# Patient Record
Sex: FEMALE | Race: WHITE | NOT HISPANIC OR LATINO | URBAN - METROPOLITAN AREA
[De-identification: names, ages, dates, MRNs, and addresses within clinical notes are randomized per-mention and may not be internally consistent; named-entity substitution may affect disease eponyms.]

---

## 2017-10-03 ENCOUNTER — EMERGENCY (EMERGENCY)
Facility: HOSPITAL | Age: 72
LOS: 1 days | Discharge: HOME | End: 2017-10-03
Attending: EMERGENCY MEDICINE | Admitting: EMERGENCY MEDICINE
Payer: MEDICARE

## 2017-10-03 VITALS
RESPIRATION RATE: 18 BRPM | OXYGEN SATURATION: 98 % | DIASTOLIC BLOOD PRESSURE: 92 MMHG | HEART RATE: 75 BPM | SYSTOLIC BLOOD PRESSURE: 137 MMHG

## 2017-10-03 VITALS
RESPIRATION RATE: 18 BRPM | DIASTOLIC BLOOD PRESSURE: 90 MMHG | TEMPERATURE: 97 F | OXYGEN SATURATION: 100 % | SYSTOLIC BLOOD PRESSURE: 160 MMHG | HEART RATE: 98 BPM

## 2017-10-03 DIAGNOSIS — N20.0 CALCULUS OF KIDNEY: ICD-10-CM

## 2017-10-03 LAB
ALBUMIN SERPL ELPH-MCNC: 4.3 G/DL — SIGNIFICANT CHANGE UP (ref 3.3–5)
ALP SERPL-CCNC: 60 U/L — SIGNIFICANT CHANGE UP (ref 40–120)
ALT FLD-CCNC: 18 U/L — SIGNIFICANT CHANGE UP (ref 4–33)
APPEARANCE UR: SIGNIFICANT CHANGE UP
AST SERPL-CCNC: 26 U/L — SIGNIFICANT CHANGE UP (ref 4–32)
BASOPHILS # BLD AUTO: 0.04 K/UL — SIGNIFICANT CHANGE UP (ref 0–0.2)
BASOPHILS NFR BLD AUTO: 0.3 % — SIGNIFICANT CHANGE UP (ref 0–2)
BILIRUB SERPL-MCNC: 0.4 MG/DL — SIGNIFICANT CHANGE UP (ref 0.2–1.2)
BILIRUB UR-MCNC: NEGATIVE — SIGNIFICANT CHANGE UP
BLOOD UR QL VISUAL: HIGH
BUN SERPL-MCNC: 34 MG/DL — HIGH (ref 7–23)
CALCIUM SERPL-MCNC: 9.1 MG/DL — SIGNIFICANT CHANGE UP (ref 8.4–10.5)
CHLORIDE SERPL-SCNC: 103 MMOL/L — SIGNIFICANT CHANGE UP (ref 98–107)
CO2 SERPL-SCNC: 25 MMOL/L — SIGNIFICANT CHANGE UP (ref 22–31)
COLOR SPEC: YELLOW — SIGNIFICANT CHANGE UP
CREAT SERPL-MCNC: 1.6 MG/DL — HIGH (ref 0.5–1.3)
EOSINOPHIL # BLD AUTO: 0.05 K/UL — SIGNIFICANT CHANGE UP (ref 0–0.5)
EOSINOPHIL NFR BLD AUTO: 0.4 % — SIGNIFICANT CHANGE UP (ref 0–6)
GLUCOSE SERPL-MCNC: 116 MG/DL — HIGH (ref 70–99)
GLUCOSE UR-MCNC: NEGATIVE — SIGNIFICANT CHANGE UP
HCT VFR BLD CALC: 39.9 % — SIGNIFICANT CHANGE UP (ref 34.5–45)
HGB BLD-MCNC: 13.4 G/DL — SIGNIFICANT CHANGE UP (ref 11.5–15.5)
IMM GRANULOCYTES # BLD AUTO: 0.08 # — SIGNIFICANT CHANGE UP
IMM GRANULOCYTES NFR BLD AUTO: 0.6 % — SIGNIFICANT CHANGE UP (ref 0–1.5)
KETONES UR-MCNC: NEGATIVE — SIGNIFICANT CHANGE UP
LEUKOCYTE ESTERASE UR-ACNC: HIGH
LYMPHOCYTES # BLD AUTO: 1.43 K/UL — SIGNIFICANT CHANGE UP (ref 1–3.3)
LYMPHOCYTES # BLD AUTO: 10.3 % — LOW (ref 13–44)
MCHC RBC-ENTMCNC: 30.2 PG — SIGNIFICANT CHANGE UP (ref 27–34)
MCHC RBC-ENTMCNC: 33.6 % — SIGNIFICANT CHANGE UP (ref 32–36)
MCV RBC AUTO: 90.1 FL — SIGNIFICANT CHANGE UP (ref 80–100)
MONOCYTES # BLD AUTO: 1.27 K/UL — HIGH (ref 0–0.9)
MONOCYTES NFR BLD AUTO: 9.1 % — SIGNIFICANT CHANGE UP (ref 2–14)
MUCOUS THREADS # UR AUTO: SIGNIFICANT CHANGE UP
NEUTROPHILS # BLD AUTO: 11.01 K/UL — HIGH (ref 1.8–7.4)
NEUTROPHILS NFR BLD AUTO: 79.3 % — HIGH (ref 43–77)
NITRITE UR-MCNC: NEGATIVE — SIGNIFICANT CHANGE UP
NRBC # FLD: 0 — SIGNIFICANT CHANGE UP
PH UR: 5.5 — SIGNIFICANT CHANGE UP (ref 4.6–8)
PLATELET # BLD AUTO: 253 K/UL — SIGNIFICANT CHANGE UP (ref 150–400)
PMV BLD: 11.6 FL — SIGNIFICANT CHANGE UP (ref 7–13)
POTASSIUM SERPL-MCNC: 4.8 MMOL/L — SIGNIFICANT CHANGE UP (ref 3.5–5.3)
POTASSIUM SERPL-SCNC: 4.8 MMOL/L — SIGNIFICANT CHANGE UP (ref 3.5–5.3)
PROT SERPL-MCNC: 7.2 G/DL — SIGNIFICANT CHANGE UP (ref 6–8.3)
PROT UR-MCNC: 20 — SIGNIFICANT CHANGE UP
RBC # BLD: 4.43 M/UL — SIGNIFICANT CHANGE UP (ref 3.8–5.2)
RBC # FLD: 13.5 % — SIGNIFICANT CHANGE UP (ref 10.3–14.5)
RBC CASTS # UR COMP ASSIST: >50 — HIGH (ref 0–?)
SODIUM SERPL-SCNC: 142 MMOL/L — SIGNIFICANT CHANGE UP (ref 135–145)
SP GR SPEC: 1.02 — SIGNIFICANT CHANGE UP (ref 1–1.03)
SQUAMOUS # UR AUTO: SIGNIFICANT CHANGE UP
UROBILINOGEN FLD QL: NORMAL E.U. — SIGNIFICANT CHANGE UP (ref 0.1–0.2)
WBC # BLD: 13.88 K/UL — HIGH (ref 3.8–10.5)
WBC # FLD AUTO: 13.88 K/UL — HIGH (ref 3.8–10.5)
WBC UR QL: SIGNIFICANT CHANGE UP (ref 0–?)

## 2017-10-03 PROCEDURE — 99285 EMERGENCY DEPT VISIT HI MDM: CPT

## 2017-10-03 PROCEDURE — 74176 CT ABD & PELVIS W/O CONTRAST: CPT | Mod: 26

## 2017-10-03 RX ORDER — ACETAMINOPHEN 500 MG
1000 TABLET ORAL ONCE
Qty: 0 | Refills: 0 | Status: COMPLETED | OUTPATIENT
Start: 2017-10-03 | End: 2017-10-03

## 2017-10-03 RX ORDER — CEPHALEXIN 500 MG
1 CAPSULE ORAL
Qty: 20 | Refills: 0 | OUTPATIENT
Start: 2017-10-03 | End: 2017-10-13

## 2017-10-03 RX ORDER — CEPHALEXIN 500 MG
500 CAPSULE ORAL ONCE
Qty: 0 | Refills: 0 | Status: COMPLETED | OUTPATIENT
Start: 2017-10-03 | End: 2017-10-03

## 2017-10-03 RX ORDER — TAMSULOSIN HYDROCHLORIDE 0.4 MG/1
1 CAPSULE ORAL
Qty: 8 | Refills: 0 | OUTPATIENT
Start: 2017-10-03 | End: 2017-10-11

## 2017-10-03 RX ORDER — OXYCODONE HYDROCHLORIDE 5 MG/1
10 TABLET ORAL ONCE
Qty: 0 | Refills: 0 | Status: DISCONTINUED | OUTPATIENT
Start: 2017-10-03 | End: 2017-10-03

## 2017-10-03 RX ORDER — OXYCODONE HYDROCHLORIDE 5 MG/1
1 TABLET ORAL
Qty: 10 | Refills: 0 | OUTPATIENT
Start: 2017-10-03

## 2017-10-03 RX ADMIN — Medication 1000 MILLIGRAM(S): at 10:40

## 2017-10-03 RX ADMIN — OXYCODONE HYDROCHLORIDE 10 MILLIGRAM(S): 5 TABLET ORAL at 10:46

## 2017-10-03 RX ADMIN — OXYCODONE HYDROCHLORIDE 10 MILLIGRAM(S): 5 TABLET ORAL at 11:16

## 2017-10-03 RX ADMIN — Medication 500 MILLIGRAM(S): at 11:03

## 2017-10-03 RX ADMIN — Medication 400 MILLIGRAM(S): at 10:02

## 2017-10-03 NOTE — CONSULT NOTE ADULT - SUBJECTIVE AND OBJECTIVE BOX
72yoF complaining of 1 day history of light pink urine and L sided flank pain.  Patient has a history of kidney stones, all of which have passed spontaneously.  Patient denies fevers, chills, vomiting.  Endorses some nausea due to pain, pain currently controlled after one dose of Tylenol and Oxycodone.  Tolerating PO.      PAST MEDICAL & SURGICAL HISTORY:  Dyslipidemia  HTN (Hypertension)  Status Post Total Knee Replace: left    Allergies  No Known Allergies    REVIEW OF SYSTEMS: Pertinent positives and negatives as stated in HPI, otherwise negative    AF  137/92  75   98%    Physical Exam  Gen: NAD  Pulm: No respiratory distress, no subcostal retractions  CV: RRR, no JVD  Abd: Soft, NT, ND  : No CVAT on either side  MSK: No edema present    LABS:  10-03 @ 09:45  WBC 13.88 / Hct 39.9  / SCr 1.60     10-03    142  |  103  |  34<H>  ----------------------------<  116<H>  4.8   |  25  |  1.60<H>    Ca    9.1      03 Oct 2017 09:45    TPro  7.2  /  Alb  4.3  /  TBili  0.4  /  DBili  x   /  AST  26  /  ALT  18  /  AlkPhos  60  10-03    Urinalysis Basic - ( 03 Oct 2017 10:04 )    Color: YELLOW / Appearance: HAZY / S.021 / pH: 5.5  Gluc: NEGATIVE / Ketone: NEGATIVE  / Bili: NEGATIVE / Urobili: NORMAL E.U.   Blood: MODERATE / Protein: 20 / Nitrite: NEGATIVE   Leuk Esterase: LARGE / RBC: >50 / WBC 2-5   Sq Epi: FEW / Non Sq Epi: x / Bacteria: x    Urine Cx: p    RADIOLOGY:  IMPRESSION: A 4 mm stone is at the left ureterovesicular junction with   mild to moderate left hydroureteronephrosis and perinephric fat stranding.

## 2017-10-03 NOTE — ED PROVIDER NOTE - PHYSICAL EXAMINATION
ATTENDING PHYSICAL EXAM DR. DEY ***GEN - NAD; well appearing; A+O x3 ***HEAD - NC/AT ***EYES/NOSE - PERRL, EOMI, mucous membranes moist, no discharge ***THROAT: Oral cavity and pharynx normal. No inflammation, swelling, exudate, or lesions.  ***NECK: Neck supple, non-tender without lymphadenopathy, no masses, no thyromegaly.   ***PULMONARY - CTA b/l, symmetric breath sounds. ***CARDIAC -s1s2, RRR, no M,G,R  ***ABDOMEN - +BS, ND, NT, soft, no guarding, no rebound, no masses   ***BACK - no CVA tenderness, Normal  spine ***EXTREMITIES - symmetric pulses, 2+ dp, capillary refill < 2 seconds, no clubbing, no cyanosis, no edema ***SKIN - no rash or bruising   ***NEUROLOGIC - alert

## 2017-10-03 NOTE — ED ADULT TRIAGE NOTE - CHIEF COMPLAINT QUOTE
c/o left sided flank pain getting worse since yesterday. states "I saw blood in my urine yesterday."  c/o nausea. denies vomiting.   h/o HTN, kidney stones in past

## 2017-10-03 NOTE — ED PROVIDER NOTE - MEDICAL DECISION MAKING DETAILS
Flank pain most consistent with nephrolithiasis  1) LABS/UA/  2) CT abd/pelvis - stone [place]  3) pain control and anti-emetics as needed 4) reassess 5) urologic consultation if necessary

## 2017-10-03 NOTE — ED ADULT NURSE NOTE - OBJECTIVE STATEMENT
pt received intake spot 9. pt alert and responsive, A+OX3, c/o left flank pain with hematuria. pt has hx of kidney stones. denies fever/chills/dysuria. labs sent. IVSL in place. medicated as ordered. awaiting CT scan. will monitor.

## 2017-10-03 NOTE — CONSULT NOTE ADULT - PROBLEM SELECTOR RECOMMENDATION 9
-Pain control  -Aggressive hydration  -Flomax 0.4mg qHS  -Bowel regiment with senna, colace, miralax  -Zofran for nausea  -Antibiotics on d/c  -Strain urine for calculi  -Follow up urine culture  -Follow up with Dr. Burrows in the office on Thursday

## 2017-10-03 NOTE — ED ADULT NURSE REASSESSMENT NOTE - NS ED NURSE REASSESS COMMENT FT1
Pt reports partial improvement in pain after iv acetaminophen but reports still feeling uncomfortable.  MD Thompson contacted, 10 mg oxycodone IR ordered and given po.  Will continue to monitor.

## 2017-10-03 NOTE — ED PROVIDER NOTE - OBJECTIVE STATEMENT
Site/Location - suprapubic initially then left flank  Intensity / Severity - 9/10  Quality / Character - sharp  Onset / Duration - yesterday afternoon 2pm, initially constant, now intermittent  Radiation - left flank to suprapubic region  Context - hx of kidney stones  Aggravating factors -   Alleviating factors - mild relief with oxycodone  Associated Signs and Symptoms - noticed hematuria yesterday, no dysuria, +nausea, no vomiting, last BM yesterday, no black or bloody stool Site/Location - suprapubic initially then left flank  Intensity / Severity - 9/10  Quality / Character - sharp  Onset / Duration - yesterday afternoon 2pm, initially constant, now intermittent  Radiation - left flank to suprapubic region  Context - hx of kidney stones  Aggravating factors - none  Alleviating factors - mild relief with oxycodone  Associated Signs and Symptoms - noticed hematuria yesterday, pink urine, no dysuria, +nausea, no vomiting, last BM yesterday, no black or bloody stool, no arm or leg pain

## 2017-10-04 LAB
BACTERIA UR CULT: SIGNIFICANT CHANGE UP
SPECIMEN SOURCE: SIGNIFICANT CHANGE UP

## 2017-10-05 ENCOUNTER — TRANSCRIPTION ENCOUNTER (OUTPATIENT)
Age: 72
End: 2017-10-05

## 2017-11-08 ENCOUNTER — FORM ENCOUNTER (OUTPATIENT)
Age: 72
End: 2017-11-08

## 2017-11-09 ENCOUNTER — OUTPATIENT (OUTPATIENT)
Dept: OUTPATIENT SERVICES | Facility: HOSPITAL | Age: 72
LOS: 1 days | End: 2017-11-09
Payer: MEDICARE

## 2017-11-09 ENCOUNTER — APPOINTMENT (OUTPATIENT)
Dept: RADIOLOGY | Facility: IMAGING CENTER | Age: 72
End: 2017-11-09
Payer: MEDICARE

## 2017-11-09 ENCOUNTER — APPOINTMENT (OUTPATIENT)
Dept: UROLOGY | Facility: CLINIC | Age: 72
End: 2017-11-09
Payer: MEDICARE

## 2017-11-09 VITALS
BODY MASS INDEX: 29.99 KG/M2 | TEMPERATURE: 98.7 F | WEIGHT: 180 LBS | SYSTOLIC BLOOD PRESSURE: 147 MMHG | HEART RATE: 67 BPM | HEIGHT: 65 IN | DIASTOLIC BLOOD PRESSURE: 91 MMHG

## 2017-11-09 DIAGNOSIS — N20.0 CALCULUS OF KIDNEY: ICD-10-CM

## 2017-11-09 DIAGNOSIS — Z00.00 ENCOUNTER FOR GENERAL ADULT MEDICAL EXAMINATION WITHOUT ABNORMAL FINDINGS: ICD-10-CM

## 2017-11-09 DIAGNOSIS — Z87.442 PERSONAL HISTORY OF URINARY CALCULI: ICD-10-CM

## 2017-11-09 PROCEDURE — 99203 OFFICE O/P NEW LOW 30 MIN: CPT

## 2017-11-09 PROCEDURE — 74000: CPT | Mod: 26

## 2017-11-09 PROCEDURE — 74018 RADEX ABDOMEN 1 VIEW: CPT

## 2017-11-13 LAB
APPEARANCE: ABNORMAL
BACTERIA UR CULT: NORMAL
BACTERIA: NEGATIVE
BILIRUBIN URINE: NEGATIVE
BLOOD URINE: NEGATIVE
COLOR: ABNORMAL
COMPN STONE: NORMAL
GLUCOSE QUALITATIVE U: NEGATIVE MG/DL
HYALINE CASTS: 6 /LPF
KETONES URINE: ABNORMAL
LEUKOCYTE ESTERASE URINE: ABNORMAL
MICROSCOPIC-UA: NORMAL
NITRITE URINE: NEGATIVE
PH URINE: 5
PROTEIN URINE: NEGATIVE MG/DL
RED BLOOD CELLS URINE: 4 /HPF
SPECIFIC GRAVITY URINE: 1.02
SQUAMOUS EPITHELIAL CELLS: 10 /HPF
UROBILINOGEN URINE: NEGATIVE MG/DL
WHITE BLOOD CELLS URINE: 6 /HPF

## 2017-12-11 ENCOUNTER — APPOINTMENT (OUTPATIENT)
Dept: UROLOGY | Facility: CLINIC | Age: 72
End: 2017-12-11

## 2017-12-14 ENCOUNTER — OUTPATIENT (OUTPATIENT)
Dept: OUTPATIENT SERVICES | Facility: HOSPITAL | Age: 72
LOS: 1 days | End: 2017-12-14
Payer: MEDICARE

## 2017-12-14 ENCOUNTER — APPOINTMENT (OUTPATIENT)
Dept: UROLOGY | Facility: CLINIC | Age: 72
End: 2017-12-14
Payer: MEDICARE

## 2017-12-14 DIAGNOSIS — R35.0 FREQUENCY OF MICTURITION: ICD-10-CM

## 2017-12-14 PROCEDURE — 76775 US EXAM ABDO BACK WALL LIM: CPT | Mod: 26

## 2017-12-14 PROCEDURE — 99214 OFFICE O/P EST MOD 30 MIN: CPT | Mod: 25

## 2017-12-14 PROCEDURE — 76775 US EXAM ABDO BACK WALL LIM: CPT

## 2017-12-14 RX ORDER — SIMVASTATIN 40 MG/1
40 TABLET, FILM COATED ORAL
Qty: 90 | Refills: 0 | Status: ACTIVE | COMMUNITY
Start: 2017-07-07

## 2017-12-20 DIAGNOSIS — N20.1 CALCULUS OF URETER: ICD-10-CM

## 2017-12-20 DIAGNOSIS — N20.0 CALCULUS OF KIDNEY: ICD-10-CM

## 2018-04-12 ENCOUNTER — APPOINTMENT (OUTPATIENT)
Dept: UROLOGY | Facility: CLINIC | Age: 73
End: 2018-04-12
Payer: MEDICARE

## 2018-04-12 ENCOUNTER — OUTPATIENT (OUTPATIENT)
Dept: OUTPATIENT SERVICES | Facility: HOSPITAL | Age: 73
LOS: 1 days | End: 2018-04-12
Payer: MEDICARE

## 2018-04-12 DIAGNOSIS — R35.0 FREQUENCY OF MICTURITION: ICD-10-CM

## 2018-04-12 PROCEDURE — 76775 US EXAM ABDO BACK WALL LIM: CPT | Mod: 26

## 2018-04-12 PROCEDURE — 99213 OFFICE O/P EST LOW 20 MIN: CPT | Mod: 25

## 2018-04-12 PROCEDURE — 76775 US EXAM ABDO BACK WALL LIM: CPT

## 2018-04-16 DIAGNOSIS — N20.0 CALCULUS OF KIDNEY: ICD-10-CM

## 2018-10-11 ENCOUNTER — APPOINTMENT (OUTPATIENT)
Dept: UROLOGY | Facility: CLINIC | Age: 73
End: 2018-10-11
Payer: MEDICARE

## 2018-10-11 ENCOUNTER — OUTPATIENT (OUTPATIENT)
Dept: OUTPATIENT SERVICES | Facility: HOSPITAL | Age: 73
LOS: 1 days | End: 2018-10-11
Payer: MEDICARE

## 2018-10-11 DIAGNOSIS — R35.0 FREQUENCY OF MICTURITION: ICD-10-CM

## 2018-10-11 PROCEDURE — 99213 OFFICE O/P EST LOW 20 MIN: CPT | Mod: 25

## 2018-10-11 PROCEDURE — 76775 US EXAM ABDO BACK WALL LIM: CPT | Mod: 26

## 2018-10-11 PROCEDURE — 76775 US EXAM ABDO BACK WALL LIM: CPT

## 2018-10-16 DIAGNOSIS — N20.1 CALCULUS OF URETER: ICD-10-CM

## 2018-10-16 DIAGNOSIS — N20.0 CALCULUS OF KIDNEY: ICD-10-CM

## 2018-10-30 LAB
APPEARANCE: ABNORMAL
BACTERIA: NEGATIVE
BILIRUBIN URINE: NEGATIVE
BLOOD URINE: ABNORMAL
COLOR: YELLOW
GLUCOSE QUALITATIVE U: NEGATIVE
HYALINE CASTS: 0 /LPF
KETONES URINE: NEGATIVE
LEUKOCYTE ESTERASE URINE: ABNORMAL
MICROSCOPIC-UA: NORMAL
NITRITE URINE: NEGATIVE
PH URINE: 6.5
PROTEIN URINE: NEGATIVE
RED BLOOD CELLS URINE: 45 /HPF
SPECIFIC GRAVITY URINE: 1.02
SQUAMOUS EPITHELIAL CELLS: 5 /HPF
UROBILINOGEN URINE: NEGATIVE
WHITE BLOOD CELLS URINE: 49 /HPF

## 2018-10-31 LAB — BACTERIA UR CULT: NORMAL

## 2019-03-04 ENCOUNTER — INPATIENT (INPATIENT)
Facility: HOSPITAL | Age: 74
LOS: 24 days | Discharge: NURSING, REHAB OR LONG-TERM CARE | DRG: 20 | End: 2019-03-29
Attending: NEUROLOGICAL SURGERY | Admitting: NEUROLOGICAL SURGERY
Payer: MEDICARE

## 2019-03-04 ENCOUNTER — APPOINTMENT (OUTPATIENT)
Age: 74
End: 2019-03-04
Payer: MEDICARE

## 2019-03-04 ENCOUNTER — EMERGENCY (EMERGENCY)
Facility: HOSPITAL | Age: 74
LOS: 1 days | Discharge: ANOTHER HOSPITAL | End: 2019-03-04
Attending: EMERGENCY MEDICINE | Admitting: EMERGENCY MEDICINE
Payer: MEDICARE

## 2019-03-04 VITALS
DIASTOLIC BLOOD PRESSURE: 83 MMHG | HEART RATE: 92 BPM | TEMPERATURE: 98 F | RESPIRATION RATE: 16 BRPM | SYSTOLIC BLOOD PRESSURE: 149 MMHG | OXYGEN SATURATION: 100 %

## 2019-03-04 VITALS
TEMPERATURE: 98 F | RESPIRATION RATE: 18 BRPM | SYSTOLIC BLOOD PRESSURE: 142 MMHG | HEART RATE: 92 BPM | OXYGEN SATURATION: 99 % | DIASTOLIC BLOOD PRESSURE: 86 MMHG

## 2019-03-04 VITALS
WEIGHT: 179.9 LBS | OXYGEN SATURATION: 100 % | HEART RATE: 84 BPM | SYSTOLIC BLOOD PRESSURE: 152 MMHG | DIASTOLIC BLOOD PRESSURE: 82 MMHG | RESPIRATION RATE: 19 BRPM

## 2019-03-04 DIAGNOSIS — I60.9 NONTRAUMATIC SUBARACHNOID HEMORRHAGE, UNSPECIFIED: ICD-10-CM

## 2019-03-04 LAB
ALBUMIN SERPL ELPH-MCNC: 4.2 G/DL — SIGNIFICANT CHANGE UP (ref 3.3–5)
ALP SERPL-CCNC: 62 U/L — SIGNIFICANT CHANGE UP (ref 40–120)
ALT FLD-CCNC: 14 U/L — SIGNIFICANT CHANGE UP (ref 4–33)
ANION GAP SERPL CALC-SCNC: 14 MMO/L — SIGNIFICANT CHANGE UP (ref 7–14)
ANION GAP SERPL CALC-SCNC: 15 MMOL/L — SIGNIFICANT CHANGE UP (ref 5–17)
ANION GAP SERPL CALC-SCNC: 16 MMO/L — HIGH (ref 7–14)
APTT BLD: 31.2 SEC — SIGNIFICANT CHANGE UP (ref 27.5–36.3)
AST SERPL-CCNC: 19 U/L — SIGNIFICANT CHANGE UP (ref 4–32)
BASOPHILS # BLD AUTO: 0 K/UL — SIGNIFICANT CHANGE UP (ref 0–0.2)
BASOPHILS # BLD AUTO: 0.05 K/UL — SIGNIFICANT CHANGE UP (ref 0–0.2)
BASOPHILS NFR BLD AUTO: 0.1 % — SIGNIFICANT CHANGE UP (ref 0–2)
BASOPHILS NFR BLD AUTO: 0.5 % — SIGNIFICANT CHANGE UP (ref 0–2)
BILIRUB SERPL-MCNC: < 0.2 MG/DL — LOW (ref 0.2–1.2)
BLD GP AB SCN SERPL QL: NEGATIVE — SIGNIFICANT CHANGE UP
BLD GP AB SCN SERPL QL: NEGATIVE — SIGNIFICANT CHANGE UP
BUN SERPL-MCNC: 19 MG/DL — SIGNIFICANT CHANGE UP (ref 7–23)
BUN SERPL-MCNC: 33 MG/DL — HIGH (ref 7–23)
BUN SERPL-MCNC: 34 MG/DL — HIGH (ref 7–23)
CALCIUM SERPL-MCNC: 8.2 MG/DL — LOW (ref 8.4–10.5)
CALCIUM SERPL-MCNC: 8.6 MG/DL — SIGNIFICANT CHANGE UP (ref 8.4–10.5)
CALCIUM SERPL-MCNC: 9.2 MG/DL — SIGNIFICANT CHANGE UP (ref 8.4–10.5)
CHLORIDE SERPL-SCNC: 104 MMOL/L — SIGNIFICANT CHANGE UP (ref 98–107)
CHLORIDE SERPL-SCNC: 104 MMOL/L — SIGNIFICANT CHANGE UP (ref 98–107)
CHLORIDE SERPL-SCNC: 105 MMOL/L — SIGNIFICANT CHANGE UP (ref 96–108)
CO2 SERPL-SCNC: 20 MMOL/L — LOW (ref 22–31)
CO2 SERPL-SCNC: 22 MMOL/L — SIGNIFICANT CHANGE UP (ref 22–31)
CO2 SERPL-SCNC: 24 MMOL/L — SIGNIFICANT CHANGE UP (ref 22–31)
CREAT SERPL-MCNC: 0.92 MG/DL — SIGNIFICANT CHANGE UP (ref 0.5–1.3)
CREAT SERPL-MCNC: 1.28 MG/DL — SIGNIFICANT CHANGE UP (ref 0.5–1.3)
CREAT SERPL-MCNC: 1.31 MG/DL — HIGH (ref 0.5–1.3)
EOSINOPHIL # BLD AUTO: 0.07 K/UL — SIGNIFICANT CHANGE UP (ref 0–0.5)
EOSINOPHIL # BLD AUTO: 0.1 K/UL — SIGNIFICANT CHANGE UP (ref 0–0.5)
EOSINOPHIL NFR BLD AUTO: 0.4 % — SIGNIFICANT CHANGE UP (ref 0–6)
EOSINOPHIL NFR BLD AUTO: 0.7 % — SIGNIFICANT CHANGE UP (ref 0–6)
GLUCOSE SERPL-MCNC: 111 MG/DL — HIGH (ref 70–99)
GLUCOSE SERPL-MCNC: 115 MG/DL — HIGH (ref 70–99)
GLUCOSE SERPL-MCNC: 121 MG/DL — HIGH (ref 70–99)
HCT VFR BLD CALC: 34.1 % — LOW (ref 34.5–45)
HCT VFR BLD CALC: 38.3 % — SIGNIFICANT CHANGE UP (ref 34.5–45)
HGB BLD-MCNC: 11.8 G/DL — SIGNIFICANT CHANGE UP (ref 11.5–15.5)
HGB BLD-MCNC: 12.1 G/DL — SIGNIFICANT CHANGE UP (ref 11.5–15.5)
IMM GRANULOCYTES NFR BLD AUTO: 0.7 % — SIGNIFICANT CHANGE UP (ref 0–1.5)
INR BLD: 0.96 — SIGNIFICANT CHANGE UP (ref 0.88–1.17)
LYMPHOCYTES # BLD AUTO: 1.1 K/UL — SIGNIFICANT CHANGE UP (ref 1–3.3)
LYMPHOCYTES # BLD AUTO: 1.6 K/UL — SIGNIFICANT CHANGE UP (ref 1–3.3)
LYMPHOCYTES # BLD AUTO: 15.2 % — SIGNIFICANT CHANGE UP (ref 13–44)
LYMPHOCYTES # BLD AUTO: 7.4 % — LOW (ref 13–44)
MCHC RBC-ENTMCNC: 29.7 PG — SIGNIFICANT CHANGE UP (ref 27–34)
MCHC RBC-ENTMCNC: 30.9 PG — SIGNIFICANT CHANGE UP (ref 27–34)
MCHC RBC-ENTMCNC: 31.6 % — LOW (ref 32–36)
MCHC RBC-ENTMCNC: 34.8 GM/DL — SIGNIFICANT CHANGE UP (ref 32–36)
MCV RBC AUTO: 89 FL — SIGNIFICANT CHANGE UP (ref 80–100)
MCV RBC AUTO: 93.9 FL — SIGNIFICANT CHANGE UP (ref 80–100)
MONOCYTES # BLD AUTO: 0.72 K/UL — SIGNIFICANT CHANGE UP (ref 0–0.9)
MONOCYTES # BLD AUTO: 0.8 K/UL — SIGNIFICANT CHANGE UP (ref 0–0.9)
MONOCYTES NFR BLD AUTO: 5.2 % — SIGNIFICANT CHANGE UP (ref 2–14)
MONOCYTES NFR BLD AUTO: 6.8 % — SIGNIFICANT CHANGE UP (ref 2–14)
NEUTROPHILS # BLD AUTO: 13.4 K/UL — HIGH (ref 1.8–7.4)
NEUTROPHILS # BLD AUTO: 8.01 K/UL — HIGH (ref 1.8–7.4)
NEUTROPHILS NFR BLD AUTO: 76.1 % — SIGNIFICANT CHANGE UP (ref 43–77)
NEUTROPHILS NFR BLD AUTO: 86.9 % — HIGH (ref 43–77)
NRBC # FLD: 0 K/UL — LOW (ref 25–125)
PLATELET # BLD AUTO: 226 K/UL — SIGNIFICANT CHANGE UP (ref 150–400)
PLATELET # BLD AUTO: 240 K/UL — SIGNIFICANT CHANGE UP (ref 150–400)
PMV BLD: 11.1 FL — SIGNIFICANT CHANGE UP (ref 7–13)
POTASSIUM SERPL-MCNC: 3.2 MMOL/L — LOW (ref 3.5–5.3)
POTASSIUM SERPL-MCNC: 3.7 MMOL/L — SIGNIFICANT CHANGE UP (ref 3.5–5.3)
POTASSIUM SERPL-MCNC: 3.8 MMOL/L — SIGNIFICANT CHANGE UP (ref 3.5–5.3)
POTASSIUM SERPL-SCNC: 3.2 MMOL/L — LOW (ref 3.5–5.3)
POTASSIUM SERPL-SCNC: 3.7 MMOL/L — SIGNIFICANT CHANGE UP (ref 3.5–5.3)
POTASSIUM SERPL-SCNC: 3.8 MMOL/L — SIGNIFICANT CHANGE UP (ref 3.5–5.3)
PROT SERPL-MCNC: 7 G/DL — SIGNIFICANT CHANGE UP (ref 6–8.3)
PROTHROM AB SERPL-ACNC: 10.9 SEC — SIGNIFICANT CHANGE UP (ref 9.8–13.1)
RBC # BLD: 3.83 M/UL — SIGNIFICANT CHANGE UP (ref 3.8–5.2)
RBC # BLD: 4.08 M/UL — SIGNIFICANT CHANGE UP (ref 3.8–5.2)
RBC # FLD: 13 % — SIGNIFICANT CHANGE UP (ref 10.3–14.5)
RBC # FLD: 14 % — SIGNIFICANT CHANGE UP (ref 10.3–14.5)
RH IG SCN BLD-IMP: POSITIVE — SIGNIFICANT CHANGE UP
SODIUM SERPL-SCNC: 140 MMOL/L — SIGNIFICANT CHANGE UP (ref 135–145)
SODIUM SERPL-SCNC: 140 MMOL/L — SIGNIFICANT CHANGE UP (ref 135–145)
SODIUM SERPL-SCNC: 144 MMOL/L — SIGNIFICANT CHANGE UP (ref 135–145)
TROPONIN T, HIGH SENSITIVITY: 11 NG/L — SIGNIFICANT CHANGE UP (ref ?–14)
TROPONIN T, HIGH SENSITIVITY: 12 NG/L — SIGNIFICANT CHANGE UP (ref ?–14)
WBC # BLD: 10.52 K/UL — HIGH (ref 3.8–10.5)
WBC # BLD: 15.5 K/UL — HIGH (ref 3.8–10.5)
WBC # FLD AUTO: 10.52 K/UL — HIGH (ref 3.8–10.5)
WBC # FLD AUTO: 15.5 K/UL — HIGH (ref 3.8–10.5)

## 2019-03-04 PROCEDURE — 70498 CT ANGIOGRAPHY NECK: CPT | Mod: 26

## 2019-03-04 PROCEDURE — 75894 X-RAYS TRANSCATH THERAPY: CPT | Mod: 26

## 2019-03-04 PROCEDURE — 70496 CT ANGIOGRAPHY HEAD: CPT | Mod: 26

## 2019-03-04 PROCEDURE — 99291 CRITICAL CARE FIRST HOUR: CPT

## 2019-03-04 PROCEDURE — 61624 TCAT PERM OCCLS/EMBOLJ CNS: CPT

## 2019-03-04 PROCEDURE — 71046 X-RAY EXAM CHEST 2 VIEWS: CPT | Mod: 26

## 2019-03-04 PROCEDURE — 36226 PLACE CATH VERTEBRAL ART: CPT | Mod: 50

## 2019-03-04 PROCEDURE — 76377 3D RENDER W/INTRP POSTPROCES: CPT | Mod: 26

## 2019-03-04 PROCEDURE — 36223 PLACE CATH CAROTID/INOM ART: CPT | Mod: 59,RT

## 2019-03-04 PROCEDURE — 75898 FOLLOW-UP ANGIOGRAPHY: CPT | Mod: 26

## 2019-03-04 PROCEDURE — 36224 PLACE CATH CAROTD ART: CPT | Mod: LT

## 2019-03-04 PROCEDURE — 99283 EMERGENCY DEPT VISIT LOW MDM: CPT

## 2019-03-04 PROCEDURE — 99223 1ST HOSP IP/OBS HIGH 75: CPT

## 2019-03-04 RX ORDER — NICARDIPINE HYDROCHLORIDE 30 MG/1
5 CAPSULE, EXTENDED RELEASE ORAL
Qty: 40 | Refills: 0 | Status: DISCONTINUED | OUTPATIENT
Start: 2019-03-04 | End: 2019-03-04

## 2019-03-04 RX ORDER — ACETAMINOPHEN 500 MG
975 TABLET ORAL ONCE
Qty: 0 | Refills: 0 | Status: COMPLETED | OUTPATIENT
Start: 2019-03-04 | End: 2019-03-04

## 2019-03-04 RX ORDER — POTASSIUM CHLORIDE 20 MEQ
20 PACKET (EA) ORAL
Qty: 0 | Refills: 0 | Status: COMPLETED | OUTPATIENT
Start: 2019-03-04 | End: 2019-03-05

## 2019-03-04 RX ORDER — OXYCODONE HYDROCHLORIDE 5 MG/1
5 TABLET ORAL EVERY 6 HOURS
Qty: 0 | Refills: 0 | Status: DISCONTINUED | OUTPATIENT
Start: 2019-03-04 | End: 2019-03-05

## 2019-03-04 RX ORDER — DOCUSATE SODIUM 100 MG
100 CAPSULE ORAL THREE TIMES A DAY
Qty: 0 | Refills: 0 | Status: DISCONTINUED | OUTPATIENT
Start: 2019-03-04 | End: 2019-03-08

## 2019-03-04 RX ORDER — SENNA PLUS 8.6 MG/1
1 TABLET ORAL DAILY
Qty: 0 | Refills: 0 | Status: DISCONTINUED | OUTPATIENT
Start: 2019-03-04 | End: 2019-03-09

## 2019-03-04 RX ORDER — NIMODIPINE 60 MG/10ML
60 SOLUTION ORAL EVERY 4 HOURS
Qty: 0 | Refills: 0 | Status: DISCONTINUED | OUTPATIENT
Start: 2019-03-04 | End: 2019-03-06

## 2019-03-04 RX ORDER — SENNA PLUS 8.6 MG/1
2 TABLET ORAL AT BEDTIME
Qty: 0 | Refills: 0 | Status: DISCONTINUED | OUTPATIENT
Start: 2019-03-04 | End: 2019-03-08

## 2019-03-04 RX ORDER — METOCLOPRAMIDE HCL 10 MG
10 TABLET ORAL ONCE
Qty: 0 | Refills: 0 | Status: COMPLETED | OUTPATIENT
Start: 2019-03-04 | End: 2019-03-04

## 2019-03-04 RX ORDER — ACETAMINOPHEN 500 MG
650 TABLET ORAL EVERY 6 HOURS
Qty: 0 | Refills: 0 | Status: DISCONTINUED | OUTPATIENT
Start: 2019-03-04 | End: 2019-03-08

## 2019-03-04 RX ORDER — MAGNESIUM SULFATE 500 MG/ML
2 VIAL (ML) INJECTION EVERY 8 HOURS
Qty: 0 | Refills: 0 | Status: DISCONTINUED | OUTPATIENT
Start: 2019-03-04 | End: 2019-03-08

## 2019-03-04 RX ORDER — OXYCODONE HYDROCHLORIDE 5 MG/1
10 TABLET ORAL EVERY 6 HOURS
Qty: 0 | Refills: 0 | Status: DISCONTINUED | OUTPATIENT
Start: 2019-03-04 | End: 2019-03-05

## 2019-03-04 RX ORDER — NIMODIPINE 60 MG/10ML
60 SOLUTION ORAL EVERY 4 HOURS
Qty: 0 | Refills: 0 | Status: DISCONTINUED | OUTPATIENT
Start: 2019-03-04 | End: 2019-03-04

## 2019-03-04 RX ORDER — ACETAMINOPHEN 500 MG
1000 TABLET ORAL ONCE
Qty: 0 | Refills: 0 | Status: COMPLETED | OUTPATIENT
Start: 2019-03-04 | End: 2019-03-04

## 2019-03-04 RX ORDER — SODIUM CHLORIDE 9 MG/ML
1000 INJECTION INTRAMUSCULAR; INTRAVENOUS; SUBCUTANEOUS ONCE
Qty: 0 | Refills: 0 | Status: DISCONTINUED | OUTPATIENT
Start: 2019-03-04 | End: 2019-03-04

## 2019-03-04 RX ORDER — NIMODIPINE 60 MG/10ML
60 SOLUTION ORAL ONCE
Qty: 0 | Refills: 0 | Status: COMPLETED | OUTPATIENT
Start: 2019-03-04 | End: 2019-03-04

## 2019-03-04 RX ORDER — SODIUM CHLORIDE 9 MG/ML
1000 INJECTION INTRAMUSCULAR; INTRAVENOUS; SUBCUTANEOUS
Qty: 0 | Refills: 0 | Status: DISCONTINUED | OUTPATIENT
Start: 2019-03-04 | End: 2019-03-04

## 2019-03-04 RX ORDER — LEVETIRACETAM 250 MG/1
1000 TABLET, FILM COATED ORAL EVERY 12 HOURS
Qty: 0 | Refills: 0 | Status: DISCONTINUED | OUTPATIENT
Start: 2019-03-04 | End: 2019-03-08

## 2019-03-04 RX ORDER — ONDANSETRON 8 MG/1
4 TABLET, FILM COATED ORAL EVERY 6 HOURS
Qty: 0 | Refills: 0 | Status: DISCONTINUED | OUTPATIENT
Start: 2019-03-04 | End: 2019-03-08

## 2019-03-04 RX ORDER — DEXTROSE MONOHYDRATE, SODIUM CHLORIDE, AND POTASSIUM CHLORIDE 50; .745; 4.5 G/1000ML; G/1000ML; G/1000ML
1000 INJECTION, SOLUTION INTRAVENOUS
Qty: 0 | Refills: 0 | Status: DISCONTINUED | OUTPATIENT
Start: 2019-03-04 | End: 2019-03-08

## 2019-03-04 RX ORDER — SODIUM CHLORIDE 9 MG/ML
1000 INJECTION INTRAMUSCULAR; INTRAVENOUS; SUBCUTANEOUS ONCE
Qty: 0 | Refills: 0 | Status: COMPLETED | OUTPATIENT
Start: 2019-03-04 | End: 2019-03-04

## 2019-03-04 RX ORDER — NICARDIPINE HYDROCHLORIDE 30 MG/1
5 CAPSULE, EXTENDED RELEASE ORAL
Qty: 40 | Refills: 0 | Status: DISCONTINUED | OUTPATIENT
Start: 2019-03-04 | End: 2019-03-08

## 2019-03-04 RX ORDER — DOCUSATE SODIUM 100 MG
100 CAPSULE ORAL
Qty: 0 | Refills: 0 | Status: DISCONTINUED | OUTPATIENT
Start: 2019-03-04 | End: 2019-03-05

## 2019-03-04 RX ADMIN — NICARDIPINE HYDROCHLORIDE 25 MG/HR: 30 CAPSULE, EXTENDED RELEASE ORAL at 08:11

## 2019-03-04 RX ADMIN — Medication 400 MILLIGRAM(S): at 17:00

## 2019-03-04 RX ADMIN — SODIUM CHLORIDE 1000 MILLILITER(S): 9 INJECTION INTRAMUSCULAR; INTRAVENOUS; SUBCUTANEOUS at 04:21

## 2019-03-04 RX ADMIN — Medication 10 MILLIGRAM(S): at 04:38

## 2019-03-04 RX ADMIN — Medication 975 MILLIGRAM(S): at 04:39

## 2019-03-04 RX ADMIN — SODIUM CHLORIDE 70 MILLILITER(S): 9 INJECTION INTRAMUSCULAR; INTRAVENOUS; SUBCUTANEOUS at 18:00

## 2019-03-04 RX ADMIN — NIMODIPINE 60 MILLIGRAM(S): 60 SOLUTION ORAL at 08:54

## 2019-03-04 RX ADMIN — Medication 20 MILLIEQUIVALENT(S): at 21:23

## 2019-03-04 RX ADMIN — NIMODIPINE 60 MILLIGRAM(S): 60 SOLUTION ORAL at 21:23

## 2019-03-04 RX ADMIN — SODIUM CHLORIDE 1000 MILLILITER(S): 9 INJECTION INTRAMUSCULAR; INTRAVENOUS; SUBCUTANEOUS at 05:30

## 2019-03-04 RX ADMIN — Medication 975 MILLIGRAM(S): at 06:15

## 2019-03-04 RX ADMIN — Medication 1000 MILLIGRAM(S): at 17:30

## 2019-03-04 NOTE — H&P ADULT - NSHPLABSRESULTS_GEN_ALL_CORE
CT brain showed diffuse subarachnoid hemorrhage with sentinel clot in the anterior interhemispheric fissure suggesting a common aneurysm rupture. No hydrocephalus.   CT angiography neck: No carotid or vertebral artery dissection. Atherosclerotic change at the bilateral carotid bulbs contributing to stenosis as described above. Stenosis in the neck is in keeping with the NASCET criteria.    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 No major vessel occlusion or hemodynamically significant stenosis.

## 2019-03-04 NOTE — ED ADULT NURSE NOTE - OBJECTIVE STATEMENT
75 y/o female PMH HTN presents to ED reporting neck pain for one week. Pt reports having worse pain this AM and synopsizing after getting out of bed. Per  pt was unresponsive for 1 minute, EMS was called. EMS transported pt to [facility], SAH was found on CT, pt transferred to [facility] ED for neurosurgery evaluation. On exam, AOx3, speaking in complete sentences. PERRL 3mm, equal strength and sensation in all 4 extremities. Pt denies any numbness, tingling or weakness. Pt denies CP, SOB, fevers/chills and pain at this time. 75 y/o female PMH HTN presents to ED reporting neck pain for one week. Pt reports having worse pain this AM and synopsizing after getting out of bed. Per  pt was unresponsive for 1 minute, EMS was called. EMS transported pt to [facility], SAH was found on CT, pt transferred to [facility] ED for neurosurgery evaluation. On exam, AOx3, speaking in complete sentences. PERRL 3mm, equal strength and sensation in all 4 extremities. Pt denies any numbness, tingling or weakness. Pt denies CP, SOB, fevers/chills and pain at this time. See paper neuro flow sheet.

## 2019-03-04 NOTE — ED PROVIDER NOTE - ATTENDING CONTRIBUTION TO CARE
agree with resident note  "74 year old female with unremarkable pmhx presenting with syncope 3 hours ago. Patient has been having neck pain for 1 week, in which she was woken up suddenly due to neck pain."   states pt got up today walked to his side of the bed and stood there holding bed.  Was not responding to him for 1 minute then fell to floor.  Did not hit head.  On floor eyes opened no seizure like activity but not responsive for 1 minute.  Then "came to" and has been at baseline since then.    PE: well appearing; mildly hypertensive; CTAB/L; s1 s2 no m/r/g abd soft/NT/ND ext: no edema Neuro: Cns intact 5/5 motor UE and LE; sensation intact;     Imp: CTA read as SAH likely aneurysmal; will give nimodipine and cardene to control BP; NSG consulted; signed out to Dr. Leslie; decision yet to be made about transfer

## 2019-03-04 NOTE — CHART NOTE - NSCHARTNOTEFT_GEN_A_CORE
Interventional Neuro Radiology  Pre-Procedure Note PA-C      This is a 74 year old right hand dominant female with a past medical history of hypertension on ASA presenting with syncope 3 hours ago. Patient has been having neck pain for 3 days, in which she was woken up suddenly due to neck pain. Per patient's , patient got out of bed, walked to foot of bed, was not responding to , then fell to the floor and was not responsive for one minute, until she came back to baseline. Patient does not remember walking or falling, but states she was aware of her surroundings. EMS was called and she was brought to ED. Denies head trauma, prior occurrence, denies being on blood thinners. States she feels mildly nauseous but denies vomiting, weakness, incontinence. States she returned to baseline. She was found to have SAH and was transferred here for further investigations and management.       Allergies: No Known Allergies  PMHX: dyslipidemia, hypertension  PSHX: left Knee replacement:  Social History:   FAMILY HISTORY: no pertinent family history   Current Medications: niCARdipine Infusion 5 mG/Hr IV       CBC                        12.1   10.52 )-----------( 240                   38.3       BMP    140  |  104  |  33<H>  ----------------------------<  121<H>  3.7   |  22  |  1.28    Blood Bank: 0 positive available       Assessment/Plan:   This is a 74 year old right hand dominant female   Procedure, goals, risks, benefits and alternatives were discussed with patient and patient's family.  All questions were answered. Risks include but are not limited to stroke, vessel injury, hemorrhage, and or right groin hematoma. Patient demonstrates understanding of all risks involved with this procedure and wishes to continue. Appropriate content was obtained from patient and consent is in the patient's chart. Interventional Neuro Radiology  Pre-Procedure Note PA-C    This is a 74 year old right hand dominant female with a past medical history of hypertension, dyslipidemia on ASA presenting with syncope 3 hours ago. Patient has been having neck pain for 3 days, in which she was woken up suddenly due to neck pain. Per patient's , patient got out of bed, walked to foot of bed, was not responding to , then fell to the floor and was not responsive for one minute, until she came back to baseline. Patient does not remember walking or falling, but states she was aware of her surroundings. EMS was called and she was brought to ED. Patient reports she feels mildly nauseous but denies vomiting, weakness, incontinence. She was found to have SAH and was transferred here for further investigations and management. 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 Patient was brought immediately to Neuro IR for a selective cerebral angiography and endovascular treatment.     Allergies: No Known Allergies  PMHX: dyslipidemia, hypertension  PSHX: left Knee replacement:  Social History: , non-smoker    FAMILY HISTORY: no pertinent family history   Current Medications: nicardipine Infusion 5 mG/Hr IV     CBC                        12.1   10.52 )-----------( 240                   38.3     BMP    140  |  104  |  33<H>  ----------------------------<  121<H>  3.7   |  22  |  1.28    Blood Bank: 0 positive available     Assessment/Plan:   This is a 74 year old right hand dominant female with a ruptured ACOM, brought to Neuro IR for a selective cerebral angiography and endovascular treatment of aneurysm. Procedure, goals, risks, benefits and alternatives were discussed with patient and patient's . All questions were answered. Risks include but are not limited to stroke, vessel injury, hemorrhage, and or right groin hematoma. Patient demonstrates understanding of all risks involved with this procedure and wishes to continue. Appropriate content was obtained from patient and consent is in the patient's chart.

## 2019-03-04 NOTE — ED PROVIDER NOTE - CLINICAL SUMMARY MEDICAL DECISION MAKING FREE TEXT BOX
74 year-old female p/w diffuse subarachnoid hemorrhage transfer from [facility] for NSGY intervention.  Repeat type/screen ordered.  Remaining labs and imaging performed at [facility].  NSGY aware upon patient arrival to the ER.  Patient has no FND; AAOx3; plan to continue to the Nicardipine drip for a goal SBP < 160. 74 year-old female p/w diffuse subarachnoid hemorrhage transfer from [facility] for NSGY intervention.  Repeat type/screen ordered.  Remaining labs and imaging performed at [facility].  NSGY aware upon patient arrival to the ER.  Patient has no FND; AAOx3; plan to continue to the Nicardipine drip for a goal SBP < 160.    PRISCILLA Urbina MD: Pt is a 75 y/o female with PMH HTN, transferred from from [facility] for NSG eval of SAH and ruptured aneurysm. Pt presented this AM for c/o neck pain x 1 week, and had 2 episodes of syncope this AM per . Denies trauma. Pt rec'd nimodipine and was started on cardene drip at [facility] for BP control. Pt with nonfocal neurologic exam at this time. Per NSG, plan is to obtain CT scan and admit to NSG ICU. Will keep BP <160 with cardene drip. 74 year-old female p/w diffuse subarachnoid hemorrhage transfer from [facility] for NSGY intervention.  Repeat type/screen ordered.  Remaining labs and imaging performed at [facility].  NSGY aware upon patient arrival to the ER.  Patient has no FND; AAOx3; plan to continue to the Nicardipine drip for a goal SBP < 160.    PRISCILLA Urbina MD: Pt is a 73 y/o female with PMH HTN, transferred from from [facility] for NSG eval of SAH and ruptured aneurysm. Pt presented this AM for c/o neck pain x 1 week, and had 2 episodes of syncope this AM per . Denies trauma. Pt rec'd nimodipine and was started on cardene drip at [facility] for BP control. Pt with nonfocal neurologic exam at this time. Per NSG, plan is to obtain CT scan and admit to NSG ICU.  Will keep BP <160 with cardene drip.

## 2019-03-04 NOTE — CHART NOTE - NSCHARTNOTEFT_GEN_A_CORE
CAPRINI SCORE [CLOT] Score on Admission for     AGE RELATED RISK FACTORS                                                       MOBILITY RELATED FACTORS  [ ] Age 41-60 years                                            (1 Point)                  [ ] Bed rest                                                        (1 Point)  [ x] Age: 61-74 years                                           (2 Points)                 [ ] Plaster cast                                                   (2 Points)  [ ] Age= 75 years                                              (3 Points)                 [ ] Bed bound for more than 72 hours                 (2 Points)    DISEASE RELATED RISK FACTORS                                               GENDER SPECIFIC FACTORS  [ ] Edema in the lower extremities                       (1 Point)                  [ ] Pregnancy                                                     (1 Point)  [ ] Varicose veins                                               (1 Point)                  [ ] Post-partum < 6 weeks                                   (1 Point)             [x ] BMI > 25 Kg/m2                                            (1 Point)                  [ ] Hormonal therapy  or oral contraception          (1 Point)                 [ ] Sepsis (in the previous month)                        (1 Point)                  [ ] History of pregnancy complications                 (1 point)  [ ] Pneumonia or serious lung disease                                               [ ] Unexplained or recurrent                     (1 Point)           (in the previous month)                               (1 Point)  [ ] Abnormal pulmonary function test                     (1 Point)                 SURGERY RELATED RISK FACTORS (include planned surgeries)  [ ] Acute myocardial infarction                              (1 Point)                 [ ]  Section                                             (1 Point)  [ ] Congestive heart failure (in the previous month)  (1 Point)         [ ] Minor surgery                                                  (1 Point)   [ ] Inflammatory bowel disease                             (1 Point)                 [ ] Arthroscopic surgery                                        (2 Points)  [ ] Central venous access                                      (2 Points)                [x ] General surgery lasting more than 45 minutes   (2 Points)       [ ] Stroke (in the previous month)                          (5 Points)               [ ] Elective arthroplasty                                         (5 Points)            [ ] current or past malignancy                              (2 Points)                                                                                                       HEMATOLOGY RELATED FACTORS                                                 TRAUMA RELATED RISK FACTORS  [ ] Prior episodes of VTE                                     (3 Points)                [ ] Fracture of the hip, pelvis, or leg                       (5 Points)  [ ] Positive family history for VTE                         (3 Points)                 [ ] Acute spinal cord injury (in the previous month)  (5 Points)  [ ] Prothrombin 88092 A                                     (3 Points)                 [ ] Paralysis  (less than 1 month)                             (5 Points)  [ ] Factor V Leiden                                             (3 Points)                  [ ] Multiple Trauma within 1 month                        (5 Points)  [ ] Lupus anticoagulants                                     (3 Points)                                                           [ ] Anticardiolipin antibodies                               (3 Points)                                                       [ ] High homocysteine in the blood                      (3 Points)                                             [ ] Other congenital or acquired thrombophilia      (3 Points)                                                [ ] Heparin induced thrombocytopenia                  (3 Points)                                          Total Score [    5      ]    Risk:  Very low 0   Low 1 to 2   Moderate 3 to 4   High =5       VTE Prophylasix Recommednations:  [x ] mechanical pneumatic compression devices                                      [ ] contraindicated: _____________________  [ ] chemo prophylasix                                                                                   [x ] contraindicated _____SAH________________    **** HIGH LIKELIHOOD DVT PRESENT ON ADMISSION  [x] (please order LE dopplers within 24 hours of admission)

## 2019-03-04 NOTE — CONSULT NOTE ADULT - SUBJECTIVE AND OBJECTIVE BOX
LEONARD DUPREE 74y ,Female  HPI:   74 year old female with unremarkable pmhx presenting with syncope 3 hours ago. Patient has been having neck pain for 1 week, in which she was woken up suddenly due to neck pain. Per patient's , patient got out of bed, walked to foot of bed, was not responding to , then fell to the floor and was not responsive for one minute, until she came back to baseline. Patient does not remember walking or falling, but states she was aware of her surroundings. EMS was called and she was brought to ED. Denies head trauma, prior occurrence, denies being on blood thinners. States she feels mildly nauseous but denies vomiting, weakness, incontinence. States she returned to baseline.  CTA of the head shows CT brain. Diffuse subarachnoid hemorrhage with sentinel clot in the anterior interhemispheric fissure suggesting a common aneurysm rupture. No hydrocephalus. CT angiography neck: No carotid or vertebral artery dissection. Atherosclerotic change at the bilateral carotid bulbs contributing to stenosis as described above. Stenosis in the neck is in keeping with the NASCET criteria.  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 No major vessel occlusion or hemodynamically significant stenosis.	    PAST MEDICAL & SURGICAL HISTORY:  Dyslipidemia  HTN (Hypertension)  Status Post Total Knee Replace: left    Allergies  No Known Allergies    MEDICATIONS  (STANDING):  niCARdipine Infusion 5 mG/Hr (25 mL/Hr) IV Continuous <Continuous>  niMODipine 60 milliGRAM(s) Oral once    MEDICATIONS  (PRN):    Vital Signs Last 24 Hrs  T(C): 36.9 (04 Mar 2019 07:06), Max: 36.9 (04 Mar 2019 07:06)  T(F): 98.5 (04 Mar 2019 07:06), Max: 98.5 (04 Mar 2019 07:06)  HR: 82 (04 Mar 2019 08:11) (77 - 92)  BP: 184/86 (04 Mar 2019 08:11) (149/83 - 184/86)  BP(mean): --  RR: 20 (04 Mar 2019 08:11) (16 - 20)  SpO2: 100% (04 Mar 2019 08:11) (100% - 100%)    PE:  AA&0 x 3, CN 2-12 grossly intact, speach clear, follows commands, PERRL  NC/AT  Motor: strength : BUE/BLE 5/5  Sensory: intact to light touch  no drift      LABS:                        12.1   10.52 )-----------( 240      ( 04 Mar 2019 04:10 )             38.3     03-04    140  |  104  |  33<H>  ----------------------------<  121<H>  3.7   |  22  |  1.28    Ca    8.6      04 Mar 2019 07:02    TPro  7.0  /  Alb  4.2  /  TBili  < 0.2<L>  /  DBili  x   /  AST  19  /  ALT  14  /  AlkPhos  62  03-04    PT/INR - ( 04 Mar 2019 08:10 )   PT: 10.9 SEC;   INR: 0.96     PTT - ( 04 Mar 2019 08:10 )  PTT:31.2 SEC

## 2019-03-04 NOTE — ED PROVIDER NOTE - OBJECTIVE STATEMENT
74 year old female with unremarkable pmhx presenting with syncope 3 hours ago. Patient has been having neck pain for 1 week, in which she was woken up suddenly due to neck pain. Per patient's , patient got out of bed, walked to foot of bed, was not responding to , then fell to the floor and was not responsive for one minute, until she came back to baseline. Patient does not remember walking or falling, but states she was aware of her surroundings. EMS was called and she was brought to ED. Denies head trauma, prior occurrence, denies being on blood thinners. States she feels mildly nauseous but denies vomiting. States she returned to baseline.    Denies postictal state, new weakness, urinary or fecal incontinence. Denies CP SOB lightheadedness, HA

## 2019-03-04 NOTE — CHART NOTE - NSCHARTNOTEFT_GEN_A_CORE
Interventional Neuro- Radiology   Procedure Note JEANNIE    Procedure: Selective Cerebral Angiography   Pre- Procedure Diagnosis:  Post- Procedure Diagnosis:    : Dr. Hector Castro  Physician Assistant: Jeimy Taylor PA-C    Nurse:  radiologic Tech:  Anesthesia: (MAC)   (general anesthesia)  Sheath:    I/Os:  Estimated blood loss less than 10cc  IV fluids:     cc     Urine output     cc        Contrast Omnipaque 240      cc         Antibiotics:    Vitals: BP         HR      Spo2      Preliminary Report:  Using a 4 Malawian short sheath to the right groin under MAC sedation via   left vertebral artery,  left common carotid artery, left external carotid artery, right vertebral artery,  right common carotid artery, right external carotid artery  a selective cerebral angiography was performed and  demonstrates ________. ( Official note to follow).  Patient tolerated procedure well, hemodynamically stable, no change in neurological status compared to baseline.  Results discussed with neurosurgery, patient and patient's  family.  Right Groin sheath was removed, manual compression held to hemostasis  for  21 minutes, no active bleeding, no hematoma,  quick clot and safeguard balloon dressing applied at _____h. Interventional Neuro- Radiology   Procedure Note PA-C    Procedure: Selective Cerebral Angiography   Pre- Procedure Diagnosis:  ACOM artery aneurysm   Post- Procedure Diagnosis:    : Dr. Hector Castro  Physician Assistant: Jeimy Taylor PA-C    Nurse:                  Aura Beard RN  Radiologic Tech:  Val Cortes LRT  Anesthesiologist:  Dr Celine Lovelace  Sheath:                 4 Syriac short sheath     I/Os: EBL less than 10cc  IV fluids:     cc     Urine output     cc        Contrast Omnipaque 240      Antibiotics: Ancef     Vitals: BP  121/57       HR  96    Spo2 99%      Preliminary Report:  Using a 4 Syriac short sheath to the right groin under MAC sedation via left vertebral artery,  left common carotid artery, left external carotid artery, right vertebral artery,  right common carotid artery, right external carotid artery  a selective cerebral angiography was performed and  demonstrated Official note to follow).  Patient tolerated procedure well, hemodynamically stable, no change in neurological status compared to baseline. Results discussed with neuro ICU, patient and patient's  family.  Right Groin sheath was removed, manual compression held to hemostasis for 20 minutes, no active bleeding, no hematoma,  quick clot and safeguard balloon dressing applied at Interventional Neuro- Radiology   Procedure Note PA-C    Procedure: Selective Cerebral Angiography   Pre- Procedure Diagnosis:  ACOM artery aneurysm   Post- Procedure Diagnosis:    : Dr. Hector Castro  Physician Assistant: Jeimy Taylor PA-C    Nurse:                  Aura Lui RN  Radiologic Tech:  Val Cortes LRT  Anesthesiologist:  Dr Celine Boss   Sheath:                 4 Australian short sheath     I/Os: EBL less than 10cc  IV fluids:     cc  Urine output     cc  Contrast Omnipaque 240      Antibiotics: Ancef        Ancef 2grams    Vitals: BP  121/57       HR  96    Spo2 99%      Preliminary Report:  Using a 4 Australian short sheath to the right groin under general anesthesia via left vertebral artery, left common carotid artery, left external carotid artery, right vertebral artery, right common carotid artery, right external carotid artery  a selective cerebral angiography was performed and  demonstrated Official note to follow  Patient tolerated procedure well, hemodynamically stable, no change in neurological status compared to baseline. Results discussed with neuro ICU, patient and patient's family.  Right Groin sheath was removed, manual compression held to hemostasis for 20 minutes, no active bleeding, no hematoma, quick clot and safeguard balloon dressing applied at Interventional Neuro- Radiology   Procedure Note PA-C    Procedure: Selective Cerebral Angiography   Pre- Procedure Diagnosis:  ACOM artery aneurysm   Post- Procedure Diagnosis:    : Dr. Hector Castro  Physician Assistant: Jeimy Taylor PA-C    Nurse:                  Aura Lui RN  Radiologic Tech:  Val Cortes LRT  Anesthesiologist:  Dr Celine Boss   Sheath:                 4 Macanese short sheath     I/Os: EBL less than 10cc  IV fluids:     cc  Urine output    Contrast Omnipaque 240      Antibiotics: Ancef        Nicardipine 3mg     Vitals: BP  121/57       HR  96    Spo2 99%      Preliminary Report:  Using a 4 Macanese short sheath to the right groin under general anesthesia via left vertebral artery, left common carotid artery, left external carotid artery, right vertebral artery, right common carotid artery, right external carotid artery  a selective cerebral angiography was performed and  demonstrated Official note to follow  Patient tolerated procedure well, hemodynamically stable, no change in neurological status compared to baseline. Results discussed with neuro ICU, patient and patient's family.  Right Groin sheath was removed, manual compression held to hemostasis for 20 minutes, no active bleeding, no hematoma, quick clot and safeguard balloon dressing applied at Interventional Neuro- Radiology   Procedure Note PA-C    Procedure: Selective Cerebral Angiography and balloon assisted coiling with 2 coils   Pre- Procedure Diagnosis:     ACOM artery aneurysm   Post- Procedure Diagnosis:    ACOM artery aneurysm s/post balloon assisted coiling with 2 coils     : Dr. Hector Castro  Physician Assistant: TERRELL Yepez-C    Nurse:                  Aura Lui RN  Radiologic Tech:  Val Cortes LRT  [facility]esthesiologist:  [facility]   Sheath:                4 Icelandic short sheath exchanged for a Benchmark    I/Os: EBL less than 10cc  IV fluids: 1,000cc Urine output 1050cc Contrast 240 173cc     Antibiotics: Ancef        Nicardipine 3mg     Vitals: BP  121/57       HR  96    Spo2 99%      Preliminary Report:  Using a 4 Icelandic short sheath exchanged for a benchmark to the right groin under general anesthesia via left vertebral artery, left common carotid artery, left internal carotid artery, right vertebral artery, right common carotid artery a selective cerebral angiography was performed and demonstrated an ACOM artery aneurysm, measuring 2.4mm, 4.8mm by 3.3mm with a 2.4mm neck. Patient underwent successful endovascular treatment using balloon assisted coiling, Official note to follow.   Patient tolerated procedure well, hemodynamically stable, no change in neurological status compared to baseline. Results discussed with neuro ICU and patient's family. Right groin sheath was removed, a star close device and manual compression held to hemostasis for 20 minutes, no active bleeding, no hematoma, quick clot and safeguard balloon dressing applied at 1445. Interventional Neuro- Radiology   Procedure Note PA-C    Procedure: Selective Cerebral Angiography and balloon assisted coiling with 2 coils   Pre- Procedure Diagnosis:     ACOM artery aneurysm   Post- Procedure Diagnosis:    ACOM artery aneurysm s/post balloon assisted coiling with 2 coils     : Dr. Hector Castro  Physician Assistant: SUSANA YepezC    Nurse:                  Aura Lui RN  Radiologic Tech:  Val Cortes LRT  Anesthesiologist:  [facility] C[facility]   Sheath:                4 Wolof short sheath exchanged for a Benchmark    I/Os: EBL less than 10cc  IV fluids: 1,000cc Urine output 1050cc Contrast 240 173cc     Antibiotics: Ancef 2 grams       Nicardipine 3mg left ICA    Vitals: BP  121/57       HR  96    Spo2 99%      Preliminary Report:  Using a 4 Wolof short sheath exchanged for a benchmark to the right groin under general anesthesia via left vertebral artery, left common carotid artery, left internal carotid artery, right vertebral artery, right common carotid artery a selective cerebral angiography was performed and demonstrated an ACOM artery aneurysm, measuring 2.4mm, 4.8mm by 3.3mm with a 2.4mm neck. Patient underwent successful endovascular treatment using balloon assisted coiling, Official note to follow.   Patient tolerated procedure well, hemodynamically stable, no change in neurological status compared to baseline. Results discussed with neuro ICU and patient's family. Right groin sheath was removed, a star close device and manual compression held to hemostasis for 20 minutes, no active bleeding, no hematoma, quick clot and safeguard balloon dressing applied at 1445. Disposition Neuro ICU bed 9, labs at 1900 hours. Interventional Neuro- Radiology   Procedure Note PA-C    Procedure: Selective Cerebral Angiography and balloon assisted coiling with 2 coils   Pre- Procedure Diagnosis:     ACOM artery aneurysm   Post- Procedure Diagnosis:    ACOM artery aneurysm s/post balloon assisted coiling with 2 coils     : Dr. Hector Castro  Physician Assistant: SUSANA YepezC    Nurse:                  Aura Lui RN  Radiologic Tech:  Val Cortes LRT  Anesthesiologist:  Dr Celine [facility]   Sheath:                4 Wolof short sheath exchanged for a Benchmark    I/Os: EBL less than 10cc  IV fluids: 1,000cc Urine output 1050cc Contrast 240 173cc     Antibiotics: Ancef 2 grams       Nicardipine 3mg left ICA    Vitals: BP  121/57       HR  96    Spo2 99%      Preliminary Report:  Using a 4 Wolof short sheath exchanged for a benchmark to the right groin under general anesthesia via left vertebral artery, left common carotid artery, left internal carotid artery, right vertebral artery, right common carotid artery a selective cerebral angiography was performed and demonstrated an ACOM artery aneurysm, measuring 2.4mm (height) x 4.8mm (depth) x 3.3mm (width) with a 2.4mm neck. Patient underwent successful endovascular treatment using balloon assisted coiling, achieving complete occlusion except for small dog ear remnant. Official note to follow.   Patient tolerated procedure well, hemodynamically stable, no change in neurological status compared to baseline. Results discussed with neuro ICU and patient's family. Right groin sheath was removed, a star close device and manual compression held to hemostasis for 20 minutes, no active bleeding, no hematoma, quick clot and safeguard balloon dressing applied at 1445. Disposition Neuro ICU bed 9, labs at 1900 hours.

## 2019-03-04 NOTE — PROGRESS NOTE ADULT - ASSESSMENT
NEURO:  Diagnosis: CT Head, CTA Head, conventional angiogram  Treatment: OR vs. IR for aneurysm treatment  Seizure Prophylaxis: Levetiracetam until aneurysm treated  Delayed Cerebral Ischemia Management: Serial screening TCDs, euvolemia, nimodipine  Hydrocephalus: EVD in place  Pain: PRN analgesics  Activity: [] OOB as tolerated [] Bedrest [] PT [] OT [] PMNR    PULM:  Incentive spirometry    CV:  SBP goal  TTE    RENAL:  Fluids:    GI:  Diet:  GI prophylaxis [] not indicated [] PPI [] other:  Bowel regimen [] colace [] senna [] other:    ENDO:   Goal euglycemia (-180)    HEME/ONC:  VTE prophylaxis: [] SCDs [] chemoprophylaxis [] hold chemoprophylaxis due to: [] high risk of DVT/PE on admission due to:    ID:    MISC:    SOCIAL/FAMILY:  [] awaiting [] updated at bedside [] family meeting    CODE STATUS:  [] Full Code [] DNR [] DNI [] Palliative/Comfort Care    DISPOSITION:  [] ICU [] Stroke Unit [] Floor [] EMU [] RCU [] PCU    [] Patient is at high risk of neurologic deterioration/death due to:     Time seen:  Time spent: ___ [] critical care minutes Patient is a 74 year old right handed woman with a PMH of HTN and HLD who presented with syncope, found to have an AComm aneurysm with SAH, s/p IR embolization, POD 0. The patient is neurologically and functionally intact s/p treatment.     NEURO:  Diagnosis: AComm aneurysm c/b SAH diagnosed via CT Head, CTA Head, conventional angiogram  Treatment: IR for aneurysm treatment  Seizure Prophylaxis: None needed, as no seizure history.  Delayed Cerebral Ischemia Management: Serial screening TCDs, euvolemia, nimodipine  Hydrocephalus: Monitor  Pain: PRN Tylenol PRN  q1 neuro checks  CT Head in AM  Monitor CMP, sodium goal 135-145  Activity: [] OOB as tolerated [x] Bedrest [] PT [] OT [] PMNR    PULM:  Incentive spirometry    CV:  SBP goal 100-200  TTE    RENAL:  Fluids: NS at 70mL/hr    GI:  Diet: Normal  GI prophylaxis [x] not indicated [] PPI [] other:  Bowel regimen [] colace [] senna [x] other:    ENDO:   Goal euglycemia (-180)    HEME/ONC:  VTE prophylaxis: [x] SCDs [] chemoprophylaxis [] hold chemoprophylaxis due to: [] high risk of DVT/PE on admission due to:    SOCIAL/FAMILY:  [x] awaiting [] updated at bedside [] family meeting    CODE STATUS:  [x] Full Code [] DNR [] DNI [] Palliative/Comfort Care    DISPOSITION:  [x] ICU [] Stroke Unit [] Floor [] EMU [] RCU [] PCU    [] Patient is at high risk of neurologic deterioration/death due to:     Time seen:  Time spent: ___ [] critical care minutes Patient is a 74 year old right handed woman with a PMH of HTN and HLD who presented with syncope, found to have an AComm aneurysm with SAH, s/p IR embolization, POD 0. The patient is neurologically and functionally intact s/p treatment.     NEURO:  Seizure Prophylaxis: None needed, as no seizure history.  Delayed Cerebral Ischemia Management: Serial screening TCDs, euvolemia, nimodipine  Hydrocephalus: Monitor  Pain: PRN Tylenol PRN  q1 neuro checks  CT Head in AM  Monitor CMP, sodium goal 135-145  Activity: [] OOB as tolerated [x] Bedrest given groin puncture x 4 hours [] PT [] OT [] PMNR    PULM:  Incentive spirometry    CV:  SBP goal 100-180  TTE    RENAL:  Fluids: NS at 70mL/hr    GI:  Diet: Normal  GI prophylaxis [x] not indicated [] PPI [] other:  Bowel regimen [] colace [] senna [x] other:    ENDO:   Goal euglycemia (-180)    HEME/ONC:  VTE prophylaxis: [x] SCDs [] chemoprophylaxis [] hold chemoprophylaxis due to: [] high risk of DVT/PE on admission due to:    SOCIAL/FAMILY:  [x] awaiting [] updated at bedside [] family meeting    CODE STATUS:  [x] Full Code [] DNR [] DNI [] Palliative/Comfort Care    DISPOSITION:  [x] ICU [] Stroke Unit [] Floor [] EMU [] RCU [] PCU    [] Patient is at high risk of neurologic deterioration/death due to:     Time seen:  Time spent: 30 [x] critical care minutes ASSESSMENT/PLAN: HH1 mF3 subarachnoid hemorrhage, post-bleed day 0    NEURO:  Seizure Prophylaxis: None needed, as no seizure history.  Delayed Cerebral Ischemia Management: Serial screening TCDs, euvolemia, nimodipine  Hydrocephalus: Monitor for need for external ventricular drain, currently not indicated  Pain: PRN Tylenol   q1 neuro checks  CT Head in AM  Monitor CMP, sodium goal 135-145  Activity: [] OOB as tolerated [x] Bedrest given groin puncture x 4 hours [] PT [] OT [] PMNR    PULM:  Incentive spirometry    CV:  SBP goal 100-180mmHg  TTE    RENAL:  Fluids: NS at 70mL/hr    GI:  Diet: Dysphagia screen then advance as tolerated  GI prophylaxis [x] not indicated [] PPI [] other:  Bowel regimen [] colace [] senna [x] other:    ENDO:   Goal euglycemia (-180)    HEME/ONC:  VTE prophylaxis: [x] SCDs [] chemoprophylaxis [] hold chemoprophylaxis due to: [] high risk of DVT/PE on admission due to:    SOCIAL/FAMILY:  [x] awaiting [] updated at bedside [] family meeting    CODE STATUS:  [x] Full Code [] DNR [] DNI [] Palliative/Comfort Care    DISPOSITION:  [x] ICU [] Stroke Unit [] Floor [] EMU [] RCU [] PCU    [x] Patient is at high risk of neurologic deterioration/death due to: delayed cerebral ischemia     Time spent: 30 [x] critical care minutes

## 2019-03-04 NOTE — ED PROVIDER NOTE - PROGRESS NOTE DETAILS
pt was signed out to me, appears well, hd stable, radiology called and made attg aware that there appears to be a subarachnoid bleed-neurosurgery was consulted, cardene drip started to titrate BP to systolics <160  Precious Mercado, PGY-2 EM

## 2019-03-04 NOTE — ED ADULT TRIAGE NOTE - CHIEF COMPLAINT QUOTE
Pt. woke up to use the bathroom and passed out; fell to floor LOC for a few sec.  20g placed to R ac by ems.  Zofran. Denies any sob or chest pain. Pt. c/o nausea.

## 2019-03-04 NOTE — ED ADULT NURSE NOTE - OBJECTIVE STATEMENT
pt received to 28, AAOx3, c/o syncopal episode when getting up to go to the bathroom x 2 hours ago. pt denies CP/Dizziness/SOB at time of syncopal episode or at present. was feeling nauseas, states relief from Zofran given by EMS. arrives w 20G to rt AC. pt also c/o neck and shoulder pain x2-3 days, with some relief when taking Aleve. EKG completed in triage, pt awaiting further MD young VS as noted, pt in NAD, will continue to monitor pt.

## 2019-03-04 NOTE — ED PROVIDER NOTE - CLINICAL SUMMARY MEDICAL DECISION MAKING FREE TEXT BOX
74 year old female with unremarkable pmhx presenting with syncope. Uncertain if cardiac etiology, will evaluate with EKG CXR troponin. Possible neuro etioogy evaluated with CT head CTa head and neck. Will give tylenol and reglan and reassess

## 2019-03-04 NOTE — H&P ADULT - ASSESSMENT
74 yr old F h/o HTN on ASA presenting with syncope 3 hours ago. Patient has been having neck pain for 3 days, in which she was woken up suddenly due to neck pain. Per patient's , patient got out of bed, walked to foot of bed, was not responding to , then fell to the floor and was not responsive for one minute, until she came back to baseline. Patient does not remember walking or falling, but states she was aware of her surroundings. EMS was called and she was brought to ED. Denies head trauma, prior occurrence, denies being on blood thinners. States she feels mildly nauseous but denies vomiting, weakness, incontinence. States she returned to baseline. She was found to have SAH secondary to Acomm aneurysm and was transferred here for further investigations and management.       Plan:   - [facility]   - Keppra 1 g  - BP control  - Preop for angio/embo 74 yr old F h/o HTN on ASA presenting with syncope 3 hours ago. Patient has been having neck pain for 3 days, in which she was woken up suddenly due to neck pain. Per patient's , patient got out of bed, walked to foot of bed, was not responding to , then fell to the floor and was not responsive for one minute, until she came back to baseline. Patient does not remember walking or falling, but states she was aware of her surroundings. EMS was called and she was brought to ED. Denies head trauma, prior occurrence, denies being on blood thinners. States she feels mildly nauseous but denies vomiting, weakness, incontinence. States she returned to baseline. She was found to have SAH secondary to Acomm aneurysm and was transferred here for further investigations and management.   HH1, MF3    Plan:   [facility]   - [facility] 1 g  - BP control  - Preop for angio/embo

## 2019-03-04 NOTE — H&P ADULT - ATTENDING COMMENTS
Agree with resident note. Patient personally seen and examined. All current imaging studies reviewed.  D/W family in detail. Angio shows Acomm aneurysm. Will proceed with endovascular treatment.  will follow closely

## 2019-03-04 NOTE — H&P ADULT - NSHPPHYSICALEXAM_GEN_ALL_CORE
AOx3, FC, PERRL, EOMI, V1-3 intact, tongue midline, shrug 5/5  5/5 throughout, no drift  SILT  No clonus or babinski

## 2019-03-04 NOTE — H&P ADULT - HISTORY OF PRESENT ILLNESS
74 yr old F h/o HTN on ASA presenting with syncope 3 hours ago. Patient has been having neck pain for 3 days, in which she was woken up suddenly due to neck pain. Per patient's , patient got out of bed, walked to foot of bed, was not responding to , then fell to the floor and was not responsive for one minute, until she came back to baseline. Patient does not remember walking or falling, but states she was aware of her surroundings. EMS was called and she was brought to ED. Denies head trauma, prior occurrence, denies being on blood thinners. States she feels mildly nauseous but denies vomiting, weakness, incontinence. States she returned to baseline. She was found to have SAH and was transferred here for further investigations and management. 74 yr old F h/o HTN on ASA presenting with syncope 3 hours ago. Patient has been having neck pain for 3 days, in which she was woken up suddenly due to neck pain. Per patient's , patient got out of bed, walked to foot of bed, was not responding to , then fell to the floor and was not responsive for one minute, until she came back to baseline. Patient does not remember walking or falling, but states she was aware of her surroundings. EMS was called and she was brought to ED. Denies head trauma, prior occurrence, denies being on blood thinners. States she feels mildly nauseous but denies vomiting, weakness, incontinence. States she returned to baseline. She was found to have SAH and was transferred here for further investigations and management.  HH1, MF3

## 2019-03-04 NOTE — H&P ADULT - FAMILY HISTORY
Airway  Urgency: elective    Date/Time: 8/7/2017 10:14 AM  Airway not difficult    General Information and Staff    Patient location during procedure: OR  Anesthesiologist: VALENTINA REDD  CRNA: MIRYAM HOUSTON    Indications and Patient Condition  Indications for airway management: airway protection    Preoxygenated: yes  MILS maintained throughout  Mask difficulty assessment: 2 - vent by mask + OA or adjuvant +/- NMBA    Final Airway Details  Final airway type: endotracheal airway      Successful airway: ETT  Cuffed: yes   Successful intubation technique: direct laryngoscopy  Facilitating devices/methods: intubating stylet  Endotracheal tube insertion site: oral  Blade: Colleen  Blade size: #4  ETT size: 7.5 mm  Cormack-Lehane Classification: grade IIb - view of arytenoids or posterior of glottis only  Placement verified by: chest auscultation and capnometry   Cuff volume (mL): 7  Measured from: lips  ETT to lips (cm): 22  Number of attempts at approach: 1    Additional Comments  Patient in OR. Monitors on. BLVS. Pre 02 100%. SIVI. DL x1.  Atraumatic placement of ETT. Placement verified with ETC02 and BBS. ETT secured. Teeth/lips in pre-op condition.              No pertinent family history in first degree relatives

## 2019-03-04 NOTE — PROGRESS NOTE ADULT - ATTENDING COMMENTS
HH1 mF3 subarachnoid hemorrhage 2/2 a-comm aneurysm, post-bleed day 0 status post coiling of aneurysm  Exam: non focal, no groin hematoma, good distal pulses  - delayed cerebral ischemia prophylaxis: TCDs, nimodipine, euvolemia  - -180mmHg  - q1 neurochecks  - TTE  - Dysphagia screen    30 minutes critical care time

## 2019-03-04 NOTE — ED PROVIDER NOTE - OBJECTIVE STATEMENT
74 year-old female transfer from [facility] presents for subarachnoid bleeding and NSGY intervention.  History obtained from chart review, EMS transfer personnel and the patient.  Patient had been having ongoing neck pain for the past week; woke up this AM with pain acutely worsened and synopsized.  Patient came back to her baseline later and was unaware of falling; EMS brought patient to [facility].  Patient had a Head CT performed which showed diffuse subarachnoid hemorrhage with sentinel clot in the anterior interhemispheric fissure suggesting a common aneurysm rupture as well as a 3.0 x 1.2 mm anterior clinically artery aneurysm.    No prior head trauma; is not on anticoagulation.  Patient was given Nimodipine 60mg PO and started on a Nicardipine drip.

## 2019-03-04 NOTE — CONSULT NOTE ADULT - PROBLEM SELECTOR RECOMMENDATION 9
1. Tx to [facility] for neurovascular consultation with Dr. Serrato.- ER aware  2. Neurochecks Q1H  3. continue cardene drip, maintain SBP < 150  4. NPO with IVF

## 2019-03-04 NOTE — ED ADULT NURSE NOTE - NSIMPLEMENTINTERV_GEN_ALL_ED
Implemented All Universal Safety Interventions:  [place] to call system. Call bell, personal items and telephone within reach. Instruct patient to call for assistance. Room bathroom lighting operational. Non-slip footwear when patient is off stretcher. Physically safe environment: no spills, clutter or unnecessary equipment. Stretcher in lowest position, wheels locked, appropriate side rails in place.

## 2019-03-04 NOTE — PROGRESS NOTE ADULT - SUBJECTIVE AND OBJECTIVE BOX
SUMMARY:    OVERNIGHT EVENTS:     ADMISSION SCORES:   GCS: HH: MF: NIHSS: ICH Score:    REVIEW OF SYSTEMS:    VITALS/DATA/ORDERS [x] Reviewed    ALLERGIES: Allergies  No Known Allergies    DEVICES:   [] Restraints [] PIVs [] ET tube [] central line [] PICC [] arterial line [] youssef [] NGT/OGT [] EVD [] LD [] ZIZY/HMV [] LiCOX [] ICP monitor [] Trach [] PEG [] Chest Tube [] other:    EXAMINATION:  General: No acute distress  HEENT: Anicteric sclerae  Cardiac: [id number]  Lungs: Clear  Abdomen: Soft, non-tender, +BS  Extremities: No c/c/e  Skin/Incision Site: Clean, dry and intact  Neurologic: Awake, alert, fully oriented, follows commands, PERRL, VFFtc, EOMI, face symmetric, tongue midline, no drift, full strength SUMMARY: Patient is a 74 year old right handed woman with a PMH of HTN controlled on two medications and HLD who presented with syncope, found to have 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 The patient awoke yesterday (3/3) evening with a "sharp, stabbing" headache. Upon standing up, she lost consciousness for about 2 minutes, as per , after which she regained consciousness and returned to baseline. The patient was transferred to ED by EMS, found to have a SAH, and was transferred to [facility] for further management. CTA demonstrated 2.4x4.8x3.3mm AComm aneurysm. Patient was brought to IR for balloon-assisted coiling with two coils. Patient tolerated the procedure well, with no change in neurologic status from baseline, remaining neuro-intact.     ADMISSION SCORES:   GCS: 15 NIHSS: 0     REVIEW OF SYSTEMS: ROS is positive for headache (which had been present since Friday), but is negative for visual changes, dizziness, nausea/vomiting, back pain, weakness, or sensory changes.    VITALS/DATA/ORDERS [x] Reviewed    ALLERGIES: Allergies  No Known Allergies    DEVICES:   [] Restraints [] PIVs [] ET tube [] central line [] PICC [] arterial line [] youssef [] NGT/OGT [] EVD [] LD [] IZZY/HMV [] LiCOX [] ICP monitor [] Trach [] PEG [] Chest Tube [] other:    EXAMINATION:  General: No acute distress  HEENT: Anicteric sclerae  Cardiac: [id number]  Lungs: Clear  Abdomen: Soft, non-tender, +BS  Extremities: No c/c/e. Clean, dry right femoral endovascular entrance wound.  Skin/Incision Site: Clean, dry and intact  Neurologic: Awake, alert, fully oriented, follows commands, PERRL, VFFtc, EOMI, face symmetric, tongue midline, no drift, full strength. Strength of right leg was not assessed secondary to endovascular treatment. 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 The patient awoke yesterday (3/3) evening with a "sharp, stabbing" headache. Upon standing up, she lost consciousness for about 2 minutes, as per , after which she regained consciousness and returned to baseline. The patient was transferred to ED by EMS, found to have a SAH, and was transferred to [facility] for further management. CTA demonstrated 2.4x4.8x3.3mm AComm aneurysm. Patient was brought to IR for balloon-assisted coiling with two coils. Patient tolerated the procedure well, with no change in neurologic status from baseline, remaining neuro-intact.     ADMISSION SCORES:   GCS: 15 NIHSS: 0   HH mF    REVIEW OF SYSTEMS: ROS is positive for headache (which had been present since Friday), but is negative for visual changes, dizziness, nausea/vomiting, back pain, weakness, or sensory changes.    VITALS/DATA/ORDERS [x] Reviewed    ALLERGIES: Allergies  No Known Allergies    DEVICES:   [] Restraints [] PIVs [] ET tube [] central line [] PICC [] arterial line [] youssef [] NGT/OGT [] EVD [] LD [] IZZY/HMV [] LiCOX [] ICP monitor [] Trach [] PEG [] Chest Tube [] other:    EXAMINATION:  General: No acute distress  HEENT: Anicteric sclerae  Cardiac: [id number]  Lungs: Clear  Abdomen: Soft, non-tender, +BS  Extremities: No c/c/e. Clean, dry right femoral endovascular entrance wound.  Skin/Incision Site: Clean, dry and intact  Neurologic: Awake, alert, fully oriented, follows commands, PERRL, VFFtc, EOMI, face symmetric, tongue midline, no drift, full strength. Strength of right leg was not assessed secondary to endovascular treatment. 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 The patient awoke yesterday (3/3) evening with a "sharp, stabbing" headache. Upon standing up, she lost consciousness for about 2 minutes, as per , after which she regained consciousness and returned to baseline. The patient was transferred to ED by EMS, found to have a SAH, and was transferred to [facility] for further management. CTA demonstrated 2.4x4.8x3.3mm AComm aneurysm. Patient was brought to IR for balloon-assisted coiling with two coils. Patient tolerated the procedure well, with no change in neurologic status from baseline, remaining neuro-intact.     ADMISSION SCORES:   GCS: 15 NIHSS: 0   HH 1 mF 3    REVIEW OF SYSTEMS: ROS is positive for headache (which had been present since Friday), but is negative for visual changes, dizziness, nausea/vomiting, back pain, weakness, or sensory changes.    VITALS/DATA/ORDERS [x] Reviewed    ALLERGIES: Allergies  No Known Allergies    EXAMINATION:  General: No acute distress  HEENT: Anicteric sclerae  Cardiac: [id number]  Lungs: Clear  Abdomen: Soft, non-tender, +BS  Extremities: No c/c/e. Clean, dry right femoral endovascular entrance wound, no groin hematoma, good distal pulses  Skin/Incision Site: Clean, dry and intact  Neurologic: Awake, alert, fully oriented, follows commands, PERRL, EOMI, face symmetric, tongue midline, no drift, full strength. Strength of right leg was not assessed secondary to endovascular treatment.

## 2019-03-05 LAB
ANION GAP SERPL CALC-SCNC: 14 MMOL/L — SIGNIFICANT CHANGE UP (ref 5–17)
BUN SERPL-MCNC: 18 MG/DL — SIGNIFICANT CHANGE UP (ref 7–23)
CALCIUM SERPL-MCNC: 8.5 MG/DL — SIGNIFICANT CHANGE UP (ref 8.4–10.5)
CHLORIDE SERPL-SCNC: 105 MMOL/L — SIGNIFICANT CHANGE UP (ref 96–108)
CHOLEST SERPL-MCNC: 184 MG/DL — SIGNIFICANT CHANGE UP (ref 10–199)
CO2 SERPL-SCNC: 21 MMOL/L — LOW (ref 22–31)
CREAT SERPL-MCNC: 0.92 MG/DL — SIGNIFICANT CHANGE UP (ref 0.5–1.3)
GLUCOSE BLDC GLUCOMTR-MCNC: 110 MG/DL — HIGH (ref 70–99)
GLUCOSE BLDC GLUCOMTR-MCNC: 89 MG/DL — SIGNIFICANT CHANGE UP (ref 70–99)
GLUCOSE SERPL-MCNC: 127 MG/DL — HIGH (ref 70–99)
HBA1C BLD-MCNC: 5.6 % — SIGNIFICANT CHANGE UP (ref 4–5.6)
HDLC SERPL-MCNC: 49 MG/DL — LOW
LIPID PNL WITH DIRECT LDL SERPL: 103 MG/DL — HIGH
POTASSIUM SERPL-MCNC: 4 MMOL/L — SIGNIFICANT CHANGE UP (ref 3.5–5.3)
POTASSIUM SERPL-SCNC: 4 MMOL/L — SIGNIFICANT CHANGE UP (ref 3.5–5.3)
SODIUM SERPL-SCNC: 140 MMOL/L — SIGNIFICANT CHANGE UP (ref 135–145)
T3 SERPL-MCNC: 58 NG/DL — LOW (ref 80–200)
T4 AB SER-ACNC: 6.9 UG/DL — SIGNIFICANT CHANGE UP (ref 4.6–12)
TOTAL CHOLESTEROL/HDL RATIO MEASUREMENT: 3.8 RATIO — SIGNIFICANT CHANGE UP (ref 3.3–7.1)
TRIGL SERPL-MCNC: 158 MG/DL — HIGH (ref 10–149)
TSH SERPL-MCNC: 1.21 UIU/ML — SIGNIFICANT CHANGE UP (ref 0.27–4.2)

## 2019-03-05 PROCEDURE — 70450 CT HEAD/BRAIN W/O DYE: CPT | Mod: 26

## 2019-03-05 PROCEDURE — 93886 INTRACRANIAL COMPLETE STUDY: CPT | Mod: 26

## 2019-03-05 PROCEDURE — 99232 SBSQ HOSP IP/OBS MODERATE 35: CPT

## 2019-03-05 PROCEDURE — 93306 TTE W/DOPPLER COMPLETE: CPT | Mod: 26

## 2019-03-05 PROCEDURE — 99231 SBSQ HOSP IP/OBS SF/LOW 25: CPT

## 2019-03-05 PROCEDURE — 99291 CRITICAL CARE FIRST HOUR: CPT

## 2019-03-05 RX ORDER — TRAMADOL HYDROCHLORIDE 50 MG/1
25 TABLET ORAL EVERY 4 HOURS
Qty: 0 | Refills: 0 | Status: DISCONTINUED | OUTPATIENT
Start: 2019-03-05 | End: 2019-03-08

## 2019-03-05 RX ORDER — SODIUM CHLORIDE 9 MG/ML
1000 INJECTION INTRAMUSCULAR; INTRAVENOUS; SUBCUTANEOUS
Qty: 0 | Refills: 0 | Status: DISCONTINUED | OUTPATIENT
Start: 2019-03-05 | End: 2019-03-05

## 2019-03-05 RX ORDER — TRAMADOL HYDROCHLORIDE 50 MG/1
25 TABLET ORAL ONCE
Qty: 0 | Refills: 0 | Status: DISCONTINUED | OUTPATIENT
Start: 2019-03-05 | End: 2019-03-05

## 2019-03-05 RX ORDER — INSULIN LISPRO 100/ML
VIAL (ML) SUBCUTANEOUS
Qty: 0 | Refills: 0 | Status: DISCONTINUED | OUTPATIENT
Start: 2019-03-05 | End: 2019-03-06

## 2019-03-05 RX ORDER — CHLORHEXIDINE GLUCONATE 213 G/1000ML
1 SOLUTION TOPICAL
Qty: 0 | Refills: 0 | Status: DISCONTINUED | OUTPATIENT
Start: 2019-03-05 | End: 2019-03-11

## 2019-03-05 RX ORDER — ACETAMINOPHEN 500 MG
1000 TABLET ORAL ONCE
Qty: 0 | Refills: 0 | Status: COMPLETED | OUTPATIENT
Start: 2019-03-05 | End: 2019-03-05

## 2019-03-05 RX ORDER — TRAMADOL HYDROCHLORIDE 50 MG/1
50 TABLET ORAL EVERY 4 HOURS
Qty: 0 | Refills: 0 | Status: DISCONTINUED | OUTPATIENT
Start: 2019-03-05 | End: 2019-03-08

## 2019-03-05 RX ORDER — ENOXAPARIN SODIUM 100 MG/ML
40 INJECTION SUBCUTANEOUS
Qty: 0 | Refills: 0 | Status: DISCONTINUED | OUTPATIENT
Start: 2019-03-05 | End: 2019-03-18

## 2019-03-05 RX ADMIN — NIMODIPINE 60 MILLIGRAM(S): 60 SOLUTION ORAL at 06:34

## 2019-03-05 RX ADMIN — NIMODIPINE 60 MILLIGRAM(S): 60 SOLUTION ORAL at 14:28

## 2019-03-05 RX ADMIN — NIMODIPINE 60 MILLIGRAM(S): 60 SOLUTION ORAL at 10:01

## 2019-03-05 RX ADMIN — SENNA PLUS 1 TABLET(S): 8.6 TABLET ORAL at 12:45

## 2019-03-05 RX ADMIN — Medication 100 MILLIGRAM(S): at 06:34

## 2019-03-05 RX ADMIN — TRAMADOL HYDROCHLORIDE 25 MILLIGRAM(S): 50 TABLET ORAL at 23:22

## 2019-03-05 RX ADMIN — TRAMADOL HYDROCHLORIDE 25 MILLIGRAM(S): 50 TABLET ORAL at 04:55

## 2019-03-05 RX ADMIN — Medication 20 MILLIEQUIVALENT(S): at 00:07

## 2019-03-05 RX ADMIN — Medication 20 MILLIEQUIVALENT(S): at 02:19

## 2019-03-05 RX ADMIN — Medication 400 MILLIGRAM(S): at 00:07

## 2019-03-05 RX ADMIN — ENOXAPARIN SODIUM 40 MILLIGRAM(S): 100 INJECTION SUBCUTANEOUS at 18:08

## 2019-03-05 RX ADMIN — TRAMADOL HYDROCHLORIDE 25 MILLIGRAM(S): 50 TABLET ORAL at 15:45

## 2019-03-05 RX ADMIN — NIMODIPINE 60 MILLIGRAM(S): 60 SOLUTION ORAL at 02:19

## 2019-03-05 RX ADMIN — CHLORHEXIDINE GLUCONATE 1 APPLICATION(S): 213 SOLUTION TOPICAL at 21:18

## 2019-03-05 RX ADMIN — NIMODIPINE 60 MILLIGRAM(S): 60 SOLUTION ORAL at 18:08

## 2019-03-05 RX ADMIN — TRAMADOL HYDROCHLORIDE 25 MILLIGRAM(S): 50 TABLET ORAL at 23:52

## 2019-03-05 RX ADMIN — Medication 1000 MILLIGRAM(S): at 00:23

## 2019-03-05 RX ADMIN — TRAMADOL HYDROCHLORIDE 25 MILLIGRAM(S): 50 TABLET ORAL at 15:05

## 2019-03-05 RX ADMIN — NIMODIPINE 60 MILLIGRAM(S): 60 SOLUTION ORAL at 21:38

## 2019-03-05 RX ADMIN — TRAMADOL HYDROCHLORIDE 25 MILLIGRAM(S): 50 TABLET ORAL at 05:25

## 2019-03-05 NOTE — PROGRESS NOTE ADULT - ASSESSMENT
74F with HH1 SAH secondary to small ruptured Acomm aneurysm, POD#1 status post coil embolization. Doing well. Intact.  1. Vasospasm watch, TCDs, nimodipine.  2. Neuro checks q1hr and hydro watch. Check CT head.  3. VTE prophylaxis.  4. Consented patient for enrollment in RAGE trial.

## 2019-03-05 NOTE — PROGRESS NOTE ADULT - ASSESSMENT
ASSESSMENT/PLAN: HH1 mF3 subarachnoid hemorrhage, post-bleed day 1  s/p coiling of AComm aneurysm    NEURO:  Seizure Prophylaxis: None needed, as no seizure history.  Delayed Cerebral Ischemia Management: Serial screening TCDs, euvolemia, nimodipine  Hydrocephalus: Monitor for need for external ventricular drain, currently not indicated  Pain: PRN Tylenol   q1 neuro checks  CT Head in AM  Monitor CMP, sodium goal 135-145  Activity: [] OOB as tolerated [x] Bedrest given groin puncture x 4 hours [] PT [] OT [] PMNR    PULM:  Incentive spirometry    CV:  SBP goal 100-180mmHg  TTE    RENAL:  Fluids: NS at 70mL/hr    GI:  Diet: Dysphagia screen then advance as tolerated  GI prophylaxis [x] not indicated [] PPI [] other:  Bowel regimen [] colace [] senna [x] other:    ENDO:   Goal euglycemia (-180)    HEME/ONC:  VTE prophylaxis: [x] SCDs [] chemoprophylaxis [] hold chemoprophylaxis due to: [] high risk of DVT/PE on admission due to:    SOCIAL/FAMILY:  [x] awaiting [] updated at bedside [] family meeting    CODE STATUS:  [x] Full Code [] DNR [] DNI [] Palliative/Comfort Care    DISPOSITION:  [x] ICU [] Stroke Unit [] Floor [] EMU [] RCU [] PCU    [x] Patient is at high risk of neurologic deterioration/death due to: delayed cerebral ischemia     Time spent: 30 [x] critical care minutes ASSESSMENT/PLAN: HH1 mF3 subarachnoid hemorrhage, post-bleed day 1  s/p coiling of AComm aneurysm    NEURO:  Neuro checks q1H  Seizure Prophylaxis: None needed, as no seizure history.  Delayed Cerebral Ischemia Management: Serial screening TCDs, euvolemia, nimodipine  Will give 500 bolus  Hydrocephalus: Monitor for need for external ventricular drain, currently not indicated  Pain: PRN Tylenol and Oxycodone  Secondary stroke measures: A1c 5.6,   Activity: [X] OOB as tolerated [X] PT [X] OT [] PMNR    PULM:  Incentive spirometry    CV:  SBP goal 100-180mmHg  TTE ordered    RENAL:  Fluids: Will give 500 bolus, NS @ 75 ml/hr  Continue fluids due to poor intake    GI:  Diet: Advance diet as tolerated  GI prophylaxis [x] not indicated [] PPI [] other:  Bowel regimen [] colace [] senna [x] other:    ENDO:   Goal euglycemia (-180)  SSI  A1c 5.6    HEME/ONC:  VTE prophylaxis: [x] SCDs [X] chemoprophylaxis - start Lovenox 40 sc qHS    SOCIAL/FAMILY:  [x] awaiting [] updated at bedside [] family meeting    CODE STATUS:  [x] Full Code [] DNR [] DNI [] Palliative/Comfort Care    DISPOSITION:  [x] ICU [] Stroke Unit [] Floor [] EMU [] RCU [] PCU    [x] Patient is at high risk of neurologic deterioration/death due to: delayed cerebral ischemia     Time spent: 30 [x] critical care minutes ASSESSMENT/PLAN: HH1 mF3 subarachnoid hemorrhage, post-bleed day 1  s/p coiling of AComm aneurysm    NEURO:  Neuro checks q1H  Seizure Prophylaxis: None needed, as no seizure history.  Delayed Cerebral Ischemia Management: Serial screening TCDs, euvolemia, nimodipine  Will give 500 bolus  Hydrocephalus: Monitor for need for external ventricular drain, currently not indicated  Pain: PRN Tylenol. Will switch Oxycodone to Tramadol 2/2 patient preference.   Secondary stroke measures: A1c 5.6,   Activity: [X] OOB as tolerated [X] PT [X] OT [] PMNR    PULM:  Incentive spirometry    CV:  SBP goal 100-180mmHg  TTE ordered    RENAL:  Fluids: Will give 500 bolus, NS @ 75 ml/hr  Continue fluids due to poor intake    GI:  Diet: Advance diet as tolerated  GI prophylaxis [x] not indicated [] PPI [] other:  Bowel regimen [] colace [] senna [x] other:    ENDO:   Goal euglycemia (-180)  SSI  A1c 5.6    HEME/ONC:  VTE prophylaxis: [x] SCDs [X] chemoprophylaxis - start Lovenox 40 sc qHS    SOCIAL/FAMILY:  [x] awaiting [] updated at bedside [] family meeting    CODE STATUS:  [x] Full Code [] DNR [] DNI [] Palliative/Comfort Care    DISPOSITION:  [x] ICU [] Stroke Unit [] Floor [] EMU [] RCU [] PCU    [x] Patient is at high risk of neurologic deterioration/death due to: delayed cerebral ischemia     Time spent: 30 [x] critical care minutes

## 2019-03-05 NOTE — PROGRESS NOTE ADULT - SUBJECTIVE AND OBJECTIVE BOX
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 The patient awoke yesterday (3/3) evening with a "sharp, stabbing" headache. Upon standing up, she lost consciousness for about 2 minutes, as per , after which she regained consciousness and returned to baseline. The patient was transferred to ED by EMS, found to have a SAH, and was transferred to [facility] for further management. CTA demonstrated 2.4x4.8x3.3mm AComm aneurysm. Patient was brought to IR for balloon-assisted coiling with two coils. Patient tolerated the procedure well, with no change in neurologic status from baseline, remaining neuro-intact.     ADMISSION SCORES:   GCS: 15 NIHSS: 0   HH 1 mF 3    HOSPITAL COURSE:  3/4: s/p coiling of ACOMM aneurysm    OVERNIGHT EVENTS: Neurologically stable    VITALS/DATA/ORDERS [x] Reviewed    ALLERGIES: Allergies  No Known Allergies    EXAMINATION:  General: No acute distress  HEENT: Anicteric sclerae  Cardiac: [id number]  Lungs: Clear  Abdomen: Soft, non-tender, +BS  Extremities: No c/c/e. Clean, dry right femoral endovascular entrance wound, no groin hematoma, good distal pulses  Skin/Incision Site: Clean, dry and intact  Neurologic: Awake, alert, fully oriented, follows commands, PERRL, EOMI, face symmetric, tongue midline, no drift, full strength. Strength of right leg was not assessed secondary to endovascular treatment. 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 The patient awoke yesterday (3/3) evening with a "sharp, stabbing" headache. Upon standing up, she lost consciousness for about 2 minutes, as per , after which she regained consciousness and returned to baseline. The patient was transferred to ED by EMS, found to have a SAH, and was transferred to [facility] for further management. CTA demonstrated 2.4x4.8x3.3mm AComm aneurysm. Patient was brought to IR for balloon-assisted coiling with two coils. Patient tolerated the procedure well, with no change in neurologic status from baseline, remaining neuro-intact.     ADMISSION SCORES:   GCS: 15 NIHSS: 0   HH 1 mF 3    HOSPITAL COURSE:  3/4: s/p coiling of ACOMM aneurysm    OVERNIGHT EVENTS: Neurologically stable    VITALS/DATA/ORDERS [x] Reviewed    ALLERGIES: Allergies  No Known Allergies    EXAMINATION:  General: No acute distress  HEENT: Anicteric sclerae  Cardiac: [id number]  Lungs: Clear  Abdomen: Soft, non-tender, +BS  Extremities: No c/c/e. Clean, dry right femoral endovascular entrance wound, no groin hematoma, good distal pulses  Skin/Incision Site: Clean, dry and intact  Neurologic: Awake, alert, fully oriented, follows commands, PERRL, EOMI, face symmetric, tongue midline, no drift, full strength. Strength of right leg was not assessed secondary to endovascular treatment. 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 The patient awoke yesterday (3/3) evening with a "sharp, stabbing" headache. Upon standing up, she lost consciousness for about 2 minutes, as per , after which she regained consciousness and returned to baseline. The patient was transferred to ED by EMS, found to have a SAH, and was transferred to [facility] for further management. CTA demonstrated 2.4x4.8x3.3mm AComm aneurysm. Patient was brought to IR for balloon-assisted coiling with two coils. Patient tolerated the procedure well, with no change in neurologic status from baseline, remaining neuro-intact.     ADMISSION SCORES:   GCS: 15 NIHSS: 0   HH 1 mF 3    HOSPITAL COURSE:  3/4: s/p coiling of ACOMM aneurysm    OVERNIGHT EVENTS: Tolerated IR procedure well.     VITALS/DATA/ORDERS [x] Reviewed    ALLERGIES: Allergies  No Known Allergies    EXAMINATION:  General: No acute distress  HEENT: Anicteric sclerae  Cardiac: [id number]  Lungs: Clear  Abdomen: Soft, non-tender, +BS  Extremities: No c/c/e. Clean, dry right femoral endovascular entrance wound, no groin hematoma, good distal pulses  Skin/Incision Site: Clean, dry and intact  Neurologic: Awake, alert, fully oriented, follows commands, PERRL, EOMI, face symmetric, tongue midline, no drift, full strength

## 2019-03-05 NOTE — PROGRESS NOTE ADULT - SUBJECTIVE AND OBJECTIVE BOX
The patient was seen and examined by me. No complaints. HA resolved.    ICU Vital Signs Last 24 Hrs  T(C): 36.7 (05 Mar 2019 15:00), Max: 37 (05 Mar 2019 07:00)  T(F): 98 (05 Mar 2019 15:00), Max: 98.6 (05 Mar 2019 07:00)  HR: 74 (05 Mar 2019 16:00) (63 - 108)  BP: --  BP(mean): --  ABP: 110/73 (05 Mar 2019 16:00) (99/66 - 175/78)  ABP(mean): 92 (05 Mar 2019 16:00) (81 - 117)  RR: 16 (05 Mar 2019 16:00) (12 - 31)  SpO2: 99% (05 Mar 2019 16:00) (95% - 100%)    Neuro: Awake and alert, oriented x3, fluent, normal naming and repetition, follows 3 step commands. Extraocular movements intact, no nystagmus, visual fields full, face symmetric, tongue midline. No drift, 5/5 power x 4 extremities. Normal sensation to LT. Normal finger-to-nose and rapid alternating movements.  Ext: No hematoma in right groin, c/d/i, 2+ PT/DP.

## 2019-03-05 NOTE — CHART NOTE - NSCHARTNOTEFT_GEN_A_CORE
Transcranial doppler exam #1 (3/5/19) 1600pm  mean velocity  cm/sec                              Left         Right  DIANE                    39           48  MCA                   80            59   PCA                    37,73        48,79  VERT                   35            48  BA                               43  Official report to follow.  [employer]ev

## 2019-03-05 NOTE — PROGRESS NOTE ADULT - SUBJECTIVE AND OBJECTIVE BOX
Vital Signs Last 24 Hrs  T(C): 36.7 (04 Mar 2019 23:00), Max: 37.6 (04 Mar 2019 10:45)  T(F): 98.1 (04 Mar 2019 23:00), Max: 99.7 (04 Mar 2019 10:45)  HR: 70 (04 Mar 2019 23:00) (70 - 108)  BP: 130/80 (04 Mar 2019 12:34) (110/60 - 184/86)  BP(mean): --  RR: 20 (04 Mar 2019 23:00) (12 - 21)  SpO2: 97% (04 Mar 2019 23:00) (91% - 100%)    EXAM  AOx3, FC, PERRL, EOMI, no facial   5/5 throughout, no drift  SILT

## 2019-03-05 NOTE — PROGRESS NOTE ADULT - ATTENDING COMMENTS
Agree with resident note. Patient personally seen and examined. All current imaging studies reviewed.  Pt stable day one s/p coiling of acomm aneurysm. D/w pt  DC

## 2019-03-06 ENCOUNTER — TRANSCRIPTION ENCOUNTER (OUTPATIENT)
Age: 74
End: 2019-03-06

## 2019-03-06 LAB
ANION GAP SERPL CALC-SCNC: 13 MMOL/L — SIGNIFICANT CHANGE UP (ref 5–17)
ANION GAP SERPL CALC-SCNC: 15 MMOL/L — SIGNIFICANT CHANGE UP (ref 5–17)
BUN SERPL-MCNC: 16 MG/DL — SIGNIFICANT CHANGE UP (ref 7–23)
BUN SERPL-MCNC: 16 MG/DL — SIGNIFICANT CHANGE UP (ref 7–23)
CALCIUM SERPL-MCNC: 9 MG/DL — SIGNIFICANT CHANGE UP (ref 8.4–10.5)
CALCIUM SERPL-MCNC: 9.3 MG/DL — SIGNIFICANT CHANGE UP (ref 8.4–10.5)
CHLORIDE SERPL-SCNC: 102 MMOL/L — SIGNIFICANT CHANGE UP (ref 96–108)
CHLORIDE SERPL-SCNC: 99 MMOL/L — SIGNIFICANT CHANGE UP (ref 96–108)
CO2 SERPL-SCNC: 20 MMOL/L — LOW (ref 22–31)
CO2 SERPL-SCNC: 21 MMOL/L — LOW (ref 22–31)
CREAT SERPL-MCNC: 0.81 MG/DL — SIGNIFICANT CHANGE UP (ref 0.5–1.3)
CREAT SERPL-MCNC: 0.85 MG/DL — SIGNIFICANT CHANGE UP (ref 0.5–1.3)
GLUCOSE SERPL-MCNC: 119 MG/DL — HIGH (ref 70–99)
GLUCOSE SERPL-MCNC: 121 MG/DL — HIGH (ref 70–99)
HCT VFR BLD CALC: 32.7 % — LOW (ref 34.5–45)
HGB BLD-MCNC: 11.7 G/DL — SIGNIFICANT CHANGE UP (ref 11.5–15.5)
MAGNESIUM SERPL-MCNC: 2 MG/DL — SIGNIFICANT CHANGE UP (ref 1.6–2.6)
MAGNESIUM SERPL-MCNC: 2 MG/DL — SIGNIFICANT CHANGE UP (ref 1.6–2.6)
MCHC RBC-ENTMCNC: 32.5 PG — SIGNIFICANT CHANGE UP (ref 27–34)
MCHC RBC-ENTMCNC: 35.8 GM/DL — SIGNIFICANT CHANGE UP (ref 32–36)
MCV RBC AUTO: 90.8 FL — SIGNIFICANT CHANGE UP (ref 80–100)
PHOSPHATE SERPL-MCNC: 2.3 MG/DL — LOW (ref 2.5–4.5)
PHOSPHATE SERPL-MCNC: 3 MG/DL — SIGNIFICANT CHANGE UP (ref 2.5–4.5)
PLATELET # BLD AUTO: 214 K/UL — SIGNIFICANT CHANGE UP (ref 150–400)
POTASSIUM SERPL-MCNC: 3.9 MMOL/L — SIGNIFICANT CHANGE UP (ref 3.5–5.3)
POTASSIUM SERPL-MCNC: 4 MMOL/L — SIGNIFICANT CHANGE UP (ref 3.5–5.3)
POTASSIUM SERPL-SCNC: 3.9 MMOL/L — SIGNIFICANT CHANGE UP (ref 3.5–5.3)
POTASSIUM SERPL-SCNC: 4 MMOL/L — SIGNIFICANT CHANGE UP (ref 3.5–5.3)
RBC # BLD: 3.59 M/UL — LOW (ref 3.8–5.2)
RBC # FLD: 13.1 % — SIGNIFICANT CHANGE UP (ref 10.3–14.5)
SODIUM SERPL-SCNC: 133 MMOL/L — LOW (ref 135–145)
SODIUM SERPL-SCNC: 137 MMOL/L — SIGNIFICANT CHANGE UP (ref 135–145)
WBC # BLD: 14.3 K/UL — HIGH (ref 3.8–10.5)
WBC # FLD AUTO: 14.3 K/UL — HIGH (ref 3.8–10.5)

## 2019-03-06 PROCEDURE — 36015 PLACE CATHETER IN ARTERY: CPT

## 2019-03-06 PROCEDURE — 99231 SBSQ HOSP IP/OBS SF/LOW 25: CPT

## 2019-03-06 PROCEDURE — 99232 SBSQ HOSP IP/OBS MODERATE 35: CPT

## 2019-03-06 PROCEDURE — 99291 CRITICAL CARE FIRST HOUR: CPT

## 2019-03-06 RX ORDER — POTASSIUM PHOSPHATE, MONOBASIC POTASSIUM PHOSPHATE, DIBASIC 236; 224 MG/ML; MG/ML
15 INJECTION, SOLUTION INTRAVENOUS ONCE
Qty: 0 | Refills: 0 | Status: COMPLETED | OUTPATIENT
Start: 2019-03-06 | End: 2019-03-06

## 2019-03-06 RX ORDER — SODIUM CHLORIDE 5 G/100ML
1000 INJECTION, SOLUTION INTRAVENOUS
Qty: 0 | Refills: 0 | Status: DISCONTINUED | OUTPATIENT
Start: 2019-03-06 | End: 2019-03-09

## 2019-03-06 RX ORDER — NIMODIPINE 60 MG/10ML
60 SOLUTION ORAL EVERY 4 HOURS
Qty: 0 | Refills: 0 | Status: DISCONTINUED | OUTPATIENT
Start: 2019-03-06 | End: 2019-03-20

## 2019-03-06 RX ADMIN — ENOXAPARIN SODIUM 40 MILLIGRAM(S): 100 INJECTION SUBCUTANEOUS at 18:13

## 2019-03-06 RX ADMIN — SENNA PLUS 1 TABLET(S): 8.6 TABLET ORAL at 12:21

## 2019-03-06 RX ADMIN — TRAMADOL HYDROCHLORIDE 50 MILLIGRAM(S): 50 TABLET ORAL at 22:30

## 2019-03-06 RX ADMIN — TRAMADOL HYDROCHLORIDE 50 MILLIGRAM(S): 50 TABLET ORAL at 12:21

## 2019-03-06 RX ADMIN — NIMODIPINE 60 MILLIGRAM(S): 60 SOLUTION ORAL at 21:37

## 2019-03-06 RX ADMIN — TRAMADOL HYDROCHLORIDE 50 MILLIGRAM(S): 50 TABLET ORAL at 23:00

## 2019-03-06 RX ADMIN — TRAMADOL HYDROCHLORIDE 50 MILLIGRAM(S): 50 TABLET ORAL at 08:25

## 2019-03-06 RX ADMIN — NIMODIPINE 60 MILLIGRAM(S): 60 SOLUTION ORAL at 18:13

## 2019-03-06 RX ADMIN — NIMODIPINE 60 MILLIGRAM(S): 60 SOLUTION ORAL at 01:20

## 2019-03-06 RX ADMIN — NIMODIPINE 60 MILLIGRAM(S): 60 SOLUTION ORAL at 10:28

## 2019-03-06 RX ADMIN — TRAMADOL HYDROCHLORIDE 50 MILLIGRAM(S): 50 TABLET ORAL at 09:00

## 2019-03-06 RX ADMIN — TRAMADOL HYDROCHLORIDE 50 MILLIGRAM(S): 50 TABLET ORAL at 17:26

## 2019-03-06 RX ADMIN — CHLORHEXIDINE GLUCONATE 1 APPLICATION(S): 213 SOLUTION TOPICAL at 21:38

## 2019-03-06 RX ADMIN — TRAMADOL HYDROCHLORIDE 50 MILLIGRAM(S): 50 TABLET ORAL at 13:20

## 2019-03-06 RX ADMIN — TRAMADOL HYDROCHLORIDE 50 MILLIGRAM(S): 50 TABLET ORAL at 18:00

## 2019-03-06 RX ADMIN — NIMODIPINE 60 MILLIGRAM(S): 60 SOLUTION ORAL at 05:07

## 2019-03-06 RX ADMIN — NIMODIPINE 60 MILLIGRAM(S): 60 SOLUTION ORAL at 14:19

## 2019-03-06 RX ADMIN — POTASSIUM PHOSPHATE, MONOBASIC POTASSIUM PHOSPHATE, DIBASIC 62.5 MILLIMOLE(S): 236; 224 INJECTION, SOLUTION INTRAVENOUS at 05:06

## 2019-03-06 NOTE — PROGRESS NOTE ADULT - SUBJECTIVE AND OBJECTIVE BOX
Vital Signs Last 24 Hrs  T(C): 37.3 (05 Mar 2019 22:00), Max: 37.3 (05 Mar 2019 22:00)  T(F): 99.1 (05 Mar 2019 22:00), Max: 99.1 (05 Mar 2019 22:00)  HR: 83 (05 Mar 2019 22:00) (63 - 99)  BP: 139/75 (05 Mar 2019 22:00) (139/75 - 139/75)  BP(mean): 96 (05 Mar 2019 22:00) (96 - 96)  RR: 17 (05 Mar 2019 22:00) (14 - 31)  SpO2: 98% (05 Mar 2019 22:00) (94% - 100%)    EXAM  AOx3, FC, PERRL, EOMI, no facial   5/5 throughout, no drift  SILT

## 2019-03-06 NOTE — PROCEDURE NOTE - NSPROCDETAILS_GEN_ALL_CORE
connected to a pressurized flush line/hemostasis with direct pressure, dressing applied/sutured in place/all materials/supplies accounted for at end of procedure/positive blood return obtained via catheter/Seldinger technique/location identified, draped/prepped, sterile technique used, needle inserted/introduced

## 2019-03-06 NOTE — DIETITIAN INITIAL EVALUATION ADULT. - PERTINENT MEDS FT
MEDICATIONS  (STANDING):  chlorhexidine 4% Liquid 1 Application(s) Topical <User Schedule>  enoxaparin Injectable 40 milliGRAM(s) SubCutaneous <User Schedule>  niMODipine 60 milliGRAM(s) Oral every 4 hours  senna 1 Tablet(s) Oral daily    MEDICATIONS  (PRN):  acetaminophen   Tablet .. 650 milliGRAM(s) Oral every 6 hours PRN Mild Pain (1 - 3)  traMADol 25 milliGRAM(s) Oral every 4 hours PRN Moderate Pain (4 - 6)  traMADol 50 milliGRAM(s) Oral every 4 hours PRN Severe Pain (7 - 10)

## 2019-03-06 NOTE — PROGRESS NOTE ADULT - ASSESSMENT
ASSESSMENT/PLAN: HH1 mF3 subarachnoid hemorrhage, post-bleed day 3.  s/p coiling of AComm aneurysm    NEURO:  Neuro checks q1H  Seizure Prophylaxis: None needed, as no seizure history, aneurysm is secured.  Delayed Cerebral Ischemia Management: Serial screening TCDs, euvolemia, nimodipine  Hydrocephalus: Monitor for need for external ventricular drain, currently not indicated  Pain: PRN Tylenol. Tramadol for severe pain PRN   Secondary stroke measures: A1c 5.6,   Activity: [X] OOB as tolerated [X] PT [X] OT [] PMNR    PULM:  Incentive spirometry    CV:  SBP goal 100-180mmHg  TTE ordered    RENAL:  Fluids: keep euvolemia  IVL    GI:  Diet: Advance diet as tolerated  GI prophylaxis [x] not indicated [] PPI [] other:  Bowel regimen [] colace [] senna [x] other:    ENDO:   Goal euglycemia (-180)  SSI  A1c 5.6    HEME/ONC:  VTE prophylaxis: [x] SCDs [X] chemoprophylaxis - Lovenox 40 sc qHS    SOCIAL/FAMILY:  [x] awaiting [] updated at bedside [] family meeting    CODE STATUS:  [x] Full Code [] DNR [] DNI [] Palliative/Comfort Care    DISPOSITION:  [x] ICU [] Stroke Unit [] Floor [] EMU [] RCU [] PCU    [x] Patient is at high risk of neurologic deterioration/death due to: delayed cerebral ischemia     Time spent: 30 [x] critical care minutes

## 2019-03-06 NOTE — PROGRESS NOTE ADULT - ASSESSMENT
74F with HH1 SAH secondary to small ruptured Acomm aneurysm, POD#2 status post coil embolization. Doing well. Intact.  1. Vasospasm watch, TCDs, nimodipine.  2. Neuro checks q1hr and hydro watch. Check CT head.  3. VTE prophylaxis.  4. Consented patient for enrollment in RAGE trial. 74F with HH1 SAH secondary to small ruptured Acomm aneurysm, POD#2 status post coil embolization. Doing well. Intact.  1. Vasospasm watch, TCDs, nimodipine.  2. Neuro checks q1hr and hydro watch.  3. VTE prophylaxis.  4. Enrolled in RAGE trial.  5. OOB with assist, ambulate.

## 2019-03-06 NOTE — DIETITIAN INITIAL EVALUATION ADULT. - OTHER INFO
Pt reports usual wt 180-185 lb.  Pt seen for: for NSCU Length Of Stay   Adm dx: SAH, s/p coiling    GI issues: denies N/V   Last BM: 3/3     Food allergies/Intolerances: NKFA   Vit/supplement PTA: none

## 2019-03-06 NOTE — PROGRESS NOTE ADULT - SUBJECTIVE AND OBJECTIVE BOX
The patient was seen and examined by me. No complaints today.     ICU Vital Signs Last 24 Hrs  T(C): 36.9 (06 Mar 2019 11:00), Max: 37.3 (05 Mar 2019 23:00)  T(F): 98.5 (06 Mar 2019 11:00), Max: 99.1 (05 Mar 2019 23:00)  HR: 77 (06 Mar 2019 14:00) (63 - 87)  BP: 147/82 (06 Mar 2019 00:00) (139/75 - 150/74)  BP(mean): 103 (06 Mar 2019 00:00) (96 - 103)  ABP: 137/82 (06 Mar 2019 14:00) (99/66 - 166/83)  ABP(mean): 104 (06 Mar 2019 14:00) (54 - 141)  RR: 25 (06 Mar 2019 14:00) (14 - 26)  SpO2: 100% (06 Mar 2019 14:00) (94% - 100%)    Neuro: Awake and alert, oriented x3, fluent, normal naming and repetition, follows 3 step commands. Extraocular movements intact, no nystagmus, visual fields full, face symmetric, tongue midline. No drift, 5/5 power x 4 extremities. Normal sensation to LT. Normal finger-to-nose and rapid alternating movements.  Ext: No hematoma in right groin, c/d/i, 2+ PT/DP.                          11.7   14.3  )-----------( 214      ( 06 Mar 2019 00:51 )             32.7   03-06    137  |  102  |  16  ----------------------------<  121<H>  3.9   |  20<L>  |  0.85    Ca    9.0      06 Mar 2019 00:51  Phos  2.3     03-06  Mg     2.0     03-06

## 2019-03-06 NOTE — PROGRESS NOTE ADULT - ATTENDING COMMENTS
Agree with resident note. Patient personally seen and examined. All current imaging studies reviewed.  Pt remains intact.doing well--d/w family  DC

## 2019-03-06 NOTE — PROCEDURE NOTE - NSINDICATIONS_GEN_A_CORE
monitoring purposes/arterial puncture to obtain ABG's/blood sampling/critical patient/Subarachnoid hemorrhage

## 2019-03-06 NOTE — DIETITIAN INITIAL EVALUATION ADULT. - ENERGY NEEDS
Ht: 65"  Wt: 180  BMI: 29.9  kg/m2   IBW: 125 (+/-10%)     144% IBW  Edema: none       Skin: no pressure injuries documented

## 2019-03-06 NOTE — PROGRESS NOTE ADULT - SUBJECTIVE AND OBJECTIVE BOX
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 The patient awoke yesterday (3/3) evening with a "sharp, stabbing" headache. Upon standing up, she lost consciousness for about 2 minutes, as per , after which she regained consciousness and returned to baseline. The patient was transferred to ED by EMS, found to have a SAH, and was transferred to [facility] for further management. CTA demonstrated 2.4x4.8x3.3mm AComm aneurysm. Patient was brought to IR for balloon-assisted coiling with two coils. Patient tolerated the procedure well, with no change in neurologic status from baseline, remaining neuro-intact.     ADMISSION SCORES:   GCS: 15 NIHSS: 0   HH 1 mF 3    HOSPITAL COURSE:  3/4: s/p coiling of ACOMM aneurysm    OVERNIGHT EVENTS: none reported.    VITALS/DATA/ORDERS [x] Reviewed    ALLERGIES: Allergies  No Known Allergies    EXAMINATION:  General: No acute distress  HEENT: Anicteric sclerae  Cardiac: [id number]  Lungs: Clear  Abdomen: Soft, non-tender, +BS  Extremities: No c/c/e. Clean, dry right femoral endovascular entrance wound, no groin hematoma, good distal pulses  Skin/Incision Site: Clean, dry and intact  Neurologic: Awake, alert, fully oriented, follows commands, PERRL, EOMI, face symmetric, tongue midline, no drift, full strength

## 2019-03-07 LAB
ANION GAP SERPL CALC-SCNC: 14 MMOL/L — SIGNIFICANT CHANGE UP (ref 5–17)
ANION GAP SERPL CALC-SCNC: 15 MMOL/L — SIGNIFICANT CHANGE UP (ref 5–17)
ANION GAP SERPL CALC-SCNC: 16 MMOL/L — SIGNIFICANT CHANGE UP (ref 5–17)
BUN SERPL-MCNC: 15 MG/DL — SIGNIFICANT CHANGE UP (ref 7–23)
BUN SERPL-MCNC: 17 MG/DL — SIGNIFICANT CHANGE UP (ref 7–23)
BUN SERPL-MCNC: 18 MG/DL — SIGNIFICANT CHANGE UP (ref 7–23)
CALCIUM SERPL-MCNC: 8.7 MG/DL — SIGNIFICANT CHANGE UP (ref 8.4–10.5)
CALCIUM SERPL-MCNC: 8.9 MG/DL — SIGNIFICANT CHANGE UP (ref 8.4–10.5)
CALCIUM SERPL-MCNC: 9 MG/DL — SIGNIFICANT CHANGE UP (ref 8.4–10.5)
CHLORIDE SERPL-SCNC: 102 MMOL/L — SIGNIFICANT CHANGE UP (ref 96–108)
CHLORIDE SERPL-SCNC: 102 MMOL/L — SIGNIFICANT CHANGE UP (ref 96–108)
CHLORIDE SERPL-SCNC: 104 MMOL/L — SIGNIFICANT CHANGE UP (ref 96–108)
CO2 SERPL-SCNC: 18 MMOL/L — LOW (ref 22–31)
CO2 SERPL-SCNC: 19 MMOL/L — LOW (ref 22–31)
CO2 SERPL-SCNC: 21 MMOL/L — LOW (ref 22–31)
CREAT SERPL-MCNC: 0.77 MG/DL — SIGNIFICANT CHANGE UP (ref 0.5–1.3)
CREAT SERPL-MCNC: 0.82 MG/DL — SIGNIFICANT CHANGE UP (ref 0.5–1.3)
CREAT SERPL-MCNC: 0.87 MG/DL — SIGNIFICANT CHANGE UP (ref 0.5–1.3)
GLUCOSE SERPL-MCNC: 115 MG/DL — HIGH (ref 70–99)
GLUCOSE SERPL-MCNC: 117 MG/DL — HIGH (ref 70–99)
GLUCOSE SERPL-MCNC: 120 MG/DL — HIGH (ref 70–99)
HCT VFR BLD CALC: 34.5 % — SIGNIFICANT CHANGE UP (ref 34.5–45)
HGB BLD-MCNC: 12.1 G/DL — SIGNIFICANT CHANGE UP (ref 11.5–15.5)
MAGNESIUM SERPL-MCNC: 1.9 MG/DL — SIGNIFICANT CHANGE UP (ref 1.6–2.6)
MAGNESIUM SERPL-MCNC: 2 MG/DL — SIGNIFICANT CHANGE UP (ref 1.6–2.6)
MCHC RBC-ENTMCNC: 31.5 PG — SIGNIFICANT CHANGE UP (ref 27–34)
MCHC RBC-ENTMCNC: 35.2 GM/DL — SIGNIFICANT CHANGE UP (ref 32–36)
MCV RBC AUTO: 89.7 FL — SIGNIFICANT CHANGE UP (ref 80–100)
PHOSPHATE SERPL-MCNC: 2.8 MG/DL — SIGNIFICANT CHANGE UP (ref 2.5–4.5)
PHOSPHATE SERPL-MCNC: 2.9 MG/DL — SIGNIFICANT CHANGE UP (ref 2.5–4.5)
PLATELET # BLD AUTO: 258 K/UL — SIGNIFICANT CHANGE UP (ref 150–400)
POTASSIUM SERPL-MCNC: 3.8 MMOL/L — SIGNIFICANT CHANGE UP (ref 3.5–5.3)
POTASSIUM SERPL-MCNC: 4 MMOL/L — SIGNIFICANT CHANGE UP (ref 3.5–5.3)
POTASSIUM SERPL-MCNC: 4 MMOL/L — SIGNIFICANT CHANGE UP (ref 3.5–5.3)
POTASSIUM SERPL-SCNC: 3.8 MMOL/L — SIGNIFICANT CHANGE UP (ref 3.5–5.3)
POTASSIUM SERPL-SCNC: 4 MMOL/L — SIGNIFICANT CHANGE UP (ref 3.5–5.3)
POTASSIUM SERPL-SCNC: 4 MMOL/L — SIGNIFICANT CHANGE UP (ref 3.5–5.3)
RBC # BLD: 3.84 M/UL — SIGNIFICANT CHANGE UP (ref 3.8–5.2)
RBC # FLD: 13 % — SIGNIFICANT CHANGE UP (ref 10.3–14.5)
SODIUM SERPL-SCNC: 136 MMOL/L — SIGNIFICANT CHANGE UP (ref 135–145)
SODIUM SERPL-SCNC: 137 MMOL/L — SIGNIFICANT CHANGE UP (ref 135–145)
SODIUM SERPL-SCNC: 138 MMOL/L — SIGNIFICANT CHANGE UP (ref 135–145)
WBC # BLD: 14.4 K/UL — HIGH (ref 3.8–10.5)
WBC # FLD AUTO: 14.4 K/UL — HIGH (ref 3.8–10.5)

## 2019-03-07 PROCEDURE — 99231 SBSQ HOSP IP/OBS SF/LOW 25: CPT

## 2019-03-07 PROCEDURE — 93888 INTRACRANIAL LIMITED STUDY: CPT | Mod: 26

## 2019-03-07 PROCEDURE — 99232 SBSQ HOSP IP/OBS MODERATE 35: CPT

## 2019-03-07 PROCEDURE — 99291 CRITICAL CARE FIRST HOUR: CPT

## 2019-03-07 RX ORDER — DOCUSATE SODIUM 100 MG
100 CAPSULE ORAL THREE TIMES A DAY
Qty: 0 | Refills: 0 | Status: DISCONTINUED | OUTPATIENT
Start: 2019-03-07 | End: 2019-03-09

## 2019-03-07 RX ADMIN — SENNA PLUS 1 TABLET(S): 8.6 TABLET ORAL at 11:58

## 2019-03-07 RX ADMIN — NIMODIPINE 60 MILLIGRAM(S): 60 SOLUTION ORAL at 10:12

## 2019-03-07 RX ADMIN — NIMODIPINE 60 MILLIGRAM(S): 60 SOLUTION ORAL at 21:52

## 2019-03-07 RX ADMIN — NIMODIPINE 60 MILLIGRAM(S): 60 SOLUTION ORAL at 06:37

## 2019-03-07 RX ADMIN — SODIUM CHLORIDE 50 MILLILITER(S): 5 INJECTION, SOLUTION INTRAVENOUS at 00:10

## 2019-03-07 RX ADMIN — TRAMADOL HYDROCHLORIDE 50 MILLIGRAM(S): 50 TABLET ORAL at 17:26

## 2019-03-07 RX ADMIN — NIMODIPINE 60 MILLIGRAM(S): 60 SOLUTION ORAL at 13:32

## 2019-03-07 RX ADMIN — NIMODIPINE 60 MILLIGRAM(S): 60 SOLUTION ORAL at 02:22

## 2019-03-07 RX ADMIN — Medication 100 MILLIGRAM(S): at 21:52

## 2019-03-07 RX ADMIN — CHLORHEXIDINE GLUCONATE 1 APPLICATION(S): 213 SOLUTION TOPICAL at 21:51

## 2019-03-07 RX ADMIN — NIMODIPINE 60 MILLIGRAM(S): 60 SOLUTION ORAL at 17:00

## 2019-03-07 RX ADMIN — ENOXAPARIN SODIUM 40 MILLIGRAM(S): 100 INJECTION SUBCUTANEOUS at 17:00

## 2019-03-07 RX ADMIN — Medication 100 MILLIGRAM(S): at 13:32

## 2019-03-07 RX ADMIN — TRAMADOL HYDROCHLORIDE 50 MILLIGRAM(S): 50 TABLET ORAL at 16:54

## 2019-03-07 NOTE — DISCHARGE NOTE NURSING/CASE MANAGEMENT/SOCIAL WORK - NSDCDPATPORTLINK_GEN_ALL_CORE
You can access the [facility] Patient Portal, offered by [facility], by registering with the following website: [email or web address]

## 2019-03-07 NOTE — PROGRESS NOTE ADULT - SUBJECTIVE AND OBJECTIVE BOX
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 The patient awoke yesterday (3/3) evening with a "sharp, stabbing" headache. Upon standing up, she lost consciousness for about 2 minutes, as per , after which she regained consciousness and returned to baseline. The patient was transferred to ED by EMS, found to have a SAH, and was transferred to [facility] for further management. CTA demonstrated 2.4x4.8x3.3mm AComm aneurysm. Patient was brought to IR for balloon-assisted coiling with two coils. Patient tolerated the procedure well, with no change in neurologic status from baseline, remaining neuro-intact.     ADMISSION SCORES:   GCS: 15 NIHSS: 0   HH 1 mF 3    HOSPITAL COURSE:  3/4: s/p coiling of ACOMM aneurysm    OVERNIGHT EVENTS: none reported.    VITALS/DATA/ORDERS [x] Reviewed    ALLERGIES: Allergies  No Known Allergies    EXAMINATION:  General: No acute distress  HEENT: Anicteric sclerae  Cardiac: [id number]  Lungs: Clear  Abdomen: Soft, non-tender, +BS  Extremities: No c/c/e. 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 The patient awoke yesterday (3/3) evening with a "sharp, stabbing" headache. Upon standing up, she lost consciousness for about 2 minutes, as per , after which she regained consciousness and returned to baseline. The patient was transferred to ED by EMS, found to have a SAH, and was transferred to [facility] for further management. CTA demonstrated 2.4x4.8x3.3mm AComm aneurysm. Patient was brought to IR for balloon-assisted coiling with two coils. Patient tolerated the procedure well, with no change in neurologic status from baseline, remaining neuro-intact.     ADMISSION SCORES:   GCS: 15 NIHSS: 0   HH 1 mF 3    HOSPITAL COURSE:  3/4: s/p coiling of ACOMM aneurysm    OVERNIGHT EVENTS: Afebrile.     VITALS/DATA/ORDERS [x] Reviewed    ALLERGIES: Allergies  No Known Allergies    EXAMINATION:  General: No acute distress  HEENT: Anicteric sclerae  Cardiac: [id number]  Lungs: Clear  Abdomen: Soft, non-tender, +BS  Extremities: No c/c/e.   Skin/Incision Site: Clean, dry and intact  Neurologic: Awake, alert, fully oriented, follows commands, PERRL, EOMI, face symmetric, tongue midline, no drift, full strength

## 2019-03-07 NOTE — PHYSICAL THERAPY INITIAL EVALUATION ADULT - PLANNED THERAPY INTERVENTIONS, PT EVAL
balance training/gait training/strengthening/transfer training/stair training: GOAL: (to be met in 4 wks) negotiate 1 flight of stairs with 1 rail and appropriate assistive device with step to step pattern

## 2019-03-07 NOTE — PROGRESS NOTE ADULT - ATTENDING COMMENTS
Agree with resident note. Patient personally seen and examined. All current imaging studies reviewed.  Pt remains intact and stable

## 2019-03-07 NOTE — PHYSICAL THERAPY INITIAL EVALUATION ADULT - ADDITIONAL COMMENTS
lives in condo with  with 4 steps to enter with 1 rail and 1 flight with 1 rail to apartment, right hand dominant

## 2019-03-07 NOTE — CHART NOTE - NSCHARTNOTEFT_GEN_A_CORE
Transcranial doppler exam #1 (3/5/19) 1600pm  mean velocity  cm/sec                              Left         Right  DIANE                    39           48  MCA                   80            59   PCA                    37,73        48,79  VERT                   35            48  BA                               43  Official report to follow.  Bev    Transcranial doppler exam #2 (3/7/19) 1245pm  mean velocity  cm/sec                              Left         Right  DIANE                   71           96  MCA                  94            77  PCA                    35,41       37,56    Official report to follow.  Bev

## 2019-03-07 NOTE — DISCHARGE NOTE NURSING/CASE MANAGEMENT/SOCIAL WORK - NSDCPEPTSTRK_GEN_ALL_CORE
Call 911 for stroke/Need for follow up after discharge/Prescribed medications/Stroke education booklet/Stroke warning signs and symptoms/Signs and symptoms of stroke/Risk factors for stroke/Stroke support groups for patients, families, and friends

## 2019-03-07 NOTE — CHART NOTE - NSCHARTNOTEFT_GEN_A_CORE
Progress Note:     · Provider Specialty: Interventional Neuro Radiology    · Reason for Admission: SAH  	  · Subjective and Objective:     Patient seen and examined by me today, doing well, no complaints.      ICU Vital Signs Last 24 Hrs  T(C): 37.4 (03-07-19 @ 11:00), Max: 37.7 (03-07-19 @ 03:00)  HR: 93 (03-07-19 @ 15:00) (66 - 93)  BP: --  RR: 22 (03-07-19 @ 15:00) (13 - 22)  SpO2: 98% (03-07-19 @ 15:00) (97% - 100%)      03-06-19 @ 07:01  -  03-07-19 @ 07:00  --------------------------------------------------------  IN: 1515 mL / OUT: 1450 mL / NET: 65 mL    03-07-19 @ 07:01  -  03-07-19 @ 16:17  --------------------------------------------------------  IN: 650 mL / OUT: 0 mL / NET: 650 mL      Neuro Examination:     Neuro: Awake and alert, oriented x3, fluent, normal naming and repetition, follows 3 step commands. Extraocular movements intact, no nystagmus, visual fields full, face symmetric, tongue midline. No drift, 5/5 power x 4 extremities. Normal sensation to LT. Normal finger-to-nose and rapid alternating movements.    Ext: No hematoma in right groin, c/d/i, 2+ PT/DP.      LABS:                        11.7   14.3  )-----------( 214      ( 06 Mar 2019 00:51 )             32.7     03-07    137  |  102  |  15  ----------------------------<  115<H>  3.8   |  21<L>  |  0.82    Ca    8.9      07 Mar 2019 06:41  Phos  2.9     03-07  Mg     1.9     03-07      MEDICATIONS:  acetaminophen   Tablet .. 650 milliGRAM(s) Oral every 6 hours PRN  chlorhexidine 4% Liquid 1 Application(s) Topical <User Schedule>  docusate sodium 100 milliGRAM(s) Oral three times a day  enoxaparin Injectable 40 milliGRAM(s) SubCutaneous <User Schedule>  niMODipine Oral Solution 60 milliGRAM(s) Oral/Enteral Tube every 4 hours  senna 1 Tablet(s) Oral daily  sodium chloride 2% . 1000 milliLiter(s) IV Continuous <Continuous>  traMADol 25 milliGRAM(s) Oral every 4 hours PRN  traMADol 50 milliGRAM(s) Oral every 4 hours PRN      Assessment and Plan:   · Assessment: This is a 75yo female with HH1 SAH, secondary to small ruptured ACOMM artery aneurysm, POD#3 status post coil embolization.     1. Vasospasm watch, TCDs, nimodipine.  2. Neuro checks q1hr and hydro watch.  3. VTE prophylaxis.  4. OOB with assist, ambulate.          Yolis Hanna PA-C  [phone]

## 2019-03-07 NOTE — PHYSICAL THERAPY INITIAL EVALUATION ADULT - PERTINENT HX OF CURRENT PROBLEM, REHAB EVAL
PMHx: HTN on ASA. Pt c/o neck pain x3days. Pt awoke 3/3 evening with a "sharp, stabbing" headache. Per , upon standing up, +LOC ~2min, after which pt returned to baseline. EMS called. CTA demonstrated AComm aneurysm & was transferred to [facility]. HH1, MF3, NIHSS 0. s/p Selective Cerebral Angiography & balloon assisted coiling 3/4.

## 2019-03-07 NOTE — PROGRESS NOTE ADULT - SUBJECTIVE AND OBJECTIVE BOX
Vital Signs Last 24 Hrs  T(C): 37.6 (06 Mar 2019 23:00), Max: 37.6 (06 Mar 2019 23:00)  T(F): 99.7 (06 Mar 2019 23:00), Max: 99.7 (06 Mar 2019 23:00)  HR: 67 (06 Mar 2019 23:00) (63 - 80)  BP: --  BP(mean): --  RR: 18 (06 Mar 2019 23:00) (14 - 26)  SpO2: 98% (06 Mar 2019 23:00) (95% - 100%)    EXAM  AOx3, FC, PERRL, EOMI, no facial   5/5 throughout, no drift  SILT

## 2019-03-07 NOTE — PHYSICAL THERAPY INITIAL EVALUATION ADULT - PRECAUTIONS/LIMITATIONS, REHAB EVAL
fall precautions/CT brain 3/5: Diffuse SAH with sentinel clot in the anterior interhemispheric fissure suggesting a common aneurysm rupture. No hydrocephalus. CTA brain 3/5: +a-comm artery aneurysm.

## 2019-03-07 NOTE — PROGRESS NOTE ADULT - ASSESSMENT
ASSESSMENT/PLAN: HH1 mF3 subarachnoid hemorrhage, post-bleed day 3.  s/p coiling of AComm aneurysm    NEURO:  Neuro checks q1H  Seizure Prophylaxis: None needed, as no seizure history, aneurysm is secured.  Delayed Cerebral Ischemia Management: Serial screening TCDs, euvolemia, nimodipine  Hydrocephalus: Monitor   Pain: PRN Tylenol. Tramadol for severe pain PRN   Secondary stroke measures: A1c 5.6,   Activity: [X] OOB as tolerated [X] PT [X] OT [] PMNR    PULM:  Incentive spirometry    CV:  SBP goal 100-180mmHg  TTE ordered    RENAL:  Fluids: keep euvolemia  IVL    GI:  Diet: as tolerated  GI prophylaxis [x] not indicated [] PPI [] other:  Bowel regimen [] colace [] senna [x] other:    ENDO:   Goal euglycemia (-180)  SSI  A1c 5.6    HEME/ONC:  VTE prophylaxis: [x] SCDs [X] chemoprophylaxis - Lovenox 40 sc qHS    SOCIAL/FAMILY:  [x] awaiting [] updated at bedside [] family meeting    CODE STATUS:  [x] Full Code [] DNR [] DNI [] Palliative/Comfort Care    DISPOSITION:  [x] ICU [] Stroke Unit [] Floor [] EMU [] RCU [] PCU    [x] Patient is at high risk of neurologic deterioration/death due to: delayed cerebral ischemia     Time spent: 30 [x] critical care minutes

## 2019-03-08 LAB
ANION GAP SERPL CALC-SCNC: 14 MMOL/L — SIGNIFICANT CHANGE UP (ref 5–17)
ANION GAP SERPL CALC-SCNC: 15 MMOL/L — SIGNIFICANT CHANGE UP (ref 5–17)
ANION GAP SERPL CALC-SCNC: 18 MMOL/L — HIGH (ref 5–17)
BUN SERPL-MCNC: 16 MG/DL — SIGNIFICANT CHANGE UP (ref 7–23)
CALCIUM SERPL-MCNC: 8.5 MG/DL — SIGNIFICANT CHANGE UP (ref 8.4–10.5)
CALCIUM SERPL-MCNC: 8.9 MG/DL — SIGNIFICANT CHANGE UP (ref 8.4–10.5)
CALCIUM SERPL-MCNC: 8.9 MG/DL — SIGNIFICANT CHANGE UP (ref 8.4–10.5)
CHLORIDE SERPL-SCNC: 106 MMOL/L — SIGNIFICANT CHANGE UP (ref 96–108)
CHLORIDE SERPL-SCNC: 106 MMOL/L — SIGNIFICANT CHANGE UP (ref 96–108)
CHLORIDE SERPL-SCNC: 107 MMOL/L — SIGNIFICANT CHANGE UP (ref 96–108)
CO2 SERPL-SCNC: 15 MMOL/L — LOW (ref 22–31)
CO2 SERPL-SCNC: 18 MMOL/L — LOW (ref 22–31)
CO2 SERPL-SCNC: 19 MMOL/L — LOW (ref 22–31)
CREAT SERPL-MCNC: 0.82 MG/DL — SIGNIFICANT CHANGE UP (ref 0.5–1.3)
CREAT SERPL-MCNC: 0.88 MG/DL — SIGNIFICANT CHANGE UP (ref 0.5–1.3)
CREAT SERPL-MCNC: 0.89 MG/DL — SIGNIFICANT CHANGE UP (ref 0.5–1.3)
GLUCOSE SERPL-MCNC: 113 MG/DL — HIGH (ref 70–99)
GLUCOSE SERPL-MCNC: 122 MG/DL — HIGH (ref 70–99)
GLUCOSE SERPL-MCNC: 131 MG/DL — HIGH (ref 70–99)
HCT VFR BLD CALC: 37 % — SIGNIFICANT CHANGE UP (ref 34.5–45)
HGB BLD-MCNC: 12.7 G/DL — SIGNIFICANT CHANGE UP (ref 11.5–15.5)
MAGNESIUM SERPL-MCNC: 1.9 MG/DL — SIGNIFICANT CHANGE UP (ref 1.6–2.6)
MAGNESIUM SERPL-MCNC: 2 MG/DL — SIGNIFICANT CHANGE UP (ref 1.6–2.6)
MCHC RBC-ENTMCNC: 31.2 PG — SIGNIFICANT CHANGE UP (ref 27–34)
MCHC RBC-ENTMCNC: 34.4 GM/DL — SIGNIFICANT CHANGE UP (ref 32–36)
MCV RBC AUTO: 90.8 FL — SIGNIFICANT CHANGE UP (ref 80–100)
PHOSPHATE SERPL-MCNC: 2.2 MG/DL — LOW (ref 2.5–4.5)
PHOSPHATE SERPL-MCNC: 2.4 MG/DL — LOW (ref 2.5–4.5)
PLATELET # BLD AUTO: 259 K/UL — SIGNIFICANT CHANGE UP (ref 150–400)
POTASSIUM SERPL-MCNC: 3.6 MMOL/L — SIGNIFICANT CHANGE UP (ref 3.5–5.3)
POTASSIUM SERPL-MCNC: 3.9 MMOL/L — SIGNIFICANT CHANGE UP (ref 3.5–5.3)
POTASSIUM SERPL-MCNC: 4 MMOL/L — SIGNIFICANT CHANGE UP (ref 3.5–5.3)
POTASSIUM SERPL-SCNC: 3.6 MMOL/L — SIGNIFICANT CHANGE UP (ref 3.5–5.3)
POTASSIUM SERPL-SCNC: 3.9 MMOL/L — SIGNIFICANT CHANGE UP (ref 3.5–5.3)
POTASSIUM SERPL-SCNC: 4 MMOL/L — SIGNIFICANT CHANGE UP (ref 3.5–5.3)
PROCALCITONIN SERPL-MCNC: 0.06 NG/ML — SIGNIFICANT CHANGE UP (ref 0.02–0.1)
RBC # BLD: 4.08 M/UL — SIGNIFICANT CHANGE UP (ref 3.8–5.2)
RBC # FLD: 12.9 % — SIGNIFICANT CHANGE UP (ref 10.3–14.5)
SODIUM SERPL-SCNC: 138 MMOL/L — SIGNIFICANT CHANGE UP (ref 135–145)
SODIUM SERPL-SCNC: 140 MMOL/L — SIGNIFICANT CHANGE UP (ref 135–145)
SODIUM SERPL-SCNC: 140 MMOL/L — SIGNIFICANT CHANGE UP (ref 135–145)
WBC # BLD: 14 K/UL — HIGH (ref 3.8–10.5)
WBC # FLD AUTO: 14 K/UL — HIGH (ref 3.8–10.5)

## 2019-03-08 PROCEDURE — 71045 X-RAY EXAM CHEST 1 VIEW: CPT | Mod: 26

## 2019-03-08 PROCEDURE — 99292 CRITICAL CARE ADDL 30 MIN: CPT

## 2019-03-08 PROCEDURE — 99291 CRITICAL CARE FIRST HOUR: CPT

## 2019-03-08 PROCEDURE — 93888 INTRACRANIAL LIMITED STUDY: CPT | Mod: 26

## 2019-03-08 PROCEDURE — 99232 SBSQ HOSP IP/OBS MODERATE 35: CPT

## 2019-03-08 RX ORDER — ACETAMINOPHEN 500 MG
650 TABLET ORAL EVERY 6 HOURS
Qty: 0 | Refills: 0 | Status: DISCONTINUED | OUTPATIENT
Start: 2019-03-08 | End: 2019-03-18

## 2019-03-08 RX ORDER — POTASSIUM PHOSPHATE, MONOBASIC POTASSIUM PHOSPHATE, DIBASIC 236; 224 MG/ML; MG/ML
15 INJECTION, SOLUTION INTRAVENOUS ONCE
Qty: 0 | Refills: 0 | Status: COMPLETED | OUTPATIENT
Start: 2019-03-08 | End: 2019-03-09

## 2019-03-08 RX ADMIN — NIMODIPINE 60 MILLIGRAM(S): 60 SOLUTION ORAL at 21:44

## 2019-03-08 RX ADMIN — NIMODIPINE 60 MILLIGRAM(S): 60 SOLUTION ORAL at 01:46

## 2019-03-08 RX ADMIN — NIMODIPINE 60 MILLIGRAM(S): 60 SOLUTION ORAL at 17:32

## 2019-03-08 RX ADMIN — Medication 650 MILLIGRAM(S): at 19:00

## 2019-03-08 RX ADMIN — Medication 650 MILLIGRAM(S): at 20:00

## 2019-03-08 RX ADMIN — NIMODIPINE 60 MILLIGRAM(S): 60 SOLUTION ORAL at 05:49

## 2019-03-08 RX ADMIN — ENOXAPARIN SODIUM 40 MILLIGRAM(S): 100 INJECTION SUBCUTANEOUS at 17:31

## 2019-03-08 RX ADMIN — SODIUM CHLORIDE 50 MILLILITER(S): 5 INJECTION, SOLUTION INTRAVENOUS at 10:42

## 2019-03-08 RX ADMIN — NIMODIPINE 60 MILLIGRAM(S): 60 SOLUTION ORAL at 10:41

## 2019-03-08 RX ADMIN — NIMODIPINE 60 MILLIGRAM(S): 60 SOLUTION ORAL at 14:11

## 2019-03-08 RX ADMIN — SODIUM CHLORIDE 50 MILLILITER(S): 5 INJECTION, SOLUTION INTRAVENOUS at 17:31

## 2019-03-08 RX ADMIN — SODIUM CHLORIDE 50 MILLILITER(S): 5 INJECTION, SOLUTION INTRAVENOUS at 01:46

## 2019-03-08 NOTE — PROGRESS NOTE ADULT - ASSESSMENT
HH1 mF3 subarachnoid hemorrhage, post-bleed day 4.  s/p coiling of AComm aneurysm    PLAN  -TCDs in AM  -LE dopps- P  -care per icu team

## 2019-03-08 NOTE — PROGRESS NOTE ADULT - SUBJECTIVE AND OBJECTIVE BOX
PBD#4 (+3d HA/neck pain prior to admission)     Febrile, pancultured  Vitals/labs/meds reviewed    VSP/DCI ppx  -200  strict I/O to maintain euvolemia  Na 135-145 on hypertonics  no sedation  no seizure ppx  pain control  regular diet  SCDs  lovenox ppx PBD#4 (+3d HA/neck pain prior to admission) HH1 MF 3 s/p acomm aneurysm coil    Febrile, pancultured  Vitals/labs/meds reviewed  alert, fully oriented, EOMI, FS  BUE no drift 5/5  BLE 5/5    VSP/DCI ppx  -200  strict I/O to maintain euvolemia  Na 135-145 on hypertonics  no sedation  no seizure ppx  pain control  regular diet  SCDs  lovenox ppx    critical care time 45min

## 2019-03-08 NOTE — CHART NOTE - NSCHARTNOTEFT_GEN_A_CORE
Transcranial doppler exam #1 (3/5/19) 1600pm  mean velocity  cm/sec                              Left         Right  DIANE                    39           48  MCA                   80            59   PCA                    37,73        48,79  VERT                   35            48  BA                               43  Official report to follow.  Bev    Transcranial doppler exam #2 (3/7/19) 1245pm  mean velocity  cm/sec                              Left         Right  DIANE                   71           96  MCA                  94            77  PCA                    35,41       37,56    Official report to follow.  Bev    Transcranial doppler exam #3 (3/8/19) 1130am  mean velocity  cm/sec                              Left         Right  DIANE                   42           101  MCA                  102           71  PCA                    36,48       P2-50    Official report to follow.  Bev

## 2019-03-08 NOTE — PROGRESS NOTE ADULT - ASSESSMENT
ASSESSMENT/PLAN: HH1 mF3 subarachnoid hemorrhage, post-bleed day 3.  s/p coiling of AComm aneurysm    NEURO:  Neuro checks q1H  Seizure Prophylaxis: None needed, as no seizure history, aneurysm is secured.  Delayed Cerebral Ischemia Management: Serial screening TCDs, euvolemia, nimodipine  Hydrocephalus: Monitor   Pain: PRN Tylenol. Tramadol for severe pain PRN   Secondary stroke measures: A1c 5.6,   Activity: [X] OOB as tolerated [X] PT [X] OT [] PMNR    PULM:  Incentive spirometry    CV:  SBP goal 100-180mmHg  TTE ordered    RENAL:  Fluids: keep euvolemia  IVL    GI:  Diet: as tolerated  GI prophylaxis [x] not indicated [] PPI [] other:  Bowel regimen [] colace [] senna [x] other:    ENDO:   Goal euglycemia (-180)  SSI  A1c 5.6    HEME/ONC:  VTE prophylaxis: [x] SCDs [X] chemoprophylaxis - Lovenox 40 sc qHS    SOCIAL/FAMILY:  [x] awaiting [] updated at bedside [] family meeting    CODE STATUS:  [x] Full Code [] DNR [] DNI [] Palliative/Comfort Care    DISPOSITION:  [x] ICU [] Stroke Unit [] Floor [] EMU [] RCU [] PCU    [x] Patient is at high risk of neurologic deterioration/death due to: delayed cerebral ischemia     Time spent: 30 [x] critical care minutes ASSESSMENT/PLAN: HH1 mF3 subarachnoid hemorrhage, post-bleed day 4.  s/p coiling of AComm aneurysm    NEURO:  Neuro checks q1H  Seizure Prophylaxis: None needed, as no seizure history, aneurysm is secured.  Delayed Cerebral Ischemia Management: Serial screening TCDs, euvolemia, nimodipine  Hydrocephalus: Monitor   Pain: PRN Tylenol. Tramadol for severe pain PRN   Secondary stroke measures: A1c 5.6,   Activity: [X] OOB as tolerated [X] PT [X] OT [] PMNR    PULM:  Incentive spirometry    CV:  SBP goal 100-180mmHg  TTE ordered    RENAL:  Fluids: keep euvolemia  IVL    GI:  Diet: as tolerated  GI prophylaxis [x] not indicated [] PPI [] other:  Bowel regimen [] colace [] senna [x] other:    ENDO:   Goal euglycemia (-180)  SSI  A1c 5.6    HEME/ONC:  VTE prophylaxis: [x] SCDs [X] chemoprophylaxis - Lovenox 40 sc qHS    SOCIAL/FAMILY:  [x] awaiting [] updated at bedside [] family meeting    CODE STATUS:  [x] Full Code [] DNR [] DNI [] Palliative/Comfort Care    DISPOSITION:  [x] ICU [] Stroke Unit [] Floor [] EMU [] RCU [] PCU    [x] Patient is at high risk of neurologic deterioration/death due to: delayed cerebral ischemia     Time spent: 30 [x] critical care minutes ASSESSMENT/PLAN: HH1 mF3 subarachnoid hemorrhage, post-bleed day 4.  s/p coiling of AComm aneurysm    NEURO:  Neuro checks q1H, follow TCDs and if stable may liberalize to q2hr  Seizure Prophylaxis: None needed, as no seizure history, aneurysm is secured.  Delayed Cerebral Ischemia Management: Serial screening TCDs, euvolemia, nimodipine  Hydrocephalus: Monitor   Pain: PRN Tylenol. Tramadol for severe pain PRN   Secondary stroke measures: A1c 5.6,   Activity: [X] OOB as tolerated [X] PT [X] OT [] PMNR    PULM:  Incentive spirometry    CV:  SBP goal 100-180mmHg  TTE ordered    RENAL:  Fluids: keep euvolemia  IVL    GI:  Diet: as tolerated  GI prophylaxis [x] not indicated [] PPI [] other:  Bowel regimen [] colace [] senna [x] other:    ENDO:   Goal euglycemia (-180)  SSI  A1c 5.6    HEME/ONC:  VTE prophylaxis: [x] SCDs [X] chemoprophylaxis - Lovenox 40 sc qHS    SOCIAL/FAMILY:  [x] awaiting [] updated at bedside [] family meeting    CODE STATUS:  [x] Full Code [] DNR [] DNI [] Palliative/Comfort Care    DISPOSITION:  [x] ICU [] Stroke Unit [] Floor [] EMU [] RCU [] PCU    [x] Patient is at high risk of neurologic deterioration/death due to: delayed cerebral ischemia     Time spent: 30 [x] critical care minutes ASSESSMENT/PLAN: HH1 mF3 subarachnoid hemorrhage, post-bleed day 4.  s/p coiling of AComm aneurysm    NEURO:  Neuro checks q1H, follow TCDs and if stable may liberalize to q2hr  Seizure Prophylaxis: None needed, as no seizure history, aneurysm is secured.  Delayed Cerebral Ischemia Management: Serial screening TCDs, euvolemia, nimodipine  Hydrocephalus: Monitor   Pain: PRN Tylenol. Tramadol for severe pain PRN. However, patient states she would rather tolerate some headache than take Tramadol as it makes her feel "loopy." She will take Tramadol if headache is severe.   Secondary stroke measures: A1c 5.6,   Activity: [X] OOB as tolerated [X] PT [X] OT [] PMNR    PULM:  Incentive spirometry    CV:  SBP goal 100-180mmHg  TTE ordered    RENAL:  Fluids: keep euvolemia  on 2%, goal eunatremia    GI:  Diet: as tolerated  GI prophylaxis [x] not indicated [] PPI [] other:  Bowel regimen [] colace [] senna [x] other:    ENDO:   Goal euglycemia (-180)  SSI  A1c 5.6    HEME/ONC:  VTE prophylaxis: [x] SCDs [X] chemoprophylaxis - Lovenox 40 sc qHS    SOCIAL/FAMILY:  [x] awaiting [] updated at bedside [] family meeting    CODE STATUS:  [x] Full Code [] DNR [] DNI [] Palliative/Comfort Care    DISPOSITION:  [x] ICU [] Stroke Unit [] Floor [] EMU [] RCU [] PCU    [x] Patient is at high risk of neurologic deterioration/death due to: delayed cerebral ischemia     Time spent: 30 [x] critical care minutes

## 2019-03-08 NOTE — PROGRESS NOTE ADULT - SUBJECTIVE AND OBJECTIVE BOX
Patient seen and examined at bedside    OVERNIGHT EVENTS:   No acute events overnight.    VITALS:  T(C): , Max: 37.7 (03-07-19 @ 23:00)  HR:  (66 - 93)  BP: --  ABP:  (96/52 - 204/153)  RR:  (13 - 22)  SpO2:  (94% - 100%)  Wt(kg): --      03-06-19 @ 07:01  -  03-07-19 @ 07:00  --------------------------------------------------------  IN: 1515 mL / OUT: 1450 mL / NET: 65 mL    03-07-19 @ 07:01  -  03-08-19 @ 03:26  --------------------------------------------------------  IN: 1850 mL / OUT: 850 mL / NET: 1000 mL      LABS:  Na: 138 (03-07 @ 22:03), 136 (03-07 @ 16:11), 137 (03-07 @ 06:41), 133 (03-06 @ 22:14), 137 (03-06 @ 00:51), 140 (03-05 @ 05:14)  K: 4.0 (03-07 @ 22:03), 4.0 (03-07 @ 16:11), 3.8 (03-07 @ 06:41), 4.0 (03-06 @ 22:14), 3.9 (03-06 @ 00:51), 4.0 (03-05 @ 05:14)  Cl: 104 (03-07 @ 22:03), 102 (03-07 @ 16:11), 102 (03-07 @ 06:41), 99 (03-06 @ 22:14), 102 (03-06 @ 00:51), 105 (03-05 @ 05:14)  CO2: 19 (03-07 @ 22:03), 18 (03-07 @ 16:11), 21 (03-07 @ 06:41), 21 (03-06 @ 22:14), 20 (03-06 @ 00:51), 21 (03-05 @ 05:14)  BUN: 18 (03-07 @ 22:03), 17 (03-07 @ 16:11), 15 (03-07 @ 06:41), 16 (03-06 @ 22:14), 16 (03-06 @ 00:51), 18 (03-05 @ 05:14)  Cr: 0.87 (03-07 @ 22:03), 0.77 (03-07 @ 16:11), 0.82 (03-07 @ 06:41), 0.81 (03-06 @ 22:14), 0.85 (03-06 @ 00:51), 0.92 (03-05 @ 05:14)  Glu: 117(03-07 @ 22:03), 120(03-07 @ 16:11), 115(03-07 @ 06:41), 119(03-06 @ 22:14), 121(03-06 @ 00:51), 127(03-05 @ 05:14)    Hgb: 12.1 (03-07 @ 22:03), 11.7 (03-06 @ 00:51)  Hct: 34.5 (03-07 @ 22:03), 32.7 (03-06 @ 00:51)  WBC: 14.4 (03-07 @ 22:03), 14.3 (03-06 @ 00:51)  Plt: 258 (03-07 @ 22:03), 214 (03-06 @ 00:51)      IMAGING:   Recent imaging studies were reviewed.    EXAMINATION:  Neuro:  awake, alert, oriented x 3, no facial, follows commands, moves all extremities 5/5

## 2019-03-09 LAB
ANION GAP SERPL CALC-SCNC: 17 MMOL/L — SIGNIFICANT CHANGE UP (ref 5–17)
APPEARANCE UR: CLEAR — SIGNIFICANT CHANGE UP
BILIRUB UR-MCNC: NEGATIVE — SIGNIFICANT CHANGE UP
BUN SERPL-MCNC: 20 MG/DL — SIGNIFICANT CHANGE UP (ref 7–23)
CALCIUM SERPL-MCNC: 9.5 MG/DL — SIGNIFICANT CHANGE UP (ref 8.4–10.5)
CHLORIDE SERPL-SCNC: 109 MMOL/L — HIGH (ref 96–108)
CO2 SERPL-SCNC: 16 MMOL/L — LOW (ref 22–31)
COLOR SPEC: SIGNIFICANT CHANGE UP
CREAT SERPL-MCNC: 0.94 MG/DL — SIGNIFICANT CHANGE UP (ref 0.5–1.3)
DIFF PNL FLD: ABNORMAL
GLUCOSE SERPL-MCNC: 109 MG/DL — HIGH (ref 70–99)
GLUCOSE UR QL: NEGATIVE — SIGNIFICANT CHANGE UP
HCT VFR BLD CALC: 39.2 % — SIGNIFICANT CHANGE UP (ref 34.5–45)
HGB BLD-MCNC: 13.5 G/DL — SIGNIFICANT CHANGE UP (ref 11.5–15.5)
KETONES UR-MCNC: NEGATIVE — SIGNIFICANT CHANGE UP
LEUKOCYTE ESTERASE UR-ACNC: NEGATIVE — SIGNIFICANT CHANGE UP
MAGNESIUM SERPL-MCNC: 2.3 MG/DL — SIGNIFICANT CHANGE UP (ref 1.6–2.6)
MCHC RBC-ENTMCNC: 31.2 PG — SIGNIFICANT CHANGE UP (ref 27–34)
MCHC RBC-ENTMCNC: 34.5 GM/DL — SIGNIFICANT CHANGE UP (ref 32–36)
MCV RBC AUTO: 90.3 FL — SIGNIFICANT CHANGE UP (ref 80–100)
NITRITE UR-MCNC: NEGATIVE — SIGNIFICANT CHANGE UP
PH UR: 5.5 — SIGNIFICANT CHANGE UP (ref 5–8)
PHOSPHATE SERPL-MCNC: 3.2 MG/DL — SIGNIFICANT CHANGE UP (ref 2.5–4.5)
PLATELET # BLD AUTO: 270 K/UL — SIGNIFICANT CHANGE UP (ref 150–400)
POTASSIUM SERPL-MCNC: 3.7 MMOL/L — SIGNIFICANT CHANGE UP (ref 3.5–5.3)
POTASSIUM SERPL-SCNC: 3.7 MMOL/L — SIGNIFICANT CHANGE UP (ref 3.5–5.3)
PROT UR-MCNC: ABNORMAL
RBC # BLD: 4.34 M/UL — SIGNIFICANT CHANGE UP (ref 3.8–5.2)
RBC # FLD: 13.3 % — SIGNIFICANT CHANGE UP (ref 10.3–14.5)
SODIUM SERPL-SCNC: 142 MMOL/L — SIGNIFICANT CHANGE UP (ref 135–145)
SP GR SPEC: 1.03 — HIGH (ref 1.01–1.02)
UROBILINOGEN FLD QL: NEGATIVE — SIGNIFICANT CHANGE UP
WBC # BLD: 15.6 K/UL — HIGH (ref 3.8–10.5)
WBC # FLD AUTO: 15.6 K/UL — HIGH (ref 3.8–10.5)

## 2019-03-09 PROCEDURE — 99291 CRITICAL CARE FIRST HOUR: CPT

## 2019-03-09 PROCEDURE — 93888 INTRACRANIAL LIMITED STUDY: CPT | Mod: 26

## 2019-03-09 RX ORDER — POTASSIUM CHLORIDE 20 MEQ
40 PACKET (EA) ORAL ONCE
Qty: 0 | Refills: 0 | Status: COMPLETED | OUTPATIENT
Start: 2019-03-09 | End: 2019-03-10

## 2019-03-09 RX ORDER — ACETAMINOPHEN 500 MG
975 TABLET ORAL EVERY 6 HOURS
Qty: 0 | Refills: 0 | Status: DISCONTINUED | OUTPATIENT
Start: 2019-03-09 | End: 2019-03-09

## 2019-03-09 RX ADMIN — Medication 650 MILLIGRAM(S): at 13:00

## 2019-03-09 RX ADMIN — POTASSIUM PHOSPHATE, MONOBASIC POTASSIUM PHOSPHATE, DIBASIC 62.5 MILLIMOLE(S): 236; 224 INJECTION, SOLUTION INTRAVENOUS at 01:15

## 2019-03-09 RX ADMIN — NIMODIPINE 60 MILLIGRAM(S): 60 SOLUTION ORAL at 13:38

## 2019-03-09 RX ADMIN — ENOXAPARIN SODIUM 40 MILLIGRAM(S): 100 INJECTION SUBCUTANEOUS at 17:03

## 2019-03-09 RX ADMIN — NIMODIPINE 60 MILLIGRAM(S): 60 SOLUTION ORAL at 17:03

## 2019-03-09 RX ADMIN — Medication 650 MILLIGRAM(S): at 15:00

## 2019-03-09 RX ADMIN — CHLORHEXIDINE GLUCONATE 1 APPLICATION(S): 213 SOLUTION TOPICAL at 21:29

## 2019-03-09 RX ADMIN — NIMODIPINE 60 MILLIGRAM(S): 60 SOLUTION ORAL at 10:51

## 2019-03-09 RX ADMIN — NIMODIPINE 60 MILLIGRAM(S): 60 SOLUTION ORAL at 01:15

## 2019-03-09 RX ADMIN — NIMODIPINE 60 MILLIGRAM(S): 60 SOLUTION ORAL at 05:17

## 2019-03-09 RX ADMIN — NIMODIPINE 60 MILLIGRAM(S): 60 SOLUTION ORAL at 21:31

## 2019-03-09 RX ADMIN — SODIUM CHLORIDE 50 MILLILITER(S): 5 INJECTION, SOLUTION INTRAVENOUS at 10:54

## 2019-03-09 NOTE — CHART NOTE - NSCHARTNOTEFT_GEN_A_CORE
Transcranial doppler exam #1 (3/5/19) 1600pm  mean velocity  cm/sec                              Left         Right  DIANE                    39           48  MCA                   80            59   PCA                    37,73        48,79  VERT                   35            48  BA                               43  Official report to follow.  SEscobar    Transcranial doppler exam #2 (3/7/19) 1245pm  mean velocity  cm/sec                              Left         Right  DIANE                   71           96  MCA                  94            77  PCA                    35,41       37,56    Official report to follow.  SEscobar    Transcranial doppler exam #3 (3/8/19) 1130am  mean velocity  cm/sec                              Left         Right  DIANE                   42           101  MCA                  102           71  PCA                    36,48       P2-50    Official report to follow.  SEscobar    Transcranial doppler exam #4 (3/9/19) 11am  mean velocity  cm/sec                              Left         Right  DIANE                   35           62  MCA                  137           78   Official report to follow.  Bev

## 2019-03-09 NOTE — PROGRESS NOTE ADULT - SUBJECTIVE AND OBJECTIVE BOX
Vital Signs Last 24 Hrs  T(C): 37.1 (08 Mar 2019 23:00), Max: 39.2 (08 Mar 2019 19:00)  T(F): 98.7 (08 Mar 2019 23:00), Max: 102.5 (08 Mar 2019 19:00)  HR: 75 (08 Mar 2019 23:00) (65 - 100)  BP: 116/64 (08 Mar 2019 23:00) (116/64 - 170/85)  BP(mean): 81 (08 Mar 2019 23:00) (81 - 121)  RR: 19 (08 Mar 2019 23:00) (14 - 25)  SpO2: 96% (08 Mar 2019 23:00) (96% - 100%)    EXAMINATION:  Neuro:  awake, alert, oriented x 3, no facial, follows commands, moves all extremities 5/5

## 2019-03-09 NOTE — PROGRESS NOTE ADULT - SUBJECTIVE AND OBJECTIVE BOX
ADMISSION SCORES:   GCS: 15 NIHSS: 0   HH 1 mF 3    HOSPITAL COURSE:  3/4: s/p coiling of ACOMM aneurysm      OVERNIGHT EVENTS: febrile, got cultured     T(C): 37.2 (03-09-19 @ 11:00), Max: 39.2 (03-08-19 @ 19:00)  HR: 84 (03-09-19 @ 11:00) (65 - 94)  BP: 141/87 (03-09-19 @ 11:00) (116/64 - 170/91)  RR: 15 (03-09-19 @ 11:00) (14 - 25)  SpO2: 100% (03-09-19 @ 11:00) (96% - 100%)  03-08-19 @ 07:01  -  03-09-19 @ 07:00  --------------------------------------------------------  IN: 2500 mL / OUT: 500 mL / NET: 2000 mL    03-09-19 @ 06:01  -  03-09-19 @ 11:15  --------------------------------------------------------  IN: 200 mL / OUT: 0 mL / NET: 200 mL    acetaminophen   Tablet .. 650 milliGRAM(s) Oral every 6 hours PRN  chlorhexidine 4% Liquid 1 Application(s) Topical <User Schedule>  docusate sodium 100 milliGRAM(s) Oral three times a day  enoxaparin Injectable 40 milliGRAM(s) SubCutaneous <User Schedule>  niMODipine Oral Solution 60 milliGRAM(s) Oral/Enteral Tube every 4 hours  senna 1 Tablet(s) Oral daily  sodium chloride 2% . 1000 milliLiter(s) IV Continuous <Continuous>    ALLERGIES: Allergies  No Known Allergies    EXAMINATION:  General: No acute distress  HEENT: Anicteric sclerae  Cardiac: [id number]  Lungs: Clear  Abdomen: Soft, non-tender, +BS  Extremities: No c/c/e.   Skin/Incision Site: Clean, dry and intact  Neurologic: Awake, alert, fully oriented, follows commands, PERRL, EOMI, face symmetric, tongue midline, no drift, full strength      LABS:  Na: 140 (03-08 @ 20:11), 140 (03-08 @ 17:49), 138 (03-08 @ 05:59), 138 (03-07 @ 22:03), 136 (03-07 @ 16:11), 137 (03-07 @ 06:41), 133 (03-06 @ 22:14)  K: 4.0 (03-08 @ 20:11), 3.9 (03-08 @ 17:49), 3.6 (03-08 @ 05:59), 4.0 (03-07 @ 22:03), 4.0 (03-07 @ 16:11), 3.8 (03-07 @ 06:41), 4.0 (03-06 @ 22:14)  Cl: 107 (03-08 @ 20:11), 106 (03-08 @ 17:49), 106 (03-08 @ 05:59), 104 (03-07 @ 22:03), 102 (03-07 @ 16:11), 102 (03-07 @ 06:41), 99 (03-06 @ 22:14)  CO2: 15 (03-08 @ 20:11), 19 (03-08 @ 17:49), 18 (03-08 @ 05:59), 19 (03-07 @ 22:03), 18 (03-07 @ 16:11), 21 (03-07 @ 06:41), 21 (03-06 @ 22:14)  BUN: 16 (03-08 @ 20:11), 16 (03-08 @ 17:49), 16 (03-08 @ 05:59), 18 (03-07 @ 22:03), 17 (03-07 @ 16:11), 15 (03-07 @ 06:41), 16 (03-06 @ 22:14)  Cr: 0.89 (03-08 @ 20:11), 0.88 (03-08 @ 17:49), 0.82 (03-08 @ 05:59), 0.87 (03-07 @ 22:03), 0.77 (03-07 @ 16:11), 0.82 (03-07 @ 06:41), 0.81 (03-06 @ 22:14)  Glu: 131(03-08 @ 20:11), 113(03-08 @ 17:49), 122(03-08 @ 05:59), 117(03-07 @ 22:03), 120(03-07 @ 16:11), 115(03-07 @ 06:41), 119(03-06 @ 22:14)    Hgb: 12.7 (03-08 @ 20:11), 12.1 (03-07 @ 22:03)  Hct: 37.0 (03-08 @ 20:11), 34.5 (03-07 @ 22:03)  WBC: 14.0 (03-08 @ 20:11), 14.4 (03-07 @ 22:03)  Plt: 259 (03-08 @ 20:11), 258 (03-07 @ 22:03)    INR:   PTT:     ASSESSMENT/PLAN: HH1 mF3 subarachnoid hemorrhage, post-bleed day 4.  s/p coiling of AComm aneurysm    NEURO:  Neuro checks q1H, follow TCDs and if stable may liberalize to q2hr  Seizure Prophylaxis: None needed, as no seizure history, aneurysm is secured.  Delayed Cerebral Ischemia Management: Serial screening TCDs, euvolemia, nimodipine  Hydrocephalus: Monitor   Pain: PRN Tylenol. Tramadol for severe pain PRN.  Secondary stroke measures: A1c 5.6,   Activity: [X] OOB as tolerated [X] PT [X] OT [] PMNR    PULM:  Incentive spirometry    CV:  SBP goal 100-180mmHg  TTE ordered    RENAL:  Fluids: keep euvolemia  eunatremia  d/c 2 %     GI:  Diet: as tolerated  GI prophylaxis [x] not indicated [] PPI [] other:  Bowel regimen [] colace [] senna [x] other:    ENDO:   Goal euglycemia (-180)  SSI  A1c 5.6    HEME/ONC:  VTE prophylaxis: [x] SCDs [X] chemoprophylaxis - Lovenox 40 sc qHS    SOCIAL/FAMILY:  [x] awaiting [] updated at bedside [] family meeting    CODE STATUS:  [x] Full Code [] DNR [] DNI [] Palliative/Comfort Care    DISPOSITION:  [x] ICU [] Stroke Unit [] Floor [] EMU [] RCU [] PCU    [x] Patient is at high risk of neurologic deterioration/death due to: delayed cerebral ischemia     Time spent: 30 [x] critical care minutes ADMISSION SCORES:   GCS: 15 NIHSS: 0   HH 1 mF 3    HOSPITAL COURSE:  3/4: s/p coiling of ACOMM aneurysm      OVERNIGHT EVENTS: febrile, got cultured     T(C): 37.2 (03-09-19 @ 11:00), Max: 39.2 (03-08-19 @ 19:00)  HR: 84 (03-09-19 @ 11:00) (65 - 94)  BP: 141/87 (03-09-19 @ 11:00) (116/64 - 170/91)  RR: 15 (03-09-19 @ 11:00) (14 - 25)  SpO2: 100% (03-09-19 @ 11:00) (96% - 100%)  03-08-19 @ 07:01  -  03-09-19 @ 07:00  --------------------------------------------------------  IN: 2500 mL / OUT: 500 mL / NET: 2000 mL    03-09-19 @ 06:01  -  03-09-19 @ 11:15  --------------------------------------------------------  IN: 200 mL / OUT: 0 mL / NET: 200 mL    acetaminophen   Tablet .. 650 milliGRAM(s) Oral every 6 hours PRN  chlorhexidine 4% Liquid 1 Application(s) Topical <User Schedule>  docusate sodium 100 milliGRAM(s) Oral three times a day  enoxaparin Injectable 40 milliGRAM(s) SubCutaneous <User Schedule>  niMODipine Oral Solution 60 milliGRAM(s) Oral/Enteral Tube every 4 hours  senna 1 Tablet(s) Oral daily  sodium chloride 2% . 1000 milliLiter(s) IV Continuous <Continuous>    ALLERGIES: Allergies  No Known Allergies    EXAMINATION:  General: No acute distress  HEENT: Anicteric sclerae  Cardiac: [id number]  Lungs: Clear  Abdomen: Soft, non-tender, +BS  Extremities: No c/c/e.   Skin/Incision Site: Clean, dry and intact  Neurologic: Awake, alert, fully oriented, follows commands, PERRL, EOMI, face symmetric, tongue midline, no drift, full strength      LABS:  Na: 140 (03-08 @ 20:11), 140 (03-08 @ 17:49), 138 (03-08 @ 05:59), 138 (03-07 @ 22:03), 136 (03-07 @ 16:11), 137 (03-07 @ 06:41), 133 (03-06 @ 22:14)  K: 4.0 (03-08 @ 20:11), 3.9 (03-08 @ 17:49), 3.6 (03-08 @ 05:59), 4.0 (03-07 @ 22:03), 4.0 (03-07 @ 16:11), 3.8 (03-07 @ 06:41), 4.0 (03-06 @ 22:14)  Cl: 107 (03-08 @ 20:11), 106 (03-08 @ 17:49), 106 (03-08 @ 05:59), 104 (03-07 @ 22:03), 102 (03-07 @ 16:11), 102 (03-07 @ 06:41), 99 (03-06 @ 22:14)  CO2: 15 (03-08 @ 20:11), 19 (03-08 @ 17:49), 18 (03-08 @ 05:59), 19 (03-07 @ 22:03), 18 (03-07 @ 16:11), 21 (03-07 @ 06:41), 21 (03-06 @ 22:14)  BUN: 16 (03-08 @ 20:11), 16 (03-08 @ 17:49), 16 (03-08 @ 05:59), 18 (03-07 @ 22:03), 17 (03-07 @ 16:11), 15 (03-07 @ 06:41), 16 (03-06 @ 22:14)  Cr: 0.89 (03-08 @ 20:11), 0.88 (03-08 @ 17:49), 0.82 (03-08 @ 05:59), 0.87 (03-07 @ 22:03), 0.77 (03-07 @ 16:11), 0.82 (03-07 @ 06:41), 0.81 (03-06 @ 22:14)  Glu: 131(03-08 @ 20:11), 113(03-08 @ 17:49), 122(03-08 @ 05:59), 117(03-07 @ 22:03), 120(03-07 @ 16:11), 115(03-07 @ 06:41), 119(03-06 @ 22:14)    Hgb: 12.7 (03-08 @ 20:11), 12.1 (03-07 @ 22:03)  Hct: 37.0 (03-08 @ 20:11), 34.5 (03-07 @ 22:03)  WBC: 14.0 (03-08 @ 20:11), 14.4 (03-07 @ 22:03)  Plt: 259 (03-08 @ 20:11), 258 (03-07 @ 22:03)    INR:   PTT:     Transcranial doppler exam #1 (3/5/19) 1600pm  mean velocity  cm/sec                              Left         Right  DIANE                    39           48  MCA                   80            59   PCA                    37,73        48,79  VERT                   35            48  BA                               43  Official report to follow.  S.North    Transcranial doppler exam #2 (3/7/19) 1245pm  mean velocity  cm/sec                              Left         Right  DIANE                   71           96  MCA                  94            77  PCA                    35,41       37,56    Official report to follow.  S.North    Transcranial doppler exam #3 (3/8/19) 1130am  mean velocity  cm/sec                              Left         Right  DIANE                   42           101  MCA                  102           71  PCA                    36,48       P2-50    Official report to follow.  SEscobar.    ASSESSMENT/PLAN: HH1 mF3 subarachnoid hemorrhage, post-bleed day 8  s/p coiling of AComm aneurysm    NEURO:  Neuro checks q1H, follow TCDs   Seizure Prophylaxis: None needed, as no seizure history, aneurysm is secured.  Delayed Cerebral Ischemia Management: Serial screening TCDs, euvolemia, nimodipine  Hydrocephalus: Monitor   Pain: PRN Tylenol. Tramadol for severe pain PRN.  Secondary stroke measures: A1c 5.6,   Activity: [X] OOB as tolerated [X] PT [X] OT [] PMNR     PULM:  Incentive spirometry    CV:  SBP goal 100-180 mmHg  TTE pul HTN     RENAL:  Fluids: keep euvolemia  eunatremia  d/c 2 %     GI:  Diet: as tolerated  GI prophylaxis [x] not indicated [] PPI [] other:  Bowel regimen [] colace [] senna [x] other:    ENDO:   Goal euglycemia (-180)  SSI  A1c 5.6    HEME/ONC:  VTE prophylaxis: [x] SCDs [X] chemoprophylaxis - Lovenox 40 sc qHS    SOCIAL/FAMILY:  [x] awaiting [] updated at bedside [] family meeting    CODE STATUS:  [x] Full Code [] DNR [] DNI [] Palliative/Comfort Care    DISPOSITION:  [x] ICU [] Stroke Unit [] Floor [] EMU [] RCU [] PCU    [x] Patient is at high risk of neurologic deterioration/death due to: delayed cerebral ischemia     Time spent: 35 [x] critical care minutes

## 2019-03-09 NOTE — PROGRESS NOTE ADULT - SUBJECTIVE AND OBJECTIVE BOX
PBD#5 (+3d HA/neck pain prior to admission) HH1 MF 3 s/p acomm aneurysm coil    Febrile, pancultured  Vitals/labs/meds reviewed  alert, fully oriented, EOMI, FS  BUE no drift 5/5  BLE 5/5    VSP/DCI ppx  -200  strict I/O to maintain euvolemia  Na 135-145   no sedation  no seizure ppx  pain control  regular diet  SCDs  lovenox ppx  f/u fever w/u and cultures--UA negative, procal negative    critical care time 30min

## 2019-03-10 LAB
ANION GAP SERPL CALC-SCNC: 16 MMOL/L — SIGNIFICANT CHANGE UP (ref 5–17)
BUN SERPL-MCNC: 22 MG/DL — SIGNIFICANT CHANGE UP (ref 7–23)
CALCIUM SERPL-MCNC: 9.3 MG/DL — SIGNIFICANT CHANGE UP (ref 8.4–10.5)
CHLORIDE SERPL-SCNC: 107 MMOL/L — SIGNIFICANT CHANGE UP (ref 96–108)
CO2 SERPL-SCNC: 16 MMOL/L — LOW (ref 22–31)
CREAT SERPL-MCNC: 0.89 MG/DL — SIGNIFICANT CHANGE UP (ref 0.5–1.3)
GLUCOSE SERPL-MCNC: 137 MG/DL — HIGH (ref 70–99)
MAGNESIUM SERPL-MCNC: 2.2 MG/DL — SIGNIFICANT CHANGE UP (ref 1.6–2.6)
PHOSPHATE SERPL-MCNC: 3.3 MG/DL — SIGNIFICANT CHANGE UP (ref 2.5–4.5)
POTASSIUM SERPL-MCNC: 3.6 MMOL/L — SIGNIFICANT CHANGE UP (ref 3.5–5.3)
POTASSIUM SERPL-SCNC: 3.6 MMOL/L — SIGNIFICANT CHANGE UP (ref 3.5–5.3)
SODIUM SERPL-SCNC: 139 MMOL/L — SIGNIFICANT CHANGE UP (ref 135–145)

## 2019-03-10 PROCEDURE — 99291 CRITICAL CARE FIRST HOUR: CPT

## 2019-03-10 PROCEDURE — 99292 CRITICAL CARE ADDL 30 MIN: CPT

## 2019-03-10 RX ORDER — HYDROCORTISONE 1 %
1 OINTMENT (GRAM) TOPICAL EVERY 8 HOURS
Qty: 0 | Refills: 0 | Status: DISCONTINUED | OUTPATIENT
Start: 2019-03-10 | End: 2019-03-11

## 2019-03-10 RX ORDER — POTASSIUM CHLORIDE 20 MEQ
40 PACKET (EA) ORAL ONCE
Qty: 0 | Refills: 0 | Status: COMPLETED | OUTPATIENT
Start: 2019-03-10 | End: 2019-03-11

## 2019-03-10 RX ADMIN — NIMODIPINE 60 MILLIGRAM(S): 60 SOLUTION ORAL at 10:18

## 2019-03-10 RX ADMIN — Medication 40 MILLIEQUIVALENT(S): at 01:22

## 2019-03-10 RX ADMIN — Medication 1 APPLICATION(S): at 22:00

## 2019-03-10 RX ADMIN — NIMODIPINE 60 MILLIGRAM(S): 60 SOLUTION ORAL at 05:34

## 2019-03-10 RX ADMIN — NIMODIPINE 60 MILLIGRAM(S): 60 SOLUTION ORAL at 14:12

## 2019-03-10 RX ADMIN — NIMODIPINE 60 MILLIGRAM(S): 60 SOLUTION ORAL at 18:00

## 2019-03-10 RX ADMIN — ENOXAPARIN SODIUM 40 MILLIGRAM(S): 100 INJECTION SUBCUTANEOUS at 18:00

## 2019-03-10 RX ADMIN — CHLORHEXIDINE GLUCONATE 1 APPLICATION(S): 213 SOLUTION TOPICAL at 22:00

## 2019-03-10 RX ADMIN — NIMODIPINE 60 MILLIGRAM(S): 60 SOLUTION ORAL at 21:59

## 2019-03-10 RX ADMIN — NIMODIPINE 60 MILLIGRAM(S): 60 SOLUTION ORAL at 01:22

## 2019-03-10 NOTE — PROGRESS NOTE ADULT - ASSESSMENT
HH1 mF3 subarachnoid hemorrhage, post-bleed day 4.  s/p coiling of AComm aneurysm    PLAN  -LE dopps- P  -care per icu team

## 2019-03-10 NOTE — PROGRESS NOTE ADULT - ASSESSMENT
HH1 mF3 subarachnoid hemorrhage, post-bleed day 6  - delayed cerebral ischemia prophylaxis: TCDs, euvolemia, nimodipine as tolerated  - pain control  - -160mmHg  - mobilize as tolerated  - febrile 3/8: f/u cultures  - monitor for cerebral salt wasting    40 minutes critical care time

## 2019-03-10 NOTE — PROGRESS NOTE ADULT - SUBJECTIVE AND OBJECTIVE BOX
Vital Signs Last 24 Hrs  T(C): 37.1 (09 Mar 2019 23:00), Max: 39.1 (09 Mar 2019 13:00)  T(F): 98.8 (09 Mar 2019 23:00), Max: 102.4 (09 Mar 2019 13:00)  HR: 82 (10 Mar 2019 01:00) (70 - 94)  BP: 164/94 (10 Mar 2019 01:00) (122/77 - 174/91)  BP(mean): 113 (10 Mar 2019 01:00) (83 - 135)  RR: 23 (10 Mar 2019 01:00) (15 - 23)  SpO2: 96% (10 Mar 2019 01:00) (96% - 100%)    EXAMINATION:  Neuro:  awake, alert, oriented x 3, no facial, follows commands, moves all extremities 5/5

## 2019-03-10 NOTE — PROGRESS NOTE ADULT - SUBJECTIVE AND OBJECTIVE BOX
Progress Note:   · Provider Specialty	NSICU    Reason for Admission:    Reason for Admission:  · Reason for Admission	SAH      · Subjective and Objective:     ADMISSION SCORES:   GCS: 15 NIHSS: 0   HH 1 mF 3    HOSPITAL COURSE:  3/4: s/p coiling of ACOMM aneurysm      OVERNIGHT EVENTS: febrile, got cultured     T(C): 38 (03-10-19 @ 07:00), Max: 39.1 (03-09-19 @ 13:00)  HR: 85 (03-10-19 @ 11:00) (70 - 94)  BP: 155/84 (03-10-19 @ 11:00) (119/81 - 174/91)  RR: 22 (03-10-19 @ 11:00) (17 - 24)  SpO2: 100% (03-10-19 @ 11:00) (96% - 100%)  03-09-19 @ 06:01  -  03-10-19 @ 07:00  --------------------------------------------------------  IN: 2000 mL / OUT: 1000 mL / NET: 1000 mL    03-10-19 @ 07:01  -  03-10-19 @ 11:50  --------------------------------------------------------  IN: 240 mL / OUT: 0 mL / NET: 240 mL    acetaminophen   Tablet .. 650 milliGRAM(s) Oral every 6 hours PRN  chlorhexidine 4% Liquid 1 Application(s) Topical <User Schedule>  enoxaparin Injectable 40 milliGRAM(s) SubCutaneous <User Schedule>  niMODipine Oral Solution 60 milliGRAM(s) Oral/Enteral Tube every 4 hours    EXAMINATION:  General: No acute distress  HEENT: Anicteric sclerae  Cardiac: [id number]  Lungs: Clear  Abdomen: Soft, non-tender, +BS  Extremities: No c/c/e.   Skin/Incision Site: Clean, dry and intact  Neurologic: Awake, alert, fully oriented, follows commands, PERRL, EOMI, face symmetric, tongue midline, no drift, full strength      LABS:  Na: 142 (03-09 @ 21:54), 140 (03-08 @ 20:11), 140 (03-08 @ 17:49), 138 (03-08 @ 05:59), 138 (03-07 @ 22:03), 136 (03-07 @ 16:11)  K: 3.7 (03-09 @ 21:54), 4.0 (03-08 @ 20:11), 3.9 (03-08 @ 17:49), 3.6 (03-08 @ 05:59), 4.0 (03-07 @ 22:03), 4.0 (03-07 @ 16:11)  Cl: 109 (03-09 @ 21:54), 107 (03-08 @ 20:11), 106 (03-08 @ 17:49), 106 (03-08 @ 05:59), 104 (03-07 @ 22:03), 102 (03-07 @ 16:11)  CO2: 16 (03-09 @ 21:54), 15 (03-08 @ 20:11), 19 (03-08 @ 17:49), 18 (03-08 @ 05:59), 19 (03-07 @ 22:03), 18 (03-07 @ 16:11)  BUN: 20 (03-09 @ 21:54), 16 (03-08 @ 20:11), 16 (03-08 @ 17:49), 16 (03-08 @ 05:59), 18 (03-07 @ 22:03), 17 (03-07 @ 16:11)  Cr: 0.94 (03-09 @ 21:54), 0.89 (03-08 @ 20:11), 0.88 (03-08 @ 17:49), 0.82 (03-08 @ 05:59), 0.87 (03-07 @ 22:03), 0.77 (03-07 @ 16:11)  Glu: 109(03-09 @ 21:54), 131(03-08 @ 20:11), 113(03-08 @ 17:49), 122(03-08 @ 05:59), 117(03-07 @ 22:03), 120(03-07 @ 16:11)    Hgb: 13.5 (03-09 @ 21:54), 12.7 (03-08 @ 20:11), 12.1 (03-07 @ 22:03)  Hct: 39.2 (03-09 @ 21:54), 37.0 (03-08 @ 20:11), 34.5 (03-07 @ 22:03)  WBC: 15.6 (03-09 @ 21:54), 14.0 (03-08 @ 20:11), 14.4 (03-07 @ 22:03)  Plt: 270 (03-09 @ 21:54), 259 (03-08 @ 20:11), 258 (03-07 @ 22:03)    INR:   PTT:           INR:   PTT:     Transcranial doppler exam #1 (3/5/19) 1600pm  mean velocity  cm/sec                              Left         Right  DIANE                    39           48  MCA                   80            59   PCA                    37,73        48,79  VERT                   35            48  BA                               43  Official report to follow.  S.North    Transcranial doppler exam #2 (3/7/19) 1245pm  mean velocity  cm/sec                              Left         Right  DIANE                   71           96  MCA                  94            77  PCA                    35,41       37,56    Official report to follow.  S.North    Transcranial doppler exam #3 (3/8/19) 1130am  mean velocity  cm/sec                              Left         Right  DIANE                   42           101  MCA                  102           71  PCA                    36,48       P2-50    Official report to follow.  S.North.    ASSESSMENT/PLAN: HH1 mF3 subarachnoid hemorrhage, post-bleed day 8  s/p coiling of AComm aneurysm    NEURO:  Neuro checks q1H, follow TCDs   Seizure Prophylaxis: None needed, as no seizure history, aneurysm is secured.  Delayed Cerebral Ischemia Management: Serial screening TCDs, euvolemia, nimodipine  Hydrocephalus: Monitor   Pain: PRN Tylenol. Tramadol for severe pain PRN.  Secondary stroke measures: A1c 5.6,   Activity: [X] OOB as tolerated [X] PT [X] OT [] PMNR     PULM:  Incentive spirometry    CV:  SBP goal 100-180 mmHg  TTE pul HTN     RENAL:  Fluids: keep euvolemia  eunatremia  d/c 2 %     GI:  Diet: as tolerated  GI prophylaxis [x] not indicated [] PPI [] other:  Bowel regimen [] colace [] senna [x] other:    ENDO:   Goal euglycemia (-180)  SSI  A1c 5.6    HEME/ONC:  VTE prophylaxis: [x] SCDs [X] chemoprophylaxis - Lovenox 40 sc qHS    ID: fever, pancultured cx negative so far   chest xray no infiltrates      SOCIAL/FAMILY:  [x] awaiting [] updated at bedside [] family meeting    CODE STATUS:  [x] Full Code [] DNR [] DNI [] Palliative/Comfort Care    DISPOSITION:  [x] ICU [] Stroke Unit [] Floor [] EMU [] RCU [] PCU    [x] Patient is at high risk of neurologic deterioration/death due to: delayed cerebral ischemia     Time spent: 35 [x] critical care minutes

## 2019-03-10 NOTE — PROGRESS NOTE ADULT - SUBJECTIVE AND OBJECTIVE BOX
3/9: L     Awake, alert, fully oriented, follows, PERRL, EOMI, face symmetric, no drift, full strength

## 2019-03-11 PROCEDURE — 99291 CRITICAL CARE FIRST HOUR: CPT

## 2019-03-11 PROCEDURE — 93888 INTRACRANIAL LIMITED STUDY: CPT | Mod: 26

## 2019-03-11 PROCEDURE — 99232 SBSQ HOSP IP/OBS MODERATE 35: CPT

## 2019-03-11 RX ORDER — ZINC OXIDE 200 MG/G
1 OINTMENT TOPICAL
Qty: 0 | Refills: 0 | Status: DISCONTINUED | OUTPATIENT
Start: 2019-03-11 | End: 2019-03-29

## 2019-03-11 RX ORDER — SODIUM CHLORIDE 9 MG/ML
1000 INJECTION INTRAMUSCULAR; INTRAVENOUS; SUBCUTANEOUS ONCE
Qty: 0 | Refills: 0 | Status: COMPLETED | OUTPATIENT
Start: 2019-03-11 | End: 2019-03-11

## 2019-03-11 RX ORDER — PSYLLIUM SEED (WITH DEXTROSE)
1 POWDER (GRAM) ORAL
Qty: 0 | Refills: 0 | Status: DISCONTINUED | OUTPATIENT
Start: 2019-03-11 | End: 2019-03-29

## 2019-03-11 RX ADMIN — NIMODIPINE 60 MILLIGRAM(S): 60 SOLUTION ORAL at 14:16

## 2019-03-11 RX ADMIN — NIMODIPINE 60 MILLIGRAM(S): 60 SOLUTION ORAL at 17:06

## 2019-03-11 RX ADMIN — NIMODIPINE 60 MILLIGRAM(S): 60 SOLUTION ORAL at 05:12

## 2019-03-11 RX ADMIN — NIMODIPINE 60 MILLIGRAM(S): 60 SOLUTION ORAL at 10:01

## 2019-03-11 RX ADMIN — NIMODIPINE 60 MILLIGRAM(S): 60 SOLUTION ORAL at 22:20

## 2019-03-11 RX ADMIN — SODIUM CHLORIDE 2000 MILLILITER(S): 9 INJECTION INTRAMUSCULAR; INTRAVENOUS; SUBCUTANEOUS at 11:00

## 2019-03-11 RX ADMIN — ENOXAPARIN SODIUM 40 MILLIGRAM(S): 100 INJECTION SUBCUTANEOUS at 17:06

## 2019-03-11 RX ADMIN — NIMODIPINE 60 MILLIGRAM(S): 60 SOLUTION ORAL at 01:04

## 2019-03-11 RX ADMIN — Medication 40 MILLIEQUIVALENT(S): at 01:03

## 2019-03-11 RX ADMIN — ZINC OXIDE 1 APPLICATION(S): 200 OINTMENT TOPICAL at 17:07

## 2019-03-11 RX ADMIN — Medication 1 APPLICATION(S): at 05:13

## 2019-03-11 RX ADMIN — Medication 1 PACKET(S): at 17:07

## 2019-03-11 NOTE — PROGRESS NOTE ADULT - SUBJECTIVE AND OBJECTIVE BOX
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 The patient awoke yesterday (3/3) evening with a "sharp, stabbing" headache. Upon standing up, she lost consciousness for about 2 minutes, as per , after which she regained consciousness and returned to baseline. The patient was transferred to ED by EMS, found to have a SAH, and was transferred to [facility] for further management. CTA demonstrated 2.4x4.8x3.3mm AComm aneurysm. Patient was brought to IR for balloon-assisted coiling with two coils. Patient tolerated the procedure well, with no change in neurologic status from baseline, remaining neuro-intact.     ADMISSION SCORES:   GCS: 15 NIHSS: 0   HH 1 mF 3    HOSPITAL COURSE:  3/4: s/p coiling of ACOMM aneurysm    OVERNIGHT EVENTS: none reported.    VITALS/DATA/ORDERS [x] Reviewed    EXAMINATION:  General: No acute distress  HEENT: Anicteric sclerae  Cardiac: [id number]  Lungs: Clear  Abdomen: Soft, non-tender, +BS  Extremities: No c/c/e.   Skin/Incision Site: Clean, dry and intact  Neurologic: Awake, alert, fully oriented, follows commands, PERRL, EOMI, face symmetric, tongue midline, no drift, full strength SUMMARY: Patient is a 74 year old right handed woman with a PMH of HTN controlled on two medications and HLD who presented with syncope, found to have 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 The patient awoke yesterday (3/3) evening with a "sharp, stabbing" headache. Upon standing up, she lost consciousness for about 2 minutes, as per , after which she regained consciousness and returned to baseline. The patient was transferred to ED by EMS, found to have a SAH, and was transferred to [facility] for further management. CTA demonstrated 2.4x4.8x3.3mm AComm aneurysm. Patient was brought to IR for balloon-assisted coiling with two coils. Patient tolerated the procedure well, with no change in neurologic status from baseline, remaining neuro-intact.     ADMISSION SCORES:   GCS: 15 NIHSS: 0   HH 1 mF 3    HOSPITAL COURSE:  3/4: s/p coiling of ACOMM aneurysm    OVERNIGHT EVENTS: skin rash reported.    VITALS/DATA/ORDERS [x] Reviewed    EXAMINATION:  General: No acute distress  HEENT: Anicteric sclerae  Cardiac: [id number]  Lungs: Clear  Abdomen: Soft, non-tender, +BS  Extremities: No c/c/e.   Skin/Incision Site: Clean, dry and intact  Neurologic: Awake, alert, fully oriented, follows commands, PERRL, EOMI, face symmetric, tongue midline, no drift, full strength

## 2019-03-11 NOTE — PROGRESS NOTE ADULT - SUBJECTIVE AND OBJECTIVE BOX
Vital Signs Last 24 Hrs  T(C): 37.2 (10 Mar 2019 23:00), Max: 38 (10 Mar 2019 07:00)  T(F): 99 (10 Mar 2019 23:00), Max: 100.4 (10 Mar 2019 07:00)  HR: 76 (11 Mar 2019 00:00) (76 - 93)  BP: 146/106 (11 Mar 2019 00:00) (119/81 - 172/103)  BP(mean): 119 (11 Mar 2019 00:00) (93 - 123)  RR: 19 (11 Mar 2019 00:00) (17 - 24)  SpO2: 99% (11 Mar 2019 00:00) (96% - 100%)    EXAMINATION:  Neuro:  awake, alert, oriented x 3, no facial, follows commands, moves all extremities 5/5

## 2019-03-11 NOTE — CHART NOTE - NSCHARTNOTEFT_GEN_A_CORE
Nutrition Follow Up Note    Patient seen for nutrition follow up. 74 year old female with a PMH of HTN and HLD who presented with syncope, found to have 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 CTA demonstrated 2.4x4.8x3.3mm AComm aneurysm. Patient was brought to IR for balloon-assisted coiling with two coils. Patient tolerated the procedure well, with no change in neurologic status from baseline, remaining neuro-intact.     Source: pt, family at bedside, EMR, NSCU rounds    Diet : Regular with Ensure Enlive 2 x daily    Patient reports fair appetite. States she is able to consume about 50% of her meals and most of the Ensure Enlive drinks. Pt reports multiple loose/liquid for past 3 days, currently ordered for metamucil. Encouraged binding foods such as banana, rice and toast. Pt family will bring her rice and pastina tonight. Will continue to monitor.     PO intake : 50%     Source for PO intake: Patient    Daily Weight in k.6 (), Weight in k.6 ()  No new weight recorded    Pertinent Medications: MEDICATIONS  (STANDING):  chlorhexidine 4% Liquid 1 Application(s) Topical <User Schedule>  enoxaparin Injectable 40 milliGRAM(s) SubCutaneous <User Schedule>  niMODipine Oral Solution 60 milliGRAM(s) Oral/Enteral Tube every 4 hours  psyllium Powder 1 Packet(s) Oral two times a day  zinc oxide 20% Ointment 1 Application(s) Topical two times a day    MEDICATIONS  (PRN):  acetaminophen   Tablet .. 650 milliGRAM(s) Oral every 6 hours PRN Temp greater or equal to 38C (100.4F), Mild Pain (1 - 3)    Pertinent Labs: 03-10 @ 20:47: Na 139, BUN 22, Cr 0.89, <H>, K+ 3.6, Phos 3.3, Mg 2.2,     Skin per nursing documentation: intact  Edema: none    Estimated Needs:   [X ] no change since previous assessment  [ ] recalculated:     Previous Nutrition Diagnosis: no nutrition diagnosis  Nutrition Diagnosis is: no longer applicable    New Nutrition Diagnosis: Inadequate PO intake   Related to: decreased appetite due to not feeling well    As evidenced by: pt reporting consuming 50% of meals     Interventions:     Recommend  1) Continue regular diet  2) Continue Ensure Enlive- 2 per day (provides additional 700cal, 40gm protein)   3) Encourage PO intake of high protein foods and binding foods to help with loose stools    Monitoring and Evaluation:     Continue to monitor Nutritional intake, Tolerance to diet prescription, weights, labs, skin integrity    RD remains available upon request and will follow up per protocol  Renee Winter, Dietetic Intern, Pager Number [phone] Nutrition Follow Up Note    Patient seen for nutrition follow up. 74 year old female with a PMH of HTN and HLD who presented with syncope, found to have 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 CTA demonstrated 2.4x4.8x3.3mm AComm aneurysm. Patient was brought to IR for balloon-assisted coiling with two coils. Patient tolerated the procedure well, with no change in neurologic status from baseline, remaining neuro-intact.     Source: pt, family at bedside, EMR, NSCU rounds    Diet : Regular with Ensure Enlive 2 x daily    Patient reports fair appetite. States she is able to consume about 50% of her meals and most of the Ensure Enlive drinks. Pt reports multiple loose/liquid for past 3 days, currently ordered for metamucil. Encouraged binding foods such as banana, rice and toast. Pt family will bring her rice and pastina tonight. Will continue to monitor.     PO intake : 50%     Source for PO intake: Patient    Daily Weight in k.6 (), Weight in k.6 ()  No new weight recorded    Pertinent Medications: MEDICATIONS  (STANDING):  chlorhexidine 4% Liquid 1 Application(s) Topical <User Schedule>  enoxaparin Injectable 40 milliGRAM(s) SubCutaneous <User Schedule>  niMODipine Oral Solution 60 milliGRAM(s) Oral/Enteral Tube every 4 hours  psyllium Powder 1 Packet(s) Oral two times a day  zinc oxide 20% Ointment 1 Application(s) Topical two times a day    MEDICATIONS  (PRN):  acetaminophen   Tablet .. 650 milliGRAM(s) Oral every 6 hours PRN Temp greater or equal to 38C (100.4F), Mild Pain (1 - 3)    Pertinent Labs: 03-10 @ 20:47: Na 139, BUN 22, Cr 0.89, <H>, K+ 3.6, Phos 3.3, Mg 2.2,     Skin per nursing documentation: intact  Edema: none    Estimated Needs:   [X ] no change since previous assessment  [ ] recalculated:     Previous Nutrition Diagnosis: no nutrition diagnosis  Nutrition Diagnosis is: no longer applicable    New Nutrition Diagnosis: Inadequate PO intake   Related to: decreased appetite due to not feeling well    As evidenced by: pt reporting consuming 50% of meals     Interventions:     Recommend  1) Continue regular diet  2) Continue Ensure Enlive- 2 per day (provides additional 700cal, 40gm protein)   3) Encourage PO intake of high protein foods and binding foods to help with loose stools  4) Request re-weight to assess any weight loss, pt at risk for malnutrition given reduced PO intake    Monitoring and Evaluation:     Continue to monitor Nutritional intake, Tolerance to diet prescription, weights, labs, skin integrity    RD remains available upon request and will follow up per protocol  Renee Winter, Dietetic Intern, Pager Number [phone]

## 2019-03-11 NOTE — PROGRESS NOTE ADULT - ASSESSMENT
HH1 mF3 subarachnoid hemorrhage  s/p coiling of AComm aneurysm    PLAN  -LE dopps- P  -care per icu team  - TCDs

## 2019-03-11 NOTE — PROGRESS NOTE ADULT - ASSESSMENT
ASSESSMENT/PLAN:   HH1 mF3 subarachnoid hemorrhage, post-bleed day 10 (presented 3 days after HA/neck pain).  s/p coiling of AComm aneurysm    NEURO:  Neuro checks q1H, follow TCDs and if stable may liberalize to q2hr  Seizure Prophylaxis: None needed, as no seizure history, aneurysm is secured.  Delayed Cerebral Ischemia Management: Serial screening TCDs, euvolemia, nimodipine  Hydrocephalus: Monitor   Pain: PRN Tylenol. Tramadol for severe pain PRN. However, patient states she would rather tolerate some headache than take Tramadol as it makes her feel "loopy." She will take Tramadol if headache is severe.   Secondary stroke measures: A1c 5.6,   Activity: [X] OOB as tolerated [X] PT [X] OT [] PMNR    PULM:  Incentive spirometry    CV:  SBP goal 100-180mmHg  TTE ordered    RENAL:  Fluids: keep euvolemia  on 2%, goal eunatremia    GI:  Diet: as tolerated  GI prophylaxis [x] not indicated [] PPI [] other:  Bowel regimen [] colace [] senna [x] other:    ENDO:   Goal euglycemia (-180)  SSI  A1c 5.6    HEME/ONC:  VTE prophylaxis: [x] SCDs [X] chemoprophylaxis - Lovenox 40 sc qHS    SOCIAL/FAMILY:  [x] awaiting [] updated at bedside [] family meeting    CODE STATUS:  [x] Full Code [] DNR [] DNI [] Palliative/Comfort Care    DISPOSITION:  [x] ICU [] Stroke Unit [] Floor [] EMU [] RCU [] PCU    [x] Patient is at high risk of neurologic deterioration/death due to: delayed cerebral ischemia     Time spent: 30 [x] critical care minutes ASSESSMENT/PLAN:   HH1 mF3 subarachnoid hemorrhage, post-bleed day 10 (presented 3 days after HA/neck pain).  s/p coiling of AComm aneurysm    NEURO:  Neuro checks q1H for now, discuss with NSGy - possible downgrade to floor  Seizure Prophylaxis: None needed, as no seizure history, aneurysm is secured.  Delayed Cerebral Ischemia Management: Serial screening TCDs, euvolemia, nimodipine  Hydrocephalus: Monitor   Pain: PRN Tylenol. Tramadol for severe pain PRN.   Secondary stroke measures: A1c 5.6,   Activity: [X] OOB as tolerated [X] PT [X] OT [] PMNR    PULM:  Incentive spirometry    CV:  SBP goal 100-180mmHg  TTE ordered    RENAL:  Fluids: keep euvolemia  1L bolus now    GI:  Diet: as tolerated  GI prophylaxis [x] not indicated [] PPI [] other:  Bowel regimen [] colace [] senna [x] other:    ENDO:   Goal euglycemia (-180)  SSI  A1c 5.6    HEME/ONC:  VTE prophylaxis: [x] SCDs [X] chemoprophylaxis - Lovenox 40 sc qHS    Misc: contact derm - zinc oxide     SOCIAL/FAMILY:  [x] awaiting [] updated at bedside [] family meeting    CODE STATUS:  [x] Full Code [] DNR [] DNI [] Palliative/Comfort Care    DISPOSITION:  [x] ICU [] Stroke Unit [] Floor [] EMU [] RCU [] PCU    [x] Patient is at high risk of neurologic deterioration/death due to: delayed cerebral ischemia     Time spent: 30 [x] critical care minutes

## 2019-03-11 NOTE — PROGRESS NOTE ADULT - ATTENDING COMMENTS
Agree with resident note. Patient personally seen and examined. All current imaging studies reviewed.

## 2019-03-11 NOTE — CHART NOTE - NSCHARTNOTEFT_GEN_A_CORE
Transcranial doppler exam #1 (3/5/19) 1600pm  mean velocity  cm/sec                              Left         Right  DIANE                    39           48  MCA                   80            59   PCA                    37,73        48,79  VERT                   35            48  BA                               43  Official report to follow.  [facility]    Transcranial doppler exam #2 (3/7/19) 1245pm  mean velocity  cm/sec                              Left         Right  DIANE                   71           96  MCA                  94            77  PCA                    35,41       37,56    Official report to follow.  [facility]    Transcranial doppler exam #3 (3/8/19) 1130am  mean velocity  cm/sec                              Left         Right  DIANE                   42           101  MCA                  102           71  PCA                    36,48       P2-50    Official report to follow.  [facility]    Transcranial doppler exam #4 (3/9/19) 11am  mean velocity  cm/sec                              Left         Right  DIANE                   35           62  MCA                  137           78   Official report to follow.  [facility]    Transcranial doppler exam #5 (3/11/19) 1030am  mean velocity  cm/sec                              Left         Right  DIANE                   47           34  MCA                   155          78  PCA                    P2-48       29,31  Official report to follow.  [facility]

## 2019-03-12 DIAGNOSIS — E78.5 HYPERLIPIDEMIA, UNSPECIFIED: ICD-10-CM

## 2019-03-12 DIAGNOSIS — R19.7 DIARRHEA, UNSPECIFIED: ICD-10-CM

## 2019-03-12 DIAGNOSIS — I10 ESSENTIAL (PRIMARY) HYPERTENSION: ICD-10-CM

## 2019-03-12 DIAGNOSIS — R33.9 RETENTION OF URINE, UNSPECIFIED: ICD-10-CM

## 2019-03-12 DIAGNOSIS — I60.9 NONTRAUMATIC SUBARACHNOID HEMORRHAGE, UNSPECIFIED: ICD-10-CM

## 2019-03-12 LAB
ANION GAP SERPL CALC-SCNC: 13 MMOL/L — SIGNIFICANT CHANGE UP (ref 5–17)
BUN SERPL-MCNC: 19 MG/DL — SIGNIFICANT CHANGE UP (ref 7–23)
C DIFF BY PCR RESULT: DETECTED
C DIFF GDH STL QL: SIGNIFICANT CHANGE UP
C DIFF GDH STL QL: SIGNIFICANT CHANGE UP
C DIFF TOX GENS STL QL NAA+PROBE: SIGNIFICANT CHANGE UP
CALCIUM SERPL-MCNC: 9.2 MG/DL — SIGNIFICANT CHANGE UP (ref 8.4–10.5)
CHLORIDE SERPL-SCNC: 108 MMOL/L — SIGNIFICANT CHANGE UP (ref 96–108)
CO2 SERPL-SCNC: 20 MMOL/L — LOW (ref 22–31)
CREAT SERPL-MCNC: 0.81 MG/DL — SIGNIFICANT CHANGE UP (ref 0.5–1.3)
GLUCOSE SERPL-MCNC: 109 MG/DL — HIGH (ref 70–99)
HCT VFR BLD CALC: 35.4 % — SIGNIFICANT CHANGE UP (ref 34.5–45)
HGB BLD-MCNC: 11.4 G/DL — LOW (ref 11.5–15.5)
MAGNESIUM SERPL-MCNC: 2.1 MG/DL — SIGNIFICANT CHANGE UP (ref 1.6–2.6)
MCHC RBC-ENTMCNC: 30.5 PG — SIGNIFICANT CHANGE UP (ref 27–34)
MCHC RBC-ENTMCNC: 32.2 GM/DL — SIGNIFICANT CHANGE UP (ref 32–36)
MCV RBC AUTO: 94.7 FL — SIGNIFICANT CHANGE UP (ref 80–100)
PHOSPHATE SERPL-MCNC: 2.8 MG/DL — SIGNIFICANT CHANGE UP (ref 2.5–4.5)
PLATELET # BLD AUTO: 337 K/UL — SIGNIFICANT CHANGE UP (ref 150–400)
POTASSIUM SERPL-MCNC: 4 MMOL/L — SIGNIFICANT CHANGE UP (ref 3.5–5.3)
POTASSIUM SERPL-SCNC: 4 MMOL/L — SIGNIFICANT CHANGE UP (ref 3.5–5.3)
RBC # BLD: 3.74 M/UL — LOW (ref 3.8–5.2)
RBC # FLD: 14.7 % — HIGH (ref 10.3–14.5)
SODIUM SERPL-SCNC: 141 MMOL/L — SIGNIFICANT CHANGE UP (ref 135–145)
WBC # BLD: 13.89 K/UL — HIGH (ref 3.8–10.5)
WBC # FLD AUTO: 13.89 K/UL — HIGH (ref 3.8–10.5)

## 2019-03-12 PROCEDURE — 99232 SBSQ HOSP IP/OBS MODERATE 35: CPT

## 2019-03-12 PROCEDURE — 93888 INTRACRANIAL LIMITED STUDY: CPT | Mod: 26

## 2019-03-12 PROCEDURE — 99223 1ST HOSP IP/OBS HIGH 75: CPT

## 2019-03-12 RX ORDER — NYSTATIN 500MM UNIT
500000 POWDER (EA) MISCELLANEOUS
Qty: 0 | Refills: 0 | Status: COMPLETED | OUTPATIENT
Start: 2019-03-12 | End: 2019-03-19

## 2019-03-12 RX ADMIN — Medication 1 PACKET(S): at 17:11

## 2019-03-12 RX ADMIN — NIMODIPINE 60 MILLIGRAM(S): 60 SOLUTION ORAL at 17:11

## 2019-03-12 RX ADMIN — Medication 500000 UNIT(S): at 22:36

## 2019-03-12 RX ADMIN — NIMODIPINE 60 MILLIGRAM(S): 60 SOLUTION ORAL at 02:00

## 2019-03-12 RX ADMIN — ZINC OXIDE 1 APPLICATION(S): 200 OINTMENT TOPICAL at 09:12

## 2019-03-12 RX ADMIN — NIMODIPINE 60 MILLIGRAM(S): 60 SOLUTION ORAL at 05:53

## 2019-03-12 RX ADMIN — NIMODIPINE 60 MILLIGRAM(S): 60 SOLUTION ORAL at 09:20

## 2019-03-12 RX ADMIN — ZINC OXIDE 1 APPLICATION(S): 200 OINTMENT TOPICAL at 18:02

## 2019-03-12 RX ADMIN — Medication 1 PACKET(S): at 09:20

## 2019-03-12 RX ADMIN — NIMODIPINE 60 MILLIGRAM(S): 60 SOLUTION ORAL at 22:35

## 2019-03-12 RX ADMIN — NIMODIPINE 60 MILLIGRAM(S): 60 SOLUTION ORAL at 13:46

## 2019-03-12 RX ADMIN — ENOXAPARIN SODIUM 40 MILLIGRAM(S): 100 INJECTION SUBCUTANEOUS at 17:11

## 2019-03-12 NOTE — CONSULT NOTE ADULT - ASSESSMENT
75 yo Female with HTN, HLD, on ASA, presenting with syncope on 3/4. Per the , the patient has been having severe neck pain and headache for 3 days prior to presenting to the hospital. The day of presentation, she fell to the floor and was not responsive for one minute. Here found to have SAH 3/4, s/p coiling of ACOMM aneurysm. During the NSCU stay, for the last 4 days the patient has been having profuse watery diarrhea- s/p rectal tube. She's also been requiring straight cath's for urinary retention. The  adds that at night she's been getting forgetful and confused.

## 2019-03-12 NOTE — PROGRESS NOTE ADULT - SUBJECTIVE AND OBJECTIVE BOX
SUBJECTIVE: Patient seen and examined at bedside. Patient has rectal tube in place with continued outputs; she is requiring straight catheterization for urine. Off bowel regimen since 3/9/19. Denies abdominal pain, chest pain, shortness of breath, nausea/vomiting.     Vital Signs Last 24 Hrs  T(C): 36.7 (03-12-19 @ 12:44), Max: 37.1 (03-11-19 @ 19:27)  T(F): 98.1 (03-12-19 @ 12:44), Max: 98.7 (03-11-19 @ 19:27)  HR: 91 (03-12-19 @ 12:44) (73 - 91)  BP: 141/71 (03-12-19 @ 12:44) (129/82 - 161/88)  BP(mean): 93 (03-11-19 @ 17:00) (93 - 93)  RR: 18 (03-12-19 @ 12:44) (18 - 22)  SpO2: 97% (03-12-19 @ 12:44) (97% - 100%)    PHYSICAL EXAM:    Constitutional: No Acute Distress, sitting up comfortably in chair    Neurological: Awake, alert, oriented to person, place and time, speech clear and fluent, face equal, tongue midline, briskly following commands, no drift, moves all extremities with 5/5 strength, sensation intact to light touch throughout, pupils 4mm and reactive bilaterally, extraocular movements intact, no nystagmus    Incision: right groin site C/D/I    Pulmonary: Clear to Auscultation, No rales, No rhonchi, No wheezes     Cardiovascular: S1, S2, Regular rate and rhythm     Gastrointestinal: Soft, Non-tender, Non-distended, +bowel sounds x 4    Extremities: No calf tenderness bilaterally, no cyanosis, clubbing or edema          LABS:                          11.4   13.89 )-----------( 337      ( 12 Mar 2019 11:57 )             35.4    03-12    141  |  108  |  19  ----------------------------<  109<H>  4.0   |  20<L>  |  0.81    Ca    9.2      12 Mar 2019 06:22  Phos  2.8     03-12  Mg     2.1     03-12 03-11 @ 07:01 - 03-12 @ 07:00  --------------------------------------------------------  IN: 1480 mL / OUT: 1100 mL / NET: 380 mL    03-12 @ 07:01 - 03-12 @ 16:47  --------------------------------------------------------  IN: 360 mL / OUT: 0 mL / NET: 360 mL        IMAGING: no new imaging    MEDICATIONS:  Antibiotics:  nystatin    Suspension 373392 Unit(s) Swish and Swallow four times a day    Neuro:  acetaminophen   Tablet .. 650 milliGRAM(s) Oral every 6 hours PRN Temp greater or equal to 38C (100.4F), Mild Pain (1 - 3)    Cardiac:  niMODipine Oral Solution 60 milliGRAM(s) Oral/Enteral Tube every 4 hours    Pulm:    GI/:  psyllium Powder 1 Packet(s) Oral two times a day    Other:   enoxaparin Injectable 40 milliGRAM(s) SubCutaneous <User Schedule>  zinc oxide 20% Ointment 1 Application(s) Topical two times a day        DIET: regular

## 2019-03-12 NOTE — CONSULT NOTE ADULT - SUBJECTIVE AND OBJECTIVE BOX
Lonny Taylor MD  ([facility] Hospitalist)  Page [phone]  (During off hours please page [phone])    *** INCOMPLETE. NOTE IN PROGRESS. ***    PMD:     HPI:  75 yo Female with HTN, HLD, on ASA, presenting with syncope on 3/4. Patient has been having neck pain for 3 days, in which she was woken up suddenly due to neck pain. Per patient's , patient got out of bed, walked to foot of bed, was not responding to , then fell to the floor and was not responsive for one minute, until she came back to baseline. Patient does not remember walking or falling, but states she was aware of her surroundings. EMS was called and she was brought to ED. Denies head trauma, prior occurrence, denies being on blood thinners. States she feels mildly nauseous but denies vomiting, weakness, incontinence. States she returned to baseline. She was found to have SAH and was transferred here for further investigations and management.  HH1, MF3     3/4: s/p coiling of ACOMM aneurysm    PAST MEDICAL & SURGICAL HISTORY:  Dyslipidemia  HTN (Hypertension)  Status Post Total Knee Replace: left    REVIEW OF SYMPTOMS:  CONSTITUTIONAL: No fever, weight loss, or fatigue  EYES: No eye pain, visual disturbances, or discharge  ENMT:  No difficulty hearing, tinnitus, vertigo; No sinus or throat pain  NECK: No pain or stiffness  BREASTS: No pain, masses, or nipple discharge  RESPIRATORY: No cough, wheezing, chills or hemoptysis; No shortness of breath  CARDIOVASCULAR: No chest pain, palpitations, dizziness, or leg swelling  GASTROINTESTINAL: No abdominal or epigastric pain. No nausea, vomiting, or hematemesis; No diarrhea or constipation. No melena or hematochezia.  GENITOURINARY: No dysuria, frequency, hematuria, or incontinence  NEUROLOGICAL: No headaches, memory loss, loss of strength, numbness, or tremors  SKIN: No itching, burning, rashes, or lesions   LYMPH NODES: No enlarged glands  ENDOCRINE: No heat or cold intolerance; No hair loss  MUSCULOSKELETAL: No joint pain or swelling; No muscle, back, or extremity pain  PSYCHIATRIC: No depression, anxiety, mood swings, or difficulty sleeping  HEME/LYMPH: No easy bruising, or bleeding gums  ALLERGIC AND IMMUNOLOGIC: No hives or eczema    ALLERGIES:  No Known Allergies    SOCIAL HISTORY:     FAMILY HISTORY:  No pertinent family history in first degree relatives    HOME MEDICATIONS:        INPATIENT MEDICATIONS  (STANDING):  enoxaparin Injectable 40 milliGRAM(s) SubCutaneous <User Schedule>  niMODipine Oral Solution 60 milliGRAM(s) Oral/Enteral Tube every 4 hours  nystatin    Suspension [id number] Unit(s) Swish and Swallow four times a day  psyllium Powder 1 Packet(s) Oral two times a day  zinc oxide 20% Ointment 1 Application(s) Topical two times a day    MEDICATIONS  (PRN):  acetaminophen   Tablet .. 650 milliGRAM(s) Oral every 6 hours PRN Temp greater or equal to 38C (100.4F), Mild Pain (1 - 3)    Vital Signs Last 24 Hrs  T(C): 36.7 (12 Mar 2019 12:44), Max: 37.4 (11 Mar 2019 15:00)  T(F): 98.1 (12 Mar 2019 12:44), Max: 99.3 (11 Mar 2019 15:00)  HR: 91 (12 Mar 2019 12:44) (73 - 100)  BP: 141/71 (12 Mar 2019 12:44) (129/82 - 161/88)  BP(mean): 93 (11 Mar 2019 17:00) (91 - 93)  RR: 18 (12 Mar 2019 12:44) (18 - 28)  SpO2: 97% (12 Mar 2019 12:44) (97% - 100%)    I&O's Summary  11 Mar 2019 07:01  -  12 Mar 2019 07:00  --------------------------------------------------------  IN: 1480 mL / OUT: 1100 mL / NET: 380 mL    12 Mar 2019 07:01  -  12 Mar 2019 14:56  --------------------------------------------------------  IN: 360 mL / OUT: 0 mL / NET: 360 mL      PHYSICAL EXAM:  GENERAL: NAD  HEENT:  Atraumatic, Normocephalic, MMM, neck supple, no JVD  EYES: EOMI, PERRLA, conjunctiva and sclera clear  CHEST/LUNG: Clear to auscultation bilaterally; No wheeze, rhonchi or rales.  HEART: S1, S2, rrr; No murmurs, rubs, or gallops  ABDOMEN: Soft, Nontender, Nondistended; Bowel sounds present  EXTREMITIES:  2+ Peripheral Pulses, No clubbing, cyanosis, or edema  PSYCH: calm  NEUROLOGY: non-focal, AOx3  SKIN: No rashes or lesions    LABS:                        11.4   13.89 )-----------( 337      ( 12 Mar 2019 11:57 )             35.4     03-12    141  |  108  |  19  ----------------------------<  109<H>  4.0   |  20<L>  |  0.81    Ca    9.2      12 Mar 2019 06:22  Phos  2.8     03-12  Mg     2.1     03-12      RADIOLOGY & ADDITIONAL TESTS:    Imaging Personally Reviewed:   CT Head 3/5: Status post aneurysm coiling. Residual subarachnoid hemorrhage at the level of the sylvian fissures bilaterally and in the interhemispheric fissure. No rehemorrhage. No hydrocephalus    ECG reviewed and interpreted: NSR at 73 bpm, non-specific T-wave inversions.     Consultant(s) Notes Reviewed:  Neurosurgery    Care Discussed with Consultants/Other Providers: Neurosurgery re plan of care Lonny Taylor MD  ([facility] Hospitalist)  Page [phone]  (During off hours please page [phone])    PMD: Dr. Nik Floyd (from [place])   Cardiologist at [facility]: Dr. Rylan Hernandez    HPI:  75 yo Female with HTN, HLD, on ASA, presenting with syncope on 3/4. Per the , the patient has been having severe neck pain and headache for 3 days prior to presenting to the hospital. The day of presentation, she fell to the floor and was not responsive for one minute. Here found to have SAH 3/4, s/p coiling of ACOMM aneurysm. During the NSCU stay, for the last 4 days the patient has been having profuse watery diarrhea- s/p rectal tube. She's also been requiring straight cath's for urinary retention. The  adds that at night she's been getting forgetful and confused.     PAST MEDICAL & SURGICAL HISTORY:  Dyslipidemia  HTN (Hypertension)  Status Post Total Knee Replace: left  Right shoulder surgery     REVIEW OF SYMPTOMS:  CONSTITUTIONAL: No fever, weight loss, or fatigue  EYES: No eye pain, visual disturbances, or discharge  ENMT:  No difficulty hearing, tinnitus, vertigo; No sinus or throat pain  NECK: No pain or stiffness  RESPIRATORY: No cough, wheezing, chills or hemoptysis; No shortness of breath  CARDIOVASCULAR: No chest pain, palpitations, dizziness, or leg swelling  GASTROINTESTINAL: No abdominal or epigastric pain. No nausea, vomiting, or hematemesis; + watery diarrhea. No melena or hematochezia.  GENITOURINARY: Urinary retention.  NEUROLOGICAL: No headaches, memory loss, loss of strength, numbness, or tremors  SKIN: No itching, burning, rashes, or lesions   ENDOCRINE: No heat or cold intolerance; No hair loss  MUSCULOSKELETAL: No joint pain or swelling; No muscle, back, or extremity pain  PSYCHIATRIC: No depression, anxiety, mood swings, or difficulty sleeping  ALLERGIC AND IMMUNOLOGIC: No hives or eczema    ALLERGIES:  No Known Allergies    SOCIAL HISTORY:   , lives with . Denies smoking, alcohol, and illicit drug use.   Independent with all activities.     FAMILY HISTORY:  No pertinent family history in first degree relatives    HOME MEDICATIONS: (called pharmacy [facility] [phone])  HCTZ 12.5mg daily  Ramipril 10mg daily  Simvastatin 40mg qHS  Aspirin 81mg daily    INPATIENT MEDICATIONS  (STANDING):  enoxaparin Injectable 40 milliGRAM(s) SubCutaneous <User Schedule>  niMODipine Oral Solution 60 milliGRAM(s) Oral/Enteral Tube every 4 hours  nystatin    Suspension [id number] Unit(s) Swish and Swallow four times a day  psyllium Powder 1 Packet(s) Oral two times a day  zinc oxide 20% Ointment 1 Application(s) Topical two times a day    MEDICATIONS  (PRN):  acetaminophen   Tablet .. 650 milliGRAM(s) Oral every 6 hours PRN Temp greater or equal to 38C (100.4F), Mild Pain (1 - 3)    Vital Signs Last 24 Hrs  T(C): 36.7 (12 Mar 2019 12:44), Max: 37.4 (11 Mar 2019 15:00)  T(F): 98.1 (12 Mar 2019 12:44), Max: 99.3 (11 Mar 2019 15:00)  HR: 91 (12 Mar 2019 12:44) (73 - 100)  BP: 141/71 (12 Mar 2019 12:44) (129/82 - 161/88)  BP(mean): 93 (11 Mar 2019 17:00) (91 - 93)  RR: 18 (12 Mar 2019 12:44) (18 - 28)  SpO2: 97% (12 Mar 2019 12:44) (97% - 100%)    I&O's Summary  11 Mar 2019 07:01  -  12 Mar 2019 07:00  --------------------------------------------------------  IN: 1480 mL / OUT: 1100 mL / NET: 380 mL    12 Mar 2019 07:01  -  12 Mar 2019 14:56  --------------------------------------------------------  IN: 360 mL / OUT: 0 mL / NET: 360 mL      PHYSICAL EXAM:  GENERAL: NAD  HEENT:  MMM, neck supple, no JVD  EYES: EOMI, conjunctiva and sclera clear  CHEST/LUNG: Clear to auscultation bilaterally; No wheeze, rhonchi or rales.  HEART: S1, S2, rrr; No murmurs, rubs, or gallops  ABDOMEN: Soft, Nontender, Distended; Hyperactive bowel sounds, rectal tube in place with watery stool  EXTREMITIES:  No clubbing, cyanosis, or edema  PSYCH: calm  NEUROLOGY: non-focal, AOx3  SKIN: No rashes or lesions    LABS:                        11.4   13.89 )-----------( 337      ( 12 Mar 2019 11:57 )             35.4     03-12    141  |  108  |  19  ----------------------------<  109<H>  4.0   |  20<L>  |  0.81    Ca    9.2      12 Mar 2019 06:22  Phos  2.8     03-12  Mg     2.1     03-12      RADIOLOGY & ADDITIONAL TESTS:    Imaging Personally Reviewed:   CT Head 3/5: Status post aneurysm coiling. Residual subarachnoid hemorrhage at the level of the sylvian fissures bilaterally and in the interhemispheric fissure. No rehemorrhage. No hydrocephalus    ECG reviewed and interpreted: NSR at 73 bpm, non-specific T-wave inversions.     Consultant(s) Notes Reviewed:  Neurosurgery    Care Discussed with Consultants/Other Providers: Neurosurgery re plan of care Lonny Taylor MD  ([facility] Hospitalist)  Page [phone]  (During off hours please page [phone])    PMD: Dr. Nik Floyd (from [place])   Cardiologist at [facility]: Dr. Rylan Hernandez    HPI:  75 yo Female with HTN, HLD, on ASA, presenting with syncope on 3/4. Per the , the patient has been having severe neck pain and headache for 3 days prior to presenting to the hospital. The day of presentation, she fell to the floor and was not responsive for one minute. Here found to have SAH 3/4, s/p coiling of ACOMM aneurysm. During the NSCU stay, for the last 4 days the patient has been having profuse watery diarrhea- s/p rectal tube. She's also been requiring straight cath's for urinary retention. The  adds that at night she's been getting forgetful and confused.     PAST MEDICAL & SURGICAL HISTORY:  Dyslipidemia  HTN (Hypertension)  Status Post Total Knee Replace: left  Right shoulder surgery     REVIEW OF SYMPTOMS:  CONSTITUTIONAL: No fever, weight loss, or fatigue  EYES: No eye pain, visual disturbances, or discharge  ENMT:  No difficulty hearing, tinnitus, vertigo; No sinus or throat pain  NECK: No pain or stiffness  RESPIRATORY: No cough, wheezing, chills or hemoptysis; No shortness of breath  CARDIOVASCULAR: No chest pain, palpitations, dizziness, or leg swelling  GASTROINTESTINAL: No abdominal or epigastric pain. No nausea, vomiting, or hematemesis; + watery diarrhea. No melena or hematochezia.  GENITOURINARY: Urinary retention.  NEUROLOGICAL: No headaches, memory loss, loss of strength, numbness, or tremors  SKIN: No itching, burning, rashes, or lesions   ENDOCRINE: No heat or cold intolerance; No hair loss  MUSCULOSKELETAL: No joint pain or swelling; No muscle, back, or extremity pain  PSYCHIATRIC: No depression, anxiety, mood swings, or difficulty sleeping  ALLERGIC AND IMMUNOLOGIC: No hives or eczema    ALLERGIES:  No Known Allergies    SOCIAL HISTORY:   , lives with . Denies smoking, alcohol, and illicit drug use.   Independent with all activities.     FAMILY HISTORY:  No pertinent family history in first degree relatives    HOME MEDICATIONS: (called pharmacy [facility] [phone])  HCTZ 12.5mg daily  Ramipril 10mg daily  Simvastatin 40mg qHS  Aspirin 81mg daily    INPATIENT MEDICATIONS  (STANDING):  enoxaparin Injectable 40 milliGRAM(s) SubCutaneous <User Schedule>  niMODipine Oral Solution 60 milliGRAM(s) Oral/Enteral Tube every 4 hours  nystatin    Suspension [id number] Unit(s) Swish and Swallow four times a day  psyllium Powder 1 Packet(s) Oral two times a day  zinc oxide 20% Ointment 1 Application(s) Topical two times a day    MEDICATIONS  (PRN):  acetaminophen   Tablet .. 650 milliGRAM(s) Oral every 6 hours PRN Temp greater or equal to 38C (100.4F), Mild Pain (1 - 3)    Vital Signs Last 24 Hrs  T(C): 36.7 (12 Mar 2019 12:44), Max: 37.4 (11 Mar 2019 15:00)  T(F): 98.1 (12 Mar 2019 12:44), Max: 99.3 (11 Mar 2019 15:00)  HR: 91 (12 Mar 2019 12:44) (73 - 100)  BP: 141/71 (12 Mar 2019 12:44) (129/82 - 161/88)  BP(mean): 93 (11 Mar 2019 17:00) (91 - 93)  RR: 18 (12 Mar 2019 12:44) (18 - 28)  SpO2: 97% (12 Mar 2019 12:44) (97% - 100%)    I&O's Summary  11 Mar 2019 07:01  -  12 Mar 2019 07:00  --------------------------------------------------------  IN: 1480 mL / OUT: 1100 mL / NET: 380 mL    12 Mar 2019 07:01  -  12 Mar 2019 14:56  --------------------------------------------------------  IN: 360 mL / OUT: 0 mL / NET: 360 mL      PHYSICAL EXAM:  GENERAL: NAD  HEENT:  MMM, neck supple, no JVD  EYES: EOMI, conjunctiva and sclera clear  CHEST/LUNG: Clear to auscultation bilaterally; No wheeze, rhonchi or rales.  HEART: S1, S2, rrr; No murmurs, rubs, or gallops  ABDOMEN: Soft, Nontender, Distended; Hyperactive bowel sounds, rectal tube in place with watery stool  EXTREMITIES:  No clubbing, cyanosis, or edema  PSYCH: calm  NEUROLOGY: non-focal, AOx3  SKIN: No rashes or lesions    LABS:             11.4   13.89 )-----------( 337      ( 12 Mar 2019 11:57 )             35.4     03-12    141  |  108  |  19  ----------------------------<  109<H>  4.0   |  20<L>  |  0.81    Ca    9.2      12 Mar 2019 06:22  Phos  2.8     03-12  Mg     2.1     03-12      RADIOLOGY & ADDITIONAL TESTS:    Imaging Personally Reviewed:   CT Head 3/5: Status post aneurysm coiling. Residual subarachnoid hemorrhage at the level of the sylvian fissures bilaterally and in the interhemispheric fissure. No rehemorrhage. No hydrocephalus    ECG reviewed and interpreted: NSR at 73 bpm, non-specific T-wave inversions.     Consultant(s) Notes Reviewed:  Neurosurgery    Care Discussed with Consultants/Other Providers: Neurosurgery re plan of care

## 2019-03-12 NOTE — CHART NOTE - NSCHARTNOTEFT_GEN_A_CORE
Transcranial doppler exam #1 (3/5/19) 1600pm  mean velocity  cm/sec                              Left         Right  DIANE                    39           48  MCA                   80            59   PCA                    37,73        48,79  VERT                   35            48  BA                               43  Official report to follow.  [facility]    Transcranial doppler exam #2 (3/7/19) 1245pm  mean velocity  cm/sec                              Left         Right  DIANE                   71           96  MCA                  94            77  PCA                    35,41       37,56    Official report to follow.  [facility]    Transcranial doppler exam #3 (3/8/19) 1130am  mean velocity  cm/sec                              Left         Right  DIANE                   42           101  MCA                  102           71  PCA                    36,48       P2-50    Official report to follow.  [facility]    Transcranial doppler exam #4 (3/9/19) 11am  mean velocity  cm/sec                              Left         Right  DIANE                   35           62  MCA                  137           78   Official report to follow.  [facility]    Transcranial doppler exam #5 (3/11/19) 1030am  mean velocity  cm/sec                              Left         Right  DIANE                   47           34  MCA                   155          78  PCA                    P2-48       29,31  Official report to follow.  [facility]    Transcranial doppler exam #6 (3/12/19) 1230pm  mean velocity  cm/sec                              Left         Right  DIANE                    53           68  MCA                   139         91  Official report to follow.  [facility]

## 2019-03-12 NOTE — PROGRESS NOTE ADULT - ASSESSMENT
HPI: 74 yr old F h/o HTN on ASA presenting with syncope 3 hours ago. Patient has been having neck pain for 3 days, in which she was woken up suddenly due to neck pain. Per patient's , patient got out of bed, walked to foot of bed, was not responding to , then fell to the floor and was not responsive for one minute, until she came back to baseline. Patient does not remember walking or falling, but states she was aware of her surroundings. EMS was called and she was brought to ED. Denies head trauma, prior occurrence, denies being on blood thinners. States she feels mildly nauseous but denies vomiting, weakness, incontinence. States she returned to baseline. She was found to have SAH and was transferred here for further investigations and management.  HH1, MF3 (04 Mar 2019 11:14)    PROCEDURE: Admitted 3/4 1  3 SAH ; s/p cerebral angiogram for coiling of anterior communicating artery aneurysm       PLAN:   -Continue daily TCDs. LMCA velocity trending down to 139 today from 155 yesterday.   -Continue Nimodipine for vasospasm prophylaxis  -Start Nystatin suspension for mild oral candidiasis  -Given leukocytosis and persistent water stools, will send C.Diff. Patient has been off bowel regimen since 3/9/19. Continue rectal tube for now. Monitor output. Start Metamucil BID as bulking agent  -Urinary retention - requiring straight cath q6h.   -Encouraged mobilization  -Incentive spirometer  -DVT prophylaxis: SQL, venodynes  -Dispo: TBD   -Will discuss with Dr. Alvin Guadarrama # [id number]

## 2019-03-12 NOTE — CONSULT NOTE ADULT - NSICDXPROBLEM_GEN_ALL_CORE_FT
PROBLEM DIAGNOSES  Problem: Acute diarrhea  Recommendation: Screening for C.diff.   Isolation for now.  Leukocytosis 14.  If c.diff is positive, start Flagyl 500mg PO TID  Would also send for stool cultures.    Problem: Urinary retention  Recommendation: She's required intermittent caths.   UA is negative for infection.  Once she's out of bed, she should begin to improve. Monitor    Problem: Subarachnoid hemorrhage  Recommendation: Post-op care per neurosurgery  On nimodipine 60 mg q4h (vasospasm prophylaxis)    Problem: Hypertension  Recommendation: At home on ramipril 10mg daily and HCTZ 12.5mg daily.  Currently on nimodipine. Permissive HTN.  Monitor BP    Problem: HLD (hyperlipidemia)  Recommendation: May resume Zocor 40mg qHS (home med)

## 2019-03-13 DIAGNOSIS — A04.72 ENTEROCOLITIS DUE TO CLOSTRIDIUM DIFFICILE, NOT SPECIFIED AS RECURRENT: ICD-10-CM

## 2019-03-13 PROCEDURE — 99233 SBSQ HOSP IP/OBS HIGH 50: CPT

## 2019-03-13 PROCEDURE — 93888 INTRACRANIAL LIMITED STUDY: CPT | Mod: 26

## 2019-03-13 RX ORDER — VANCOMYCIN HCL 1 G
250 VIAL (EA) INTRAVENOUS EVERY 6 HOURS
Qty: 0 | Refills: 0 | Status: DISCONTINUED | OUTPATIENT
Start: 2019-03-13 | End: 2019-03-13

## 2019-03-13 RX ORDER — SIMVASTATIN 20 MG/1
40 TABLET, FILM COATED ORAL AT BEDTIME
Qty: 0 | Refills: 0 | Status: DISCONTINUED | OUTPATIENT
Start: 2019-03-13 | End: 2019-03-29

## 2019-03-13 RX ORDER — METRONIDAZOLE 500 MG
500 TABLET ORAL EVERY 8 HOURS
Qty: 0 | Refills: 0 | Status: DISCONTINUED | OUTPATIENT
Start: 2019-03-13 | End: 2019-03-14

## 2019-03-13 RX ADMIN — NIMODIPINE 60 MILLIGRAM(S): 60 SOLUTION ORAL at 22:12

## 2019-03-13 RX ADMIN — Medication 500000 UNIT(S): at 12:15

## 2019-03-13 RX ADMIN — NIMODIPINE 60 MILLIGRAM(S): 60 SOLUTION ORAL at 15:38

## 2019-03-13 RX ADMIN — Medication 500000 UNIT(S): at 22:11

## 2019-03-13 RX ADMIN — Medication 500000 UNIT(S): at 17:40

## 2019-03-13 RX ADMIN — Medication 1 PACKET(S): at 06:46

## 2019-03-13 RX ADMIN — ENOXAPARIN SODIUM 40 MILLIGRAM(S): 100 INJECTION SUBCUTANEOUS at 17:49

## 2019-03-13 RX ADMIN — NIMODIPINE 60 MILLIGRAM(S): 60 SOLUTION ORAL at 17:40

## 2019-03-13 RX ADMIN — NIMODIPINE 60 MILLIGRAM(S): 60 SOLUTION ORAL at 06:46

## 2019-03-13 RX ADMIN — Medication 500 MILLIGRAM(S): at 12:15

## 2019-03-13 RX ADMIN — ZINC OXIDE 1 APPLICATION(S): 200 OINTMENT TOPICAL at 17:40

## 2019-03-13 RX ADMIN — SIMVASTATIN 40 MILLIGRAM(S): 20 TABLET, FILM COATED ORAL at 22:11

## 2019-03-13 RX ADMIN — NIMODIPINE 60 MILLIGRAM(S): 60 SOLUTION ORAL at 01:40

## 2019-03-13 RX ADMIN — Medication 500 MILLIGRAM(S): at 20:25

## 2019-03-13 RX ADMIN — Medication 500000 UNIT(S): at 06:46

## 2019-03-13 RX ADMIN — NIMODIPINE 60 MILLIGRAM(S): 60 SOLUTION ORAL at 12:15

## 2019-03-13 RX ADMIN — ZINC OXIDE 1 APPLICATION(S): 200 OINTMENT TOPICAL at 06:46

## 2019-03-13 NOTE — PROGRESS NOTE ADULT - NSICDXPROBLEM_GEN_ALL_CORE_FT
PROBLEM DIAGNOSES  Problem: Clostridium difficile colitis  Assessment and Plan: Continue contact isolation until she developed formed stools. Rectal tube in place.   Leukocytosis 14.  Started Flagyl 500mg PO TID yesterday (day 2 today)    Problem: Urinary retention  Assessment and Plan: She's requiring intermittent caths.   UA is negative for infection. Monitor for now.    Problem: Subarachnoid hemorrhage  Assessment and Plan: Post-op care per neurosurgery  On nimodipine 60 mg q4h (vasospasm prophylaxis)    Problem: Hypertension  Assessment and Plan: At home on ramipril 10mg daily and HCTZ 12.5mg daily.  Currently on nimodipine. Permissive HTN.  Monitor BP    Problem: HLD (hyperlipidemia)  Assessment and Plan: May resume Zocor 40mg qHS (home med)

## 2019-03-13 NOTE — PROGRESS NOTE ADULT - SUBJECTIVE AND OBJECTIVE BOX
Lonny Taylor MD  ([facility] Hospitalist)  Pager [phone]  (During off hours please page [phone])      Patient is a 74y old  Female who presents with a chief complaint of Admitted 3/4 HH1 MF 3 SAH ; s/p cerebral angiogram for coiling of anterior communicating artery aneurysm (13 Mar 2019 07:34)      SUBJECTIVE / OVERNIGHT EVENTS: No events overnight. Continues to have watery stool- rectal tube in place. No other complaints- just very sleepy.     MEDICATIONS  (STANDING):  enoxaparin Injectable 40 milliGRAM(s) SubCutaneous <User Schedule>  metroNIDAZOLE    Tablet 500 milliGRAM(s) Oral every 8 hours  niMODipine Oral Solution 60 milliGRAM(s) Oral/Enteral Tube every 4 hours  nystatin    Suspension 252685 Unit(s) Swish and Swallow four times a day  psyllium Powder 1 Packet(s) Oral two times a day  zinc oxide 20% Ointment 1 Application(s) Topical two times a day    MEDICATIONS  (PRN):  acetaminophen   Tablet .. 650 milliGRAM(s) Oral every 6 hours PRN Temp greater or equal to 38C (100.4F), Mild Pain (1 - 3)      Vital Signs Last 24 Hrs  T(C): 36.9 (13 Mar 2019 11:01), Max: 37.3 (12 Mar 2019 17:05)  T(F): 98.4 (13 Mar 2019 11:01), Max: 99.1 (12 Mar 2019 17:05)  HR: 74 (13 Mar 2019 11:01) (74 - 99)  BP: 144/88 (13 Mar 2019 11:01) (141/71 - 168/96)  BP(mean): --  RR: 18 (13 Mar 2019 11:01) (18 - 20)  SpO2: 96% (13 Mar 2019 11:01) (96% - 99%)  CAPILLARY BLOOD GLUCOSE        I&O's Summary    12 Mar 2019 07:01  -  13 Mar 2019 07:00  --------------------------------------------------------  IN: 360 mL / OUT: 1900 mL / NET: -1540 mL        PHYSICAL EXAM:  GENERAL: NAD, drowsy (hypoactive delirium?)  HEENT:  MMM, neck supple, no JVD  EYES: EOMI, conjunctiva and sclera clear  CHEST/LUNG: Clear to auscultation bilaterally; No wheeze, rhonchi or rales.  HEART: S1, S2, rrr; No murmurs, rubs, or gallops  ABDOMEN: Soft, Nontender, Distended; Hyperactive bowel sounds, rectal tube in place with watery stool  EXTREMITIES:  No clubbing, cyanosis, or edema  PSYCH: calm  NEUROLOGY: non-focal, AOx3        LABS:                        11.4   13.89 )-----------( 337      ( 12 Mar 2019 11:57 )             35.4     03-12    141  |  108  |  19  ----------------------------<  109<H>  4.0   |  20<L>  |  0.81    Ca    9.2      12 Mar 2019 06:22  Phos  2.8     03-12  Mg     2.1     03-12        RADIOLOGY & ADDITIONAL TESTS:    Consultant(s) Notes Reviewed: Neurosurgery     Care Discussed with Consultants/Other Providers: Neurosurgery re plan of careDORYdiff

## 2019-03-13 NOTE — PROGRESS NOTE ADULT - SUBJECTIVE AND OBJECTIVE BOX
SUBJECTIVE: Patient seen and examined at bedside. C.Diff came back positive overnight; patient started on PO Flagyl. Still with rectal tube in place. +Urinary retention requiring straight cath. Denies abdominal pain, chest pain, shortness of breath, nausea/vomiting.     Vital Signs Last 24 Hrs  T(C): 36.4 (03-13-19 @ 05:00), Max: 37.3 (03-12-19 @ 17:05)  T(F): 97.5 (03-13-19 @ 05:00), Max: 99.1 (03-12-19 @ 17:05)  HR: 75 (03-13-19 @ 05:00) (73 - 99)  BP: 162/97 (03-13-19 @ 05:00) (129/82 - 168/96)  RR: 18 (03-13-19 @ 05:00) (18 - 20)  SpO2: 97% (03-13-19 @ 05:00) (97% - 99%)	    PHYSICAL EXAM:    Constitutional: No Acute Distress, resting comfortably in bed    Neurological: Awake, alert, oriented to person, place and time, speech clear and fluent, face equal, tongue midline, briskly following commands, no drift, moves all extremities with 5/5 strength, sensation intact to light touch throughout, pupils 4mm and reactive bilaterally, extraocular movements intact, no nystagmus    Incision: right groin site C/D/I    Pulmonary: Clear to Auscultation, No rales, No rhonchi, No wheezes     Cardiovascular: S1, S2, Regular rate and rhythm     Gastrointestinal: Soft, Non-tender, Non-distended, +bowel sounds x 4    Extremities: No calf tenderness bilaterally, no cyanosis, clubbing or edema          LABS:                          11.4   13.89 )-----------( 337      ( 12 Mar 2019 11:57 )             35.4    03-12    141  |  108  |  19  ----------------------------<  109<H>  4.0   |  20<L>  |  0.81    Ca    9.2      12 Mar 2019 06:22  Phos  2.8     03-12  Mg     2.1     03-12 03-11 @ 07:01  -  03-12 @ 07:00  --------------------------------------------------------  IN: 1480 mL / OUT: 1100 mL / NET: 380 mL    03-12 @ 07:01  - 03-12 @ 16:47  --------------------------------------------------------  IN: 360 mL / OUT: 0 mL / NET: 360 mL        IMAGING: no new imaging        MEDICATIONS  (STANDING):  enoxaparin Injectable 40 milliGRAM(s) SubCutaneous <User Schedule>  metroNIDAZOLE    Tablet 500 milliGRAM(s) Oral every 8 hours  niMODipine Oral Solution 60 milliGRAM(s) Oral/Enteral Tube every 4 hours  nystatin    Suspension 316053 Unit(s) Swish and Swallow four times a day  psyllium Powder 1 Packet(s) Oral two times a day  zinc oxide 20% Ointment 1 Application(s) Topical two times a day    MEDICATIONS  (PRN):  acetaminophen   Tablet .. 650 milliGRAM(s) Oral every 6 hours PRN Temp greater or equal to 38C (100.4F), Mild Pain (1 - 3)          DIET: regular

## 2019-03-13 NOTE — CHART NOTE - NSCHARTNOTEFT_GEN_A_CORE
Transcranial doppler exam #1 (3/5/19) 1600pm  mean velocity  cm/sec                              Left         Right  DAINE                    39           48  MCA                   80            59   PCA                    37,73        48,79  VERT                   35            48  BA                               43  Official report to follow.  [facility]    Transcranial doppler exam #2 (3/7/19) 1245pm  mean velocity  cm/sec                              Left         Right  DIANE                   71           96  MCA                  94            77  PCA                    35,41       37,56    Official report to follow.  [facility]    Transcranial doppler exam #3 (3/8/19) 1130am  mean velocity  cm/sec                              Left         Right  DIANE                   42           101  MCA                  102           71  PCA                    36,48       P2-50    Official report to follow.  [facility]    Transcranial doppler exam #4 (3/9/19) 11am  mean velocity  cm/sec                              Left         Right  DIANE                   35           62  MCA                  137           78   Official report to follow.  [facility]    Transcranial doppler exam #5 (3/11/19) 1030am  mean velocity  cm/sec                              Left         Right  DIANE                   47           34  MCA                   155          78  PCA                    P2-48       29,31  Official report to follow.  [facility]    Transcranial doppler exam #6 (3/12/19) 1230pm  mean velocity  cm/sec                              Left         Right  DIANE                    53           68  MCA                   139         91  Official report to follow.  [facility]    Transcranial doppler exam #7 (3/13/19) 1130am  mean velocity  cm/sec                              Left         Right  DIANE                   90           46  MCA                  96           96  Official report to follow.  [facility]

## 2019-03-13 NOTE — PROGRESS NOTE ADULT - ASSESSMENT
HPI: 74 yr old F h/o HTN on ASA presenting with syncope 3 hours ago. Patient has been having neck pain for 3 days, in which she was woken up suddenly due to neck pain. Per patient's , patient got out of bed, walked to foot of bed, was not responding to , then fell to the floor and was not responsive for one minute, until she came back to baseline. Patient does not remember walking or falling, but states she was aware of her surroundings. EMS was called and she was brought to ED. Denies head trauma, prior occurrence, denies being on blood thinners. States she feels mildly nauseous but denies vomiting, weakness, incontinence. States she returned to baseline. She was found to have SAH and was transferred here for further investigations and management.  HH1, MF3 (04 Mar 2019 11:14)    PROCEDURE: Admitted 3/4 1  3 SAH ; s/p cerebral angiogram for coiling of anterior communicating artery aneurysm       PLAN:   -Continue daily TCDs   -Continue Nimodipine for vasospasm prophylaxis  -Started Flagyl 500 mg TID for +C.Diff. Contact isolation. Maintain rectal tube for now.   -Urinary retention - requiring straight cath q6h. May be secondary to   -Continue Nystatin suspension for mild oral candidiasis  -Encouraged mobilization  -Incentive spirometer  -DVT prophylaxis: SQL, venodynes  -Dispo: TBD   -Will discuss with Dr. Rojelio Guadarrama # [id number]

## 2019-03-14 LAB
ANION GAP SERPL CALC-SCNC: 15 MMOL/L — SIGNIFICANT CHANGE UP (ref 5–17)
APPEARANCE UR: CLEAR — SIGNIFICANT CHANGE UP
BILIRUB UR-MCNC: NEGATIVE — SIGNIFICANT CHANGE UP
BUN SERPL-MCNC: 26 MG/DL — HIGH (ref 7–23)
CALCIUM SERPL-MCNC: 9.3 MG/DL — SIGNIFICANT CHANGE UP (ref 8.4–10.5)
CHLORIDE SERPL-SCNC: 108 MMOL/L — SIGNIFICANT CHANGE UP (ref 96–108)
CO2 SERPL-SCNC: 19 MMOL/L — LOW (ref 22–31)
COLOR SPEC: YELLOW — SIGNIFICANT CHANGE UP
CREAT SERPL-MCNC: 0.89 MG/DL — SIGNIFICANT CHANGE UP (ref 0.5–1.3)
CULTURE RESULTS: SIGNIFICANT CHANGE UP
CULTURE RESULTS: SIGNIFICANT CHANGE UP
DIFF PNL FLD: ABNORMAL
GLUCOSE SERPL-MCNC: 120 MG/DL — HIGH (ref 70–99)
GLUCOSE UR QL: NEGATIVE — SIGNIFICANT CHANGE UP
HCT VFR BLD CALC: 33.5 % — LOW (ref 34.5–45)
HGB BLD-MCNC: 12.2 G/DL — SIGNIFICANT CHANGE UP (ref 11.5–15.5)
KETONES UR-MCNC: NEGATIVE — SIGNIFICANT CHANGE UP
LEUKOCYTE ESTERASE UR-ACNC: ABNORMAL
MCHC RBC-ENTMCNC: 32.6 PG — SIGNIFICANT CHANGE UP (ref 27–34)
MCHC RBC-ENTMCNC: 36.5 GM/DL — HIGH (ref 32–36)
MCV RBC AUTO: 89.4 FL — SIGNIFICANT CHANGE UP (ref 80–100)
NITRITE UR-MCNC: POSITIVE
PH UR: 5.5 — SIGNIFICANT CHANGE UP (ref 5–8)
PLATELET # BLD AUTO: 365 K/UL — SIGNIFICANT CHANGE UP (ref 150–400)
POTASSIUM SERPL-MCNC: 3.9 MMOL/L — SIGNIFICANT CHANGE UP (ref 3.5–5.3)
POTASSIUM SERPL-SCNC: 3.9 MMOL/L — SIGNIFICANT CHANGE UP (ref 3.5–5.3)
PROT UR-MCNC: ABNORMAL
RBC # BLD: 3.75 M/UL — LOW (ref 3.8–5.2)
RBC # FLD: 12.8 % — SIGNIFICANT CHANGE UP (ref 10.3–14.5)
SODIUM SERPL-SCNC: 142 MMOL/L — SIGNIFICANT CHANGE UP (ref 135–145)
SP GR SPEC: 1.03 — HIGH (ref 1.01–1.02)
SPECIMEN SOURCE: SIGNIFICANT CHANGE UP
SPECIMEN SOURCE: SIGNIFICANT CHANGE UP
UROBILINOGEN FLD QL: NEGATIVE — SIGNIFICANT CHANGE UP
WBC # BLD: 16.5 K/UL — HIGH (ref 3.8–10.5)
WBC # FLD AUTO: 16.5 K/UL — HIGH (ref 3.8–10.5)

## 2019-03-14 PROCEDURE — 99232 SBSQ HOSP IP/OBS MODERATE 35: CPT

## 2019-03-14 PROCEDURE — 99233 SBSQ HOSP IP/OBS HIGH 50: CPT

## 2019-03-14 PROCEDURE — 93888 INTRACRANIAL LIMITED STUDY: CPT | Mod: 26

## 2019-03-14 RX ORDER — SODIUM CHLORIDE 9 MG/ML
1000 INJECTION INTRAMUSCULAR; INTRAVENOUS; SUBCUTANEOUS
Qty: 0 | Refills: 0 | Status: DISCONTINUED | OUTPATIENT
Start: 2019-03-14 | End: 2019-03-17

## 2019-03-14 RX ORDER — CEFTRIAXONE 500 MG/1
INJECTION, POWDER, FOR SOLUTION INTRAMUSCULAR; INTRAVENOUS
Qty: 0 | Refills: 0 | Status: DISCONTINUED | OUTPATIENT
Start: 2019-03-14 | End: 2019-03-15

## 2019-03-14 RX ORDER — VANCOMYCIN HCL 1 G
125 VIAL (EA) INTRAVENOUS EVERY 6 HOURS
Qty: 0 | Refills: 0 | Status: DISCONTINUED | OUTPATIENT
Start: 2019-03-14 | End: 2019-03-25

## 2019-03-14 RX ORDER — CEFTRIAXONE 500 MG/1
1 INJECTION, POWDER, FOR SOLUTION INTRAMUSCULAR; INTRAVENOUS ONCE
Qty: 0 | Refills: 0 | Status: COMPLETED | OUTPATIENT
Start: 2019-03-14 | End: 2019-03-14

## 2019-03-14 RX ORDER — CEFTRIAXONE 500 MG/1
1 INJECTION, POWDER, FOR SOLUTION INTRAMUSCULAR; INTRAVENOUS EVERY 24 HOURS
Qty: 0 | Refills: 0 | Status: DISCONTINUED | OUTPATIENT
Start: 2019-03-15 | End: 2019-03-15

## 2019-03-14 RX ADMIN — Medication 125 MILLIGRAM(S): at 18:16

## 2019-03-14 RX ADMIN — Medication 500000 UNIT(S): at 05:51

## 2019-03-14 RX ADMIN — NIMODIPINE 60 MILLIGRAM(S): 60 SOLUTION ORAL at 13:44

## 2019-03-14 RX ADMIN — CEFTRIAXONE 100 GRAM(S): 500 INJECTION, POWDER, FOR SOLUTION INTRAMUSCULAR; INTRAVENOUS at 18:13

## 2019-03-14 RX ADMIN — NIMODIPINE 60 MILLIGRAM(S): 60 SOLUTION ORAL at 01:48

## 2019-03-14 RX ADMIN — Medication 500000 UNIT(S): at 11:18

## 2019-03-14 RX ADMIN — Medication 500 MILLIGRAM(S): at 05:51

## 2019-03-14 RX ADMIN — NIMODIPINE 60 MILLIGRAM(S): 60 SOLUTION ORAL at 21:22

## 2019-03-14 RX ADMIN — ENOXAPARIN SODIUM 40 MILLIGRAM(S): 100 INJECTION SUBCUTANEOUS at 18:15

## 2019-03-14 RX ADMIN — Medication 500000 UNIT(S): at 18:15

## 2019-03-14 RX ADMIN — NIMODIPINE 60 MILLIGRAM(S): 60 SOLUTION ORAL at 05:51

## 2019-03-14 RX ADMIN — SIMVASTATIN 40 MILLIGRAM(S): 20 TABLET, FILM COATED ORAL at 21:22

## 2019-03-14 RX ADMIN — NIMODIPINE 60 MILLIGRAM(S): 60 SOLUTION ORAL at 09:03

## 2019-03-14 RX ADMIN — ZINC OXIDE 1 APPLICATION(S): 200 OINTMENT TOPICAL at 05:51

## 2019-03-14 RX ADMIN — Medication 500000 UNIT(S): at 21:22

## 2019-03-14 RX ADMIN — Medication 1 PACKET(S): at 18:15

## 2019-03-14 RX ADMIN — ZINC OXIDE 1 APPLICATION(S): 200 OINTMENT TOPICAL at 18:16

## 2019-03-14 RX ADMIN — NIMODIPINE 60 MILLIGRAM(S): 60 SOLUTION ORAL at 18:15

## 2019-03-14 RX ADMIN — SODIUM CHLORIDE 75 MILLILITER(S): 9 INJECTION INTRAMUSCULAR; INTRAVENOUS; SUBCUTANEOUS at 18:16

## 2019-03-14 RX ADMIN — Medication 1 PACKET(S): at 05:52

## 2019-03-14 NOTE — CHART NOTE - NSCHARTNOTEFT_GEN_A_CORE
Transcranial doppler exam #1 (3/5/19) 1600pm  mean velocity  cm/sec                              Left         Right  DIANE                    39           48  MCA                   80            59   PCA                    37,73        48,79  VERT                   35            48  BA                               43  Official report to follow.  [facility]    Transcranial doppler exam #2 (3/7/19) 1245pm  mean velocity  cm/sec                              Left         Right  DIANE                   71           96  MCA                  94            77  PCA                    35,41       37,56    Official report to follow.  [facility]    Transcranial doppler exam #3 (3/8/19) 1130am  mean velocity  cm/sec                              Left         Right  DIANE                   42           101  MCA                  102           71  PCA                    36,48       P2-50    Official report to follow.  [facility]    Transcranial doppler exam #4 (3/9/19) 11am  mean velocity  cm/sec                              Left         Right  DIANE                   35           62  MCA                  137           78   Official report to follow.  [facility]    Transcranial doppler exam #5 (3/11/19) 1030am  mean velocity  cm/sec                              Left         Right  DIANE                   47           34  MCA                   155          78  PCA                    P2-48       29,31  Official report to follow.  [facility]    Transcranial doppler exam #6 (3/12/19) 1230pm  mean velocity  cm/sec                              Left         Right  DIANE                    53           68  MCA                   139         91  Official report to follow.  [facility]    Transcranial doppler exam #7 (3/13/19) 1130am  mean velocity  cm/sec                              Left         Right  DIANE                   90           46  MCA                  96           96  Official report to follow.  [facility]    Transcranial doppler exam #8 (3/14/19) 1230pm  mean velocity  cm/sec                              Left         Right  DIANE                    62           51  MCA                   80           111  Official report to follow.  [facility]

## 2019-03-14 NOTE — PROGRESS NOTE ADULT - ASSESSMENT
HPI: 74 yr old F h/o HTN on ASA presenting with syncope 3 hours ago. Patient has been having neck pain for 3 days, in which she was woken up suddenly due to neck pain. Per patient's , patient got out of bed, walked to foot of bed, was not responding to , then fell to the floor and was not responsive for one minute, until she came back to baseline. Patient does not remember walking or falling, but states she was aware of her surroundings. EMS was called and she was brought to ED. Denies head trauma, prior occurrence, denies being on blood thinners. States she feels mildly nauseous but denies vomiting, weakness, incontinence. States she returned to baseline. She was found to have SAH and was transferred here for further investigations and management.  HH1, MF3 (04 Mar 2019 11:14)    PROCEDURE: Admitted 3/4 1  3 SAH ; s/p cerebral angiogram for coiling of anterior communicating artery aneurysm       PLAN:   -Continue daily TCDs. Continue Nimodipine for vasospasm prophylaxis  -Continue Flagyl 500 mg TID for +C.Diff. Contact isolation. Maintain rectal tube for now.   -Urinary retention - requiring straight cath q6h. Will check UA today to r/o UTI.  -Continue Nystatin suspension for mild oral candidiasis  -Encouraged mobilization  -Incentive spirometer  -DVT prophylaxis: SQL, venodynes  -Dispo: TBD   -Will discuss with Dr. Rojelio Guadarrama # [id number]

## 2019-03-14 NOTE — PROGRESS NOTE ADULT - SUBJECTIVE AND OBJECTIVE BOX
Lonny Taylor MD  ([facility] Hospitalist)  Pager [phone]  (During off hours please page [phone])      Patient is a 74y old  Female who presents with a chief complaint of SAH (14 Mar 2019 13:58)      SUBJECTIVE / OVERNIGHT EVENTS: No events overnight. Continues to have large volumes of watery stools.     MEDICATIONS  (STANDING):  enoxaparin Injectable 40 milliGRAM(s) SubCutaneous <User Schedule>  niMODipine Oral Solution 60 milliGRAM(s) Oral/Enteral Tube every 4 hours  nystatin    Suspension 517929 Unit(s) Swish and Swallow four times a day  psyllium Powder 1 Packet(s) Oral two times a day  simvastatin 40 milliGRAM(s) Oral at bedtime  vancomycin    Solution 125 milliGRAM(s) Oral every 6 hours  zinc oxide 20% Ointment 1 Application(s) Topical two times a day    MEDICATIONS  (PRN):  acetaminophen   Tablet .. 650 milliGRAM(s) Oral every 6 hours PRN Temp greater or equal to 38C (100.4F), Mild Pain (1 - 3)      Vital Signs Last 24 Hrs  T(C): 36.7 (14 Mar 2019 13:09), Max: 37.7 (13 Mar 2019 22:09)  T(F): 98 (14 Mar 2019 13:09), Max: 99.9 (13 Mar 2019 22:09)  HR: 80 (14 Mar 2019 13:09) (75 - 107)  BP: 150/94 (14 Mar 2019 13:09) (125/76 - 170/82)  BP(mean): --  RR: 20 (14 Mar 2019 13:09) (18 - 20)  SpO2: 98% (14 Mar 2019 13:09) (94% - 98%)      I&O's Summary    13 Mar 2019 07:01  -  14 Mar 2019 07:00  --------------------------------------------------------  IN: 0 mL / OUT: 1650 mL / NET: -1650 mL    14 Mar 2019 07:01  -  14 Mar 2019 14:37  --------------------------------------------------------  IN: 0 mL / OUT: 800 mL / NET: -800 mL        PHYSICAL EXAM:  GENERAL: NAD, drowsy  HEENT:  MMM, neck supple, no JVD  EYES: EOMI, conjunctiva and sclera clear  CHEST/LUNG: Clear to auscultation bilaterally; No wheeze, rhonchi or rales.  HEART: S1, S2, rrr; No murmurs, rubs, or gallops  ABDOMEN: Soft, Nontender, Distended; Hyperactive bowel sounds, rectal tube in place with watery stool  EXTREMITIES:  No clubbing, cyanosis, or edema  PSYCH: calm  NEUROLOGY: non-focal, AOx3      LABS:                        12.2   16.5  )-----------( 365      ( 14 Mar 2019 05:09 )             33.5     03-14    142  |  108  |  26<H>  ----------------------------<  120<H>  3.9   |  19<L>  |  0.89    Ca    9.3      14 Mar 2019 05:09      Urinalysis Basic - ( 14 Mar 2019 11:44 )    Color: Yellow / Appearance: Clear / S.033 / pH: x  Gluc: x / Ketone: Negative  / Bili: Negative / Urobili: Negative   Blood: x / Protein: 30 mg/dL / Nitrite: Positive   Leuk Esterase: Moderate / RBC: 2 /hpf / WBC 10 /HPF   Sq Epi: x / Non Sq Epi: 2 /hpf / Bacteria: Many      RADIOLOGY & ADDITIONAL TESTS:    Consultant(s) Notes Reviewed: Neurosurgery, ID    Care Discussed with Consultants/Other Providers: Neurosurgery

## 2019-03-14 NOTE — PROGRESS NOTE ADULT - NSICDXPROBLEM_GEN_ALL_CORE_FT
PROBLEM DIAGNOSES  Problem: Clostridium difficile colitis  Assessment and Plan: Continue contact isolation until she developed formed stools. Rectal tube in place.   Leukocytosis increased to 16 today  ID consult appreciated.   Change to PO vancomycin. D/c flagyl    Problem: Urinary retention  Assessment and Plan: She's requiring intermittent caths.   UA is positive today- consistent with UTI  Start ceftriaxone 1g IV daily    Problem: Subarachnoid hemorrhage  Assessment and Plan: Post-op care per neurosurgery  On nimodipine 60 mg q4h (vasospasm prophylaxis)    Problem: Hypertension  Assessment and Plan: At home on ramipril 10mg daily and HCTZ 12.5mg daily.  Currently on nimodipine. Permissive HTN.  Monitor BP    Problem: HLD (hyperlipidemia)  Assessment and Plan: Continue Zocor 40mg qHS

## 2019-03-14 NOTE — PROGRESS NOTE ADULT - SUBJECTIVE AND OBJECTIVE BOX
SUBJECTIVE: Patient seen and examined at bedside. Rectal tube in place with persistent watery stool. +Urinary retention requiring straight cath. Denies abdominal pain, chest pain, shortness of breath, nausea/vomiting.     Vital Signs Last 24 Hrs  T(C): 36.8 (03-14-19 @ 08:01), Max: 37.7 (03-13-19 @ 22:09)  T(F): 98.3 (03-14-19 @ 08:01), Max: 99.9 (03-13-19 @ 22:09)  HR: 76 (03-14-19 @ 08:01) (74 - 107)  BP: 125/76 (03-14-19 @ 08:01) (125/76 - 170/82)  RR: 20 (03-14-19 @ 08:01) (18 - 20)  SpO2: 96% (03-14-19 @ 08:01) (94% - 98%)    PHYSICAL EXAM:    Constitutional: No Acute Distress, resting comfortably in bed    Neurological: Awake, alert, oriented to person, place and time, speech clear and fluent, face equal, tongue midline, briskly following commands, no drift, moves all extremities with 5/5 strength, sensation intact to light touch throughout, pupils 4mm and reactive bilaterally, extraocular movements intact, no nystagmus    Incision: right groin site C/D/I    Pulmonary: Clear to Auscultation, No rales, No rhonchi, No wheezes     Cardiovascular: S1, S2, Regular rate and rhythm     Gastrointestinal: Soft, Non-tender, Non-distended, +bowel sounds x 4    Extremities: No calf tenderness bilaterally, no cyanosis, clubbing or edema          LABS:                                     12.2   16.5  )-----------( 365      ( 14 Mar 2019 05:09 )             33.5   03-14    142  |  108  |  26<H>  ----------------------------<  120<H>  3.9   |  19<L>  |  0.89    Ca    9.3      14 Mar 2019 05:09          IMAGING: no new imaging      MEDICATIONS  (STANDING):  enoxaparin Injectable 40 milliGRAM(s) SubCutaneous <User Schedule>  metroNIDAZOLE    Tablet 500 milliGRAM(s) Oral every 8 hours  niMODipine Oral Solution 60 milliGRAM(s) Oral/Enteral Tube every 4 hours  nystatin    Suspension 079683 Unit(s) Swish and Swallow four times a day  psyllium Powder 1 Packet(s) Oral two times a day  simvastatin 40 milliGRAM(s) Oral at bedtime  zinc oxide 20% Ointment 1 Application(s) Topical two times a day    MEDICATIONS  (PRN):  acetaminophen   Tablet .. 650 milliGRAM(s) Oral every 6 hours PRN Temp greater or equal to 38C (100.4F), Mild Pain (1 - 3)          DIET: regular

## 2019-03-14 NOTE — CONSULT NOTE ADULT - SUBJECTIVE AND OBJECTIVE BOX
HPI:   Patient is a 74y female who had syncopal event 10 days ago, found to have sah with aneurysm, s/p coiling, developed diarrhea a few days ago and tested positive for cdif. PO flagyl was commenced, we are called. She now has a rectal tube . Her abdomen was uncomfortable and she had a poor appetite but is now feeling better. She has no fever but has persistent leukocytosis. She has urinary retention requiring straight catheterization. She has a history of kidney stones but no recent symptoms. She has not taken any recent antibiotics and felt fine before the syncope. She has no cough, sob, chest pain, ha, limb pain. She had not dysuria prior to admit. She travelled to [place] as only recent travel.     REVIEW OF SYSTEMS:  All other review of systems negative (Comprehensive ROS)    PAST MEDICAL & SURGICAL HISTORY:  Dyslipidemia  HTN (Hypertension)  Status Post Total Knee Replace: left      Allergies    No Known Allergies    Intolerances        Antimicrobials Day #  :  nystatin    Suspension 098409 Unit(s) Swish and Swallow four times a day  vancomycin    Solution 125 milliGRAM(s) Oral every 6 hours  Day 1    Other Medications:  acetaminophen   Tablet .. 650 milliGRAM(s) Oral every 6 hours PRN  enoxaparin Injectable 40 milliGRAM(s) SubCutaneous <User Schedule>  niMODipine Oral Solution 60 milliGRAM(s) Oral/Enteral Tube every 4 hours  psyllium Powder 1 Packet(s) Oral two times a day  simvastatin 40 milliGRAM(s) Oral at bedtime  zinc oxide 20% Ointment 1 Application(s) Topical two times a day      FAMILY HISTORY:  No pertinent family history in first degree relatives      SOCIAL HISTORY:  Smoking: [ ]Yes [x ]No  ETOH: [ ]Yes [x ]No  Drug Use: [ ]Yes [ ]xNo   [x ] Single[ ]    T(F): 98 (19 @ 13:09), Max: 99.9 (19 @ 22:09)  HR: 80 (19 @ 13:09)  BP: 150/94 (19 @ 13:09)  RR: 20 (19 @ 13:09)  SpO2: 98% (19 @ 13:09)  Wt(kg): --    PHYSICAL EXAM:  General: alert, no acute distress  Eyes:  anicteric, no conjunctival injection, no discharge  Oropharynx: no lesions or injection 	  Neck: supple, without adenopathy  Lungs: clear to auscultation  Heart: regular rate and rhythm; no murmur, rubs or gallops  Abdomen: soft, nondistended, nontender, without mass or organomegaly  Skin: no lesions  Extremities: no clubbing, cyanosis, or edema  Neurologic: alert, oriented, moves all extremities    LAB RESULTS:                        12.2   16.5  )-----------( 365      ( 14 Mar 2019 05:09 )             33.5     03-14    142  |  108  |  26<H>  ----------------------------<  120<H>  3.9   |  19<L>  |  0.89    Ca    9.3      14 Mar 2019 05:09        Urinalysis Basic - ( 14 Mar 2019 11:44 )    Color: Yellow / Appearance: Clear / S.033 / pH: x  Gluc: x / Ketone: Negative  / Bili: Negative / Urobili: Negative   Blood: x / Protein: 30 mg/dL / Nitrite: Positive   Leuk Esterase: Moderate / RBC: 2 /hpf / WBC 10 /HPF   Sq Epi: x / Non Sq Epi: 2 /hpf / Bacteria: Many        MICROBIOLOGY:  RECENT CULTURES:  Clostridium difficile Toxin by PCR (19 @ 18:41)      C Diff by PCR Result: Detected      RADIOLOGY REVIEWED:    < from: Xray Chest 1 View- PORTABLE-Urgent (19 @ 21:04) >  IMPRESSION:   No focal consolidations.    < end of copied text >    < from: CT Head No Cont (19 @ 08:01) >    IMPRESSION: Status post aneurysm coiling. Residual subarachnoid   hemorrhage at the level of the sylvian fissures bilaterally and in the   interhemispheric fissure. No rehemorrhage. No hydrocephalus      < end of copied text >      Impression: patient with sah from ruptured aneurysm s/p coiling, developed diarrhea and tested positive for cdif though no recent antibiotics. Leukocytosis can be explained totally by uncontrolled cdif but will rule out other causes as well. UA not too much pyuria and not toxic so doubt pyelo, even if positive culture  Recommendations:    changed from flagyl to po vanco  check blood cultures  continue to be sure bladder is decompressed  check gi pcr , culture of stool and o and p to be complete

## 2019-03-15 LAB
ANION GAP SERPL CALC-SCNC: 14 MMOL/L — SIGNIFICANT CHANGE UP (ref 5–17)
BASOPHILS # BLD AUTO: 0 K/UL — SIGNIFICANT CHANGE UP (ref 0–0.2)
BASOPHILS NFR BLD AUTO: 0.2 % — SIGNIFICANT CHANGE UP (ref 0–2)
BUN SERPL-MCNC: 22 MG/DL — SIGNIFICANT CHANGE UP (ref 7–23)
CALCIUM SERPL-MCNC: 8.9 MG/DL — SIGNIFICANT CHANGE UP (ref 8.4–10.5)
CHLORIDE SERPL-SCNC: 112 MMOL/L — HIGH (ref 96–108)
CO2 SERPL-SCNC: 18 MMOL/L — LOW (ref 22–31)
CREAT SERPL-MCNC: 0.77 MG/DL — SIGNIFICANT CHANGE UP (ref 0.5–1.3)
EOSINOPHIL # BLD AUTO: 0.3 K/UL — SIGNIFICANT CHANGE UP (ref 0–0.5)
EOSINOPHIL NFR BLD AUTO: 1.9 % — SIGNIFICANT CHANGE UP (ref 0–6)
GLUCOSE SERPL-MCNC: 111 MG/DL — HIGH (ref 70–99)
HCT VFR BLD CALC: 34.2 % — LOW (ref 34.5–45)
HGB BLD-MCNC: 11.5 G/DL — SIGNIFICANT CHANGE UP (ref 11.5–15.5)
LYMPHOCYTES # BLD AUTO: 1.8 K/UL — SIGNIFICANT CHANGE UP (ref 1–3.3)
LYMPHOCYTES # BLD AUTO: 12.3 % — LOW (ref 13–44)
MCHC RBC-ENTMCNC: 31 PG — SIGNIFICANT CHANGE UP (ref 27–34)
MCHC RBC-ENTMCNC: 33.7 GM/DL — SIGNIFICANT CHANGE UP (ref 32–36)
MCV RBC AUTO: 91.9 FL — SIGNIFICANT CHANGE UP (ref 80–100)
MONOCYTES # BLD AUTO: 1.2 K/UL — HIGH (ref 0–0.9)
MONOCYTES NFR BLD AUTO: 8.5 % — SIGNIFICANT CHANGE UP (ref 2–14)
NEUTROPHILS # BLD AUTO: 11.3 K/UL — HIGH (ref 1.8–7.4)
NEUTROPHILS NFR BLD AUTO: 77 % — SIGNIFICANT CHANGE UP (ref 43–77)
PLATELET # BLD AUTO: 371 K/UL — SIGNIFICANT CHANGE UP (ref 150–400)
POTASSIUM SERPL-MCNC: 3.6 MMOL/L — SIGNIFICANT CHANGE UP (ref 3.5–5.3)
POTASSIUM SERPL-SCNC: 3.6 MMOL/L — SIGNIFICANT CHANGE UP (ref 3.5–5.3)
RBC # BLD: 3.72 M/UL — LOW (ref 3.8–5.2)
RBC # FLD: 13.5 % — SIGNIFICANT CHANGE UP (ref 10.3–14.5)
SODIUM SERPL-SCNC: 144 MMOL/L — SIGNIFICANT CHANGE UP (ref 135–145)
WBC # BLD: 14.6 K/UL — HIGH (ref 3.8–10.5)
WBC # FLD AUTO: 14.6 K/UL — HIGH (ref 3.8–10.5)

## 2019-03-15 PROCEDURE — 99233 SBSQ HOSP IP/OBS HIGH 50: CPT

## 2019-03-15 PROCEDURE — 93888 INTRACRANIAL LIMITED STUDY: CPT | Mod: 26

## 2019-03-15 RX ORDER — POTASSIUM CHLORIDE 20 MEQ
40 PACKET (EA) ORAL ONCE
Qty: 0 | Refills: 0 | Status: COMPLETED | OUTPATIENT
Start: 2019-03-15 | End: 2019-03-15

## 2019-03-15 RX ADMIN — NIMODIPINE 60 MILLIGRAM(S): 60 SOLUTION ORAL at 09:58

## 2019-03-15 RX ADMIN — NIMODIPINE 60 MILLIGRAM(S): 60 SOLUTION ORAL at 17:50

## 2019-03-15 RX ADMIN — ENOXAPARIN SODIUM 40 MILLIGRAM(S): 100 INJECTION SUBCUTANEOUS at 17:36

## 2019-03-15 RX ADMIN — SODIUM CHLORIDE 75 MILLILITER(S): 9 INJECTION INTRAMUSCULAR; INTRAVENOUS; SUBCUTANEOUS at 06:23

## 2019-03-15 RX ADMIN — NIMODIPINE 60 MILLIGRAM(S): 60 SOLUTION ORAL at 22:09

## 2019-03-15 RX ADMIN — Medication 1 PACKET(S): at 06:23

## 2019-03-15 RX ADMIN — Medication 125 MILLIGRAM(S): at 11:58

## 2019-03-15 RX ADMIN — Medication 125 MILLIGRAM(S): at 17:36

## 2019-03-15 RX ADMIN — Medication 125 MILLIGRAM(S): at 01:04

## 2019-03-15 RX ADMIN — NIMODIPINE 60 MILLIGRAM(S): 60 SOLUTION ORAL at 06:23

## 2019-03-15 RX ADMIN — Medication 500000 UNIT(S): at 11:58

## 2019-03-15 RX ADMIN — Medication 40 MILLIEQUIVALENT(S): at 09:58

## 2019-03-15 RX ADMIN — Medication 1 PACKET(S): at 17:35

## 2019-03-15 RX ADMIN — NIMODIPINE 60 MILLIGRAM(S): 60 SOLUTION ORAL at 01:04

## 2019-03-15 RX ADMIN — NIMODIPINE 60 MILLIGRAM(S): 60 SOLUTION ORAL at 13:43

## 2019-03-15 RX ADMIN — Medication 500000 UNIT(S): at 06:23

## 2019-03-15 RX ADMIN — ZINC OXIDE 1 APPLICATION(S): 200 OINTMENT TOPICAL at 17:42

## 2019-03-15 RX ADMIN — SIMVASTATIN 40 MILLIGRAM(S): 20 TABLET, FILM COATED ORAL at 22:09

## 2019-03-15 RX ADMIN — ZINC OXIDE 1 APPLICATION(S): 200 OINTMENT TOPICAL at 06:23

## 2019-03-15 RX ADMIN — Medication 125 MILLIGRAM(S): at 06:23

## 2019-03-15 NOTE — PROGRESS NOTE ADULT - SUBJECTIVE AND OBJECTIVE BOX
SUBJECTIVE: Seen and examined at bedside. No acute overnight events. Denies headache, blurry vision, chest pain or N/V. ID recommendations appreciated con't vanco. Hospitalist following + UTI con't ceftriaxone 1g IV daily. Leukocytosis downtrending 14.6 today.       Vital Signs Last 24 Hrs  T(C): 36.7 (03-15-19 @ 07:28), Max: 37.1 (03-14-19 @ 19:57)  T(F): 98 (03-15-19 @ 07:28), Max: 98.7 (03-14-19 @ 19:57)  HR: 89 (03-15-19 @ 07:28) (72 - 91)  BP: 102/76 (03-15-19 @ 07:28) (102/76 - 166/81)  BP(mean): --  RR: 17 (03-15-19 @ 07:28) (17 - 20)  SpO2: 97% (03-15-19 @ 07:28) (97% - 98%)    PHYSICAL EXAM:    Constitutional: No Acute Distress ,     Neurological: Awake, alert, oriented to person, place and time, speech clear and fluent, PERRL, EOMI, no nystagmus face equal, tongue midline, briskly following commands, no drift, moves all extremities with 5/5 strength, sensation intact to light touch throughout    Incision: right groin site C/D/I    Pulmonary: Clear to Auscultation, No rales, No rhonchi, No wheezes     Cardiovascular: S1, S2, Regular rate and rhythm     Gastrointestinal: Soft, Non-tender, Non-distended, +bowel sounds x 4    Extremities: No calf tenderness bilaterally, no cyanosis, clubbing or edema    Tube: POSITIVE RECTAL TUBE      LABS:                          11.5   14.6  )-----------( 371      ( 15 Mar 2019 06:46 )             34.2    03-15    144  |  112<H>  |  22  ----------------------------<  111<H>  3.6   |  18<L>  |  0.77    Ca    8.9      15 Mar 2019 06:45        03-14 @ 07:01  -  03-15 @ 07:00  --------------------------------------------------------  IN: 1025 mL / OUT: 2450 mL / NET: -1425 mL        IMAGING:     MEDICATIONS:  Antibiotics:  cefTRIAXone   IVPB 1 Gram(s) IV Intermittent every 24 hours  cefTRIAXone   IVPB      nystatin    Suspension [id number] Unit(s) Swish and Swallow four times a day  vancomycin    Solution 125 milliGRAM(s) Oral every 6 hours    Neuro:  acetaminophen   Tablet .. 650 milliGRAM(s) Oral every 6 hours PRN Temp greater or equal to 38C (100.4F), Mild Pain (1 - 3)    Cardiac:  niMODipine Oral Solution 60 milliGRAM(s) Oral/Enteral Tube every 4 hours    Pulm:    GI/:  psyllium Powder 1 Packet(s) Oral two times a day    Other:   enoxaparin Injectable 40 milliGRAM(s) SubCutaneous <User Schedule>  potassium chloride    Tablet ER 40 milliEquivalent(s) Oral once  simvastatin 40 milliGRAM(s) Oral at bedtime  sodium chloride 0.9%. 1000 milliLiter(s) IV Continuous <Continuous>  zinc oxide 20% Ointment 1 Application(s) Topical two times a day        DIET: Regular Diet SUBJECTIVE: Seen and examined at bedside. No acute overnight events. Denies headache, blurry vision, chest pain or N/V. ID recommendations appreciated con't vanco. Hospitalist following + UTI con't ceftriaxone 1g IV daily. Leukocytosis downtrending 14.6 today.       Vital Signs Last 24 Hrs  T(C): 36.7 (03-15-19 @ 07:28), Max: 37.1 (03-14-19 @ 19:57)  T(F): 98 (03-15-19 @ 07:28), Max: 98.7 (03-14-19 @ 19:57)  HR: 89 (03-15-19 @ 07:28) (72 - 91)  BP: 102/76 (03-15-19 @ 07:28) (102/76 - 166/81)  BP(mean): --  RR: 17 (03-15-19 @ 07:28) (17 - 20)  SpO2: 97% (03-15-19 @ 07:28) (97% - 98%)    PHYSICAL EXAM:    Constitutional: No Acute Distress ,     Neurological: Awake, alert, oriented to person, place and time, speech clear and fluent, PERRL, EOMI, no nystagmus face equal, tongue midline, briskly following commands, no drift, moves all extremities with 5/5 strength, sensation intact to light touch throughout    Incision: right groin site C/D/I    Pulmonary: Clear to Auscultation, No rales, No rhonchi, No wheezes     Cardiovascular: S1, S2, Regular rate and rhythm     Gastrointestinal: Soft, Non-tender, Non-distended, +bowel sounds x 4    Extremities: No calf tenderness bilaterally, no cyanosis, clubbing or edema    Tube: POSITIVE RECTAL TUBE      LABS:                          11.5   14.6  )-----------( 371      ( 15 Mar 2019 06:46 )             34.2    03-15    144  |  112<H>  |  22  ----------------------------<  111<H>  3.6   |  18<L>  |  0.77    Ca    8.9      15 Mar 2019 06:45        03-14 @ 07:01  -  03-15 @ 07:00  --------------------------------------------------------  IN: 1025 mL / OUT: 2450 mL / NET: -1425 mL        IMAGING: TCD REPORTS    Transcranial doppler exam #1 (3/5/19) 1600pm  mean velocity  cm/sec                              Left         Right  DIANE                    39           48  MCA                   80            59   PCA                    37,73        48,79  VERT                   35            48  BA                               43  Official report to follow.  [facility]    Transcranial doppler exam #2 (3/7/19) 1245pm  mean velocity  cm/sec                              Left         Right  DIANE                   71           96  MCA                  94            77  PCA                    35,41       37,56    Official report to follow.  [facility]    Transcranial doppler exam #3 (3/8/19) 1130am  mean velocity  cm/sec                              Left         Right  DIANE                   42           101  MCA                  102           71  PCA                    36,48       P2-50    Official report to follow.  [facility]    Transcranial doppler exam #4 (3/9/19) 11am  mean velocity  cm/sec                              Left         Right  DIANE                   35           62  MCA                  137           78   Official report to follow.  [facility]    Transcranial doppler exam #5 (3/11/19) 1030am  mean velocity  cm/sec                              Left         Right  DIANE                   47           34  MCA                   155          78  PCA                    P2-48       29,31  Official report to follow.  [facility]    Transcranial doppler exam #6 (3/12/19) 1230pm  mean velocity  cm/sec                              Left         Right  DIANE                    53           68  MCA                   139         91  Official report to follow.  [facility]    Transcranial doppler exam #7 (3/13/19) 1130am  mean velocity  cm/sec                              Left         Right  DIANE                   90           46  MCA                  96           96  Official report to follow.  [facility]    Transcranial doppler exam #8 (3/14/19) 1230pm  mean velocity  cm/sec                              Left         Right  DIANE                    62           51  MCA                   80           111  Official report to follow.  [facility].    MEDICATIONS:  Antibiotics:  cefTRIAXone   IVPB 1 Gram(s) IV Intermittent every 24 hours  cefTRIAXone   IVPB      nystatin    Suspension [id number] Unit(s) Swish and Swallow four times a day  vancomycin    Solution 125 milliGRAM(s) Oral every 6 hours    Neuro:  acetaminophen   Tablet .. 650 milliGRAM(s) Oral every 6 hours PRN Temp greater or equal to 38C (100.4F), Mild Pain (1 - 3)    Cardiac:  niMODipine Oral Solution 60 milliGRAM(s) Oral/Enteral Tube every 4 hours    Pulm:    GI/:  psyllium Powder 1 Packet(s) Oral two times a day    Other:   enoxaparin Injectable 40 milliGRAM(s) SubCutaneous <User Schedule>  potassium chloride    Tablet ER 40 milliEquivalent(s) Oral once  simvastatin 40 milliGRAM(s) Oral at bedtime  sodium chloride 0.9%. 1000 milliLiter(s) IV Continuous <Continuous>  zinc oxide 20% Ointment 1 Application(s) Topical two times a day        DIET: Regular Diet

## 2019-03-15 NOTE — PROGRESS NOTE ADULT - SUBJECTIVE AND OBJECTIVE BOX
CC: f/u for  cdif  Patient reports  she is not sure how the diarrhea is doing, still needs catheterization  REVIEW OF SYSTEMS:  All other review of systems negative (Comprehensive ROS)    Antimicrobials Day #  :  nystatin    Suspension [id number] Unit(s) Swish and Swallow four times a day  vancomycin    Solution 125 milliGRAM(s) Oral every 6 hours    Other Medications Reviewed    T(F): 98.8 (03-15-19 @ 12:04), Max: 98.8 (03-15-19 @ 12:04)  HR: 81 (03-15-19 @ 13:41)  BP: 155/90 (03-15-19 @ 13:41)  RR: 18 (03-15-19 @ 13:41)  SpO2: 95% (03-15-19 @ 13:41)  Wt(kg): --    PHYSICAL EXAM:  General: alert, no acute distress  Eyes:  anicteric, no conjunctival injection, no discharge  Oropharynx: no lesions or injection 	  Neck: supple, without adenopathy  Lungs: clear to auscultation  Heart: regular rate and rhythm; no murmur, rubs or gallops  Abdomen: soft, nondistended, nontender, without mass or organomegaly  Skin: no lesions  Extremities: no clubbing, cyanosis, or edema  Neurologic: alert, oriented, moves all extremities    LAB RESULTS:                        11.5   14.6  )-----------( 371      ( 15 Mar 2019 06:46 )             34.2     -15    144  |  112<H>  |  22  ----------------------------<  111<H>  3.6   |  18<L>  |  0.77    Ca    8.9      15 Mar 2019 06:45        Urinalysis Basic - ( 14 Mar 2019 11:44 )    Color: Yellow / Appearance: Clear / S.033 / pH: x  Gluc: x / Ketone: Negative  / Bili: Negative / Urobili: Negative   Blood: x / Protein: 30 mg/dL / Nitrite: Positive   Leuk Esterase: Moderate / RBC: 2 /hpf / WBC 10 /HPF   Sq Epi: x / Non Sq Epi: 2 /hpf / Bacteria: Many      MICROBIOLOGY:  RECENT CULTURES:      RADIOLOGY REVIEWED:    < from: Xray Chest 1 View- PORTABLE-Urgent (19 @ 21:04) >    IMPRESSION:   No focal consolidations.      < end of copied text >    Assessment:  Patient s/p sah, ruptured aneurysm s/p coil now with cdif colitis. I suspect the urinary retention is a neurologic issue and not from infection. Given that she has required straight catherization , her urine will likely grow bacteria but she has no fever, no suprapubic or flank pain, leukocytosis is moderating so if she has a positive urine I would just consider it assymptomatic bactiuria. Would like to avoid systemic antibiotics.   Plan:  continue po vancomycin  stopped ceftriaxone, will give systemic antibiotics only if systemically ill or symptomatic with a uti  continue to straight catheterize to avoid any significant bladder volume, consider gu evaluation

## 2019-03-15 NOTE — PROGRESS NOTE ADULT - SUBJECTIVE AND OBJECTIVE BOX
Lonny Taylor MD  ([facility] Hospitalist)  Pager [phone]  (During off hours please page [phone])      Patient is a 74y old  Female who presents with a chief complaint of SAH (15 Mar 2019 09:43)      SUBJECTIVE / OVERNIGHT EVENTS: No events overnight. Apparently she's having less diarrhea- decrease in volume. Otherwise no complaints.     MEDICATIONS  (STANDING):  cefTRIAXone   IVPB 1 Gram(s) IV Intermittent every 24 hours  enoxaparin Injectable 40 milliGRAM(s) SubCutaneous  niMODipine Oral Solution 60 milliGRAM(s) Oral/Enteral Tube every 4 hours  nystatin    Suspension 327007 Unit(s) Swish and Swallow four times a day  psyllium Powder 1 Packet(s) Oral two times a day  simvastatin 40 milliGRAM(s) Oral at bedtime  sodium chloride 0.9%. 1000 milliLiter(s) (75 mL/Hr) IV Continuous <Continuous>  vancomycin    Solution 125 milliGRAM(s) Oral every 6 hours  zinc oxide 20% Ointment 1 Application(s) Topical two times a day    MEDICATIONS  (PRN):  acetaminophen   Tablet .. 650 milliGRAM(s) Oral every 6 hours PRN Temp greater or equal to 38C (100.4F), Mild Pain (1 - 3)      Vital Signs Last 24 Hrs  T(C): 37.1 (15 Mar 2019 12:04), Max: 37.1 (14 Mar 2019 19:57)  T(F): 98.8 (15 Mar 2019 12:04), Max: 98.8 (15 Mar 2019 12:04)  HR: 80 (15 Mar 2019 12:04) (72 - 91)  BP: 109/88 (15 Mar 2019 12:04) (102/76 - 166/81)  BP(mean): --  RR: 17 (15 Mar 2019 12:04) (17 - 20)  SpO2: 95% (15 Mar 2019 12:04) (95% - 98%)  CAPILLARY BLOOD GLUCOSE        I&O's Summary    14 Mar 2019 07:01  -  15 Mar 2019 07:00  --------------------------------------------------------  IN: 1025 mL / OUT: 2450 mL / NET: -1425 mL        PHYSICAL EXAM:  GENERAL: NAD, drowsy  HEENT:  MMM, neck supple, no JVD  EYES: EOMI, conjunctiva and sclera clear  CHEST/LUNG: Clear to auscultation bilaterally; No wheeze, rhonchi or rales.  HEART: S1, S2, rrr; No murmurs, rubs, or gallops  ABDOMEN: Soft, Nontender, mildly sistended; Hyperactive bowel sounds, rectal tube in place- appears clean  EXTREMITIES:  No clubbing, cyanosis, or edema  PSYCH: calm  NEUROLOGY: non-focal, AOx3      LABS:                        11.5   14.6  )-----------( 371      ( 15 Mar 2019 06:46 )             34.2     03-15    144  |  112<H>  |  22  ----------------------------<  111<H>  3.6   |  18<L>  |  0.77    Ca    8.9      15 Mar 2019 06:45      Urinalysis Basic - ( 14 Mar 2019 11:44 )    Color: Yellow / Appearance: Clear / S.033 / pH: x  Gluc: x / Ketone: Negative  / Bili: Negative / Urobili: Negative   Blood: x / Protein: 30 mg/dL / Nitrite: Positive   Leuk Esterase: Moderate / RBC: 2 /hpf / WBC 10 /HPF   Sq Epi: x / Non Sq Epi: 2 /hpf / Bacteria: Many      RADIOLOGY & ADDITIONAL TESTS:    Consultant(s) Notes Reviewed: Neurosurgery    Care Discussed with Consultants/Other Providers: Neurosurgery re plan of care

## 2019-03-15 NOTE — PROGRESS NOTE ADULT - ASSESSMENT
74 yr old F h/o HTN on ASA presenting with syncope 3 hours ago. Patient has been having neck pain for 3 days, in which she was woken up suddenly due to neck pain. Per patient's , patient got out of bed, walked to foot of bed, was not responding to , then fell to the floor and was not responsive for one minute, until she came back to baseline. Patient does not remember walking or falling, but states she was aware of her surroundings. EMS was called and she was brought to ED. Denies head trauma, prior occurrence, denies being on blood thinners. States she feels mildly nauseous but denies vomiting, weakness, incontinence. States she returned to baseline. She was found to have SAH and was transferred here for further investigations and management.  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 Daily labs trend WBC.   - Con't rectal tube and contact isolation for C.DIFF  - f/u Hospitalist rec's. Con't ceftriaxone 1g IV daily for UTI. f/u cultures   - Continue daily TCDs. Continue Nimodipine 60 q4h for vasospasm prophylaxis  - Con't simvastatin for HLD  - Con't Nystatin suspension for mild oral candidiasis  - Con't GI ppx   - Con't DVT prophylaxis: SQL, venodynes, Encouraged mobilization and Incentive spirometer  - Dispo: TBD       Will discuss with Dr. Serrato  [phone]

## 2019-03-15 NOTE — CHART NOTE - NSCHARTNOTEFT_GEN_A_CORE
Transcranial doppler exam #1 (3/5/19) 1600pm  mean velocity  cm/sec                              Left         Right  DIANE                    39           48  MCA                   80            59   PCA                    37,73        48,79  VERT                   35            48  BA                               43  Official report to follow.  [facility]    Transcranial doppler exam #2 (3/7/19) 1245pm  mean velocity  cm/sec                              Left         Right  DIANE                   71           96  MCA                  94            77  PCA                    35,41       37,56    Official report to follow.  [facility]    Transcranial doppler exam #3 (3/8/19) 1130am  mean velocity  cm/sec                              Left         Right  DIANE                   42           101  MCA                  102           71  PCA                    36,48       P2-50    Official report to follow.  [facility]    Transcranial doppler exam #4 (3/9/19) 11am  mean velocity  cm/sec                              Left         Right  DIANE                   35           62  MCA                  137           78   Official report to follow.  [facility]    Transcranial doppler exam #5 (3/11/19) 1030am  mean velocity  cm/sec                              Left         Right  DIANE                   47           34  MCA                   155          78  PCA                    P2-48       29,31  Official report to follow.  [facility]    Transcranial doppler exam #6 (3/12/19) 1230pm  mean velocity  cm/sec                              Left         Right  DIANE                    53           68  MCA                   139         91  Official report to follow.  [facility]    Transcranial doppler exam #7 (3/13/19) 1130am  mean velocity  cm/sec                              Left         Right  DIANE                   90           46  MCA                  96           96  Official report to follow.  [facility]    Transcranial doppler exam #8 (3/14/19) 1230pm  mean velocity  cm/sec                              Left         Right  DIANE                    62           51  MCA                   80           111  Official report to follow.  [facility]    Transcranial doppler exam #9 (3/15/19) 1230pm  mean velocity  cm/sec                              Left         Right  DIANE                    46          43  MCA                   78          81           Official report to follow.  [facility]

## 2019-03-15 NOTE — PROGRESS NOTE ADULT - NSICDXPROBLEM_GEN_ALL_CORE_FT
PROBLEM DIAGNOSES  Problem: Clostridium difficile colitis  Assessment and Plan: Continue contact isolation until she developed formed stools. Rectal tube in place - if no more watery stools, please d/c the rectal tube..   Leukocytosis down to 14 today (from 16 yesterday).  ID following.  Currently on PO vancomycin (day 2)    Problem: Urinary retention  Assessment and Plan: She's requiring intermittent caths.   UA is positive for UTI yesterday (consistent with UTI)  Started ceftriaxone 1g IV daily (day 2)  Follow up urine culture results.     Problem: Subarachnoid hemorrhage  Assessment and Plan: Post-op care per neurosurgery  On nimodipine 60 mg q4h (vasospasm prophylaxis)    Problem: Hypertension  Assessment and Plan: At home on ramipril 10mg daily and HCTZ 12.5mg daily.  Currently on nimodipine. Permissive HTN.  Monitor BP    Problem: HLD (hyperlipidemia)  Assessment and Plan: Continue Zocor 40mg qHS PROBLEM DIAGNOSES  Problem: Clostridium difficile colitis  Assessment and Plan: Continue contact isolation until she developed formed stools. Rectal tube in place - if no more watery stools, please d/c the rectal tube..   Leukocytosis down to 14 today (from 16 yesterday).  ID following.  Currently on PO vancomycin (day 2)  Continue IV fluids for maintanance hydration.     Problem: Urinary retention  Assessment and Plan: She's requiring intermittent caths.   UA is positive for UTI yesterday (consistent with UTI)  Started ceftriaxone 1g IV daily (day 2)  Follow up urine culture results.     Problem: Subarachnoid hemorrhage  Assessment and Plan: Post-op care per neurosurgery  On nimodipine 60 mg q4h (vasospasm prophylaxis)    Problem: Hypertension  Assessment and Plan: At home on ramipril 10mg daily and HCTZ 12.5mg daily.  Currently on nimodipine. Permissive HTN.  Monitor BP    Problem: HLD (hyperlipidemia)  Assessment and Plan: Continue Zocor 40mg qHS PROBLEM DIAGNOSES  Problem: Clostridium difficile colitis  Assessment and Plan: Continue contact isolation until she developed formed stools. Rectal tube in place - if no more watery stools, please d/c the rectal tube..   Leukocytosis down to 14 today (from 16 yesterday).  ID following.  Currently on PO vancomycin (day 2)  Continue IV fluids for maintanance hydration.     Problem: Urinary retention  Assessment and Plan: She's requiring intermittent caths.   UA is positive for UTI yesterday (consistent with UTI), started ceftriaxone 1g IV- received 1 dose yesterday. This was stopped after discussion with ID. Unless the patient's clinical condition deteriorates, will hold off on further systemic antibiotics.   Follow up urine culture results.     Problem: Subarachnoid hemorrhage  Assessment and Plan: Post-op care per neurosurgery  On nimodipine 60 mg q4h (vasospasm prophylaxis)    Problem: Hypertension  Assessment and Plan: At home on ramipril 10mg daily and HCTZ 12.5mg daily.  Currently on nimodipine. Permissive HTN.  Monitor BP    Problem: HLD (hyperlipidemia)  Assessment and Plan: Continue Zocor 40mg qHS

## 2019-03-16 LAB
CULTURE RESULTS: SIGNIFICANT CHANGE UP
HCT VFR BLD CALC: 34.8 % — SIGNIFICANT CHANGE UP (ref 34.5–45)
HGB BLD-MCNC: 11.1 G/DL — LOW (ref 11.5–15.5)
MCHC RBC-ENTMCNC: 29.9 PG — SIGNIFICANT CHANGE UP (ref 27–34)
MCHC RBC-ENTMCNC: 31.9 GM/DL — LOW (ref 32–36)
MCV RBC AUTO: 93.8 FL — SIGNIFICANT CHANGE UP (ref 80–100)
PLATELET # BLD AUTO: 445 K/UL — HIGH (ref 150–400)
RBC # BLD: 3.71 M/UL — LOW (ref 3.8–5.2)
RBC # FLD: 14.8 % — HIGH (ref 10.3–14.5)
SPECIMEN SOURCE: SIGNIFICANT CHANGE UP
WBC # BLD: 14.76 K/UL — HIGH (ref 3.8–10.5)
WBC # FLD AUTO: 14.76 K/UL — HIGH (ref 3.8–10.5)

## 2019-03-16 PROCEDURE — 99233 SBSQ HOSP IP/OBS HIGH 50: CPT

## 2019-03-16 PROCEDURE — 99231 SBSQ HOSP IP/OBS SF/LOW 25: CPT

## 2019-03-16 RX ADMIN — Medication 125 MILLIGRAM(S): at 18:10

## 2019-03-16 RX ADMIN — NIMODIPINE 60 MILLIGRAM(S): 60 SOLUTION ORAL at 18:11

## 2019-03-16 RX ADMIN — SIMVASTATIN 40 MILLIGRAM(S): 20 TABLET, FILM COATED ORAL at 23:02

## 2019-03-16 RX ADMIN — Medication 125 MILLIGRAM(S): at 23:02

## 2019-03-16 RX ADMIN — NIMODIPINE 60 MILLIGRAM(S): 60 SOLUTION ORAL at 10:32

## 2019-03-16 RX ADMIN — Medication 125 MILLIGRAM(S): at 13:00

## 2019-03-16 RX ADMIN — Medication 1 PACKET(S): at 06:11

## 2019-03-16 RX ADMIN — Medication 125 MILLIGRAM(S): at 01:35

## 2019-03-16 RX ADMIN — Medication 500000 UNIT(S): at 06:11

## 2019-03-16 RX ADMIN — Medication 500000 UNIT(S): at 13:00

## 2019-03-16 RX ADMIN — Medication 500000 UNIT(S): at 23:02

## 2019-03-16 RX ADMIN — ZINC OXIDE 1 APPLICATION(S): 200 OINTMENT TOPICAL at 18:11

## 2019-03-16 RX ADMIN — SODIUM CHLORIDE 75 MILLILITER(S): 9 INJECTION INTRAMUSCULAR; INTRAVENOUS; SUBCUTANEOUS at 23:02

## 2019-03-16 RX ADMIN — ENOXAPARIN SODIUM 40 MILLIGRAM(S): 100 INJECTION SUBCUTANEOUS at 18:10

## 2019-03-16 RX ADMIN — Medication 1 PACKET(S): at 18:10

## 2019-03-16 RX ADMIN — NIMODIPINE 60 MILLIGRAM(S): 60 SOLUTION ORAL at 13:05

## 2019-03-16 RX ADMIN — Medication 125 MILLIGRAM(S): at 06:11

## 2019-03-16 RX ADMIN — NIMODIPINE 60 MILLIGRAM(S): 60 SOLUTION ORAL at 01:35

## 2019-03-16 RX ADMIN — Medication 500000 UNIT(S): at 18:10

## 2019-03-16 RX ADMIN — NIMODIPINE 60 MILLIGRAM(S): 60 SOLUTION ORAL at 23:02

## 2019-03-16 RX ADMIN — ZINC OXIDE 1 APPLICATION(S): 200 OINTMENT TOPICAL at 06:12

## 2019-03-16 RX ADMIN — NIMODIPINE 60 MILLIGRAM(S): 60 SOLUTION ORAL at 06:11

## 2019-03-16 RX ADMIN — Medication 500000 UNIT(S): at 01:35

## 2019-03-16 NOTE — PROGRESS NOTE ADULT - ASSESSMENT
74 yr old F h/o HTN on ASA presenting with syncope 3 hours ago. Patient has been having neck pain for 3 days, in which she was woken up suddenly due to neck pain. Per patient's , patient got out of bed, walked to foot of bed, was not responding to , then fell to the floor and was not responsive for one minute, until she came back to baseline. Patient does not remember walking or falling, but states she was aware of her surroundings. EMS was called and she was brought to ED. Denies head trauma, prior occurrence, denies being on blood thinners. States she feels mildly nauseous but denies vomiting, weakness, incontinence. States she returned to baseline. She was found to have SAH and was transferred here for further investigations and management.  HH1, MF3 (04 Mar 2019 11:14)    PROCEDURE:  Adm 3/4 SAH. 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 Diff positive-On PO Vanco.  Cont Rectal Tube. Ceftriaxone DC'd 3/15.  TCD WNL  Leukocytosis trending downwards.  Inc activity/OOB. D/W pt  at bedside and questions answered.    ID/Dr JADA Cleveland-I suspect the urinary retention is a neurologic issue and not from infection. Given that she has required straight catherization , her urine will likely grow bacteria but she has no fever, no suprapubic or flank pain, leukocytosis is moderating so if she has a positive urine I would just consider it assymptomatic bactiuria. Would like to avoid systemic antibiotics. Plan: continue po vancomycin. stopped ceftriaxone, will give systemic antibiotics only if systemically ill or symptomatic with a uti  continue to straight catheterize to avoid any significant bladder volume, consider gu evaluation.    Medicine/Hosp of 3/15-Problem: Clostridium difficile colitis.  Assessment and Plan: Continue contact isolation until she developed formed stools. Rectal tube in place - if no more watery stools, please d/c the rectal tube. Leukocytosis down to 14 today (from 16 yesterday).  Continue IV fluids for maintanance hydration. Problem: Urinary retention.  Assessment and Plan: She's requiring intermittent caths.  UA is positive for UTI yesterday (consistent with UTI), started ceftriaxone 1g IV- received 1 dose yesterday. This was stopped after discussion with ID. Unless the patient's clinical condition deteriorates, will hold off on further systemic antibiotics. Follow up urine culture results. On nimodipine 60 mg q4h (vasospasm prophylaxis).   Problem: Hypertension.  Assessment and Plan: At home on ramipril 10mg daily and HCTZ 12.5mg daily.  Currently on nimodipine. Permissive HTN.  Monitor BP.  Problem: HLD (hyperlipidemia).  Assessment and Plan: Continue Zocor 40mg qHS  Respiratory: Patient instructed to use incentive spirometer [ X] YES [ ] NO              DVT ppx: [X ] SQL [ ] SQH and Venodynes [ ] Left [ ] Right [ X] Bilateral    Discharge Planning:  The patient was evaluated by PT and recommended no needs, DME TBD.       More than 30 minutes spent on total encounter: more than 50% of the visit was spent on educating the patient and family regarding condition, medications, follow up plans, signs and symptoms to be concerned with, preparing paperwork, and questions answered regarding discharge.    Assessment:  Please Check When Present   []  GCS  E   V  M     Heart Failure: []Acute, [] acute on chronic , []chronic  Heart Failure:  [] Diastolic (HFpEF), [] Systolic (HFrEF), []Combined (HFpEF and HFrEF), [] RHF, [] Pulm HTN, [] Other    [] TEREZA, [] ATN, [] AIN, [] other  [] CKD1, [] CKD2, [] CKD 3, [] CKD 4, [] CKD 5, []ESRD    Encephalopathy: [] Metabolic, [] Hepatic, [] toxic, [] Neurological, [] Other    Abnormal Nurtitional Status: [] malnurtition (see nutrition note), [ ]underweight: BMI < 19, [] morbid obesity: BMI >40, [] Cachexia    [] Sepsis  [] hypovolemic shock,[] cardiogenic shock, [] hemorrhagic shock, [] neuogenic shock  [] Acute Respiratory Failure  []Cerebral edema, [] Brain compression/ herniation,   [] Functional quadriplegia  [] Acute blood loss anemia 74 yr old F h/o HTN on ASA presenting with syncope 3 hours ago. Patient has been having neck pain for 3 days, in which she was woken up suddenly due to neck pain. Per patient's , patient got out of bed, walked to foot of bed, was not responding to , then fell to the floor and was not responsive for one minute, until she came back to baseline. Patient does not remember walking or falling, but states she was aware of her surroundings. EMS was called and she was brought to ED. Denies head trauma, prior occurrence, denies being on blood thinners. States she feels mildly nauseous but denies vomiting, weakness, incontinence. States she returned to baseline. She was found to have SAH and was transferred here for further investigations and management.  HH1, MF3 (04 Mar 2019 11:14)    PROCEDURE:  Adm 3/4 SAH. 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 Diff positive-On PO Vanco.  Cont Rectal Tube. Ceftriaxone DC'd 3/15.  TCD WNL  Leukocytosis trending downwards.  Inc activity/OOB. D/W pt  at bedside and questions answered.  3/16 Addendum: Per RN Rectal tube fell out about 10am today.  At 2pm there was scant diarrhea only on pad.  Cont to observe off Rectal tube, replace if diarrhea persist.    ID/Dr JADA Cleveland-I suspect the urinary retention is a neurologic issue and not from infection. Given that she has required straight catherization , her urine will likely grow bacteria but she has no fever, no suprapubic or flank pain, leukocytosis is moderating so if she has a positive urine I would just consider it assymptomatic bactiuria. Would like to avoid systemic antibiotics. Plan: continue po vancomycin. stopped ceftriaxone, will give systemic antibiotics only if systemically ill or symptomatic with a uti  continue to straight catheterize to avoid any significant bladder volume, consider gu evaluation.    Medicine/Hosp of 3/15-Problem: Clostridium difficile colitis.  Assessment and Plan: Continue contact isolation until she developed formed stools. Rectal tube in place - if no more watery stools, please d/c the rectal tube. Leukocytosis down to 14 today (from 16 yesterday).  Continue IV fluids for maintanance hydration. Problem: Urinary retention.  Assessment and Plan: She's requiring intermittent caths.  UA is positive for UTI yesterday (consistent with UTI), started ceftriaxone 1g IV- received 1 dose yesterday. This was stopped after discussion with ID. Unless the patient's clinical condition deteriorates, will hold off on further systemic antibiotics. Follow up urine culture results. On nimodipine 60 mg q4h (vasospasm prophylaxis).   Problem: Hypertension.  Assessment and Plan: At home on ramipril 10mg daily and HCTZ 12.5mg daily.  Currently on nimodipine. Permissive HTN.  Monitor BP.  Problem: HLD (hyperlipidemia).  Assessment and Plan: Continue Zocor 40mg qHS  Respiratory: Patient instructed to use incentive spirometer [ X] YES [ ] NO              DVT ppx: [X ] SQL [ ] SQH and Venodynes [ ] Left [ ] Right [ X] Bilateral    Discharge Planning:  The patient was evaluated by PT and recommended no needs, DME TBD.       More than 30 minutes spent on total encounter: more than 50% of the visit was spent on educating the patient and family regarding condition, medications, follow up plans, signs and symptoms to be concerned with, preparing paperwork, and questions answered regarding discharge.    Assessment:  Please Check When Present   []  GCS  E   V  M     Heart Failure: []Acute, [] acute on chronic , []chronic  Heart Failure:  [] Diastolic (HFpEF), [] Systolic (HFrEF), []Combined (HFpEF and HFrEF), [] RHF, [] Pulm HTN, [] Other    [] TEREZA, [] ATN, [] AIN, [] other  [] CKD1, [] CKD2, [] CKD 3, [] CKD 4, [] CKD 5, []ESRD    Encephalopathy: [] Metabolic, [] Hepatic, [] toxic, [] Neurological, [] Other    Abnormal Nurtitional Status: [] malnurtition (see nutrition note), [ ]underweight: BMI < 19, [] morbid obesity: BMI >40, [] Cachexia    [] Sepsis  [] hypovolemic shock,[] cardiogenic shock, [] hemorrhagic shock, [] neuogenic shock  [] Acute Respiratory Failure  []Cerebral edema, [] Brain compression/ herniation,   [] Functional quadriplegia  [] Acute blood loss anemia

## 2019-03-16 NOTE — PROGRESS NOTE ADULT - SUBJECTIVE AND OBJECTIVE BOX
SUBJECTIVE: Comfortable, no complaints.   at bedside.    OVERNIGHT EVENTS:  Diarrhea-rectal tube in    Vital Signs Last 24 Hrs  T(C): 36.8 (16 Mar 2019 08:10), Max: 37.4 (15 Mar 2019 16:21)  T(F): 98.2 (16 Mar 2019 08:10), Max: 99.4 (15 Mar 2019 16:21)  HR: 76 (16 Mar 2019 08:10) (75 - 96)  BP: 159/84 (16 Mar 2019 08:10) (109/88 - 178/100)  BP(mean): --  RR: 19 (16 Mar 2019 08:10) (17 - 19)  SpO2: 98% (16 Mar 2019 08:10) (95% - 98%)  IVF: [ ] IVL [X ] NS@ 75  DIET: [X] Regular + ensure[ ] CCD [ ] Renal [ ] Puree [ ] Dysphagia [ ] Tube Feeds:   PCA: [ ] YES [X ] NO   VALERIO: [ ] YES [X ] NO-intermittent St Cath  BM: [ X] YES-rectal tube in with pale brownish diarrhea.    DRAINS: None    PHYSICAL EXAM:    General: No Acute Distress     Neurological: Awake, alert oriented to person, place and time, Following Commands, PERRL, EOMI, Face Symmetrical, Speech Fluent, Moving all extremities, Muscle Strength normal in all four extremities, No Drift, Sensation to Light Touch Intact    Pulmonary: Clear to Auscultation, No Rales, No Rhonchi, No Wheezes     Cardiovascular: S1, S2, Regular Rate and Rhythm     Gastrointestinal: Soft, Nontender, Nondistended     Incision: CDI. Flat    LABS:                        11.5   14.6  )-----------( 371      ( 15 Mar 2019 06:46 )             34.2    03-15    144  |  112<H>  |  22  ----------------------------<  111<H>  3.6   |  18<L>  |  0.77    Ca    8.9      15 Mar 2019 06:45      Hemoglobin A1C, Whole Blood: 5.6 % (03-04 @ 22:39)    Clostridium difficile Toxin by PCR (03.12.19 @ 18:41)    C Diff by PCR Result: Detected    03-15 @ 07:01  -  03-16 @ 07:00  --------------------------------------------------------  IN: 2200 mL / OUT: 2000 mL / NET: 200 mL    Transcranial doppler exam #8 (3/14/19) 1230pm  mean velocity  cm/sec                              Left         Right  DIANE                    62           51  MCA                   80           111  Official report to follow.  [facility]    Transcranial doppler exam #9 (3/15/19) 1230pm  mean velocity  cm/sec                              Left         Right  DIANE                    46          43  MCA                   78          81           Official report to follow.  [facility]ev.    IMAGING: < from: CT Head No Cont (03.05.19 @ 08:01) >  IMPRESSION: Status post aneurysm coiling. Residual subarachnoid   hemorrhage at the level of the sylvian fissures bilaterally and in the   interhemispheric fissure. No rehemorrhage. No hydrocephalus    MEDICATIONS  (STANDING):  enoxaparin Injectable 40 milliGRAM(s) SubCutaneous <User Schedule>  niMODipine Oral Solution 60 milliGRAM(s) Oral/Enteral Tube every 4 hours  nystatin    Suspension 005737 Unit(s) Swish and Swallow four times a day  psyllium Powder 1 Packet(s) Oral two times a day  simvastatin 40 milliGRAM(s) Oral at bedtime  sodium chloride 0.9%. 1000 milliLiter(s) (75 mL/Hr) IV Continuous <Continuous>  vancomycin    Solution 125 milliGRAM(s) Oral every 6 hours  zinc oxide 20% Ointment 1 Application(s) Topical two times a day    MEDICATIONS  (PRN):  acetaminophen   Tablet .. 650 milliGRAM(s) Oral every 6 hours PRN Temp greater or equal to 38C (100.4F), Mild Pain (1 - 3)

## 2019-03-16 NOTE — PROGRESS NOTE ADULT - SUBJECTIVE AND OBJECTIVE BOX
Patient seen with  at bedside. Patient still with stool and urinary incontinence but stool output is slowing down. She does not notice that her stool has formed.    GENERAL: No fevers, no chills.  EYES: No blurry vision,  No photophobia  ENT: No sore throat.  No dysphagia  Cardiovascular: No chest pain, palpitations, orthopnea  Pulmonary: No cough, no wheezing. No shortness of breath  Gastrointestinal: No abdominal pain, ++ loose, watery diarrhea, no constipation.   : No dysuria.  No hematuria  Musculoskeletal: No weakness.  No myalgias.  Dermatology:  No rashes.  Neuro: No Headache.  No vertigo.  No dizziness.  Psych: No anxiety, no depression.  Denies suicidal thoughts.    Vital Signs Last 24 Hrs  T(C): 37 (16 Mar 2019 12:38), Max: 37.4 (15 Mar 2019 16:21)  T(F): 98.6 (16 Mar 2019 12:38), Max: 99.4 (15 Mar 2019 16:21)  HR: 80 (16 Mar 2019 12:38) (75 - 96)  BP: 147/87 (16 Mar 2019 12:38) (133/86 - 178/100)  BP(mean): --  RR: 18 (16 Mar 2019 12:38) (18 - 19)  SpO2: 96% (16 Mar 2019 12:38) (95% - 98%)    GENERAL: NAD, well-developed  HEAD:  Atraumatic, Normocephalic  EYES: EOMI, PERRLA, conjunctiva and sclera clear  ENT: Pharynx not erythematous  PULMONARY: Clear to auscultation bilaterally; No wheeze  CARDIOVASCULAR: Regular rate and rhythm; No murmurs, rubs, or gallops  ABDOMEN: Soft, mild generalized tenderness to palpation, Nondistended; Bowel sounds present  EXTREMITIES:  2+ Peripheral Pulses, No clubbing, cyanosis, or edema  MUSCULOSKELETAL: No calf tenderness  PSYCH: AAOx3, normal affect  SKIN: warm and dry, No rashes or lesions

## 2019-03-16 NOTE — PROGRESS NOTE ADULT - ATTENDING COMMENTS
I have discussed the case with neurosurgery TERRELL Davison at [phone] I have discussed the case with neurosurgery TERRELL Davison at [phone]    I have spent 25 minutes discussing the case with patients  at bedside. He is requesting a family meeting with the primary team this week.

## 2019-03-16 NOTE — PROGRESS NOTE ADULT - SUBJECTIVE AND OBJECTIVE BOX
CC: f/u for  cdif  Patient reports  feels better, rectal tube out, having formed stool, still needs straight cath  REVIEW OF SYSTEMS:  All other review of systems negative (Comprehensive ROS)    Antimicrobials Day #  :3  nystatin    Suspension 087283 Unit(s) Swish and Swallow four times a day  vancomycin    Solution 125 milliGRAM(s) Oral every 6 hours    Other Medications Reviewed    T(F): 97.5 (03-16-19 @ 16:14), Max: 99 (03-15-19 @ 20:21)  HR: 80 (03-16-19 @ 16:14)  BP: 167/91 (03-16-19 @ 16:14)  RR: 18 (03-16-19 @ 16:14)  SpO2: 95% (03-16-19 @ 16:14)  Wt(kg): --    PHYSICAL EXAM:  General: alert, no acute distress  Eyes:  anicteric, no conjunctival injection, no discharge  Oropharynx: no lesions or injection 	  Neck: supple, without adenopathy  Lungs: clear to auscultation  Heart: regular rate and rhythm; no murmur, rubs or gallops  Abdomen: soft, nondistended, nontender, without mass or organomegaly  Skin: no lesions  Extremities: no clubbing, cyanosis, or edema  Neurologic: alert, oriented, moves all extremities    LAB RESULTS:                        11.1   14.76 )-----------( 445      ( 16 Mar 2019 12:32 )             34.8     03-15    144  |  112<H>  |  22  ----------------------------<  111<H>  3.6   |  18<L>  |  0.77    Ca    8.9      15 Mar 2019 06:45          MICROBIOLOGY:  RECENT CULTURES:  03-15 @ 22:21 .Stool Feces     No enteric pathogens to date: Final culture pending      03-15 @ 22:20 .Stool Feces     Testing in progress      03-15 @ 22:19 .Stool Feces     GI PCR Results: NOT detected  *******Please Note:*******  GI panel PCR evaluates for:  Campylobacter, Plesiomonas shigelloides, Salmonella,  Vibrio, Yersinia enterocolitica, Enteroaggregative  Escherichia coli (EAEC), Enteropathogenic E.coli (EPEC),  Enterotoxigenic E. coli (ETEC) lt/st, Shiga-like  toxin-producing E. coli (STEC) stx1/stx2,  Shigella/ Enteroinvasive E. coli (EIEC), Cryptosporidium,  Cyclospora cayetanensis, Entamoeba histolytica,  Giardia lamblia, Adenovirus F 40/41, Astrovirus,  Norovirus GI/GII, Rotavirus A, Sapovirus      03-15 @ 07:08 .Blood Blood-Venous     No growth to date.      03-15 @ 00:53 .Urine Catheterized     >100,000 CFU/ml Gram Negative Rods          RADIOLOGY REVIEWED:      < from: CT Head No Cont (03.05.19 @ 08:01) >  IMPRESSION: Status post aneurysm coiling. Residual subarachnoid   hemorrhage at the level of the sylvian fissures bilaterally and in the   interhemispheric fissure. No rehemorrhage. No hydrocephalus    < end of copied text >    Assessment:  Patient with sah, s/p coil had diarrhea, tested postive for cdif now with formed stool on po vanco. Positive urine culture with urinary retention but gets straight cathed and no fever so just assympto bactiuria for now  Plan:  continue po vancomycin for 10 days  monitor urine , avoid high volumes in bladder.  monitor off systemic abx

## 2019-03-16 NOTE — PROGRESS NOTE ADULT - NSICDXPROBLEM_GEN_ALL_CORE_FT
PROBLEM DIAGNOSES  Problem: Clostridium difficile colitis  Assessment and Plan: - Continue contact isolation until she developed formed stools, rectal tube fell out this morning- please keep out   - ID following  - Currently on PO vancomycin (day 3)     Problem: Urinary retention  Assessment and Plan: She's requiring intermittent caths, this is likely 2' a neurogenic bladder; cystitis is unlikely. I would recommend intermitent caths with q6h bladder scans and a urology consult.     Problem: Hypertension  Assessment and Plan: - bp stable   - at home on ramipril 10mg daily and HCTZ 12.5mg daily.   - Currently on nimodipine  - Monitor BP q4h     Problem: Subarachnoid hemorrhage  Assessment and Plan: - Post-op care per neurosurgery    - On nimodipine 60 mg q4h (vasospasm prophylaxis)

## 2019-03-16 NOTE — PROGRESS NOTE ADULT - ASSESSMENT
73 yo Female with HTN and HLD presenting with headaches x 3 days and syncope, found to have subarachnoid hemorrhage on 3/4, s/p coiling of ACOMM aneurysm. Course complicated by c diff infection requiring rectal tube and urinary retention requiring frequent straight cath.

## 2019-03-17 LAB
-  AMIKACIN: SIGNIFICANT CHANGE UP
-  AMPICILLIN/SULBACTAM: SIGNIFICANT CHANGE UP
-  AMPICILLIN: SIGNIFICANT CHANGE UP
-  AZTREONAM: SIGNIFICANT CHANGE UP
-  CEFAZOLIN: SIGNIFICANT CHANGE UP
-  CEFEPIME: SIGNIFICANT CHANGE UP
-  CEFOXITIN: SIGNIFICANT CHANGE UP
-  CEFTRIAXONE: SIGNIFICANT CHANGE UP
-  CIPROFLOXACIN: SIGNIFICANT CHANGE UP
-  ERTAPENEM: SIGNIFICANT CHANGE UP
-  GENTAMICIN: SIGNIFICANT CHANGE UP
-  IMIPENEM: SIGNIFICANT CHANGE UP
-  LEVOFLOXACIN: SIGNIFICANT CHANGE UP
-  MEROPENEM: SIGNIFICANT CHANGE UP
-  NITROFURANTOIN: SIGNIFICANT CHANGE UP
-  PIPERACILLIN/TAZOBACTAM: SIGNIFICANT CHANGE UP
-  TIGECYCLINE: SIGNIFICANT CHANGE UP
-  TOBRAMYCIN: SIGNIFICANT CHANGE UP
-  TRIMETHOPRIM/SULFAMETHOXAZOLE: SIGNIFICANT CHANGE UP
ANION GAP SERPL CALC-SCNC: 13 MMOL/L — SIGNIFICANT CHANGE UP (ref 5–17)
BASOPHILS # BLD AUTO: 0.1 K/UL — SIGNIFICANT CHANGE UP (ref 0–0.2)
BASOPHILS NFR BLD AUTO: 0.7 % — SIGNIFICANT CHANGE UP (ref 0–2)
BUN SERPL-MCNC: 13 MG/DL — SIGNIFICANT CHANGE UP (ref 7–23)
CALCIUM SERPL-MCNC: 8.9 MG/DL — SIGNIFICANT CHANGE UP (ref 8.4–10.5)
CHLORIDE SERPL-SCNC: 106 MMOL/L — SIGNIFICANT CHANGE UP (ref 96–108)
CO2 SERPL-SCNC: 19 MMOL/L — LOW (ref 22–31)
CREAT SERPL-MCNC: 0.68 MG/DL — SIGNIFICANT CHANGE UP (ref 0.5–1.3)
CULTURE RESULTS: SIGNIFICANT CHANGE UP
CULTURE RESULTS: SIGNIFICANT CHANGE UP
EOSINOPHIL # BLD AUTO: 0.6 K/UL — HIGH (ref 0–0.5)
EOSINOPHIL NFR BLD AUTO: 4.8 % — SIGNIFICANT CHANGE UP (ref 0–6)
GLUCOSE SERPL-MCNC: 107 MG/DL — HIGH (ref 70–99)
HCT VFR BLD CALC: 34.3 % — LOW (ref 34.5–45)
HGB BLD-MCNC: 11.8 G/DL — SIGNIFICANT CHANGE UP (ref 11.5–15.5)
LYMPHOCYTES # BLD AUTO: 1.7 K/UL — SIGNIFICANT CHANGE UP (ref 1–3.3)
LYMPHOCYTES # BLD AUTO: 14.4 % — SIGNIFICANT CHANGE UP (ref 13–44)
MCHC RBC-ENTMCNC: 31.4 PG — SIGNIFICANT CHANGE UP (ref 27–34)
MCHC RBC-ENTMCNC: 34.4 GM/DL — SIGNIFICANT CHANGE UP (ref 32–36)
MCV RBC AUTO: 91.4 FL — SIGNIFICANT CHANGE UP (ref 80–100)
METHOD TYPE: SIGNIFICANT CHANGE UP
MONOCYTES # BLD AUTO: 1 K/UL — HIGH (ref 0–0.9)
MONOCYTES NFR BLD AUTO: 8.9 % — SIGNIFICANT CHANGE UP (ref 2–14)
NEUTROPHILS # BLD AUTO: 8.2 K/UL — HIGH (ref 1.8–7.4)
NEUTROPHILS NFR BLD AUTO: 71.1 % — SIGNIFICANT CHANGE UP (ref 43–77)
ORGANISM # SPEC MICROSCOPIC CNT: SIGNIFICANT CHANGE UP
ORGANISM # SPEC MICROSCOPIC CNT: SIGNIFICANT CHANGE UP
PLATELET # BLD AUTO: 480 K/UL — HIGH (ref 150–400)
POTASSIUM SERPL-MCNC: 3.8 MMOL/L — SIGNIFICANT CHANGE UP (ref 3.5–5.3)
POTASSIUM SERPL-SCNC: 3.8 MMOL/L — SIGNIFICANT CHANGE UP (ref 3.5–5.3)
RBC # BLD: 3.76 M/UL — LOW (ref 3.8–5.2)
RBC # FLD: 13.1 % — SIGNIFICANT CHANGE UP (ref 10.3–14.5)
SODIUM SERPL-SCNC: 138 MMOL/L — SIGNIFICANT CHANGE UP (ref 135–145)
SPECIMEN SOURCE: SIGNIFICANT CHANGE UP
SPECIMEN SOURCE: SIGNIFICANT CHANGE UP
WBC # BLD: 11.5 K/UL — HIGH (ref 3.8–10.5)
WBC # FLD AUTO: 11.5 K/UL — HIGH (ref 3.8–10.5)

## 2019-03-17 PROCEDURE — 99232 SBSQ HOSP IP/OBS MODERATE 35: CPT

## 2019-03-17 PROCEDURE — 71045 X-RAY EXAM CHEST 1 VIEW: CPT | Mod: 26

## 2019-03-17 PROCEDURE — 99222 1ST HOSP IP/OBS MODERATE 55: CPT

## 2019-03-17 RX ADMIN — NIMODIPINE 60 MILLIGRAM(S): 60 SOLUTION ORAL at 09:54

## 2019-03-17 RX ADMIN — NIMODIPINE 60 MILLIGRAM(S): 60 SOLUTION ORAL at 17:54

## 2019-03-17 RX ADMIN — Medication 125 MILLIGRAM(S): at 06:16

## 2019-03-17 RX ADMIN — Medication 125 MILLIGRAM(S): at 17:54

## 2019-03-17 RX ADMIN — SIMVASTATIN 40 MILLIGRAM(S): 20 TABLET, FILM COATED ORAL at 21:18

## 2019-03-17 RX ADMIN — NIMODIPINE 60 MILLIGRAM(S): 60 SOLUTION ORAL at 03:42

## 2019-03-17 RX ADMIN — SODIUM CHLORIDE 75 MILLILITER(S): 9 INJECTION INTRAMUSCULAR; INTRAVENOUS; SUBCUTANEOUS at 06:17

## 2019-03-17 RX ADMIN — NIMODIPINE 60 MILLIGRAM(S): 60 SOLUTION ORAL at 21:18

## 2019-03-17 RX ADMIN — Medication 125 MILLIGRAM(S): at 11:58

## 2019-03-17 RX ADMIN — ENOXAPARIN SODIUM 40 MILLIGRAM(S): 100 INJECTION SUBCUTANEOUS at 17:54

## 2019-03-17 RX ADMIN — NIMODIPINE 60 MILLIGRAM(S): 60 SOLUTION ORAL at 15:20

## 2019-03-17 RX ADMIN — Medication 500000 UNIT(S): at 17:54

## 2019-03-17 RX ADMIN — NIMODIPINE 60 MILLIGRAM(S): 60 SOLUTION ORAL at 06:16

## 2019-03-17 RX ADMIN — Medication 1 PACKET(S): at 17:54

## 2019-03-17 RX ADMIN — ZINC OXIDE 1 APPLICATION(S): 200 OINTMENT TOPICAL at 17:55

## 2019-03-17 RX ADMIN — Medication 1 PACKET(S): at 06:17

## 2019-03-17 RX ADMIN — Medication 500000 UNIT(S): at 11:58

## 2019-03-17 RX ADMIN — ZINC OXIDE 1 APPLICATION(S): 200 OINTMENT TOPICAL at 06:16

## 2019-03-17 RX ADMIN — Medication 500000 UNIT(S): at 06:16

## 2019-03-17 NOTE — CONSULT NOTE ADULT - ASSESSMENT
75 yo female found with urinary retention, recent uti that has not undergone treatment     Cont. to drain the bladder of urine. Residuals/uop is too high in the setting of uti. Should be undergoing drainage when PVR is 300.   would treat uti.   cont oob and limit narcs   medications reviewed for side effects  ultimately if cont to retain will need urodynamics as outpt. 73 yo female found with urinary retention, recent uti that has not undergone treatment     -Patient needs an indwelling Low catheter for maximal drainage in the setting of UTI and persistently high residuals  -UTI likely contributing to patient's urinary retention.  Would not consider this asymptomatic bacteriuria, needs antibiotics  -Continue OOB, limit narcotics, anticholinergics  -Medications reviewed for side effects  -If has persistent retention, will need outpatient urodynamics  -Continue to follow up with Dr. Burrows  -[facility] [phone]   -Please call if questions

## 2019-03-17 NOTE — PROGRESS NOTE ADULT - SUBJECTIVE AND OBJECTIVE BOX
SUBJECTIVE: Pt seen and examined, sitting up in bed eating breakfast, pt reports stool is more formed, still requiring catheterization for urinary retention    OVERNIGHT EVENTS: none    Vital Signs Last 24 Hrs  T(C): 36.9 (17 Mar 2019 04:16), Max: 37.3 (16 Mar 2019 21:28)  T(F): 98.5 (17 Mar 2019 04:16), Max: 99.1 (16 Mar 2019 21:28)  HR: 85 (17 Mar 2019 04:16) (78 - 87)  BP: 146/79 (17 Mar 2019 04:16) (146/79 - 170/89)  BP(mean): --  RR: 18 (17 Mar 2019 04:16) (18 - 18)  SpO2: 100% (17 Mar 2019 04:16) (95% - 100%)    PHYSICAL EXAM:    General: No Acute Distress     Neurological: Awake, alert oriented to person, place and time, Following Commands,  Face Symmetrical, Speech Fluent, Moving all extremities, Muscle Strength normal in all four extremities, No Drift, Sensation to Light Touch Intact    Pulmonary: Clear to Auscultation, No Rales, No Rhonchi, No Wheezes     Cardiovascular: S1, S2, Regular Rate and Rhythm     Gastrointestinal: Soft, Nontender, Nondistended     Incision: none    LABS:                        11.1   14.76 )-----------( 445      ( 16 Mar 2019 12:32 )             34.8          Hemoglobin A1C, Whole Blood: 5.6 % (03-04 @ 22:39)      03-16 @ 07:01  -  03-17 @ 07:00  --------------------------------------------------------  IN: 2795 mL / OUT: 2325 mL / NET: 470 mL      DRAINS: none    MEDICATIONS:  Antibiotics:  nystatin    Suspension [id number] Unit(s) Swish and Swallow four times a day  vancomycin    Solution 125 milliGRAM(s) Oral every 6 hours    Neuro:  acetaminophen   Tablet .. 650 milliGRAM(s) Oral every 6 hours PRN Temp greater or equal to 38C (100.4F), Mild Pain (1 - 3)    Cardiac:  niMODipine Oral Solution 60 milliGRAM(s) Oral/Enteral Tube every 4 hours    Pulm:    GI/:  psyllium Powder 1 Packet(s) Oral two times a day    Other:   enoxaparin Injectable 40 milliGRAM(s) SubCutaneous <User Schedule>  simvastatin 40 milliGRAM(s) Oral at bedtime  sodium chloride 0.9%. 1000 milliLiter(s) IV Continuous <Continuous>  zinc oxide 20% Ointment 1 Application(s) Topical two times a day    DIET: [x] Regular [] CCD [] Renal [] Puree [] Dysphagia [] Tube Feeds:     IMAGING:   < from: CT Head No Cont (03.05.19 @ 08:01) >  FINDINGS:    VENTRICLES AND SULCI: Evidence of residual subarachnoid hemorrhage in the   bilateral sylvian regions left slightly greater than right . No   hydrocephalus  INTRA-AXIAL:  No mass, blood or abnormal attenuation is seen.  EXTRA-AXIAL: Patient is status post a comm aneurysm coiling  VISUALIZED SINUSES:  Clear.  VISUALIZED MASTOIDS:  Clear.  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    IMPRESSION: Status post aneurysm coiling. Residual subarachnoid   hemorrhage at the level of the sylvian fissures bilaterally and in the   interhemispheric fissure. No rehemorrhage. No hydrocephalus      < end of copied text >

## 2019-03-17 NOTE — PROGRESS NOTE ADULT - ASSESSMENT
74 yr old F h/o HTN on ASA presenting with syncope 3 hours ago. Patient has been having neck pain for 3 days, in which she was woken up suddenly due to neck pain. Per patient's , patient got out of bed, walked to foot of bed, was not responding to , then fell to the floor and was not responsive for one minute, until she came back to baseline. Patient does not remember walking or falling, but states she was aware of her surroundings. EMS was called and she was brought to ED. Denies head trauma, prior occurrence, denies being on blood thinners. States she feels mildly nauseous but denies vomiting, weakness, incontinence. States she returned to baseline. She was found to have SAH and was transferred here for further investigations and management.  HH1, MF3     PROCEDURE:  3/4 s/p cerebral angiogram/ coiling of ACOMM aneurysm, hospital course c/b cdiff, placement of rectal tube, urinary retention requiring catheterization and UTI  POD#13    PLAN:  Neuro:   - SAH- stable- s/p coiling  - continue nimodipine, restart home antihypertensive meds on discharge (ramipril 10 mg)  Respiratory:   - encouraged incentive spirometry  CV:  - vital signs stable  DVT ppx:   - venodynes, sq lovenox  ID:   - ID following  - Cdiff- on vanco po x 10 days total, rectal tube is out, stool is more formed  - asymptomatic bacteriuria: + urine culture- observe off systemic abx per ID  - thrush - on nystatin  - on metamucil for added fiber  GI:    - tolerating diet  PT/OT:   - no PT needs    Assessment:  Please Check When Present   []  GCS  E   V  M     Heart Failure: []Acute, [] acute on chronic , []chronic  Heart Failure:  [] Diastolic (HFpEF), [] Systolic (HFrEF), []Combined (HFpEF and HFrEF), [] RHF, [] Pulm HTN, [] Other    [] TEREZA, [] ATN, [] AIN, [] other  [] CKD1, [] CKD2, [] CKD 3, [] CKD 4, [] CKD 5, []ESRD    Encephalopathy: [] Metabolic, [] Hepatic, [] toxic, [] Neurological, [] Other    Abnormal Nurtitional Status: [] malnurtition (see nutrition note), [ ]underweight: BMI < 19, [] morbid obesity: BMI >40, [] Cachexia    [] Sepsis  [] hypovolemic shock,[] cardiogenic shock, [] hemorrhagic shock, [] neuogenic shock  [] Acute Respiratory Failure  []Cerebral edema, [] Brain compression/ herniation,   [] Functional quadriplegia  [] Acute blood loss anemia    Spectralink # [id number] 74 yr old F h/o HTN on ASA presenting with syncope 3 hours ago. Patient has been having neck pain for 3 days, in which she was woken up suddenly due to neck pain. Per patient's , patient got out of bed, walked to foot of bed, was not responding to , then fell to the floor and was not responsive for one minute, until she came back to baseline. Patient does not remember walking or falling, but states she was aware of her surroundings. EMS was called and she was brought to ED. Denies head trauma, prior occurrence, denies being on blood thinners. States she feels mildly nauseous but denies vomiting, weakness, incontinence. States she returned to baseline. She was found to have SAH and was transferred here for further investigations and management.  HH1, MF3     PROCEDURE:  3/4 s/p cerebral angiogram/ coiling of ACOMM aneurysm, hospital course c/b cdiff, placement of rectal tube, urinary retention requiring catheterization and UTI  POD#13    PLAN:  Neuro:   - SAH- stable- s/p coiling  - continue nimodipine, restart home antihypertensive meds on discharge (ramipril 10 mg)  - continue straight cath q 6 hrs  Respiratory:   - encouraged incentive spirometry  CV:  - vital signs stable  DVT ppx:   - venodynes, sq lovenox  ID:   - ID following  - Cdiff- on vanco po x 10 days total, rectal tube is out, stool is more formed  - asymptomatic bacteriuria: + urine culture- observe off systemic abx per ID  - thrush - on nystatin  - on metamucil for added fiber  GI:    - tolerating diet  PT/OT:   - no PT needs    Assessment:  Please Check When Present   []  GCS  E   V  M     Heart Failure: []Acute, [] acute on chronic , []chronic  Heart Failure:  [] Diastolic (HFpEF), [] Systolic (HFrEF), []Combined (HFpEF and HFrEF), [] RHF, [] Pulm HTN, [] Other    [] TEREZA, [] ATN, [] AIN, [] other  [] CKD1, [] CKD2, [] CKD 3, [] CKD 4, [] CKD 5, []ESRD    Encephalopathy: [] Metabolic, [] Hepatic, [] toxic, [] Neurological, [] Other    Abnormal Nurtitional Status: [] malnurtition (see nutrition note), [ ]underweight: BMI < 19, [] morbid obesity: BMI >40, [] Cachexia    [] Sepsis  [] hypovolemic shock,[] cardiogenic shock, [] hemorrhagic shock, [] neuogenic shock  [] Acute Respiratory Failure  []Cerebral edema, [] Brain compression/ herniation,   [] Functional quadriplegia  [] Acute blood loss anemia    Spectralink # [id number]

## 2019-03-17 NOTE — CONSULT NOTE ADULT - SUBJECTIVE AND OBJECTIVE BOX
74y Female admitted with SAH and has been complicated by cdiff and urinary retention requiring CIC every 6-8 hours with residual upwards of 800cc.     PAST MEDICAL & SURGICAL HISTORY:  Dyslipidemia  HTN (Hypertension)  Status Post Total Knee Replace: left      MEDICATIONS  (STANDING):  enoxaparin Injectable 40 milliGRAM(s) SubCutaneous <User Schedule>  niMODipine Oral Solution 60 milliGRAM(s) Oral/Enteral Tube every 4 hours  nystatin    Suspension 834027 Unit(s) Swish and Swallow four times a day  psyllium Powder 1 Packet(s) Oral two times a day  simvastatin 40 milliGRAM(s) Oral at bedtime  sodium chloride 0.9%. 1000 milliLiter(s) (75 mL/Hr) IV Continuous <Continuous>  vancomycin    Solution 125 milliGRAM(s) Oral every 6 hours  zinc oxide 20% Ointment 1 Application(s) Topical two times a day    MEDICATIONS  (PRN):  acetaminophen   Tablet .. 650 milliGRAM(s) Oral every 6 hours PRN Temp greater or equal to 38C (100.4F), Mild Pain (1 - 3)      FAMILY HISTORY:  No pertinent family history in first degree relatives      Allergies    No Known Allergies    Intolerances    SOCIAL HISTORY:      REVIEW OF SYSTEMS: Otherwise negative as stated in HPI    Physical Exam  Vital signs  T(C): 36.9 (03-17-19 @ 04:16), Max: 37.3 (03-16-19 @ 21:28)  HR: 85 (03-17-19 @ 04:16)  BP: 146/79 (03-17-19 @ 04:16)  SpO2: 100% (03-17-19 @ 04:16)  Wt(kg): --    Output    UOP    Gen:  AWAKE ALERT NAD AXOX3    Pulm:  NO RESP DISTRESS  	  CV:  S1S2    GI:  SOFT NT/ND    :      LABS:      03-17 @ 11:37    WBC 11.5  / Hct 34.3  / SCr --       03-16 @ 12:32    WBC 14.76 / Hct 34.8  / SCr --             Urine Cx:  Culture - Urine (03.15.19 @ 00:53)    -  Amikacin: S <=16    -  Ampicillin: R >16 These ampicillin results predict results for amoxicillin    -  Ampicillin/Sulbactam: I 16/8    -  Aztreonam: S <=4    -  Cefazolin: S <=8 For uncomplicated UTI with K. pneumoniae, E. coli, or P. mirablis: INDRA <=16 is sensitive and INDRA >=32 is resistant. This also predicts results for oral agents cefaclor, cefdinir, cefpodoxime, cefprozil, cefuroxime axetil, cephalexin and locarbef for uncomplicated UTI. Note that some isolates may be susceptible to these agents while testing resistant to cefazolin.    -  Cefepime: S <=4    -  Cefoxitin: S <=8    -  Ceftriaxone: S <=1 Enterobacter, Citrobacter, and Serratia may develop resistance during prolonged therapy    -  Ciprofloxacin: S <=1    -  Ertapenem: S <=1    -  Gentamicin: S <=4    -  Imipenem: S <=1    -  Levofloxacin: S <=2    -  Meropenem: S <=1    -  Nitrofurantoin: S <=32 Should not be used to treat pyelonephritis    -  Piperacillin/Tazobactam: S <=16    -  Tigecycline: S <=2    -  Tobramycin: S <=4    -  Trimethoprim/Sulfamethoxazole: S <=2/38    Specimen Source: .Urine Catheterized    Culture Results:   >100,000 CFU/ml Escherichia coli    Organism Identification: Escherichia coli    Organism: Escherichia coli    Method Type: INDRA    Blood Cx:  Culture - Blood (03.15.19 @ 07:08)    Specimen Source: .Blood Blood-Peripheral    Culture Results:   No growth to date. 74y Female admitted with SAH and has been complicated by cdiff and urinary retention requiring CIC every 6-8 hours with residual upwards of 800cc. Pt states that when she is retaining she does not feel it.  Has past hx of urolithiasis seen by Dr. Johnny Burrows no hx of urinary retention, freq uti, stress incontinence   three vaginal births, has been oob with walker, not constipated   PAST MEDICAL & SURGICAL HISTORY:  Dyslipidemia  HTN (Hypertension)  Status Post Total Knee Replace: left      MEDICATIONS  (STANDING):  enoxaparin Injectable 40 milliGRAM(s) SubCutaneous <User Schedule>  niMODipine Oral Solution 60 milliGRAM(s) Oral/Enteral Tube every 4 hours  nystatin    Suspension [id number] Unit(s) Swish and Swallow four times a day  psyllium Powder 1 Packet(s) Oral two times a day  simvastatin 40 milliGRAM(s) Oral at bedtime  sodium chloride 0.9%. 1000 milliLiter(s) (75 mL/Hr) IV Continuous <Continuous>  vancomycin    Solution 125 milliGRAM(s) Oral every 6 hours  zinc oxide 20% Ointment 1 Application(s) Topical two times a day    MEDICATIONS  (PRN):  acetaminophen   Tablet .. 650 milliGRAM(s) Oral every 6 hours PRN Temp greater or equal to 38C (100.4F), Mild Pain (1 - 3)      FAMILY HISTORY:  No pertinent family history in first degree relatives      Allergies    No Known Allergies        REVIEW OF SYSTEMS: Otherwise negative as stated in HPI    Physical Exam  Vital signs  T(C): 36.9 (03-17-19 @ 04:16), Max: 37.3 (03-16-19 @ 21:28)  HR: 85 (03-17-19 @ 04:16)  BP: 146/79 (03-17-19 @ 04:16)  SpO2: 100% (03-17-19 @ 04:16)  Wt(kg): --      Gen:  AWAKE ALERT NAD AXOX3    Pulm:  NO RESP DISTRESS  	  CV:  S1S2    GI:  SOFT NT/ND  NO CVAT BL     : NONPALP BLADDER       LABS:                          11.8   11.5  )-----------( 480      ( 17 Mar 2019 11:37 )             34.3       03-17    138  |  106  |  13  ----------------------------<  107<H>  3.8   |  19<L>  |  0.68    Ca    8.9      17 Mar 2019 11:37          Urine Cx:  Culture - Urine (03.15.19 @ 00:53)    -  Amikacin: S <=16    -  Ampicillin: R >16 These ampicillin results predict results for amoxicillin    -  Ampicillin/Sulbactam: I 16/8    -  Aztreonam: S <=4    -  Cefazolin: S <=8 For uncomplicated UTI with K. pneumoniae, E. coli, or P. mirablis: INDRA <=16 is sensitive and INDRA >=32 is resistant. This also predicts results for oral agents cefaclor, cefdinir, cefpodoxime, cefprozil, cefuroxime axetil, cephalexin and locarbef for uncomplicated UTI. Note that some isolates may be susceptible to these agents while testing resistant to cefazolin.    -  Cefepime: S <=4    -  Cefoxitin: S <=8    -  Ceftriaxone: S <=1 Enterobacter, Citrobacter, and Serratia may develop resistance during prolonged therapy    -  Ciprofloxacin: S <=1    -  Ertapenem: S <=1    -  Gentamicin: S <=4    -  Imipenem: S <=1    -  Levofloxacin: S <=2    -  Meropenem: S <=1    -  Nitrofurantoin: S <=32 Should not be used to treat pyelonephritis    -  Piperacillin/Tazobactam: S <=16    -  Tigecycline: S <=2    -  Tobramycin: S <=4    -  Trimethoprim/Sulfamethoxazole: S <=2/38    Specimen Source: .Urine Catheterized    Culture Results:   >100,000 CFU/ml Escherichia coli    Organism Identification: Escherichia coli    Organism: Escherichia coli    Method Type: INDRA    Blood Cx:  Culture - Blood (03.15.19 @ 07:08)    Specimen Source: .Blood Blood-Peripheral    Culture Results:   No growth to date. 74y Female admitted with SAH and has been complicated by cdiff and urinary retention requiring CIC every 6-8 hours with residual upwards of 800cc. Pt states that when she is retaining she does not feel it.  Has past hx of urolithiasis seen by Dr. Johnny Burrows no hx of urinary retention, freq uti, stress incontinence   three vaginal births, has been oob with walker, not constipated     PAST MEDICAL & SURGICAL HISTORY:  Dyslipidemia  HTN (Hypertension)  Status Post Total Knee Replace: left    MEDICATIONS  (STANDING):  enoxaparin Injectable 40 milliGRAM(s) SubCutaneous <User Schedule>  niMODipine Oral Solution 60 milliGRAM(s) Oral/Enteral Tube every 4 hours  nystatin    Suspension [id number] Unit(s) Swish and Swallow four times a day  psyllium Powder 1 Packet(s) Oral two times a day  simvastatin 40 milliGRAM(s) Oral at bedtime  sodium chloride 0.9%. 1000 milliLiter(s) (75 mL/Hr) IV Continuous <Continuous>  vancomycin    Solution 125 milliGRAM(s) Oral every 6 hours  zinc oxide 20% Ointment 1 Application(s) Topical two times a day    MEDICATIONS  (PRN):  acetaminophen   Tablet .. 650 milliGRAM(s) Oral every 6 hours PRN Temp greater or equal to 38C (100.4F), Mild Pain (1 - 3)    FAMILY HISTORY:  No pertinent family history in first degree relatives    Allergies  No Known Allergies    REVIEW OF SYSTEMS: Otherwise negative as stated in HPI    Physical Exam  Vital signs  T(C): 36.9 (03-17-19 @ 04:16), Max: 37.3 (03-16-19 @ 21:28)  HR: 85 (03-17-19 @ 04:16)  BP: 146/79 (03-17-19 @ 04:16)  SpO2: 100% (03-17-19 @ 04:16)      Gen:  AWAKE ALERT NAD AXOX3    Pulm:  NO RESP DISTRESS  	  CV:  S1S2    GI:  SOFT NT/ND  NO CVAT BL     : NONPALP BLADDER       LABS:                      11.8   11.5  )-----------( 480      ( 17 Mar 2019 11:37 )             34.3       03-17    138  |  106  |  13  ----------------------------<  107<H>  3.8   |  19<L>  |  0.68    Ca    8.9      17 Mar 2019 11:37          Urine Cx:  Culture - Urine (03.15.19 @ 00:53)    -  Amikacin: S <=16    -  Ampicillin: R >16 These ampicillin results predict results for amoxicillin    -  Ampicillin/Sulbactam: I 16/8    -  Aztreonam: S <=4    -  Cefazolin: S <=8 For uncomplicated UTI with K. pneumoniae, E. coli, or P. mirablis: INDRA <=16 is sensitive and INDRA >=32 is resistant. This also predicts results for oral agents cefaclor, cefdinir, cefpodoxime, cefprozil, cefuroxime axetil, cephalexin and locarbef for uncomplicated UTI. Note that some isolates may be susceptible to these agents while testing resistant to cefazolin.    -  Cefepime: S <=4    -  Cefoxitin: S <=8    -  Ceftriaxone: S <=1 Enterobacter, Citrobacter, and Serratia may develop resistance during prolonged therapy    -  Ciprofloxacin: S <=1    -  Ertapenem: S <=1    -  Gentamicin: S <=4    -  Imipenem: S <=1    -  Levofloxacin: S <=2    -  Meropenem: S <=1    -  Nitrofurantoin: S <=32 Should not be used to treat pyelonephritis    -  Piperacillin/Tazobactam: S <=16    -  Tigecycline: S <=2    -  Tobramycin: S <=4    -  Trimethoprim/Sulfamethoxazole: S <=2/38    Specimen Source: .Urine Catheterized    Culture Results:   >100,000 CFU/ml Escherichia coli    Organism Identification: Escherichia coli    Organism: Escherichia coli    Method Type: INDRA    Blood Cx:  Culture - Blood (03.15.19 @ 07:08)    Specimen Source: .Blood Blood-Peripheral    Culture Results:   No growth to date.

## 2019-03-17 NOTE — CONSULT NOTE ADULT - ATTENDING COMMENTS
Patient seen and examined. Agree with assessment and plan.  Given evidence of UTI and urinary retention, patient should have youssef placed to decompress bladder.    Consider antibiotics if urine does not improve. Patient seen and examined. Agree with assessment and plan.  Given evidence of UTI and urinary retention, patient should have Low placed to decompress bladder.    Consider antibiotics if urine does not improve.

## 2019-03-18 DIAGNOSIS — A04.72 ENTEROCOLITIS DUE TO CLOSTRIDIUM DIFFICILE, NOT SPECIFIED AS RECURRENT: ICD-10-CM

## 2019-03-18 DIAGNOSIS — R06.09 OTHER FORMS OF DYSPNEA: ICD-10-CM

## 2019-03-18 LAB
ANION GAP SERPL CALC-SCNC: 14 MMOL/L — SIGNIFICANT CHANGE UP (ref 5–17)
BUN SERPL-MCNC: 11 MG/DL — SIGNIFICANT CHANGE UP (ref 7–23)
CALCIUM SERPL-MCNC: 8.9 MG/DL — SIGNIFICANT CHANGE UP (ref 8.4–10.5)
CHLORIDE SERPL-SCNC: 105 MMOL/L — SIGNIFICANT CHANGE UP (ref 96–108)
CO2 SERPL-SCNC: 18 MMOL/L — LOW (ref 22–31)
CREAT SERPL-MCNC: 0.65 MG/DL — SIGNIFICANT CHANGE UP (ref 0.5–1.3)
GLUCOSE SERPL-MCNC: 94 MG/DL — SIGNIFICANT CHANGE UP (ref 70–99)
HCT VFR BLD CALC: 34 % — LOW (ref 34.5–45)
HGB BLD-MCNC: 10.9 G/DL — LOW (ref 11.5–15.5)
MCHC RBC-ENTMCNC: 29.8 PG — SIGNIFICANT CHANGE UP (ref 27–34)
MCHC RBC-ENTMCNC: 32.1 GM/DL — SIGNIFICANT CHANGE UP (ref 32–36)
MCV RBC AUTO: 92.9 FL — SIGNIFICANT CHANGE UP (ref 80–100)
PLATELET # BLD AUTO: 476 K/UL — HIGH (ref 150–400)
POTASSIUM SERPL-MCNC: 3.8 MMOL/L — SIGNIFICANT CHANGE UP (ref 3.5–5.3)
POTASSIUM SERPL-SCNC: 3.8 MMOL/L — SIGNIFICANT CHANGE UP (ref 3.5–5.3)
RBC # BLD: 3.66 M/UL — LOW (ref 3.8–5.2)
RBC # FLD: 14.6 % — HIGH (ref 10.3–14.5)
SODIUM SERPL-SCNC: 137 MMOL/L — SIGNIFICANT CHANGE UP (ref 135–145)
WBC # BLD: 11.67 K/UL — HIGH (ref 3.8–10.5)
WBC # FLD AUTO: 11.67 K/UL — HIGH (ref 3.8–10.5)

## 2019-03-18 PROCEDURE — 93970 EXTREMITY STUDY: CPT | Mod: 26

## 2019-03-18 PROCEDURE — 99233 SBSQ HOSP IP/OBS HIGH 50: CPT

## 2019-03-18 PROCEDURE — 71275 CT ANGIOGRAPHY CHEST: CPT | Mod: 26

## 2019-03-18 PROCEDURE — 99232 SBSQ HOSP IP/OBS MODERATE 35: CPT

## 2019-03-18 RX ORDER — HEPARIN SODIUM 5000 [USP'U]/ML
1000 INJECTION INTRAVENOUS; SUBCUTANEOUS
Qty: 25000 | Refills: 0 | Status: DISCONTINUED | OUTPATIENT
Start: 2019-03-18 | End: 2019-03-19

## 2019-03-18 RX ORDER — SODIUM CHLORIDE 9 MG/ML
1000 INJECTION INTRAMUSCULAR; INTRAVENOUS; SUBCUTANEOUS
Qty: 0 | Refills: 0 | Status: DISCONTINUED | OUTPATIENT
Start: 2019-03-18 | End: 2019-03-19

## 2019-03-18 RX ORDER — LABETALOL HCL 100 MG
100 TABLET ORAL EVERY 12 HOURS
Qty: 0 | Refills: 0 | Status: DISCONTINUED | OUTPATIENT
Start: 2019-03-18 | End: 2019-03-20

## 2019-03-18 RX ORDER — ACETAMINOPHEN 500 MG
815 TABLET ORAL EVERY 6 HOURS
Qty: 0 | Refills: 0 | Status: DISCONTINUED | OUTPATIENT
Start: 2019-03-18 | End: 2019-03-29

## 2019-03-18 RX ADMIN — Medication 815 MILLIGRAM(S): at 23:25

## 2019-03-18 RX ADMIN — SODIUM CHLORIDE 50 MILLILITER(S): 9 INJECTION INTRAMUSCULAR; INTRAVENOUS; SUBCUTANEOUS at 11:50

## 2019-03-18 RX ADMIN — Medication 500000 UNIT(S): at 05:17

## 2019-03-18 RX ADMIN — Medication 1 PACKET(S): at 05:16

## 2019-03-18 RX ADMIN — NIMODIPINE 60 MILLIGRAM(S): 60 SOLUTION ORAL at 05:16

## 2019-03-18 RX ADMIN — SIMVASTATIN 40 MILLIGRAM(S): 20 TABLET, FILM COATED ORAL at 21:00

## 2019-03-18 RX ADMIN — Medication 1 PACKET(S): at 18:11

## 2019-03-18 RX ADMIN — Medication 125 MILLIGRAM(S): at 01:32

## 2019-03-18 RX ADMIN — Medication 100 MILLIGRAM(S): at 21:00

## 2019-03-18 RX ADMIN — NIMODIPINE 60 MILLIGRAM(S): 60 SOLUTION ORAL at 18:12

## 2019-03-18 RX ADMIN — ZINC OXIDE 1 APPLICATION(S): 200 OINTMENT TOPICAL at 05:16

## 2019-03-18 RX ADMIN — Medication 500000 UNIT(S): at 01:32

## 2019-03-18 RX ADMIN — ENOXAPARIN SODIUM 40 MILLIGRAM(S): 100 INJECTION SUBCUTANEOUS at 18:11

## 2019-03-18 RX ADMIN — Medication 500000 UNIT(S): at 11:50

## 2019-03-18 RX ADMIN — NIMODIPINE 60 MILLIGRAM(S): 60 SOLUTION ORAL at 10:06

## 2019-03-18 RX ADMIN — Medication 125 MILLIGRAM(S): at 18:11

## 2019-03-18 RX ADMIN — HEPARIN SODIUM 10 UNIT(S)/HR: 5000 INJECTION INTRAVENOUS; SUBCUTANEOUS at 18:54

## 2019-03-18 RX ADMIN — Medication 500000 UNIT(S): at 18:11

## 2019-03-18 RX ADMIN — NIMODIPINE 60 MILLIGRAM(S): 60 SOLUTION ORAL at 13:34

## 2019-03-18 RX ADMIN — Medication 815 MILLIGRAM(S): at 22:30

## 2019-03-18 RX ADMIN — Medication 650 MILLIGRAM(S): at 16:40

## 2019-03-18 RX ADMIN — Medication 125 MILLIGRAM(S): at 05:16

## 2019-03-18 RX ADMIN — Medication 100 MILLIGRAM(S): at 11:50

## 2019-03-18 RX ADMIN — ZINC OXIDE 1 APPLICATION(S): 200 OINTMENT TOPICAL at 18:11

## 2019-03-18 RX ADMIN — NIMODIPINE 60 MILLIGRAM(S): 60 SOLUTION ORAL at 21:00

## 2019-03-18 RX ADMIN — Medication 125 MILLIGRAM(S): at 11:50

## 2019-03-18 RX ADMIN — NIMODIPINE 60 MILLIGRAM(S): 60 SOLUTION ORAL at 01:32

## 2019-03-18 NOTE — PROGRESS NOTE ADULT - NSICDXPROBLEM_GEN_ALL_CORE_FT
PROBLEM DIAGNOSES  Problem: Clostridium difficile diarrhea  Assessment and Plan: complete course of Vanco po    Problem: TROTTER (dyspnea on exertion)  Assessment and Plan: f/u CTA. IVF pre and post. risks/benefits d/w pt/    Problem: Urinary retention  Assessment and Plan: defer to ID regarding treatment of bacteriuria    Problem: Hypertension  Assessment and Plan: would restart home ACE-I but pt will be receiving IV contrast. Start Labetalol for now and then switch back to ACE-I after 3 days.

## 2019-03-18 NOTE — PROGRESS NOTE ADULT - ASSESSMENT
74 yr old F h/o HTN on ASA presenting with syncope 3 hours ago. Patient has been having neck pain for 3 days, in which she was woken up suddenly due to neck pain. Per patient's , patient got out of bed, walked to foot of bed, was not responding to , then fell to the floor and was not responsive for one minute, until she came back to baseline. Patient does not remember walking or falling, but states she was aware of her surroundings. EMS was called and she was brought to ED. Denies head trauma, prior occurrence, denies being on blood thinners. States she feels mildly nauseous but denies vomiting, weakness, incontinence. States she returned to baseline. She was found to have SAH and was transferred here for further investigations and management.  HH1, MF3     PROCEDURE:  3/4 s/p cerebral angiogram/ coiling of ACOMM aneurysm, hospital course c/b cdiff, placement of rectal tube, urinary retention requiring catheterization and UTI, youssef placed for urinary retention  POD#14    PLAN:  Neuro:   - SAH- stable- s/p coiling  - continue nimodipine, restart home antihypertensive meds on discharge (ramipril 10 mg)  - Urology consult appreciated- youssef placed for persistent urinary retention  Respiratory:   - encouraged incentive spirometry  CV:  - vital signs stable  DVT ppx:   - venodynes, sq lovenox  ID:   - ID following  - Cdiff- on vanco po x 10 days total, rectal tube is out, stool is more formed  - asymptomatic bacteriuria: + urine culture- observe off systemic abx per ID  - thrush - on nystatin  - on metamucil for added fiber, added banatrol for loose stools  GI:    - tolerating diet  PT/OT:   - no PT needs, PT to re-evaluate, pt may benefit from PT    Assessment:  Please Check When Present   []  GCS  E   V  M     Heart Failure: []Acute, [] acute on chronic , []chronic  Heart Failure:  [] Diastolic (HFpEF), [] Systolic (HFrEF), []Combined (HFpEF and HFrEF), [] RHF, [] Pulm HTN, [] Other    [] TEREZA, [] ATN, [] AIN, [] other  [] CKD1, [] CKD2, [] CKD 3, [] CKD 4, [] CKD 5, []ESRD    Encephalopathy: [] Metabolic, [] Hepatic, [] toxic, [] Neurological, [] Other    Abnormal Nurtitional Status: [] malnurtition (see nutrition note), [ ]underweight: BMI < 19, [] morbid obesity: BMI >40, [] Cachexia    [] Sepsis  [] hypovolemic shock,[] cardiogenic shock, [] hemorrhagic shock, [] neuogenic shock  [] Acute Respiratory Failure  []Cerebral edema, [] Brain compression/ herniation,   [] Functional quadriplegia  [] Acute blood loss anemia    Spectralink # [id number]

## 2019-03-18 NOTE — PROGRESS NOTE ADULT - ASSESSMENT
Patient with SAH, s/p coiling, diarrhea, tested positive for Cdiff   Diarrhea resolved on po vanco.   Positive urine culture c/w asymptomatic bacteruria   Afebrile, normal WBC, negative Bld Cxs    Plan:  Continue po vancomycin for 10 days  Monitor off systemic abx  Low as per   d/w pt and

## 2019-03-18 NOTE — PROGRESS NOTE ADULT - ASSESSMENT
75 yo Female with HTN and HLD presenting with headaches x 3 days and syncope, found to have subarachnoid hemorrhage on 3/4, s/p coiling of ACOMM aneurysm. Course complicated by c diff infection requiring rectal tube and urinary retention requiring Low.

## 2019-03-18 NOTE — PROGRESS NOTE ADULT - SUBJECTIVE AND OBJECTIVE BOX
Bobby Bernstein   Pager [phone]  Office [phone]      CC: Patient is a 74y old  Female who presents with a chief complaint of SAH (18 Mar 2019 09:54)      SUBJECTIVE / OVERNIGHT EVENTS: Pt only complains of generalized fatigue since hospitalization. Denies TROTTER/CP/cough. Per neurosurg/nurse pt was tachypneic with ambulation yest but tolerated it well last week. No f/c/r. Diarrhea mildly improved and stool becoming more formed. No N/V/abd pain.     MEDICATIONS  (STANDING):  enoxaparin Injectable 40 milliGRAM(s) SubCutaneous <User Schedule>  labetalol 100 milliGRAM(s) Oral every 12 hours  niMODipine Oral Solution 60 milliGRAM(s) Oral/Enteral Tube every 4 hours  nystatin    Suspension [id number] Unit(s) Swish and Swallow four times a day  psyllium Powder 1 Packet(s) Oral two times a day  simvastatin 40 milliGRAM(s) Oral at bedtime  sodium chloride 0.9%. 1000 milliLiter(s) (50 mL/Hr) IV Continuous <Continuous>  vancomycin    Solution 125 milliGRAM(s) Oral every 6 hours  zinc oxide 20% Ointment 1 Application(s) Topical two times a day    MEDICATIONS  (PRN):  acetaminophen   Tablet .. 650 milliGRAM(s) Oral every 6 hours PRN Temp greater or equal to 38C (100.4F), Mild Pain (1 - 3)      Vital Signs Last 24 Hrs  T(C): 36.7 (18 Mar 2019 07:56), Max: 37.1 (17 Mar 2019 19:57)  T(F): 98 (18 Mar 2019 07:56), Max: 98.8 (17 Mar 2019 19:57)  HR: 78 (18 Mar 2019 07:56) (76 - 92)  BP: 168/84 (18 Mar 2019 08:11) (136/80 - 173/93)  BP(mean): --  RR: 18 (18 Mar 2019 07:56) (18 - 918)  SpO2: 97% (18 Mar 2019 07:56) (97% - 100%)  CAPILLARY BLOOD GLUCOSE        I&O's Summary    17 Mar 2019 07:01  -  18 Mar 2019 07:00  --------------------------------------------------------  IN: 400 mL / OUT: 1200 mL / NET: -800 mL    18 Mar 2019 07:01  -  18 Mar 2019 11:09  --------------------------------------------------------  IN: 0 mL / OUT: 300 mL / NET: -300 mL          PHYSICAL EXAM:    GENERAL: NAD   HEENT: EOMI, PERRL  PULM: Clear to auscultation bilaterally  CV: Regular rate and rhythm; nl S1, S2; No murmurs, rubs, or gallops  ABDOMEN: Soft, Nontender, Nondistended; Bowel sounds present  EXTREMITIES/MSK:  Slight non-pitting edema around left knee which is chronic since knee replacement per pt/   PSYCH: AAOx3  NEUROLOGY: non-focal          LABS:                        10.9   11.67 )-----------( 476      ( 18 Mar 2019 10:08 )             34.0     03-18    137  |  105  |  11  ----------------------------<  94  3.8   |  18<L>  |  0.65    Ca    8.9      18 Mar 2019 06:34                  Culture - Stool (collected 15 Mar 2019 22:21)  Source: .Stool Feces  Final Report (17 Mar 2019 18:56):    No enteric pathogens isolated.    (Stool culture examined for Salmonella,    Shigella, Campylobacter, Aeromonas, Plesiomonas,    Vibrio, E.coli O157 and Yersinia)    GI PCR Panel, Stool (collected 15 Mar 2019 22:19)  Source: .Stool Feces  Final Report (16 Mar 2019 00:08):    GI PCR Results: NOT detected    *******Please Note:*******    GI panel PCR evaluates for:    Campylobacter, Plesiomonas shigelloides, Salmonella,    Vibrio, Yersinia enterocolitica, Enteroaggregative    Escherichia coli (EAEC), Enteropathogenic E.coli (EPEC),    Enterotoxigenic E. coli (ETEC) lt/st, Shiga-like    toxin-producing E. coli (STEC) stx1/stx2,    Shigella/ Enteroinvasive E. coli (EIEC), Cryptosporidium,    Cyclospora cayetanensis, Entamoeba histolytica,    Giardia lamblia, Adenovirus F 40/41, Astrovirus,    Norovirus GI/GII, Rotavirus A, Sapovirus      RADIOLOGY & ADDITIONAL TESTS:    Imaging Personally Reviewed:    Consultant(s) Notes Reviewed:      Care Discussed with Consultants/Other Providers: neurosurg regarding TROTTER, urinary retention, HTN

## 2019-03-18 NOTE — PROGRESS NOTE ADULT - SUBJECTIVE AND OBJECTIVE BOX
SUBJECTIVE: Pt seen and examined, pt is lying in bed, tachypneic, lungs CTA, she reports SOB when walking to the bathroom, CXR done- f/u report, will order CTA chest to r/o PE    OVERNIGHT EVENTS: none    Vital Signs Last 24 Hrs  T(C): 36.7 (18 Mar 2019 07:56), Max: 37.1 (17 Mar 2019 19:57)  T(F): 98 (18 Mar 2019 07:56), Max: 98.8 (17 Mar 2019 19:57)  HR: 78 (18 Mar 2019 07:56) (76 - 92)  BP: 168/84 (18 Mar 2019 08:11) (136/80 - 173/93)  BP(mean): --  RR: 18 (18 Mar 2019 07:56) (18 - 918)  SpO2: 97% (18 Mar 2019 07:56) (97% - 100%)    PHYSICAL EXAM:    General: No Acute Distress     Neurological: Awake, alert oriented to person, place and time, Following Commands, Speech Fluent, Moving all extremities, Muscle Strength normal in all four extremities, No Drift, Sensation to Light Touch Intact    Pulmonary: Clear to Auscultation, No Rales, No Rhonchi, No Wheezes     Cardiovascular: S1, S2, Regular Rate and Rhythm     Gastrointestinal: Soft, Nontender, Nondistended     Incision: right groin: no ecchymosis/hematoma    LABS:                        11.8   11.5  )-----------( 480      ( 17 Mar 2019 11:37 )             34.3    03-18    137  |  105  |  11  ----------------------------<  94  3.8   |  18<L>  |  0.65    Ca    8.9      18 Mar 2019 06:34      Hemoglobin A1C, Whole Blood: 5.6 % (03-04 @ 22:39)      03-17 @ 07:01  -  03-18 @ 07:00  --------------------------------------------------------  IN: 400 mL / OUT: 1200 mL / NET: -800 mL    03-18 @ 07:01  -  03-18 @ 09:54  --------------------------------------------------------  IN: 0 mL / OUT: 300 mL / NET: -300 mL      DRAINS: none    MEDICATIONS:  Antibiotics:  nystatin    Suspension 877824 Unit(s) Swish and Swallow four times a day  vancomycin    Solution 125 milliGRAM(s) Oral every 6 hours    Neuro:  acetaminophen   Tablet .. 650 milliGRAM(s) Oral every 6 hours PRN Temp greater or equal to 38C (100.4F), Mild Pain (1 - 3)    Cardiac:  niMODipine Oral Solution 60 milliGRAM(s) Oral/Enteral Tube every 4 hours    Pulm:    GI/:  psyllium Powder 1 Packet(s) Oral two times a day    Other:   enoxaparin Injectable 40 milliGRAM(s) SubCutaneous <User Schedule>  simvastatin 40 milliGRAM(s) Oral at bedtime  zinc oxide 20% Ointment 1 Application(s) Topical two times a day    DIET: [x] Regular [] CCD [] Renal [] Puree [] Dysphagia [] Tube Feeds:     IMAGING:   < from: CT Head No Cont (03.05.19 @ 08:01) >  FINDINGS:    VENTRICLES AND SULCI: Evidence of residual subarachnoid hemorrhage in the   bilateral sylvian regions left slightly greater than right . No   hydrocephalus  INTRA-AXIAL:  No mass, blood or abnormal attenuation is seen.  EXTRA-AXIAL: Patient is status post a comm aneurysm coiling  VISUALIZED SINUSES:  Clear.  VISUALIZED MASTOIDS:  Clear.  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    IMPRESSION: Status post aneurysm coiling. Residual subarachnoid   hemorrhage at the level of the sylvian fissures bilaterally and in the   interhemispheric fissure. No rehemorrhage. No hydrocephalus      < end of copied text >

## 2019-03-18 NOTE — PROGRESS NOTE ADULT - SUBJECTIVE AND OBJECTIVE BOX
CC: f/u for Cdiff    Patient reports comfortable, increased appetite, no diarrhea    REVIEW OF SYSTEMS:  All other review of systems negative (Comprehensive ROS)    Antimicrobials Day # 4  nystatin    Suspension [id number] Unit(s) Swish and Swallow four times a day  vancomycin    Solution 125 milliGRAM(s) Oral every 6 hours    Other Medications Reviewed    Vital Signs Last 24 Hrs  T(F): 98.2 (18 Mar 2019 13:26), Max: 98.8 (17 Mar 2019 19:57)  HR: 68 (18 Mar 2019 13:26) (68 - 92)  BP: 135/83 (18 Mar 2019 13:26) (135/83 - 173/93)  BP(mean): --  RR: 18 (18 Mar 2019 13:26) (18 - 18)  SpO2: 98% (18 Mar 2019 13:26) (97% - 100%)    PHYSICAL EXAM:  General: alert, no acute distress  Eyes:  anicteric, no conjunctival injection, no discharge  Oropharynx: no lesions or injection 	  Neck: supple, without adenopathy  Lungs: clear to auscultation  Heart: regular rate and rhythm; no murmur, rubs or gallops  Abdomen: soft, nondistended, nontender, without mass or organomegaly  Low  Skin: no lesions  Extremities: no edema  Neurologic: moves all extremities    LAB RESULTS:                        10.9   11.67 )-----------( 476      ( 18 Mar 2019 10:08 )             34.0   03-18    137  |  105  |  11  ----------------------------<  94  3.8   |  18<L>  |  0.65    Ca    8.9      18 Mar 2019 06:34    MICROBIOLOGY:  RECENT CULTURES:  03-15 @ 22:19 .Stool Feces     GI PCR Results: NOT detected    Clostridium difficile Toxin by PCR (03.12.19 @ 18:41)    Clostridium difficile Toxin by PCR: Detected    03-15 @ 07:08 .Blood Blood-Venous     No growth to date.    03-15 @ 00:53 .Urine Catheterized     >100,000 CFU/ml Gram Negative Rods    RADIOLOGY REVIEWED:  CT Head No Cont (03.05.19 @ 08:01) >  status post aneurysm coiling. Residual subarachnoid   hemorrhage at the level of the sylvian fissures bilaterally and in the   interhemispheric fissure. No rehemorrhage. No hydrocephalus

## 2019-03-19 DIAGNOSIS — I26.99 OTHER PULMONARY EMBOLISM WITHOUT ACUTE COR PULMONALE: ICD-10-CM

## 2019-03-19 LAB
APTT BLD: 29.9 SEC — SIGNIFICANT CHANGE UP (ref 27.5–36.3)
APTT BLD: 48.2 SEC — HIGH (ref 27.5–36.3)
APTT BLD: 63.3 SEC — HIGH (ref 27.5–36.3)
APTT BLD: 64.6 SEC — HIGH (ref 27.5–36.3)
HCT VFR BLD CALC: 33.5 % — LOW (ref 34.5–45)
HGB BLD-MCNC: 11.4 G/DL — LOW (ref 11.5–15.5)
MCHC RBC-ENTMCNC: 30.8 PG — SIGNIFICANT CHANGE UP (ref 27–34)
MCHC RBC-ENTMCNC: 33.9 GM/DL — SIGNIFICANT CHANGE UP (ref 32–36)
MCV RBC AUTO: 90.9 FL — SIGNIFICANT CHANGE UP (ref 80–100)
PLATELET # BLD AUTO: 471 K/UL — HIGH (ref 150–400)
RBC # BLD: 3.69 M/UL — LOW (ref 3.8–5.2)
RBC # FLD: 13 % — SIGNIFICANT CHANGE UP (ref 10.3–14.5)
WBC # BLD: 13.9 K/UL — HIGH (ref 3.8–10.5)
WBC # FLD AUTO: 13.9 K/UL — HIGH (ref 3.8–10.5)

## 2019-03-19 PROCEDURE — 99232 SBSQ HOSP IP/OBS MODERATE 35: CPT

## 2019-03-19 PROCEDURE — 99233 SBSQ HOSP IP/OBS HIGH 50: CPT

## 2019-03-19 PROCEDURE — 70450 CT HEAD/BRAIN W/O DYE: CPT | Mod: 26

## 2019-03-19 RX ORDER — HEPARIN SODIUM 5000 [USP'U]/ML
1200 INJECTION INTRAVENOUS; SUBCUTANEOUS
Qty: 25000 | Refills: 0 | Status: DISCONTINUED | OUTPATIENT
Start: 2019-03-19 | End: 2019-03-19

## 2019-03-19 RX ORDER — HEPARIN SODIUM 5000 [USP'U]/ML
12 INJECTION INTRAVENOUS; SUBCUTANEOUS
Qty: 25000 | Refills: 0 | Status: DISCONTINUED | OUTPATIENT
Start: 2019-03-19 | End: 2019-03-19

## 2019-03-19 RX ORDER — HEPARIN SODIUM 5000 [USP'U]/ML
1200 INJECTION INTRAVENOUS; SUBCUTANEOUS
Qty: 25000 | Refills: 0 | Status: DISCONTINUED | OUTPATIENT
Start: 2019-03-19 | End: 2019-03-20

## 2019-03-19 RX ADMIN — ZINC OXIDE 1 APPLICATION(S): 200 OINTMENT TOPICAL at 17:37

## 2019-03-19 RX ADMIN — SIMVASTATIN 40 MILLIGRAM(S): 20 TABLET, FILM COATED ORAL at 20:40

## 2019-03-19 RX ADMIN — Medication 815 MILLIGRAM(S): at 20:39

## 2019-03-19 RX ADMIN — Medication 500000 UNIT(S): at 05:58

## 2019-03-19 RX ADMIN — Medication 1 PACKET(S): at 17:36

## 2019-03-19 RX ADMIN — Medication 125 MILLIGRAM(S): at 00:15

## 2019-03-19 RX ADMIN — Medication 500000 UNIT(S): at 00:15

## 2019-03-19 RX ADMIN — NIMODIPINE 60 MILLIGRAM(S): 60 SOLUTION ORAL at 05:58

## 2019-03-19 RX ADMIN — NIMODIPINE 60 MILLIGRAM(S): 60 SOLUTION ORAL at 20:41

## 2019-03-19 RX ADMIN — Medication 1 PACKET(S): at 05:58

## 2019-03-19 RX ADMIN — Medication 500000 UNIT(S): at 12:06

## 2019-03-19 RX ADMIN — NIMODIPINE 60 MILLIGRAM(S): 60 SOLUTION ORAL at 02:23

## 2019-03-19 RX ADMIN — Medication 100 MILLIGRAM(S): at 05:58

## 2019-03-19 RX ADMIN — Medication 125 MILLIGRAM(S): at 17:36

## 2019-03-19 RX ADMIN — Medication 125 MILLIGRAM(S): at 05:58

## 2019-03-19 RX ADMIN — ZINC OXIDE 1 APPLICATION(S): 200 OINTMENT TOPICAL at 05:58

## 2019-03-19 RX ADMIN — Medication 815 MILLIGRAM(S): at 21:09

## 2019-03-19 RX ADMIN — NIMODIPINE 60 MILLIGRAM(S): 60 SOLUTION ORAL at 10:25

## 2019-03-19 RX ADMIN — Medication 125 MILLIGRAM(S): at 12:06

## 2019-03-19 NOTE — PROGRESS NOTE ADULT - ASSESSMENT
Patient with SAH, s/p coiling, diarrhea, tested positive for Cdiff   Diarrhea resolved on po vanco.   Positive urine culture c/w asymptomatic bacteruria- youssef in place   Afebrile, mild leukocytosis only, negative Bld Cxs    Plan:  Continue po vancomycin for 10 days  Monitor off systemic abx  Youssef as per   d/w pt and

## 2019-03-19 NOTE — PROGRESS NOTE ADULT - NSICDXPROBLEM_GEN_ALL_CORE_FT
PROBLEM DIAGNOSES  Problem: Acute pulmonary embolism  Assessment and Plan: Hep gtt per neurosurg. Coumadin vs Pradaxa for reversibility?    Problem: Clostridium difficile diarrhea  Assessment and Plan: complete course of Vanco po    Problem: Urinary retention  Assessment and Plan: defer to ID regarding treatment of bacteriuria    Problem: Hypertension  Assessment and Plan: would restart home ACE-I but pt will be receiving IV contrast. Start Labetalol for now and then switch back to ACE-I after 3 days.

## 2019-03-19 NOTE — PROGRESS NOTE ADULT - SUBJECTIVE AND OBJECTIVE BOX
SUBJECTIVE: Pt seen and examined, resting in bed no chest pains or SOB, feels better overall today  +PE on CTA chest last night, now on heparin drip    Vital Signs Last 24 Hrs  T(C): 36.8 (19 Mar 2019 15:39), Max: 36.9 (18 Mar 2019 20:23)  T(F): 98.2 (19 Mar 2019 15:39), Max: 98.5 (18 Mar 2019 20:23)  HR: 106 (19 Mar 2019 15:39) (76 - 106)  BP: 101/68 (19 Mar 2019 15:39) (101/68 - 155/99)  BP(mean): --  RR: 18 (19 Mar 2019 15:39) (18 - 18)  SpO2: 99% (19 Mar 2019 15:39) (97% - 99%)    PHYSICAL EXAM:    General: No Acute Distress     Neurological: Awake, alert oriented to person, place and time, Following Commands, Speech Fluent, Moving all extremities, Muscle Strength normal in all four extremities, No Drift, Sensation to Light Touch Intact    Pulmonary: Clear to Auscultation, No Rales, No Rhonchi, No Wheezes     Cardiovascular: S1, S2, Regular Rate and Rhythm     Gastrointestinal: Soft, Nontender, Nondistended     Incision: right groin: no ecchymosis/hematoma    LABS:                           11.4   13.9  )-----------( 471      ( 19 Mar 2019 01:15 )             33.5   03-18    137  |  105  |  11  ----------------------------<  94  3.8   |  18<L>  |  0.65    Ca    8.9      18 Mar 2019 06:34    Hemoglobin A1C, Whole Blood: 5.6 % (03-04 @ 22:39)    MEDICATIONS:  Antibiotics:  nystatin    Suspension [id number] Unit(s) Swish and Swallow four times a day  vancomycin    Solution 125 milliGRAM(s) Oral every 6 hours    Neuro:  acetaminophen   Tablet .. 650 milliGRAM(s) Oral every 6 hours PRN Temp greater or equal to 38C (100.4F), Mild Pain (1 - 3)    Cardiac:  niMODipine Oral Solution 60 milliGRAM(s) Oral/Enteral Tube every 4 hours    GI/:  psyllium Powder 1 Packet(s) Oral two times a day    Other:   enoxaparin Injectable 40 milliGRAM(s) SubCutaneous <User Schedule>  simvastatin 40 milliGRAM(s) Oral at bedtime  zinc oxide 20% Ointment 1 Application(s) Topical two times a day    DIET: [x] Regular [] CCD [] Renal [] Puree [] Dysphagia [] Tube Feeds:     IMAGING:   < from: CT Head No Cont (03.05.19 @ 08:01) >  FINDINGS:    VENTRICLES AND SULCI: Evidence of residual subarachnoid hemorrhage in the   bilateral sylvian regions left slightly greater than right . No   hydrocephalus  INTRA-AXIAL:  No mass, blood or abnormal attenuation is seen.  EXTRA-AXIAL: Patient is status post a comm aneurysm coiling  VISUALIZED SINUSES:  Clear.  VISUALIZED MASTOIDS:  Clear.  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    IMPRESSION: Status post aneurysm coiling. Residual subarachnoid   hemorrhage at the level of the sylvian fissures bilaterally and in the   interhemispheric fissure. No rehemorrhage. No hydrocephalus      < end of copied text >

## 2019-03-19 NOTE — PROGRESS NOTE ADULT - SUBJECTIVE AND OBJECTIVE BOX
Bobby Bernstein   Pager [phone]  Office [phone]      CC: Patient is a 74y old  Female who presents with a chief complaint of SAH (18 Mar 2019 14:37)      SUBJECTIVE / OVERNIGHT EVENTS: Feels good. Denies TROTTER/CP/HA/black stools/bleeding.     MEDICATIONS  (STANDING):  heparin  Infusion 1200 Unit(s)/Hr (12 mL/Hr) IV Continuous <Continuous>  labetalol 100 milliGRAM(s) Oral every 12 hours  niMODipine Oral Solution 60 milliGRAM(s) Oral/Enteral Tube every 4 hours  psyllium Powder 1 Packet(s) Oral two times a day  simvastatin 40 milliGRAM(s) Oral at bedtime  vancomycin    Solution 125 milliGRAM(s) Oral every 6 hours  zinc oxide 20% Ointment 1 Application(s) Topical two times a day    MEDICATIONS  (PRN):  acetaminophen    Suspension .. 815 milliGRAM(s) Oral every 6 hours PRN Mild Pain (1 - 3)      Vital Signs Last 24 Hrs  T(C): 36.9 (19 Mar 2019 09:02), Max: 37.1 (18 Mar 2019 16:33)  T(F): 98.5 (19 Mar 2019 09:02), Max: 98.8 (18 Mar 2019 16:33)  HR: 98 (19 Mar 2019 13:30) (76 - 98)  BP: 121/84 (19 Mar 2019 13:30) (121/84 - 155/99)  BP(mean): --  RR: 18 (19 Mar 2019 13:30) (17 - 18)  SpO2: 99% (19 Mar 2019 13:30) (97% - 99%)  CAPILLARY BLOOD GLUCOSE        I&O's Summary    18 Mar 2019 07:01  -  19 Mar 2019 07:00  --------------------------------------------------------  IN: 250 mL / OUT: 1400 mL / NET: -1150 mL    PHYSICAL EXAM:    GENERAL: NAD   HEENT: EOMI, PERRL  PULM: Clear to auscultation bilaterally  CV: Regular rate and rhythm; nl S1, S2; No murmurs, rubs, or gallops  ABDOMEN: Soft, Nontender, Nondistended; Bowel sounds present  EXTREMITIES/MSK:  No edema, calf tenderness   PSYCH: AAOx3  NEUROLOGY: non-focal          LABS:                        11.4   13.9  )-----------( 471      ( 19 Mar 2019 01:15 )             33.5     03-18    137  |  105  |  11  ----------------------------<  94  3.8   |  18<L>  |  0.65    Ca    8.9      18 Mar 2019 06:34      PTT - ( 19 Mar 2019 07:27 )  PTT:48.2 sec            RADIOLOGY & ADDITIONAL TESTS:    Imaging Personally Reviewed:    Consultant(s) Notes Reviewed:      Care Discussed with Consultants/Other Providers: neurosurg

## 2019-03-19 NOTE — PROGRESS NOTE ADULT - ASSESSMENT
74 yr old F h/o HTN on ASA presenting with syncope 3 hours ago. Patient has been having neck pain for 3 days, in which she was woken up suddenly due to neck pain. Per patient's , patient got out of bed, walked to foot of bed, was not responding to , then fell to the floor and was not responsive for one minute, until she came back to baseline. Patient does not remember walking or falling, but states she was aware of her surroundings. EMS was called and she was brought to ED. Denies head trauma, prior occurrence, denies being on blood thinners. States she feels mildly nauseous but denies vomiting, weakness, incontinence. States she returned to baseline. She was found to have SAH and was transferred here for further investigations and management.  HH1, MF3     PROCEDURE:  3/4 s/p cerebral angiogram/ coiling of ACOMM aneurysm, hospital course c/b cdiff, placement of rectal tube, urinary retention requiring catheterization and UTI, youssef placed for urinary retention    PLAN:  Neuro:   - SAH- stable- s/p coiling  - continue nimodipine, restart home antihypertensive meds on discharge (ramipril 10 mg)  - Urology consult appreciated- youssef placed for persistent urinary retention  Respiratory:   - encouraged incentive spirometry  -+PE on CTA chest,  now on heparin drip with goal PTT 60-90- will need to recheck PTT @ 8pm tonight as IV infiltrated/leaking per nurse so last PTT at 2pm not accurate  CV:  - vital signs stable  DVT ppx:   - venodynes, sq lovenox  ID:   - ID following  - Cdiff- on vanco po x 10 days total, rectal tube is out, stool is more formed  - asymptomatic bacteriuria: + urine culture- observe off systemic abx per ID  - thrush - on nystatin  - on metamucil for added fiber, added banatrol for loose stools  GI:    - tolerating diet  PT/OT:   - no PT needs, PT re-evaluate, pt may benefit from acute rehab, OT/PMR asked to see  CTH ordered to r/o delayed HCP and f/u given now on heparin drip, will need to convert to PO med which is reversible as discussed with Dr Serrato.   Hospitalist medicine following, will need off nimodpine for discharge    Assessment:  Please Check When Present   []  GCS  E   V  M     Heart Failure: []Acute, [] acute on chronic , []chronic  Heart Failure:  [] Diastolic (HFpEF), [] Systolic (HFrEF), []Combined (HFpEF and HFrEF), [] RHF, [] Pulm HTN, [] Other    [] TEREZA, [] ATN, [] AIN, [] other  [] CKD1, [] CKD2, [] CKD 3, [] CKD 4, [] CKD 5, []ESRD    Encephalopathy: [] Metabolic, [] Hepatic, [] toxic, [] Neurological, [] Other    Abnormal Nurtitional Status: [] malnurtition (see nutrition note), [ ]underweight: BMI < 19, [] morbid obesity: BMI >40, [] Cachexia    [] Sepsis  [] hypovolemic shock,[] cardiogenic shock, [] hemorrhagic shock, [] neuogenic shock  [] Acute Respiratory Failure  []Cerebral edema, [] Brain compression/ herniation,   [] Functional quadriplegia  [] Acute blood loss anemia    Spectralink # [id number]

## 2019-03-19 NOTE — PROGRESS NOTE ADULT - SUBJECTIVE AND OBJECTIVE BOX
CC: f/u for Cdiff    Patient remains comfortable, no diarrhea    REVIEW OF SYSTEMS:  All other review of systems negative (Comprehensive ROS)    Antimicrobials Day # 5  nystatin    Suspension [id number] Unit(s) Swish and Swallow four times a day  vancomycin    Solution 125 milliGRAM(s) Oral every 6 hours    Other Medications Reviewed    Vital Signs Last 24 Hrs  T(F): 98.2 (19 Mar 2019 15:39), Max: 98.5 (18 Mar 2019 20:23)  HR: 106 (19 Mar 2019 15:39) (76 - 106)  BP: 101/68 (19 Mar 2019 15:39) (101/68 - 155/99)  BP(mean): --  RR: 18 (19 Mar 2019 15:39) (18 - 18)  SpO2: 99% (19 Mar 2019 15:39) (97% - 99%)    PHYSICAL EXAM:  General: alert, no acute distress  Eyes:  anicteric, no conjunctival injection, no discharge  Oropharynx: no lesions or injection 	  Neck: supple, without adenopathy  Lungs: clear to auscultation  Heart: regular rate and rhythm; no murmur, rubs or gallops  Abdomen: soft, nondistended, nontender, without mass or organomegaly  Low  Skin: no lesions  Extremities: no edema  Neurologic: moves all extremities    LAB RESULTS:                        11.4   13.9  )-----------( 471      ( 19 Mar 2019 01:15 )             33.5   03-18    137  |  105  |  11  ----------------------------<  94  3.8   |  18<L>  |  0.65    Ca    8.9      18 Mar 2019 06:34    MICROBIOLOGY:  RECENT CULTURES:  03-15 @ 22:19 .Stool Feces     GI PCR Results: NOT detected    Clostridium difficile Toxin by PCR (03.12.19 @ 18:41)    Clostridium difficile Toxin by PCR: Detected    03-15 @ 07:08 .Blood Blood-Venous     No growth to date.    Culture - Urine (03.15.19 @ 00:53)    -  Amikacin: S <=16    -  Ampicillin: R >16 T    -  Ampicillin/Sulbactam: I 16/8    -  Aztreonam: S <=4    -  Cefazolin: S <=8     -  Cefepime: S <=4    -  Cefoxitin: S <=8    -  Ceftriaxone: S <=1     -  Ciprofloxacin: S <=1    -  Ertapenem: S <=1    -  Gentamicin: S <=4    -  Imipenem: S <=1    -  Levofloxacin: S <=2    -  Meropenem: S <=1    -  Nitrofurantoin: S <=32     -  Piperacillin/Tazobactam: S <=16    -  Tigecycline: S <=2    -  Tobramycin: S <=4    -  Trimethoprim/Sulfamethoxazole: S <=2/38    Specimen Source: .Urine Catheterized    Culture Results:   >100,000 CFU/ml Escherichia coli       RADIOLOGY REVIEWED:  CT Head No Cont (03.05.19 @ 08:01) >  status post aneurysm coiling. Residual subarachnoid   hemorrhage at the level of the sylvian fissures bilaterally and in the   interhemispheric fissure. No rehemorrhage. No hydrocephalus

## 2019-03-20 DIAGNOSIS — G91.9 HYDROCEPHALUS, UNSPECIFIED: ICD-10-CM

## 2019-03-20 DIAGNOSIS — I10 ESSENTIAL (PRIMARY) HYPERTENSION: ICD-10-CM

## 2019-03-20 DIAGNOSIS — E87.6 HYPOKALEMIA: ICD-10-CM

## 2019-03-20 DIAGNOSIS — A49.8 OTHER BACTERIAL INFECTIONS OF UNSPECIFIED SITE: ICD-10-CM

## 2019-03-20 LAB
ANION GAP SERPL CALC-SCNC: 14 MMOL/L — SIGNIFICANT CHANGE UP (ref 5–17)
APTT BLD: 122.3 SEC — CRITICAL HIGH (ref 27.5–36.3)
APTT BLD: 38.2 SEC — HIGH (ref 27.5–36.3)
APTT BLD: 76.8 SEC — HIGH (ref 27.5–36.3)
APTT BLD: 80.5 SEC — HIGH (ref 27.5–36.3)
BLD GP AB SCN SERPL QL: NEGATIVE — SIGNIFICANT CHANGE UP
BUN SERPL-MCNC: 12 MG/DL — SIGNIFICANT CHANGE UP (ref 7–23)
CALCIUM SERPL-MCNC: 8.8 MG/DL — SIGNIFICANT CHANGE UP (ref 8.4–10.5)
CHLORIDE SERPL-SCNC: 104 MMOL/L — SIGNIFICANT CHANGE UP (ref 96–108)
CO2 SERPL-SCNC: 20 MMOL/L — LOW (ref 22–31)
CREAT SERPL-MCNC: 0.68 MG/DL — SIGNIFICANT CHANGE UP (ref 0.5–1.3)
CULTURE RESULTS: SIGNIFICANT CHANGE UP
GLUCOSE SERPL-MCNC: 100 MG/DL — HIGH (ref 70–99)
HCT VFR BLD CALC: 31.9 % — LOW (ref 34.5–45)
HGB BLD-MCNC: 10.9 G/DL — LOW (ref 11.5–15.5)
MCHC RBC-ENTMCNC: 31.3 PG — SIGNIFICANT CHANGE UP (ref 27–34)
MCHC RBC-ENTMCNC: 34.1 GM/DL — SIGNIFICANT CHANGE UP (ref 32–36)
MCV RBC AUTO: 91.7 FL — SIGNIFICANT CHANGE UP (ref 80–100)
PLATELET # BLD AUTO: 469 K/UL — HIGH (ref 150–400)
POTASSIUM SERPL-MCNC: 3.4 MMOL/L — LOW (ref 3.5–5.3)
POTASSIUM SERPL-SCNC: 3.4 MMOL/L — LOW (ref 3.5–5.3)
RBC # BLD: 3.47 M/UL — LOW (ref 3.8–5.2)
RBC # FLD: 13.6 % — SIGNIFICANT CHANGE UP (ref 10.3–14.5)
RH IG SCN BLD-IMP: POSITIVE — SIGNIFICANT CHANGE UP
SODIUM SERPL-SCNC: 138 MMOL/L — SIGNIFICANT CHANGE UP (ref 135–145)
SPECIMEN SOURCE: SIGNIFICANT CHANGE UP
WBC # BLD: 13 K/UL — HIGH (ref 3.8–10.5)
WBC # FLD AUTO: 13 K/UL — HIGH (ref 3.8–10.5)

## 2019-03-20 PROCEDURE — 99233 SBSQ HOSP IP/OBS HIGH 50: CPT

## 2019-03-20 PROCEDURE — 99222 1ST HOSP IP/OBS MODERATE 55: CPT | Mod: GC

## 2019-03-20 PROCEDURE — 99232 SBSQ HOSP IP/OBS MODERATE 35: CPT

## 2019-03-20 RX ORDER — POTASSIUM CHLORIDE 20 MEQ
20 PACKET (EA) ORAL
Qty: 0 | Refills: 0 | Status: COMPLETED | OUTPATIENT
Start: 2019-03-20 | End: 2019-03-20

## 2019-03-20 RX ORDER — HEPARIN SODIUM 5000 [USP'U]/ML
1100 INJECTION INTRAVENOUS; SUBCUTANEOUS
Qty: 25000 | Refills: 0 | Status: DISCONTINUED | OUTPATIENT
Start: 2019-03-20 | End: 2019-03-29

## 2019-03-20 RX ORDER — HEPARIN SODIUM 5000 [USP'U]/ML
1300 INJECTION INTRAVENOUS; SUBCUTANEOUS
Qty: 25000 | Refills: 0 | Status: DISCONTINUED | OUTPATIENT
Start: 2019-03-20 | End: 2019-03-20

## 2019-03-20 RX ORDER — HEPARIN SODIUM 5000 [USP'U]/ML
1100 INJECTION INTRAVENOUS; SUBCUTANEOUS
Qty: 25000 | Refills: 0 | Status: DISCONTINUED | OUTPATIENT
Start: 2019-03-20 | End: 2019-03-20

## 2019-03-20 RX ADMIN — Medication 125 MILLIGRAM(S): at 17:15

## 2019-03-20 RX ADMIN — Medication 125 MILLIGRAM(S): at 02:00

## 2019-03-20 RX ADMIN — Medication 20 MILLIEQUIVALENT(S): at 13:55

## 2019-03-20 RX ADMIN — NIMODIPINE 60 MILLIGRAM(S): 60 SOLUTION ORAL at 06:32

## 2019-03-20 RX ADMIN — Medication 125 MILLIGRAM(S): at 06:32

## 2019-03-20 RX ADMIN — Medication 1 PACKET(S): at 06:32

## 2019-03-20 RX ADMIN — ZINC OXIDE 1 APPLICATION(S): 200 OINTMENT TOPICAL at 17:16

## 2019-03-20 RX ADMIN — SIMVASTATIN 40 MILLIGRAM(S): 20 TABLET, FILM COATED ORAL at 23:20

## 2019-03-20 RX ADMIN — NIMODIPINE 60 MILLIGRAM(S): 60 SOLUTION ORAL at 02:00

## 2019-03-20 RX ADMIN — NIMODIPINE 60 MILLIGRAM(S): 60 SOLUTION ORAL at 13:55

## 2019-03-20 RX ADMIN — Medication 20 MILLIEQUIVALENT(S): at 17:15

## 2019-03-20 RX ADMIN — Medication 100 MILLIGRAM(S): at 06:32

## 2019-03-20 RX ADMIN — ZINC OXIDE 1 APPLICATION(S): 200 OINTMENT TOPICAL at 06:32

## 2019-03-20 RX ADMIN — Medication 125 MILLIGRAM(S): at 23:20

## 2019-03-20 RX ADMIN — Medication 1 PACKET(S): at 17:15

## 2019-03-20 RX ADMIN — NIMODIPINE 60 MILLIGRAM(S): 60 SOLUTION ORAL at 17:15

## 2019-03-20 RX ADMIN — NIMODIPINE 60 MILLIGRAM(S): 60 SOLUTION ORAL at 10:31

## 2019-03-20 RX ADMIN — Medication 125 MILLIGRAM(S): at 11:29

## 2019-03-20 NOTE — CONSULT NOTE ADULT - SUBJECTIVE AND OBJECTIVE BOX
Patient is a 74y old  Female who presents with a chief complaint of SAH (20 Mar 2019 12:18)      HPI:  74 year-old Woman with a  PMH of HTN on ASA presenting with syncope. Patient has been having neck pain for 3 days, in which she was woken up suddenly due to neck pain. Per patient's , patient got out of bed, walked to foot of bed, was not responding to , then fell to the floor and was not responsive for one minute, until she came back to baseline. Patient does not remember walking or falling, but states she was aware of her surroundings. EMS was called and she was brought to ER. Denies head trauma, prior occurrence, denies being on blood thinners. States she feels mildly nauseous but denies vomiting, weakness, incontinence. States she returned to baseline. She was found to have SAH and was transferred here for further investigations and management.      Hospital course on 3/4 s/p coiling of A-COMM aneurysm, on CCB for SAH and BB for bp control. Course complicated by c diff infection requiring rectal tube currently on Vanco, urinary retention requiring Low and acute PE currently on Heparin ggt with plans for repeat CT head for stability prior to starting PO AC.       REVIEW OF SYSTEMS  [X] Constitutional WNL       [X] HEENT   [X] Cardio WNL              [X] Resp WNL            [X] GI WNL                            [X]  WNL                               [X] MSK WNL            [X] Neuro WNL                     [X] Cognitive WNL   [X] Psych WNL  [X] Skin WNL      [X] Heme WNL              [X] Endo WNL    PAST MEDICAL & SURGICAL HISTORY  Dyslipidemia  HTN (Hypertension)  Status Post Total Knee Replace      SOCIAL HISTORY  Smoking - Denied  EtOH - Denied   Drugs - Denied    FUNCTIONAL HISTORY  lives in [facility] with  with 4 steps to enter with 1 rail and 1 flight with 1 rail to apartment, right hand dominant  Independent prior with Ambulation and ADLs.     CURRENT FUNCTIONAL STATUS  - Bed Mobility: min to mod A  - Transfers: min to mod A   - Gait: CG for 10 feet with RW   - ADLs: no OT eval performed     ALLERGIES  No Known Allergies        FAMILY HISTORY   No pertinent family history in first degree relatives      VITALS  T(C): 36.8 (03-20-19 @ 12:39), Max: 37.1 (03-20-19 @ 07:25)  HR: 79 (03-20-19 @ 12:39) (70 - 106)  BP: 127/82 (03-20-19 @ 12:39) (101/67 - 150/92)  RR: 18 (03-20-19 @ 12:39) (18 - 18)  SpO2: 97% (03-20-19 @ 12:39) (97% - 100%)  Wt(kg): --    RECENT LABS/IMAGING  CBC Full  -  ( 19 Mar 2019 01:15 )  WBC Count : 13.9 K/uL  Hemoglobin : 11.4 g/dL  Hematocrit : 33.5 %  Platelet Count - Automated : 471 K/uL  Mean Cell Volume : 90.9 fl  Mean Cell Hemoglobin : 30.8 pg  Mean Cell Hemoglobin Concentration : 33.9 gm/dL  Auto Neutrophil # : x  Auto Lymphocyte # : x  Auto Monocyte # : x  Auto Eosinophil # : x  Auto Basophil # : x  Auto Neutrophil % : x  Auto Lymphocyte % : x  Auto Monocyte % : x  Auto Eosinophil % : x  Auto Basophil % : x    03-20    138  |  104  |  12  ----------------------------<  100<H>  3.4<L>   |  20<L>  |  0.68    Ca    8.8      20 Mar 2019 06:18            MEDICATIONS   acetaminophen    Suspension .. 815 milliGRAM(s) Oral every 6 hours PRN  enalapril 5 milliGRAM(s) Oral daily  heparin  Infusion 1100 Unit(s)/Hr IV Continuous <Continuous>  niMODipine Oral Solution 60 milliGRAM(s) Oral/Enteral Tube every 4 hours  potassium chloride    Tablet ER 20 milliEquivalent(s) Oral every 2 hours  psyllium Powder 1 Packet(s) Oral two times a day  simvastatin 40 milliGRAM(s) Oral at bedtime  vancomycin    Solution 125 milliGRAM(s) Oral every 6 hours  zinc oxide 20% Ointment 1 Application(s) Topical two times a day      ---------------------------------------------------------------------------------------- Patient is a 74y old  Female who presents with a chief complaint of SAH (20 Mar 2019 12:18)      HPI:  74 year-old Woman with a  PMH of HTN on ASA presenting with syncope. Patient has been having neck pain for 3 days, in which she was woken up suddenly due to neck pain. Per patient's , patient got out of bed, walked to foot of bed, was not responding to , then fell to the floor and was not responsive for one minute, until she came back to baseline. Patient does not remember walking or falling, but states she was aware of her surroundings. EMS was called and she was brought to ER. Denies head trauma, prior occurrence, denies being on blood thinners. States she feels mildly nauseous but denies vomiting, weakness, incontinence. States she returned to baseline. She was found to have SAH and was transferred here for further investigations and management.      Hospital course on 3/4 s/p coiling of A-COMM aneurysm, on CCB for SAH and BB for bp control. Course complicated by c diff infection requiring rectal tube currently on Vanco, urinary retention requiring Low and acute PE currently on Heparin ggt with plans for repeat CT head for stability prior to starting PO AC.       REVIEW OF SYSTEMS  [X] Constitutional WNL       [X] HEENT   [X] Cardio WNL              [X] Resp WNL            [X] GI WNL  + BM                          [X]  +Low, retention  [X] MSK WNL            [X] Neuro WNL                     [X] Cognitive WNL   [X] Psych WNL  [X] Skin WNL      [X] Heme WNL              [X] Endo WNL    PAST MEDICAL & SURGICAL HISTORY  Dyslipidemia  HTN (Hypertension)  Status Post Total Knee Replace      SOCIAL HISTORY  Smoking - Denied  EtOH - Denied   Drugs - Denied    FUNCTIONAL HISTORY  lives in [facility] with  with 4 steps to enter with 1 rail and 1 flight with 1 rail to apartment, right hand dominant  Independent prior with Ambulation and ADLs.     CURRENT FUNCTIONAL STATUS  - Bed Mobility: min to mod A  - Transfers: min to mod A   - Gait: CG for 10 feet with RW   - ADLs: no OT eval performed     ALLERGIES  No Known Allergies        FAMILY HISTORY   No pertinent family history in first degree relatives      VITALS  T(C): 36.8 (03-20-19 @ 12:39), Max: 37.1 (03-20-19 @ 07:25)  HR: 79 (03-20-19 @ 12:39) (70 - 106)  BP: 127/82 (03-20-19 @ 12:39) (101/67 - 150/92)  RR: 18 (03-20-19 @ 12:39) (18 - 18)  SpO2: 97% (03-20-19 @ 12:39) (97% - 100%)  Wt(kg): --    RECENT LABS/IMAGING  CBC Full  -  ( 19 Mar 2019 01:15 )  WBC Count : 13.9 K/uL  Hemoglobin : 11.4 g/dL  Hematocrit : 33.5 %  Platelet Count - Automated : 471 K/uL  Mean Cell Volume : 90.9 fl  Mean Cell Hemoglobin : 30.8 pg  Mean Cell Hemoglobin Concentration : 33.9 gm/dL  Auto Neutrophil # : x  Auto Lymphocyte # : x  Auto Monocyte # : x  Auto Eosinophil # : x  Auto Basophil # : x  Auto Neutrophil % : x  Auto Lymphocyte % : x  Auto Monocyte % : x  Auto Eosinophil % : x  Auto Basophil % : x    03-20    138  |  104  |  12  ----------------------------<  100<H>  3.4<L>   |  20<L>  |  0.68    Ca    8.8      20 Mar 2019 06:18            MEDICATIONS   acetaminophen    Suspension .. 815 milliGRAM(s) Oral every 6 hours PRN  enalapril 5 milliGRAM(s) Oral daily  heparin  Infusion 1100 Unit(s)/Hr IV Continuous <Continuous>  niMODipine Oral Solution 60 milliGRAM(s) Oral/Enteral Tube every 4 hours  potassium chloride    Tablet ER 20 milliEquivalent(s) Oral every 2 hours  psyllium Powder 1 Packet(s) Oral two times a day  simvastatin 40 milliGRAM(s) Oral at bedtime  vancomycin    Solution 125 milliGRAM(s) Oral every 6 hours  zinc oxide 20% Ointment 1 Application(s) Topical two times a day      ----------------------------------------------------------------------------------------      PHYSICAL EXAM  Constitutional: NAD, Resting Comfortable  HEENT: NC/AT, Normal Conjunctivae, EOMI, PERRLA   Neck: Supple, FROM,   Chest: No Respiratory Distress  CV: No Peripheral Edema  Abdomen: ND/NT, Soft  MSK: No joint swelling, Full ROM   Ext: No C/C/E, Pulses 2+ throughout, No calf tenderness   Neuro Exam      Cognitive: AAO x 3     Communication:  Fluent, No dysarthria      CN II - XII  intact         Attention impaired (unable to spell world backwards), short-term recall 2/3 objects at 5 min       Motor             LEFT    UE:  SF 5/5, EF 5/5, EE 5/5, WE 5/5, Hand intrinsic 5/5,  wnl            RIGHT UE:  SF 5/5, EF 5/5, EE 5/5, WE 5/5, Hand Intrinsic 5/5,  wnl             LEFT    LE:  HF 4/5, KE 4/5, DF 4/5, EHL 4/5,  PF 4/5             RIGHT LE:  HF 4/5, KE 4/5, DF 4/5, EHL 4/5, PF 4/5        Sensory: Intact to light touch     Tone: Normal     Reflexes: DTR Intact,  Negative Hoffmans, Negative Babinski         ASSESSMENT/PLAN  74 year-old Woman with a PMH of HTN who was found to have SAH complicated with a PE who is now with  gait, ADL, and functional  impairments.      Recommend   - Disposition:  ACUTE inpatient rehabilitation for the functional deficits consisting of 3 hours of therapy/day & 24 hour RN/daily PMR physician for comorbid medical management. Patient can tolerate intensive therapy consisting of PT/OT and or SLP.  Will continue to follow for ongoing rehab needs and recommendations  - c/w PT for Bed Mobility, Transfers, Ambulation with AD   - obtain OT eval for ADLs  - Turn Q2hrs while in bed, OOB to chair when possible to prevent Pressure Injury   - please obtain SLP evaluation to assess cognitive deficits

## 2019-03-20 NOTE — PROGRESS NOTE ADULT - SUBJECTIVE AND OBJECTIVE BOX
Bobby Bernstein   Pager [phone]  Office [phone]      CC: Patient is a 74y old  Female who presents with a chief complaint of subarachnoid hemorrhage (20 Mar 2019 09:53)      SUBJECTIVE / OVERNIGHT EVENTS: BM more formed per PCA. No N/V/abd pain. No CP/SOB.     MEDICATIONS  (STANDING):  enalapril 5 milliGRAM(s) Oral daily  heparin  Infusion 1100 Unit(s)/Hr (11 mL/Hr) IV Continuous <Continuous>  niMODipine Oral Solution 60 milliGRAM(s) Oral/Enteral Tube every 4 hours  potassium chloride    Tablet ER 20 milliEquivalent(s) Oral every 2 hours  psyllium Powder 1 Packet(s) Oral two times a day  simvastatin 40 milliGRAM(s) Oral at bedtime  vancomycin    Solution 125 milliGRAM(s) Oral every 6 hours  zinc oxide 20% Ointment 1 Application(s) Topical two times a day    MEDICATIONS  (PRN):  acetaminophen    Suspension .. 815 milliGRAM(s) Oral every 6 hours PRN Mild Pain (1 - 3)      Vital Signs Last 24 Hrs  T(C): 37.1 (20 Mar 2019 07:25), Max: 37.1 (20 Mar 2019 07:25)  T(F): 98.7 (20 Mar 2019 07:25), Max: 98.7 (20 Mar 2019 07:25)  HR: 76 (20 Mar 2019 07:25) (70 - 106)  BP: 101/67 (20 Mar 2019 07:25) (101/67 - 150/92)  BP(mean): --  RR: 18 (20 Mar 2019 07:25) (18 - 18)  SpO2: 97% (20 Mar 2019 07:25) (97% - 100%)  CAPILLARY BLOOD GLUCOSE        I&O's Summary    19 Mar 2019 07:01  -  20 Mar 2019 07:00  --------------------------------------------------------  IN: 224 mL / OUT: 600 mL / NET: -376 mL    20 Mar 2019 07:01  -  20 Mar 2019 12:19  --------------------------------------------------------  IN: 295 mL / OUT: 0 mL / NET: 295 mL      tele:    PHYSICAL EXAM:    GENERAL: NAD   HEENT: EOMI, PERRL  PULM: Clear to auscultation bilaterally  CV: Regular rate and rhythm; nl S1, S2; No murmurs, rubs, or gallops  ABDOMEN: Soft, Nontender, Nondistended; Bowel sounds present  EXTREMITIES/MSK:  No edema, calf tenderness   PSYCH: AAOx3  NEUROLOGY: non-focal          LABS:                        11.4   13.9  )-----------( 471      ( 19 Mar 2019 01:15 )             33.5     03-20    138  |  104  |  12  ----------------------------<  100<H>  3.4<L>   |  20<L>  |  0.68    Ca    8.8      20 Mar 2019 06:18      PTT - ( 20 Mar 2019 06:18 )  PTT:76.8 sec            RADIOLOGY & ADDITIONAL TESTS:    Imaging Personally Reviewed:    Consultant(s) Notes Reviewed:      Care Discussed with Consultants/Other Providers: Bobby Bernstein   Pager [phone]  Office [phone]      CC: Patient is a 74y old  Female who presents with a chief complaint of subarachnoid hemorrhage (20 Mar 2019 09:53)      SUBJECTIVE / OVERNIGHT EVENTS: BM more formed per PCA. No N/V/abd pain. No CP/SOB. Does have fatigue with ambulation. No HA, bleeding, black stools.     MEDICATIONS  (STANDING):  enalapril 5 milliGRAM(s) Oral daily  heparin  Infusion 1100 Unit(s)/Hr (11 mL/Hr) IV Continuous <Continuous>  niMODipine Oral Solution 60 milliGRAM(s) Oral/Enteral Tube every 4 hours  potassium chloride    Tablet ER 20 milliEquivalent(s) Oral every 2 hours  psyllium Powder 1 Packet(s) Oral two times a day  simvastatin 40 milliGRAM(s) Oral at bedtime  vancomycin    Solution 125 milliGRAM(s) Oral every 6 hours  zinc oxide 20% Ointment 1 Application(s) Topical two times a day    MEDICATIONS  (PRN):  acetaminophen    Suspension .. 815 milliGRAM(s) Oral every 6 hours PRN Mild Pain (1 - 3)      Vital Signs Last 24 Hrs  T(C): 37.1 (20 Mar 2019 07:25), Max: 37.1 (20 Mar 2019 07:25)  T(F): 98.7 (20 Mar 2019 07:25), Max: 98.7 (20 Mar 2019 07:25)  HR: 76 (20 Mar 2019 07:25) (70 - 106)  BP: 101/67 (20 Mar 2019 07:25) (101/67 - 150/92)  BP(mean): --  RR: 18 (20 Mar 2019 07:25) (18 - 18)  SpO2: 97% (20 Mar 2019 07:25) (97% - 100%)  CAPILLARY BLOOD GLUCOSE        I&O's Summary    19 Mar 2019 07:01  -  20 Mar 2019 07:00  --------------------------------------------------------  IN: 224 mL / OUT: 600 mL / NET: -376 mL    20 Mar 2019 07:01  -  20 Mar 2019 12:19  --------------------------------------------------------  IN: 295 mL / OUT: 0 mL / NET: 295 mL      tele:    PHYSICAL EXAM:    GENERAL: NAD   HEENT: EOMI, PERRL  PULM: Clear to auscultation bilaterally  CV: Regular rate and rhythm; nl S1, S2; No murmurs, rubs, or gallops  ABDOMEN: Soft, Nontender, Nondistended; Bowel sounds present  EXTREMITIES/MSK:  No edema, calf tenderness   PSYCH: AAOx3  NEUROLOGY: non-focal          LABS:                        11.4   13.9  )-----------( 471      ( 19 Mar 2019 01:15 )             33.5     03-20    138  |  104  |  12  ----------------------------<  100<H>  3.4<L>   |  20<L>  |  0.68    Ca    8.8      20 Mar 2019 06:18      PTT - ( 20 Mar 2019 06:18 )  PTT:76.8 sec            RADIOLOGY & ADDITIONAL TESTS:    Imaging Personally Reviewed:    Consultant(s) Notes Reviewed:      Care Discussed with Consultants/Other Providers: neurosurg regarding nimodipine and ACE-I

## 2019-03-20 NOTE — PROVIDER CONTACT NOTE (MEDICATION) - ASSESSMENT
Pt receiving heparin infusion at 12ml/hr. Pt aPTT at 2030 in therapeutic range of 63.3. Most recent aPTT at 0258 was 122.3

## 2019-03-20 NOTE — PROGRESS NOTE ADULT - ASSESSMENT
73 yo Female with HTN and HLD presenting with headaches x 3 days and syncope, found to have subarachnoid hemorrhage on 3/4, s/p coiling of ACOMM aneurysm. Course complicated by c diff infection requiring rectal tube, urinary retention requiring Low and acute PE.

## 2019-03-20 NOTE — PROGRESS NOTE ADULT - SUBJECTIVE AND OBJECTIVE BOX
HPI:  Patient is a 74 year old female who became unresponsive and had fall at home.  Brought to ED and CT head showed subarachnoid hemorrhage.  Admitted for further management.    OVERNIGHT EVENTS:  No acute events overnight.  CT head was done yesterday and was read as slight increase in ventricle size.  Tolerating diet.  On po vanco for c. diff, ID following.  On heparin drip for PE.      Vital Signs Last 24 Hrs  T(C): 37.1 (20 Mar 2019 07:25), Max: 37.1 (20 Mar 2019 07:25)  T(F): 98.7 (20 Mar 2019 07:25), Max: 98.7 (20 Mar 2019 07:25)  HR: 76 (20 Mar 2019 07:25) (70 - 106)  BP: 101/67 (20 Mar 2019 07:25) (101/67 - 150/92)  BP(mean): --  RR: 18 (20 Mar 2019 07:25) (18 - 18)  SpO2: 97% (20 Mar 2019 07:25) (97% - 100%)        PHYSICAL EXAM:  Neurological: awake, alert, oriented x3, follows commands, speech clear and fluent, perrl, eomi, face symmetric, tongue midline, no drift b/l, moves all extremities x4 w/ 5/5 strength throughout, sensation present, intact, equal throughout    Cardiovascular: +s1, s2  Respiratory: clear to auscultation b/l  Gastrointestinal: soft, non-distended, non-tender  Genitourinary: +youssef  Extremities: +dp/pt pulses palpable b/l    TUBES/LINES:  [x] youssef    DIET:  [x] regular diet, ensure enlive BID    LABS:                        11.4   13.9  )-----------( 471      ( 19 Mar 2019 01:15 )             33.5     03-20    138  |  104  |  12  ----------------------------<  100<H>  3.4<L>   |  20<L>  |  0.68    Ca    8.8      20 Mar 2019 06:18      PTT - ( 20 Mar 2019 06:18 )  PTT:76.8 sec      Allergies    No Known Allergies        MEDICATIONS:  Antibiotics:  vancomycin    Solution 125 milliGRAM(s) Oral every 6 hours    Neuro:  acetaminophen    Suspension .. 815 milliGRAM(s) Oral every 6 hours PRN    Anticoagulation:  heparin  Infusion 1100 Unit(s)/Hr IV Continuous <Continuous>    OTHER:  labetalol 100 milliGRAM(s) Oral every 12 hours  niMODipine Oral Solution 60 milliGRAM(s) Oral/Enteral Tube every 4 hours  psyllium Powder 1 Packet(s) Oral two times a day  simvastatin 40 milliGRAM(s) Oral at bedtime  zinc oxide 20% Ointment 1 Application(s) Topical two times a day    IVF:  none    CULTURES:  Culture Results:   No enteric pathogens isolated.  (Stool culture examined for Salmonella,  Shigella, Campylobacter, Aeromonas, Plesiomonas,  Vibrio, E.coli O157 and Yersinia) (03-15 @ 22:21)  Culture Results:   Testing in progress (03-15 @ 22:20)    RADIOLOGY & ADDITIONAL TESTS:  CT head (3/19):  slight increase in vent size

## 2019-03-20 NOTE — PROGRESS NOTE ADULT - ASSESSMENT
HPI:  Patient is a 74 year old female who became unresponsive and had fall at home.  Brought to ED and CT head showed subarachnoid hemorrhage.  Admitted for further management.    PROCEDURE: s/p cerebral angiogram and coiling of ACOMM aneurysm on 3/4/2019  PAD #16    PLAN:  -continue heparin gtt for PE, goal aPTT 60-90, trend aPTT q6 hours  -po vanco for c. diff, ID following  -nimodipine for vasospasm prophylaxis  -labetalol for bp control  -zocor for lipids  -youssef for urinary retention  -repeat CT head 3/21 to check for stable ventricles prior to starting oral anticoagulation  -regular diet, ensure enlive BID  -out of bed with assistance  -incentive spirometer for lung expansion  -pt - acute rehab upon discharge  -ot - pending  -am labs    Spectra #[id number]                Assessment:  Please Check When Present   []  GCS  E   V  M     Heart Failure: []Acute, [] acute on chronic , []chronic  Heart Failure:  [] Diastolic (HFpEF), [] Systolic (HFrEF), []Combined (HFpEF and HFrEF), [] RHF, [] Pulm HTN, [] Other    [] TEREZA, [] ATN, [] AIN, [] other  [] CKD1, [] CKD2, [] CKD 3, [] CKD 4, [] CKD 5, []ESRD    Encephalopathy: [] Metabolic, [] Hepatic, [] toxic, [] Neurological, [] Other    Abnormal Nurtitional Status: [] malnurtition (see nutrition note), [ ]underweight: BMI < 19, [] morbid obesity: BMI >40, [] Cachexia    [] Sepsis  [] hypovolemic shock,[] cardiogenic shock, [] hemorrhagic shock, [] neuogenic shock  [] Acute Respiratory Failure  []Cerebral edema, [] Brain compression/ herniation,   [] Functional quadriplegia  [] Acute blood loss anemia

## 2019-03-20 NOTE — PROVIDER CONTACT NOTE (MEDICATION) - ACTION/TREATMENT ORDERED:
Provider to RN order to hold the heparin infusion until a new lab results. Draw a stat aptt now. Once results appear will discuss course of treatment. Will continue to monitor.

## 2019-03-21 DIAGNOSIS — D72.829 ELEVATED WHITE BLOOD CELL COUNT, UNSPECIFIED: ICD-10-CM

## 2019-03-21 LAB
ANION GAP SERPL CALC-SCNC: 15 MMOL/L — SIGNIFICANT CHANGE UP (ref 5–17)
APTT BLD: 61.4 SEC — HIGH (ref 27.5–36.3)
APTT BLD: 66.9 SEC — HIGH (ref 27.5–36.3)
APTT BLD: >200 SEC — CRITICAL HIGH (ref 27.5–36.3)
BUN SERPL-MCNC: 11 MG/DL — SIGNIFICANT CHANGE UP (ref 7–23)
CALCIUM SERPL-MCNC: 9.1 MG/DL — SIGNIFICANT CHANGE UP (ref 8.4–10.5)
CHLORIDE SERPL-SCNC: 101 MMOL/L — SIGNIFICANT CHANGE UP (ref 96–108)
CO2 SERPL-SCNC: 19 MMOL/L — LOW (ref 22–31)
CREAT SERPL-MCNC: 0.66 MG/DL — SIGNIFICANT CHANGE UP (ref 0.5–1.3)
GLUCOSE SERPL-MCNC: 112 MG/DL — HIGH (ref 70–99)
HCT VFR BLD CALC: 33 % — LOW (ref 34.5–45)
HGB BLD-MCNC: 11.2 G/DL — LOW (ref 11.5–15.5)
INR BLD: 1.16 RATIO — SIGNIFICANT CHANGE UP (ref 0.88–1.16)
MAGNESIUM SERPL-MCNC: 1.9 MG/DL — SIGNIFICANT CHANGE UP (ref 1.6–2.6)
MCHC RBC-ENTMCNC: 31 PG — SIGNIFICANT CHANGE UP (ref 27–34)
MCHC RBC-ENTMCNC: 34 GM/DL — SIGNIFICANT CHANGE UP (ref 32–36)
MCV RBC AUTO: 91.3 FL — SIGNIFICANT CHANGE UP (ref 80–100)
PLATELET # BLD AUTO: 451 K/UL — HIGH (ref 150–400)
POTASSIUM SERPL-MCNC: 4 MMOL/L — SIGNIFICANT CHANGE UP (ref 3.5–5.3)
POTASSIUM SERPL-SCNC: 4 MMOL/L — SIGNIFICANT CHANGE UP (ref 3.5–5.3)
PROTHROM AB SERPL-ACNC: 13.4 SEC — HIGH (ref 10–12.9)
RBC # BLD: 3.61 M/UL — LOW (ref 3.8–5.2)
RBC # FLD: 13.6 % — SIGNIFICANT CHANGE UP (ref 10.3–14.5)
SODIUM SERPL-SCNC: 135 MMOL/L — SIGNIFICANT CHANGE UP (ref 135–145)
WBC # BLD: 15.3 K/UL — HIGH (ref 3.8–10.5)
WBC # FLD AUTO: 15.3 K/UL — HIGH (ref 3.8–10.5)

## 2019-03-21 PROCEDURE — 99233 SBSQ HOSP IP/OBS HIGH 50: CPT

## 2019-03-21 PROCEDURE — 99232 SBSQ HOSP IP/OBS MODERATE 35: CPT

## 2019-03-21 PROCEDURE — 70450 CT HEAD/BRAIN W/O DYE: CPT | Mod: 26

## 2019-03-21 RX ADMIN — Medication 1 PACKET(S): at 05:37

## 2019-03-21 RX ADMIN — Medication 1 PACKET(S): at 17:48

## 2019-03-21 RX ADMIN — Medication 125 MILLIGRAM(S): at 05:37

## 2019-03-21 RX ADMIN — SIMVASTATIN 40 MILLIGRAM(S): 20 TABLET, FILM COATED ORAL at 21:17

## 2019-03-21 RX ADMIN — HEPARIN SODIUM 11 UNIT(S)/HR: 5000 INJECTION INTRAVENOUS; SUBCUTANEOUS at 12:18

## 2019-03-21 RX ADMIN — Medication 5 MILLIGRAM(S): at 05:36

## 2019-03-21 RX ADMIN — Medication 125 MILLIGRAM(S): at 12:17

## 2019-03-21 RX ADMIN — ZINC OXIDE 1 APPLICATION(S): 200 OINTMENT TOPICAL at 17:56

## 2019-03-21 RX ADMIN — Medication 125 MILLIGRAM(S): at 17:48

## 2019-03-21 RX ADMIN — ZINC OXIDE 1 APPLICATION(S): 200 OINTMENT TOPICAL at 05:38

## 2019-03-21 NOTE — PROGRESS NOTE ADULT - SUBJECTIVE AND OBJECTIVE BOX
CC: f/u for Cdiff    Patient remains comfortable, 2 BMs, mostly formed, no pain    REVIEW OF SYSTEMS:  All other review of systems negative (Comprehensive ROS)    Antimicrobials Day # 7  nystatin    Suspension 687851 Unit(s) Swish and Swallow four times a day  vancomycin    Solution 125 milliGRAM(s) Oral every 6 hours    Other Medications Reviewed    Vital Signs Last 24 Hrs  T(F): 98.8 (21 Mar 2019 15:42), Max: 99.6 (21 Mar 2019 04:40)  HR: 96 (21 Mar 2019 15:42) (86 - 100)  BP: 143/88 (21 Mar 2019 15:42) (127/74 - 167/77)  BP(mean): --  RR: 18 (21 Mar 2019 15:42) (18 - 20)  SpO2: 96% (21 Mar 2019 15:42) (96% - 100%)    PHYSICAL EXAM:  General: alert, no acute distress  Eyes:  anicteric, no conjunctival injection, no discharge  Oropharynx: no lesions or injection 	  Neck: supple, without adenopathy  Lungs: clear to auscultation  Heart: regular rate and rhythm; no murmur, rubs or gallops  Abdomen: soft, nondistended, nontender, without mass or organomegaly  Low  Skin: no lesions  Extremities: no edema  Neurologic: moves all extremities    LAB RESULTS:                        11.2   15.3  )-----------( 451      ( 21 Mar 2019 05:20 )             33.0   03-21    135  |  101  |  11  ----------------------------<  112<H>  4.0   |  19<L>  |  0.66    Ca    9.1      21 Mar 2019 05:20  Mg     1.9     03-21      MICROBIOLOGY:  RECENT CULTURES:  03-15 @ 22:19 .Stool Feces     GI PCR Results: NOT detected    Clostridium difficile Toxin by PCR (03.12.19 @ 18:41)    Clostridium difficile Toxin by PCR: Detected    03-15 @ 07:08 .Blood Blood-Venous     No growth to date.    Culture - Urine (03.15.19 @ 00:53)    -  Amikacin: S <=16    -  Ampicillin: R >16 T    -  Ampicillin/Sulbactam: I 16/8    -  Aztreonam: S <=4    -  Cefazolin: S <=8     -  Cefepime: S <=4    -  Cefoxitin: S <=8    -  Ceftriaxone: S <=1     -  Ciprofloxacin: S <=1    -  Ertapenem: S <=1    -  Gentamicin: S <=4    -  Imipenem: S <=1    -  Levofloxacin: S <=2    -  Meropenem: S <=1    -  Nitrofurantoin: S <=32     -  Piperacillin/Tazobactam: S <=16    -  Tigecycline: S <=2    -  Tobramycin: S <=4    -  Trimethoprim/Sulfamethoxazole: S <=2/38    Specimen Source: .Urine Catheterized    Culture Results:   >100,000 CFU/ml Escherichia coli       RADIOLOGY REVIEWED:  CT Head No Cont (03.05.19 @ 08:01) >  status post aneurysm coiling. Residual subarachnoid   hemorrhage at the level of the sylvian fissures bilaterally and in the   interhemispheric fissure. No rehemorrhage. No hydrocephalus

## 2019-03-21 NOTE — OCCUPATIONAL THERAPY INITIAL EVALUATION ADULT - DIAGNOSIS, OT EVAL
Pt presents with impaired STM, decreased command follow, decreased strength, endurance, and balance, all impacting her ability to perform ADLs and functional mobility.

## 2019-03-21 NOTE — OCCUPATIONAL THERAPY INITIAL EVALUATION ADULT - PERTINENT HX OF CURRENT PROBLEM, REHAB EVAL
74 yr old F presenting with syncope Patient has been having neck pain for 3 days, in which she was woken up suddenly due to neck pain. Per patient's , patient got out of bed, walked to foot of bed, was not responding to , then fell to the floor and was not responsive for one minute, until she came back to baseline. She was found to have SAH and was transferred here for further investigations and management.

## 2019-03-21 NOTE — OCCUPATIONAL THERAPY INITIAL EVALUATION ADULT - SHORT TERM MEMORY, REHAB EVAL
Unable to recall where to put hands during sit<>stand transfer after instruction and demonstration./impaired

## 2019-03-21 NOTE — OCCUPATIONAL THERAPY INITIAL EVALUATION ADULT - ADL RETRAINING, OT EVAL
GOAL: Patient will require minimal assistance with UB dressing within two weeks. GOAL: Pt will perform LB dressing with moderate assistance in two weeks. GOAL: Patient will perform grooming standing sink level with minimal assistance in 2 weeks.

## 2019-03-21 NOTE — OCCUPATIONAL THERAPY INITIAL EVALUATION ADULT - TRANSFER TRAINING, PT EVAL
GOAL: Patient will require minimal assistance with functional transfer with use of rolling walker in two weeks.

## 2019-03-21 NOTE — PROGRESS NOTE ADULT - ASSESSMENT
HPI:  Patient is a 74 year old female who became unresponsive and had fall at home.  Brought to ED and CT head showed subarachnoid hemorrhage.  Admitted for further management.    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 diff, follow up ID recs  -now off nimodipine, enalapril for bp control  -d/c youssef, trial of void  -zocor for lipids  -tylenol for pain control  -psyllium for bowel regimen  -regular diet, ensure enlive BID  -out of bed with assistance  -incentive spirometer for lung expansion  -pt/pm+r - acute rehab upon discharge  -ot eval pending  -am labs    Spectra #[id number]            Assessment:  Please Check When Present   []  GCS  E   V  M     Heart Failure: []Acute, [] acute on chronic , []chronic  Heart Failure:  [] Diastolic (HFpEF), [] Systolic (HFrEF), []Combined (HFpEF and HFrEF), [] RHF, [] Pulm HTN, [] Other    [] TEREZA, [] ATN, [] AIN, [] other  [] CKD1, [] CKD2, [] CKD 3, [] CKD 4, [] CKD 5, []ESRD    Encephalopathy: [] Metabolic, [] Hepatic, [] toxic, [] Neurological, [] Other    Abnormal Nurtitional Status: [] malnurtition (see nutrition note), [ ]underweight: BMI < 19, [] morbid obesity: BMI >40, [] Cachexia    [] Sepsis  [] hypovolemic shock,[] cardiogenic shock, [] hemorrhagic shock, [] neuogenic shock  [] Acute Respiratory Failure  []Cerebral edema, [] Brain compression/ herniation,   [] Functional quadriplegia  [] Acute blood loss anemia

## 2019-03-21 NOTE — PROGRESS NOTE ADULT - ASSESSMENT
Patient with SAH, s/p coiling, diarrhea, tested positive for Cdiff   Diarrhea resolved on po vanco.   Positive urine culture c/w asymptomatic bacteruria- youssef in place, latest UA without significant pyuria  Afebrile, negative Bld Cxs  WBC higher, but with no other support for infection and thrombocytosis, I suspect reactive    Plan:  Continue po vancomycin   Monitor off systemic abx  Youssef as per   Follow temps and CBC/diff   For f/u CT, hydro- d/w PA  D/w pt and  at bedside

## 2019-03-21 NOTE — OCCUPATIONAL THERAPY INITIAL EVALUATION ADULT - ADDITIONAL COMMENTS
CT BRAIN (3/5): Diffuse SAH with sentinel clot in the anterior interhemispheric fissure suggesting a common aneurysm rupture. No hydrocephalus. CTA brain (3/5): +a-comm artery aneurysm. HEAD CT (3/19): There has been a marked interval decrease in the diffuse subarachnoid   hemorrhage compared to the prior head CT study. There is no evidence for   new acute intracranial hemorrhage.

## 2019-03-21 NOTE — OCCUPATIONAL THERAPY INITIAL EVALUATION ADULT - STRENGTHENING, PT EVAL
Patient will increase strength in BUE/BLE by 1/2 grade in two weeks to facilitate increased ability to perform ADLs and functional mobility

## 2019-03-21 NOTE — OCCUPATIONAL THERAPY INITIAL EVALUATION ADULT - BALANCE TRAINING, PT EVAL
GOAL: Patient will increase static/dynamic standing balance by 1/2 grade to facilitate increased ability to perform ADLs and functional mobility within two weeks.

## 2019-03-21 NOTE — OCCUPATIONAL THERAPY INITIAL EVALUATION ADULT - PLANNED THERAPY INTERVENTIONS, OT EVAL
ADL retraining/bed mobility training/strengthening/transfer training/balance training/cognitive, visual perceptual

## 2019-03-21 NOTE — PROGRESS NOTE ADULT - SUBJECTIVE AND OBJECTIVE BOX
Bobby Bernstein   Pager [phone]  Office [phone]      CC: Patient is a 74y old  Female who presents with a chief complaint of subarachnoid hemorrhage (21 Mar 2019 09:18)      SUBJECTIVE / OVERNIGHT EVENTS:  states that pt is more fatigued when she tries to ambulate but pt denies. Pt denies HA, N/V/abd pain. Still with soft stools. No f/c/r.    MEDICATIONS  (STANDING):  enalapril 5 milliGRAM(s) Oral daily  heparin  Infusion 1100 Unit(s)/Hr (11 mL/Hr) IV Continuous <Continuous>  psyllium Powder 1 Packet(s) Oral two times a day  simvastatin 40 milliGRAM(s) Oral at bedtime  vancomycin    Solution 125 milliGRAM(s) Oral every 6 hours  zinc oxide 20% Ointment 1 Application(s) Topical two times a day    MEDICATIONS  (PRN):  acetaminophen    Suspension .. 815 milliGRAM(s) Oral every 6 hours PRN Mild Pain (1 - 3)      Vital Signs Last 24 Hrs  T(C): 37 (21 Mar 2019 13:27), Max: 37.6 (21 Mar 2019 04:40)  T(F): 98.6 (21 Mar 2019 13:27), Max: 99.6 (21 Mar 2019 04:40)  HR: 100 (21 Mar 2019 13:27) (86 - 100)  BP: 127/74 (21 Mar 2019 13:27) (118/78 - 167/77)  BP(mean): --  RR: 20 (21 Mar 2019 13:27) (18 - 20)  SpO2: 97% (21 Mar 2019 13:27) (97% - 100%)  CAPILLARY BLOOD GLUCOSE        I&O's Summary    20 Mar 2019 07:01  -  21 Mar 2019 07:00  --------------------------------------------------------  IN: 535 mL / OUT: 1200 mL / NET: -665 mL    21 Mar 2019 07:01  -  21 Mar 2019 15:17  --------------------------------------------------------  IN: 500 mL / OUT: 0 mL / NET: 500 mL          PHYSICAL EXAM:    GENERAL: NAD   HEENT: EOMI, PERRL  PULM: Clear to auscultation bilaterally  CV: Regular rate and rhythm; nl S1, S2; No murmurs, rubs, or gallops  ABDOMEN: Soft, Nontender, Nondistended; Bowel sounds present; brown stool in bed  EXTREMITIES/MSK:  No edema, calf tenderness   PSYCH: AAOx3  NEUROLOGY: non-focal          LABS:                        11.2   15.3  )-----------( 451      ( 21 Mar 2019 05:20 )             33.0     03-21    135  |  101  |  11  ----------------------------<  112<H>  4.0   |  19<L>  |  0.66    Ca    9.1      21 Mar 2019 05:20  Mg     1.9     03-21      PT/INR - ( 21 Mar 2019 05:20 )   PT: 13.4 sec;   INR: 1.16 ratio         PTT - ( 21 Mar 2019 13:03 )  PTT:>200.0 sec            RADIOLOGY & ADDITIONAL TESTS:    Imaging Personally Reviewed:    Consultant(s) Notes Reviewed:      Care Discussed with Consultants/Other Providers: neurosurg

## 2019-03-21 NOTE — PROGRESS NOTE ADULT - SUBJECTIVE AND OBJECTIVE BOX
HPI:  Patient is a 74 year old female who became unresponsive and had fall at home.  Brought to ED and CT head showed subarachnoid hemorrhage.  Admitted for further management.    OVERNIGHT EVENTS:  No acute events overnight.  States her stools have been more formed over past 24 hours, still on PO vanco, ID following.  On heparin drip.  Awaiting repeat CT scan.    Vital Signs Last 24 Hrs  T(C): 36.7 (21 Mar 2019 08:56), Max: 37.6 (21 Mar 2019 04:40)  T(F): 98 (21 Mar 2019 08:56), Max: 99.6 (21 Mar 2019 04:40)  HR: 86 (21 Mar 2019 08:56) (79 - 96)  BP: 150/84 (21 Mar 2019 08:56) (118/78 - 167/77)  BP(mean): --  RR: 20 (21 Mar 2019 08:56) (18 - 20)  SpO2: 97% (21 Mar 2019 08:56) (97% - 100%)        PHYSICAL EXAM:  Neurological: awake, alert, oriented x3, follows commands, speech clear and fluent, perrl, eomi, face symmetric, tongue midline, no drift b/l, moves all extremities x4 w/ 5/5 strength throughout, sensation present, intact, equal throughout    Cardiovascular: +s1, s2  Respiratory: clear to auscultation b/l  Gastrointestinal: soft, non-distended, non-tender  Genitourinary: +youssef  Extremities: +dp/pt pulses palpable b/l    TUBES/LINES:  [x] youssef    DIET:  [x] regular diet, ensure enlive BID    LABS:                        11.2   15.3  )-----------( 451      ( 21 Mar 2019 05:20 )             33.0     03-21    135  |  101  |  11  ----------------------------<  112<H>  4.0   |  19<L>  |  0.66    Ca    9.1      21 Mar 2019 05:20  Mg     1.9     03-21      PT/INR - ( 21 Mar 2019 05:20 )   PT: 13.4 sec;   INR: 1.16 ratio         PTT - ( 21 Mar 2019 05:20 )  PTT:61.4 sec        Allergies    No Known Allergies        MEDICATIONS:  Antibiotics:  vancomycin    Solution 125 milliGRAM(s) Oral every 6 hours    Neuro:  acetaminophen    Suspension .. 815 milliGRAM(s) Oral every 6 hours PRN    Anticoagulation:  heparin  Infusion 1100 Unit(s)/Hr IV Continuous <Continuous>    OTHER:  enalapril 5 milliGRAM(s) Oral daily  psyllium Powder 1 Packet(s) Oral two times a day  simvastatin 40 milliGRAM(s) Oral at bedtime  zinc oxide 20% Ointment 1 Application(s) Topical two times a day    IVF:  none    CULTURES:  Culture Results:   No enteric pathogens isolated.  (Stool culture examined for Salmonella,  Shigella, Campylobacter, Aeromonas, Plesiomonas,  Vibrio, E.coli O157 and Yersinia) (03-15 @ 22:21)  Culture Results:   No Protozoa seen by trichrome stain  No Helminths or Protozoa seen in formalin concentrate  performed by iodine stain  (routine O+P not evaluated for Microsporidia,  Cryptosporidia, Cyclospora, or Isospora.)  Note: One negative specimen does not rule  out the possibility of a parasitic infection. (03-15 @ 22:20)    RADIOLOGY & ADDITIONAL TESTS:  CT head: pending HPI:  Patient is a 74 year old female who became unresponsive and had fall at home.  Brought to ED and CT head showed subarachnoid hemorrhage.  Admitted for further management.    OVERNIGHT EVENTS:  No acute events overnight.  States her stools have been more formed over past 24 hours, still on PO vanco, ID following.  On heparin drip.  Awaiting repeat CT scan.    Vital Signs Last 24 Hrs  T(C): 36.7 (21 Mar 2019 08:56), Max: 37.6 (21 Mar 2019 04:40)  T(F): 98 (21 Mar 2019 08:56), Max: 99.6 (21 Mar 2019 04:40)  HR: 86 (21 Mar 2019 08:56) (79 - 96)  BP: 150/84 (21 Mar 2019 08:56) (118/78 - 167/77)  BP(mean): --  RR: 20 (21 Mar 2019 08:56) (18 - 20)  SpO2: 97% (21 Mar 2019 08:56) (97% - 100%)        PHYSICAL EXAM:  Neurological: awake, alert, oriented x3, follows commands, speech clear and fluent, perrl, eomi, face symmetric, tongue midline, no drift b/l, moves all extremities x4 w/ 5/5 strength throughout, sensation present, intact, equal throughout    Cardiovascular: +s1, s2  Respiratory: clear to auscultation b/l  Gastrointestinal: soft, non-distended, non-tender  Genitourinary: +youssef  Extremities: +dp/pt pulses palpable b/l  Wound (VEENA Hutchinson present as chaperone): right groin puncture site well healed c/d/i, no hematoma, no erythema, no drainage    TUBES/LINES:  [x] youssef    DIET:  [x] regular diet, ensure enlive BID    LABS:                        11.2   15.3  )-----------( 451      ( 21 Mar 2019 05:20 )             33.0     03-21    135  |  101  |  11  ----------------------------<  112<H>  4.0   |  19<L>  |  0.66    Ca    9.1      21 Mar 2019 05:20  Mg     1.9     03-21      PT/INR - ( 21 Mar 2019 05:20 )   PT: 13.4 sec;   INR: 1.16 ratio         PTT - ( 21 Mar 2019 05:20 )  PTT:61.4 sec        Allergies    No Known Allergies        MEDICATIONS:  Antibiotics:  vancomycin    Solution 125 milliGRAM(s) Oral every 6 hours    Neuro:  acetaminophen    Suspension .. 815 milliGRAM(s) Oral every 6 hours PRN    Anticoagulation:  heparin  Infusion 1100 Unit(s)/Hr IV Continuous <Continuous>    OTHER:  enalapril 5 milliGRAM(s) Oral daily  psyllium Powder 1 Packet(s) Oral two times a day  simvastatin 40 milliGRAM(s) Oral at bedtime  zinc oxide 20% Ointment 1 Application(s) Topical two times a day    IVF:  none    CULTURES:  Culture Results:   No enteric pathogens isolated.  (Stool culture examined for Salmonella,  Shigella, Campylobacter, Aeromonas, Plesiomonas,  Vibrio, E.coli O157 and Yersinia) (03-15 @ 22:21)  Culture Results:   No Protozoa seen by trichrome stain  No Helminths or Protozoa seen in formalin concentrate  performed by iodine stain  (routine O+P not evaluated for Microsporidia,  Cryptosporidia, Cyclospora, or Isospora.)  Note: One negative specimen does not rule  out the possibility of a parasitic infection. (03-15 @ 22:20)    RADIOLOGY & ADDITIONAL TESTS:  CT head: pending

## 2019-03-21 NOTE — PROGRESS NOTE ADULT - ASSESSMENT
75 yo Female with HTN and HLD presenting with headaches x 3 days and syncope, found to have subarachnoid hemorrhage on 3/4, s/p coiling of ACOMM aneurysm. Course complicated by c diff infection requiring rectal tube, urinary retention requiring Low and acute PE.

## 2019-03-21 NOTE — PROGRESS NOTE ADULT - PROBLEM SELECTOR PLAN 2
Hep gtt for now. Elevated PTT bec lab was drawn from arm receiving gtt per nurse. will repeat. monitoring ventricle size to see if shunt needed

## 2019-03-21 NOTE — PROVIDER CONTACT NOTE (OTHER) - SITUATION
Pt confused to birth date year and sometimes current year. Pt noticed to be confused in AM, CTH done earlier today

## 2019-03-21 NOTE — PROVIDER CONTACT NOTE (OTHER) - ASSESSMENT
Pt alert, AOx2-3, unable to remember  year but able to state it is 2019 when given choices Pt alert, AOx2-3, unable to remember  year but able to state it is 2019 when given choices. No drifts x4, follows commands

## 2019-03-21 NOTE — OCCUPATIONAL THERAPY INITIAL EVALUATION ADULT - LIVES WITH, PROFILE
Pt lives with  in a co-op with 3 steps to enter, and 1 flight of steps with handrails on one side./spouse

## 2019-03-22 LAB
ANION GAP SERPL CALC-SCNC: 14 MMOL/L — SIGNIFICANT CHANGE UP (ref 5–17)
APTT BLD: 67.7 SEC — HIGH (ref 27.5–36.3)
BUN SERPL-MCNC: 11 MG/DL — SIGNIFICANT CHANGE UP (ref 7–23)
CALCIUM SERPL-MCNC: 9 MG/DL — SIGNIFICANT CHANGE UP (ref 8.4–10.5)
CHLORIDE SERPL-SCNC: 97 MMOL/L — SIGNIFICANT CHANGE UP (ref 96–108)
CO2 SERPL-SCNC: 23 MMOL/L — SIGNIFICANT CHANGE UP (ref 22–31)
CREAT SERPL-MCNC: 0.68 MG/DL — SIGNIFICANT CHANGE UP (ref 0.5–1.3)
GLUCOSE SERPL-MCNC: 103 MG/DL — HIGH (ref 70–99)
HCT VFR BLD CALC: 34.9 % — SIGNIFICANT CHANGE UP (ref 34.5–45)
HGB BLD-MCNC: 10.9 G/DL — LOW (ref 11.5–15.5)
INR BLD: 1.14 RATIO — SIGNIFICANT CHANGE UP (ref 0.88–1.16)
MCHC RBC-ENTMCNC: 28.7 PG — SIGNIFICANT CHANGE UP (ref 27–34)
MCHC RBC-ENTMCNC: 31.3 GM/DL — LOW (ref 32–36)
MCV RBC AUTO: 91.8 FL — SIGNIFICANT CHANGE UP (ref 80–100)
PLATELET # BLD AUTO: 529 K/UL — HIGH (ref 150–400)
POTASSIUM SERPL-MCNC: 3.6 MMOL/L — SIGNIFICANT CHANGE UP (ref 3.5–5.3)
POTASSIUM SERPL-SCNC: 3.6 MMOL/L — SIGNIFICANT CHANGE UP (ref 3.5–5.3)
PROTHROM AB SERPL-ACNC: 13.1 SEC — HIGH (ref 10–12.9)
RBC # BLD: 3.8 M/UL — SIGNIFICANT CHANGE UP (ref 3.8–5.2)
RBC # FLD: 13 % — SIGNIFICANT CHANGE UP (ref 10.3–14.5)
SODIUM SERPL-SCNC: 134 MMOL/L — LOW (ref 135–145)
WBC # BLD: 11.3 K/UL — HIGH (ref 3.8–10.5)
WBC # FLD AUTO: 11.3 K/UL — HIGH (ref 3.8–10.5)

## 2019-03-22 PROCEDURE — 99233 SBSQ HOSP IP/OBS HIGH 50: CPT

## 2019-03-22 PROCEDURE — 99232 SBSQ HOSP IP/OBS MODERATE 35: CPT

## 2019-03-22 RX ORDER — WARFARIN SODIUM 2.5 MG/1
5 TABLET ORAL
Qty: 0 | Refills: 0 | Status: COMPLETED | OUTPATIENT
Start: 2019-03-22 | End: 2019-03-22

## 2019-03-22 RX ADMIN — Medication 125 MILLIGRAM(S): at 18:39

## 2019-03-22 RX ADMIN — Medication 1 PACKET(S): at 18:39

## 2019-03-22 RX ADMIN — Medication 125 MILLIGRAM(S): at 23:46

## 2019-03-22 RX ADMIN — ZINC OXIDE 1 APPLICATION(S): 200 OINTMENT TOPICAL at 06:33

## 2019-03-22 RX ADMIN — Medication 125 MILLIGRAM(S): at 13:29

## 2019-03-22 RX ADMIN — Medication 10 MILLIGRAM(S): at 18:38

## 2019-03-22 RX ADMIN — WARFARIN SODIUM 5 MILLIGRAM(S): 2.5 TABLET ORAL at 20:24

## 2019-03-22 RX ADMIN — Medication 125 MILLIGRAM(S): at 00:02

## 2019-03-22 RX ADMIN — Medication 125 MILLIGRAM(S): at 06:32

## 2019-03-22 RX ADMIN — ZINC OXIDE 1 APPLICATION(S): 200 OINTMENT TOPICAL at 18:39

## 2019-03-22 RX ADMIN — Medication 1 PACKET(S): at 08:00

## 2019-03-22 RX ADMIN — SIMVASTATIN 40 MILLIGRAM(S): 20 TABLET, FILM COATED ORAL at 20:24

## 2019-03-22 RX ADMIN — Medication 5 MILLIGRAM(S): at 06:32

## 2019-03-22 NOTE — PROGRESS NOTE ADULT - SUBJECTIVE AND OBJECTIVE BOX
Bobby Bernstein   Pager [phone]  Office [phone]      CC: Patient is a 74y old  Female who presents with a chief complaint of SAH (21 Mar 2019 17:27)      SUBJECTIVE / OVERNIGHT EVENTS:    MEDICATIONS  (STANDING):  enalapril 5 milliGRAM(s) Oral daily  heparin  Infusion 1100 Unit(s)/Hr (11 mL/Hr) IV Continuous <Continuous>  psyllium Powder 1 Packet(s) Oral two times a day  simvastatin 40 milliGRAM(s) Oral at bedtime  vancomycin    Solution 125 milliGRAM(s) Oral every 6 hours  zinc oxide 20% Ointment 1 Application(s) Topical two times a day    MEDICATIONS  (PRN):  acetaminophen    Suspension .. 815 milliGRAM(s) Oral every 6 hours PRN Mild Pain (1 - 3)      Vital Signs Last 24 Hrs  T(C): 36.8 (22 Mar 2019 12:45), Max: 37.1 (21 Mar 2019 15:42)  T(F): 98.3 (22 Mar 2019 12:45), Max: 98.8 (21 Mar 2019 15:42)  HR: 91 (22 Mar 2019 12:45) (78 - 96)  BP: 132/91 (22 Mar 2019 12:45) (130/84 - 168/99)  BP(mean): --  RR: 18 (22 Mar 2019 12:45) (18 - 19)  SpO2: 98% (22 Mar 2019 12:45) (96% - 99%)  CAPILLARY BLOOD GLUCOSE        I&O's Summary    21 Mar 2019 07:01  -  22 Mar 2019 07:00  --------------------------------------------------------  IN: 1312 mL / OUT: 1600 mL / NET: -288 mL      tele:    PHYSICAL EXAM:    GENERAL: NAD   HEENT: EOMI, PERRL  PULM: Clear to auscultation bilaterally  CV: Regular rate and rhythm; nl S1, S2; No murmurs, rubs, or gallops  ABDOMEN: Soft, Nontender, Nondistended; Bowel sounds present  EXTREMITIES/MSK:  No edema, calf tenderness   PSYCH: AAOx3  NEUROLOGY: non-focal          LABS:                        10.9   11.3  )-----------( 529      ( 22 Mar 2019 06:55 )             34.9     03-22    134<L>  |  97  |  11  ----------------------------<  103<H>  3.6   |  23  |  0.68    Ca    9.0      22 Mar 2019 06:51  Mg     1.9     03-21      PT/INR - ( 22 Mar 2019 06:54 )   PT: 13.1 sec;   INR: 1.14 ratio         PTT - ( 22 Mar 2019 06:54 )  PTT:67.7 sec            RADIOLOGY & ADDITIONAL TESTS:    Imaging Personally Reviewed:    Consultant(s) Notes Reviewed:      Care Discussed with Consultants/Other Providers: Bobby Bernstein   Pager [phone]  Office [phone]      CC: Patient is a 74y old  Female who presents with a chief complaint of SAH (21 Mar 2019 17:27)      SUBJECTIVE / OVERNIGHT EVENTS: Improved energy. No N/V/abd pain. Stool becoming less soft. No HA. No bleeding/melena.     MEDICATIONS  (STANDING):  enalapril 5 milliGRAM(s) Oral daily  heparin  Infusion 1100 Unit(s)/Hr (11 mL/Hr) IV Continuous <Continuous>  psyllium Powder 1 Packet(s) Oral two times a day  simvastatin 40 milliGRAM(s) Oral at bedtime  vancomycin    Solution 125 milliGRAM(s) Oral every 6 hours  zinc oxide 20% Ointment 1 Application(s) Topical two times a day    MEDICATIONS  (PRN):  acetaminophen    Suspension .. 815 milliGRAM(s) Oral every 6 hours PRN Mild Pain (1 - 3)      Vital Signs Last 24 Hrs  T(C): 36.8 (22 Mar 2019 12:45), Max: 37.1 (21 Mar 2019 15:42)  T(F): 98.3 (22 Mar 2019 12:45), Max: 98.8 (21 Mar 2019 15:42)  HR: 91 (22 Mar 2019 12:45) (78 - 96)  BP: 132/91 (22 Mar 2019 12:45) (130/84 - 168/99)  BP(mean): --  RR: 18 (22 Mar 2019 12:45) (18 - 19)  SpO2: 98% (22 Mar 2019 12:45) (96% - 99%)  CAPILLARY BLOOD GLUCOSE        I&O's Summary    21 Mar 2019 07:01  -  22 Mar 2019 07:00  --------------------------------------------------------  IN: 1312 mL / OUT: 1600 mL / NET: -288 mL          PHYSICAL EXAM:    GENERAL: NAD   HEENT: EOMI, PERRL  PULM: Clear to auscultation bilaterally  CV: Regular rate and rhythm; nl S1, S2; No murmurs, rubs, or gallops  ABDOMEN: Soft, Nontender, Nondistended; Bowel sounds present  EXTREMITIES/MSK:  No edema, calf tenderness   PSYCH: AAOx3  NEUROLOGY: non-focal          LABS:                        10.9   11.3  )-----------( 529      ( 22 Mar 2019 06:55 )             34.9     03-22    134<L>  |  97  |  11  ----------------------------<  103<H>  3.6   |  23  |  0.68    Ca    9.0      22 Mar 2019 06:51  Mg     1.9     03-21      PT/INR - ( 22 Mar 2019 06:54 )   PT: 13.1 sec;   INR: 1.14 ratio         PTT - ( 22 Mar 2019 06:54 )  PTT:67.7 sec            RADIOLOGY & ADDITIONAL TESTS:    Imaging Personally Reviewed:    Consultant(s) Notes Reviewed:      Care Discussed with Consultants/Other Providers: neurosurg

## 2019-03-22 NOTE — PROGRESS NOTE ADULT - SUBJECTIVE AND OBJECTIVE BOX
SUBJECTIVE: Pt seen and examined, resting in bed no chest pains or SOB, feels better overall today  +PE on CTA chest, now on heparin drip; denies chest pains/SOB  +youssef retention    Vital Signs Last 24 Hrs  T(C): 36.8 (22 Mar 2019 12:45), Max: 37.1 (21 Mar 2019 15:42)  T(F): 98.3 (22 Mar 2019 12:45), Max: 98.8 (21 Mar 2019 15:42)  HR: 91 (22 Mar 2019 12:45) (78 - 96)  BP: 132/91 (22 Mar 2019 12:45) (130/84 - 168/99)  BP(mean): --  RR: 18 (22 Mar 2019 12:45) (18 - 19)  SpO2: 98% (22 Mar 2019 12:45) (96% - 99%)    PHYSICAL EXAM:    General: No Acute Distress     Neurological: Awake, alert oriented to person, place and time, Following Commands, Speech Fluent, Moving all extremities, Muscle Strength normal in all four extremities, No Drift, Sensation to Light Touch Intact    Pulmonary: Clear to Auscultation, No Rales, No Rhonchi, No Wheezes     Cardiovascular: S1, S2, Regular Rate and Rhythm     Gastrointestinal: Soft, Nontender, Nondistended     Incision: right groin: no ecchymosis/hematoma    LABS:                        10.9   11.3  )-----------( 529      ( 22 Mar 2019 06:55 )             34.9   03-22    134<L>  |  97  |  11  ----------------------------<  103<H>  3.6   |  23  |  0.68    Ca    9.0      22 Mar 2019 06:51  Mg     1.9     03-21    Activated Partial Thromboplastin Time in AM (03.22.19 @ 06:54)    Activated Partial Thromboplastin Time: 67.7: The recommended therapeutic heparin range (full dose) is 58-99 seconds.  Recommended therapeutic Argatroban range is 1.5 to 3.0 times the baseline  APTT value, not to exceed 100 seconds. Recommended therapeutic Refludan  range is 1.5 to 2.5 times the baseline APTT.  Effective October 30, 2018 the reference range has changed. sec      Hemoglobin A1C, Whole Blood: 5.6 % (03-04 @ 22:39)    MEDICATIONS:    heparin drip @ 11cc/hr    Antibiotics:  nystatin    Suspension 456504 Unit(s) Swish and Swallow four times a day  vancomycin    Solution 125 milliGRAM(s) Oral every 6 hours    Neuro:  acetaminophen   Tablet .. 650 milliGRAM(s) Oral every 6 hours PRN Temp greater or equal to 38C (100.4F), Mild Pain (1 - 3)    Cardiac:  niMODipine Oral Solution 60 milliGRAM(s) Oral/Enteral Tube every 4 hours    GI/:  psyllium Powder 1 Packet(s) Oral two times a day    Other:   enoxaparin Injectable 40 milliGRAM(s) SubCutaneous <User Schedule>  simvastatin 40 milliGRAM(s) Oral at bedtime  zinc oxide 20% Ointment 1 Application(s) Topical two times a day    DIET: [x] Regular [] CCD [] Renal [] Puree [] Dysphagia [] Tube Feeds:     IMAGING:   < from: CT Head No Cont (03.21.19 @ 14:55) >  INTERPRETATION:  History: Subarachnoid hemorrhage. Follow-up ventricular   size. Change in mental status.    Description: A noncontrast head CT was performed. Axial images were   performed from the skull base to the vertex with coronal/sagittal   reconstructions.    Comparison is made to a prior head CT from 03/19/2019.    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    The ventricles are stable in size compared tothe 03/19/2019 head CT   study.    No significant residual or recurrent subarachnoid hemorrhage is   visualized.    Chronic white matter changes and cerebral volume loss are again noted.   There is no evidence for acute infarct or superimposed new acute   hemorrhage.    Marked degenerative changes of the temporal mandibular joints is again   noted.    IMPRESSION:    Stable ventricular size.    < end of copied text >      < from: CT Head No Cont (03.19.19 @ 19:57) >  INTERPRETATION:  History: Status post aneurysm coiling. Subarachnoid   hemorrhage. Follow-up ventricular size. Patient now on heparin drip.    Description: A noncontrast head CT is compared to a prior head CT study   from 03/05/2019.    Aneurysm coiling is again noted in the suprasellar cistern, with   associated metallic streak artifact.    There has been a marked interval decrease in the diffuse subarachnoid   hemorrhage compared to the prior head CT study. There is no evidence for   new acute intracranial hemorrhage.     There has been a mild to moderate interval increase in size of the   lateral and third ventricles compared to the prior head CT study.    There is no evidence for superimposed acute vascular territory infarct or   new acute intracranial hemorrhage.    Severe degenerative changes are noted about the temporomandibular joints.    IMPRESSION:    There has been a marked interval decrease in the diffuse subarachnoid   hemorrhage compared to the prior head CT study. There is no evidence for   new acute intracranial hemorrhage.     There has been a mild to moderate interval increase in size of the   lateral and third ventricles compared to the prior head CT study.    < end of copied text >

## 2019-03-22 NOTE — PROGRESS NOTE ADULT - SUBJECTIVE AND OBJECTIVE BOX
CC: f/u for  cdif  Patient reports  feels well, happy youssef being pulled, no diarrhea  REVIEW OF SYSTEMS:  All other review of systems negative (Comprehensive ROS)    Antimicrobials Day #  :8/10-14  vancomycin    Solution 125 milliGRAM(s) Oral every 6 hours    Other Medications Reviewed    T(F): 98 (03-22-19 @ 19:03), Max: 98.8 (03-22-19 @ 08:05)  HR: 88 (03-22-19 @ 19:03)  BP: 153/90 (03-22-19 @ 19:03)  RR: 18 (03-22-19 @ 19:03)  SpO2: 97% (03-22-19 @ 19:03)  Wt(kg): --    PHYSICAL EXAM:  General: alert, no acute distress  Eyes:  anicteric, no conjunctival injection, no discharge  Oropharynx: no lesions or injection 	  Neck: supple, without adenopathy  Lungs: clear to auscultation  Heart: regular rate and rhythm; no murmur, rubs or gallops  Abdomen: soft, nondistended, nontender, without mass or organomegaly  Skin: no lesions  Extremities: no clubbing, cyanosis, or edema  Neurologic: alert, oriented, moves all extremities    LAB RESULTS:                        10.9   11.3  )-----------( 529      ( 22 Mar 2019 06:55 )             34.9     03-22    134<L>  |  97  |  11  ----------------------------<  103<H>  3.6   |  23  |  0.68    Ca    9.0      22 Mar 2019 06:51  Mg     1.9     03-21          MICROBIOLOGY:  RECENT CULTURES:      RADIOLOGY REVIEWED:  < from: CT Angio Chest w/ IV Cont (03.18.19 @ 15:51) >  IMPRESSION: Right upper lobe pulmonary embolus.  Findings discussed with ARANZA Castro with read back on 3/18/2019 5:10 PM      < end of copied text >  Assessment:  Patient s/p sah, aneurysm coiled, pe, cdif, urinary retention s/p youssef. cdif controlled  Plan:  complete 2 more days of po vanco  monitor off systemic abx, urine just colonized  await tov results  pe per medicine

## 2019-03-22 NOTE — PROGRESS NOTE ADULT - ASSESSMENT
74 yr old F h/o HTN on ASA presenting with syncope 3 hours ago. Patient has been having neck pain for 3 days, in which she was woken up suddenly due to neck pain. Per patient's , patient got out of bed, walked to foot of bed, was not responding to , then fell to the floor and was not responsive for one minute, until she came back to baseline. Patient does not remember walking or falling, but states she was aware of her surroundings. EMS was called and she was brought to ED. Denies head trauma, prior occurrence, denies being on blood thinners. States she feels mildly nauseous but denies vomiting, weakness, incontinence. States she returned to baseline. She was found to have SAH and was transferred here for further investigations and management.  HH1, MF3     PROCEDURE:  3/4 s/p cerebral angiogram/ coiling of ACOMM aneurysm, hospital course c/b cdiff, placement of rectal tube, urinary retention requiring catheterization and UTI, youssef placed for urinary retention    PLAN:  Neuro:   - SAH- stable- s/p coiling  - continue nimodipine, restart home antihypertensive meds on discharge (ramipril 10 mg)  - Urology consult appreciated- youssef placed for persistent urinary retention and complete bladder emptying in setting of UTI not being treated with antibiotics per ID; will give TOV today  Respiratory:   - encouraged incentive spirometry  -+PE on CTA chest,  now on heparin drip with goal PTT 60-90, last PTT 67.7; will discuss with Dr Serrato will start coumadin 5mg x1 tonight for goal INR 2-3  CV:  - vital signs stable  DVT ppx:   - venodynes, sq lovenox  ID:   - ID following  - Cdiff- on vanco po x 10 days total, rectal tube is out, stool is more formed  - asymptomatic bacteriuria: + urine culture- observe off systemic abx per ID  - thrush - on nystatin  - on metamucil for added fiber, and banatrol for loose stools  GI:    - tolerating diet  PT/OT/PMR- acute rehab on d/c once therapeutic on PO coumadin  Hospitalist medicine following, will need off nimodpine for discharge    [facility] Dr Serrato  [id number]    Assessment:  Please Check When Present   []  GCS  E   V  M     Heart Failure: []Acute, [] acute on chronic , []chronic  Heart Failure:  [] Diastolic (HFpEF), [] Systolic (HFrEF), []Combined (HFpEF and HFrEF), [] RHF, [] Pulm HTN, [] Other    [] TEREZA, [] ATN, [] AIN, [] other  [] CKD1, [] CKD2, [] CKD 3, [] CKD 4, [] CKD 5, []ESRD    Encephalopathy: [] Metabolic, [] Hepatic, [] toxic, [] Neurological, [] Other    Abnormal Nutritional Status: [] malnutrition (see nutrition note), [ ]underweight: BMI < 19, [] morbid obesity: BMI >40, [] Cachexia    [] Sepsis  [] hypovolemic shock,[] cardiogenic shock, [] hemorrhagic shock, [] neurogenic shock  [] Acute Respiratory Failure  []Cerebral edema, [] Brain compression/ herniation,   [] Functional quadriplegia  [] Acute blood loss anemia

## 2019-03-22 NOTE — PROGRESS NOTE ADULT - PROBLEM SELECTOR PLAN 2
Hep gtt for now. Elevated PTT bec lab was drawn from arm receiving gtt per nurse. will repeat. monitoring ventricle size to see if shunt needed. Once determined that shunt not needed, can start Coumadin vs Pradaxa. Hep gtt for now. Monitoring PTT. monitoring ventricle size to see if shunt needed. Once determined that shunt not needed, can start Coumadin vs Pradaxa.

## 2019-03-23 LAB
ANION GAP SERPL CALC-SCNC: 12 MMOL/L — SIGNIFICANT CHANGE UP (ref 5–17)
APTT BLD: 72 SEC — HIGH (ref 27.5–36.3)
BUN SERPL-MCNC: 15 MG/DL — SIGNIFICANT CHANGE UP (ref 7–23)
CALCIUM SERPL-MCNC: 9.1 MG/DL — SIGNIFICANT CHANGE UP (ref 8.4–10.5)
CHLORIDE SERPL-SCNC: 99 MMOL/L — SIGNIFICANT CHANGE UP (ref 96–108)
CO2 SERPL-SCNC: 24 MMOL/L — SIGNIFICANT CHANGE UP (ref 22–31)
CREAT SERPL-MCNC: 0.7 MG/DL — SIGNIFICANT CHANGE UP (ref 0.5–1.3)
GLUCOSE SERPL-MCNC: 103 MG/DL — HIGH (ref 70–99)
INR BLD: 1.16 RATIO — SIGNIFICANT CHANGE UP (ref 0.88–1.16)
POTASSIUM SERPL-MCNC: 3.6 MMOL/L — SIGNIFICANT CHANGE UP (ref 3.5–5.3)
POTASSIUM SERPL-SCNC: 3.6 MMOL/L — SIGNIFICANT CHANGE UP (ref 3.5–5.3)
PROTHROM AB SERPL-ACNC: 13.3 SEC — HIGH (ref 10–12.9)
SODIUM SERPL-SCNC: 135 MMOL/L — SIGNIFICANT CHANGE UP (ref 135–145)

## 2019-03-23 PROCEDURE — 99233 SBSQ HOSP IP/OBS HIGH 50: CPT

## 2019-03-23 PROCEDURE — 99232 SBSQ HOSP IP/OBS MODERATE 35: CPT

## 2019-03-23 RX ORDER — WARFARIN SODIUM 2.5 MG/1
5 TABLET ORAL ONCE
Qty: 0 | Refills: 0 | Status: COMPLETED | OUTPATIENT
Start: 2019-03-23 | End: 2019-03-23

## 2019-03-23 RX ADMIN — Medication 125 MILLIGRAM(S): at 18:41

## 2019-03-23 RX ADMIN — ZINC OXIDE 1 APPLICATION(S): 200 OINTMENT TOPICAL at 18:42

## 2019-03-23 RX ADMIN — Medication 1 PACKET(S): at 18:40

## 2019-03-23 RX ADMIN — Medication 10 MILLIGRAM(S): at 06:52

## 2019-03-23 RX ADMIN — Medication 125 MILLIGRAM(S): at 12:00

## 2019-03-23 RX ADMIN — ZINC OXIDE 1 APPLICATION(S): 200 OINTMENT TOPICAL at 06:48

## 2019-03-23 RX ADMIN — SIMVASTATIN 40 MILLIGRAM(S): 20 TABLET, FILM COATED ORAL at 20:47

## 2019-03-23 RX ADMIN — Medication 1 PACKET(S): at 12:00

## 2019-03-23 RX ADMIN — WARFARIN SODIUM 5 MILLIGRAM(S): 2.5 TABLET ORAL at 20:47

## 2019-03-23 RX ADMIN — Medication 125 MILLIGRAM(S): at 06:52

## 2019-03-23 NOTE — PROGRESS NOTE ADULT - PROBLEM SELECTOR PLAN 5
- s/p youssef catheter removal last night, awaiting TOV - s/p youssef catheter removal last night, awaiting TOV  - urology following

## 2019-03-23 NOTE — PROGRESS NOTE ADULT - ATTENDING COMMENTS
case discussed with neurosurgery TERRELL Mcgrath discussed with daughter at bedside     case discussed with neurosurgery TERRELL Mcgrath

## 2019-03-23 NOTE — PROGRESS NOTE ADULT - ASSESSMENT
Patient with SAH, s/p coiling, diarrhea, tested positive for Cdiff   Diarrhea resolved on po vanco.   Positive urine culture c/w asymptomatic bacteruria- youssef in place, latest UA without significant pyuria  Afebrile, negative Bld Cxs  WBC on decline  BMs formed  No plans for VPS as noted    Plan:  Continue po vancomycin; to complete 10 days total   Monitor off systemic abx  Youssef as per   Follow temps and CBC/diff   Natural h/o Cdiff, 30% recurrence rate, d/w pt and daughter

## 2019-03-23 NOTE — PROGRESS NOTE ADULT - SUBJECTIVE AND OBJECTIVE BOX
SUBJECTIVE: Doing well, no complaints.  at bedside-all questions answered.    OVERNIGHT EVENTS:  None    Vital Signs Last 24 Hrs  T(C): 36.9 (23 Mar 2019 08:54), Max: 36.9 (22 Mar 2019 15:29)  T(F): 98.4 (23 Mar 2019 08:54), Max: 98.4 (22 Mar 2019 15:29)  HR: 86 (23 Mar 2019 08:54) (84 - 107)  BP: 149/91 (23 Mar 2019 08:54) (123/82 - 153/90)  BP(mean): --  RR: 17 (23 Mar 2019 08:54) (16 - 18)  SpO2: 98% (23 Mar 2019 08:54) (96% - 98%)  IVF: [ X] IVL [ ] NS+K@   DIET: [X ] Regular + Ensure supp [ ] CCD [ ] Renal [ ] Puree [ ] Dysphagia [ ] Tube Feeds:   PCA: [ ] YES [X ] NO   VALERIO: [ ] YES [X ] NO [X ] VOID + straight cath  BM: [X ] YES-on 3/22    DRAINS: None    PHYSICAL EXAM:    General: No Acute Distress     Neurological: Awake, alert oriented to person, place and time, Following Commands, PERRL, EOMI, Face Symmetrical, Speech Fluent, Moving all extremities, Muscle Strength normal in all four extremities, No Drift, Sensation to Light Touch Intact    Pulmonary: Clear to Auscultation, No Rales, No Rhonchi, No Wheezes     Cardiovascular: S1, S2, Regular Rate and Rhythm     Gastrointestinal: Soft, Nontender, Nondistended     Incision: Groing CDI. Flat. No ecchymosis    LABS:                        10.9   11.3  )-----------( 529      ( 22 Mar 2019 06:55 )             34.9    03-23    135  |  99  |  15  ----------------------------<  103<H>  3.6   |  24  |  0.70    Ca    9.1      23 Mar 2019 07:12    PT/INR - ( 22 Mar 2019 06:54 )   PT: 13.1 sec;   INR: 1.14 ratio    PTT - ( 22 Mar 2019 06:54 )  PTT:67.7 sec  Hemoglobin A1C, Whole Blood: 5.6 % (03-04 @ 22:39)      03-22 @ 07:01  -  03-23 @ 07:00  --------------------------------------------------------  IN: 0 mL / OUT: 860 mL / NET: -860 mL      IMAGING: < from: CT Head No Cont (03.21.19 @ 14:55) >  Stable ventricular size.    < from: VA Duplex Lower Ext Vein Scan, Bilat (03.18.19 @ 17:57) >  No evidence of bilateral lower extremity deep venous thrombosis.    MEDICATIONS  (STANDING):  enalapril 10 milliGRAM(s) Oral daily  heparin  Infusion 1100 Unit(s)/Hr (11 mL/Hr) IV Continuous <Continuous>  psyllium Powder 1 Packet(s) Oral two times a day  simvastatin 40 milliGRAM(s) Oral at bedtime  vancomycin    Solution 125 milliGRAM(s) Oral every 6 hours  zinc oxide 20% Ointment 1 Application(s) Topical two times a day    MEDICATIONS  (PRN):  acetaminophen    Suspension .. 815 milliGRAM(s) Oral every 6 hours PRN Mild Pain (1 - 3)

## 2019-03-23 NOTE — PROGRESS NOTE ADULT - ASSESSMENT
74 yr old F h/o HTN on ASA presenting with syncope 3 hours ago. Patient has been having neck pain for 3 days, in which she was woken up suddenly due to neck pain. Per patient's , patient got out of bed, walked to foot of bed, was not responding to , then fell to the floor and was not responsive for one minute, until she came back to baseline. Patient does not remember walking or falling, but states she was aware of her surroundings. EMS was called and she was brought to ED. Denies head trauma, prior occurrence, denies being on blood thinners. States she feels mildly nauseous but denies vomiting, weakness, incontinence. States she returned to baseline. She was found to have SAH and was transferred here for further investigations and management.  HH1, MF3 (04 Mar 2019 11:14)    PROCEDURE:  Adm 3/4 s/p Angio coiling AComm aneurysm  POD# 19    PLAN:  Neuro: 3/18 CTA Chest +PE, On Heparin gtt at 11cc/hr, PTT=67.7 (PTT goal 60-90), Got Coumadin 5mg 3/22 HS, (INR=1.14) INR goal 2-3, Will give Coumadin 5HS tonite x 1. PO Vanco x 10 dys for C. Diff. PT/PTT/INR-P-FU. Leukocytosis trending down.  Inc activity/OOB. DC Planning once off Heparin gtt and INR Therapeutic.    ID Dr Cleveland note of 3/22-complete 2 more days of po vanco till 3/24. monitor off systemic abx, urine just colonized. await tov results per medicine    Medicine/Hosp-Course complicated by c diff infection requiring rectal tube, urinary retention requiring Low and acute PE. Problem: Leukocytosis.  Plan: improved. asymptomatic.  Problem: Acute pulmonary embolism.  Plan: Hep gtt for now. Monitoring PTT. monitoring ventricle size to see if shunt needed. Once determined that shunt not needed, can start Coumadin vs Pradaxa. Problem: Clostridium difficile infection.  Plan: cont Vanco. Problem: Hydrocephalus.  Plan: monitoring ventricle size. pt going for CT head.  Problem: Hypokalemia.  Plan: resolved. monitor with soft stools. Problem: HTN (hypertension). Plan: restarted ACE-I. Can increase Enalapril. monitor. Problem: Urinary retention.  Plan: TOV per neurosurg. Attending Attestation: Plan discussed with pt/.    Respiratory: Patient instructed to use incentive spirometer [ X] YES [ ] NO              DVT ppx: [X ] Heparin gtt + Coumadin [ ] SQH and Venodynes [ ] Left [ ] Right [ X] Bilateral    Discharge Planning:  The patient was evaluated by PT/OT/PMR and recommended Acute Rehab.       Assessment:  Please Check When Present   []  GCS  E   V  M     Heart Failure: []Acute, [] acute on chronic , []chronic  Heart Failure:  [] Diastolic (HFpEF), [] Systolic (HFrEF), []Combined (HFpEF and HFrEF), [] RHF, [] Pulm HTN, [] Other    [] TEREZA, [] ATN, [] AIN, [] other  [] CKD1, [] CKD2, [] CKD 3, [] CKD 4, [] CKD 5, []ESRD    Encephalopathy: [] Metabolic, [] Hepatic, [] toxic, [] Neurological, [] Other    Abnormal Nurtitional Status: [] malnurtition (see nutrition note), [ ]underweight: BMI < 19, [] morbid obesity: BMI >40, [] Cachexia    [] Sepsis  [] hypovolemic shock,[] cardiogenic shock, [] hemorrhagic shock, [] neuogenic shock  [] Acute Respiratory Failure  []Cerebral edema, [] Brain compression/ herniation,   [] Functional quadriplegia  [] Acute blood loss anemia

## 2019-03-23 NOTE — PROGRESS NOTE ADULT - SUBJECTIVE AND OBJECTIVE BOX
Patient states her stools are forming. She is not sure if she is making urine- but per daughter at bedside, she was apparently told by nurse that she had some urine on her bed this morning.     GENERAL: No fevers, no chills.  EYES: No blurry vision,  No photophobia  ENT: No sore throat.  No dysphagia  Cardiovascular: No chest pain, palpitations, orthopnea  Pulmonary: No cough, no wheezing. No shortness of breath  Gastrointestinal: No abdominal pain, no diarrhea, no constipation.   : No dysuria.  No hematuria  Musculoskeletal: No weakness.  No myalgias.  Dermatology:  No rashes.  Neuro: No Headache.  No vertigo.  No dizziness.  Psych: No anxiety, no depression.  Denies suicidal thoughts.    Vital Signs Last 24 Hrs  T(C): 36.8 (23 Mar 2019 11:29), Max: 36.9 (23 Mar 2019 08:54)  T(F): 98.2 (23 Mar 2019 11:29), Max: 98.4 (23 Mar 2019 08:54)  HR: 85 (23 Mar 2019 11:29) (84 - 88)  BP: 141/89 (23 Mar 2019 11:29) (123/82 - 153/90)  BP(mean): --  RR: 18 (23 Mar 2019 11:29) (16 - 18)  SpO2: 98% (23 Mar 2019 11:29) (97% - 98%)    GENERAL: NAD, well-developed  HEAD:  Atraumatic, Normocephalic  EYES: EOMI, PERRLA, conjunctiva and sclera clear  ENT: Pharynx not erythematous  PULMONARY: Clear to auscultation bilaterally; No wheeze  CARDIOVASCULAR: Regular rate and rhythm; No murmurs, rubs, or gallops  ABDOMEN: Soft, Nontender, Nondistended; Bowel sounds present  EXTREMITIES:  2+ Peripheral Pulses, No clubbing, cyanosis, or edema  MUSCULOSKELETAL: No calf tenderness  PSYCH: AAOx3, normal affect  NEUROLOGY: CN2-12 grossly intact, no gross focal sensory or motor deficits   SKIN: warm and dry, No rashes or lesions

## 2019-03-23 NOTE — PROGRESS NOTE ADULT - SUBJECTIVE AND OBJECTIVE BOX
CC: f/u for Cdiff    Patient remains comfortable, BMs formed, no diarrhea    REVIEW OF SYSTEMS:  All other review of systems negative (Comprehensive ROS)    Antimicrobials Day # 9  nystatin    Suspension 315519 Unit(s) Swish and Swallow four times a day  vancomycin    Solution 125 milliGRAM(s) Oral every 6 hours    Other Medications Reviewed    Vital Signs Last 24 Hrs  T(F): 98.2 (23 Mar 2019 11:29), Max: 98.4 (22 Mar 2019 15:29)  HR: 85 (23 Mar 2019 11:29) (84 - 107)  BP: 141/89 (23 Mar 2019 11:29) (123/82 - 153/90)  BP(mean): --  RR: 18 (23 Mar 2019 11:29) (16 - 18)  SpO2: 98% (23 Mar 2019 11:29) (96% - 98%)    PHYSICAL EXAM:  General: alert, no acute distress  Eyes:  anicteric, no conjunctival injection, no discharge  Oropharynx: no lesions or injection 	  Neck: supple, without adenopathy  Lungs: clear to auscultation  Heart: regular rate and rhythm; no murmur, rubs or gallops  Abdomen: soft, nondistended, nontender, without mass or organomegaly  Low  Skin: no lesions  Extremities: no edema  Neurologic: moves all extremities    LAB RESULTS:                        10.9   11.3  )-----------( 529      ( 22 Mar 2019 06:55 )             34.9        MICROBIOLOGY:  RECENT CULTURES:  03-15 @ 22:19 .Stool Feces     GI PCR Results: NOT detected    Clostridium difficile Toxin by PCR (03.12.19 @ 18:41)    Clostridium difficile Toxin by PCR: Detected    03-15 @ 07:08 .Blood Blood-Venous     No growth to date.    Culture - Urine (03.15.19 @ 00:53)    -  Amikacin: S <=16    -  Ampicillin: R >16 T    -  Ampicillin/Sulbactam: I 16/8    -  Aztreonam: S <=4    -  Cefazolin: S <=8     -  Cefepime: S <=4    -  Cefoxitin: S <=8    -  Ceftriaxone: S <=1     -  Ciprofloxacin: S <=1    -  Ertapenem: S <=1    -  Gentamicin: S <=4    -  Imipenem: S <=1    -  Levofloxacin: S <=2    -  Meropenem: S <=1    -  Nitrofurantoin: S <=32     -  Piperacillin/Tazobactam: S <=16    -  Tigecycline: S <=2    -  Tobramycin: S <=4    -  Trimethoprim/Sulfamethoxazole: S <=2/38    Specimen Source: .Urine Catheterized    Culture Results:   >100,000 CFU/ml Escherichia coli       RADIOLOGY REVIEWED:  CT Head No Cont (03.05.19 @ 08:01) >  status post aneurysm coiling. Residual subarachnoid   hemorrhage at the level of the sylvian fissures bilaterally and in the   interhemispheric fissure. No rehemorrhage. No hydrocephalus

## 2019-03-23 NOTE — PROGRESS NOTE ADULT - ASSESSMENT
75 yo Female with HTN and HLD presenting with headaches x 3 days and syncope, found to have subarachnoid hemorrhage on 3/4, s/p coiling of ACOMM aneurysm. Course complicated by c diff infection requiring rectal tube (now out), urinary retention requiring Low (now removed, awaiting TOV) and acute pulmonary embolism now on heparin bridge to coumadin.

## 2019-03-24 LAB
APTT BLD: 37.6 SEC — HIGH (ref 27.5–36.3)
APTT BLD: 62.6 SEC — HIGH (ref 27.5–36.3)
HCT VFR BLD CALC: 31.5 % — LOW (ref 34.5–45)
HGB BLD-MCNC: 10.8 G/DL — LOW (ref 11.5–15.5)
INR BLD: 1.22 RATIO — HIGH (ref 0.88–1.16)
INR BLD: 1.23 RATIO — HIGH (ref 0.88–1.16)
MCHC RBC-ENTMCNC: 31.5 PG — SIGNIFICANT CHANGE UP (ref 27–34)
MCHC RBC-ENTMCNC: 34.5 GM/DL — SIGNIFICANT CHANGE UP (ref 32–36)
MCV RBC AUTO: 91.6 FL — SIGNIFICANT CHANGE UP (ref 80–100)
PLATELET # BLD AUTO: 422 K/UL — HIGH (ref 150–400)
PROTHROM AB SERPL-ACNC: 14.1 SEC — HIGH (ref 10–12.9)
PROTHROM AB SERPL-ACNC: 14.2 SEC — HIGH (ref 10–13.1)
RBC # BLD: 3.44 M/UL — LOW (ref 3.8–5.2)
RBC # FLD: 12.9 % — SIGNIFICANT CHANGE UP (ref 10.3–14.5)
WBC # BLD: 10.6 K/UL — HIGH (ref 3.8–10.5)
WBC # FLD AUTO: 10.6 K/UL — HIGH (ref 3.8–10.5)

## 2019-03-24 PROCEDURE — 99231 SBSQ HOSP IP/OBS SF/LOW 25: CPT

## 2019-03-24 PROCEDURE — 99233 SBSQ HOSP IP/OBS HIGH 50: CPT

## 2019-03-24 RX ORDER — WARFARIN SODIUM 2.5 MG/1
5 TABLET ORAL
Qty: 0 | Refills: 0 | Status: COMPLETED | OUTPATIENT
Start: 2019-03-24 | End: 2019-03-24

## 2019-03-24 RX ADMIN — Medication 1 PACKET(S): at 07:49

## 2019-03-24 RX ADMIN — Medication 125 MILLIGRAM(S): at 17:14

## 2019-03-24 RX ADMIN — WARFARIN SODIUM 5 MILLIGRAM(S): 2.5 TABLET ORAL at 20:08

## 2019-03-24 RX ADMIN — Medication 125 MILLIGRAM(S): at 00:15

## 2019-03-24 RX ADMIN — Medication 10 MILLIGRAM(S): at 07:50

## 2019-03-24 RX ADMIN — Medication 1 PACKET(S): at 17:14

## 2019-03-24 RX ADMIN — Medication 125 MILLIGRAM(S): at 07:50

## 2019-03-24 RX ADMIN — ZINC OXIDE 1 APPLICATION(S): 200 OINTMENT TOPICAL at 17:15

## 2019-03-24 RX ADMIN — SIMVASTATIN 40 MILLIGRAM(S): 20 TABLET, FILM COATED ORAL at 20:08

## 2019-03-24 RX ADMIN — Medication 125 MILLIGRAM(S): at 12:33

## 2019-03-24 RX ADMIN — ZINC OXIDE 1 APPLICATION(S): 200 OINTMENT TOPICAL at 05:19

## 2019-03-24 NOTE — PROGRESS NOTE ADULT - ASSESSMENT
73 yo Female with HTN and HLD presenting with headaches x 3 days and syncope, found to have subarachnoid hemorrhage on 3/4, s/p coiling of ACOMM aneurysm. Course complicated by c diff infection requiring rectal tube (now out), urinary retention requiring Low (now removed, awaiting TOV) and acute pulmonary embolism now on heparin bridge to coumadin.

## 2019-03-24 NOTE — PROGRESS NOTE ADULT - ASSESSMENT
74 yr old F h/o HTN on ASA presenting with syncope 3 hours ago. Patient has been having neck pain for 3 days, in which she was woken up suddenly due to neck pain. Per patient's , patient got out of bed, walked to foot of bed, was not responding to , then fell to the floor and was not responsive for one minute, until she came back to baseline. Patient does not remember walking or falling, but states she was aware of her surroundings. EMS was called and she was brought to ED. Denies head trauma, prior occurrence, denies being on blood thinners. States she feels mildly nauseous but denies vomiting, weakness, incontinence. States she returned to baseline. She was found to have SAH and was transferred here for further investigations and management.  HH1, MF3     PROCEDURE:  3/4 s/p cerebral angiogram/ coiling of ACOMM aneurysm, hospital course c/b cdiff, placement of rectal tube, urinary retention requiring catheterization and UTI, youssef placed for urinary retention    PLAN:  Neuro:   - SAH- stable- s/p coiling  - continue home antihypertensive enalapril- BP controlled  - Urology consult appreciated- youssef placed for persistent urinary retention and complete bladder emptying in setting of UTI not being treated with antibiotics per ID;      failed TOV 3/22 cont straight cath for now, will need youssef placed for rehab; will recall urology in am  respiratory:   - encouraged incentive spirometry  -+acute PE on CTA chest,  now on heparin drip with PTT 62.6 goal PTT 60-90 and bridging with coumadin INR now 1.22 goal 2-3  CV:  - vital signs stable  DVT ppx:   - venodynes, sq lovenox  ID:   - ID following  - Cdiff- on vanco po x 10 days total, rectal tube is out, stool is more formed but more lose at times- to complete tonight  - asymptomatic bacteriuria: + urine culture- observe off systemic abx per ID; leukocytosis improved  - thrush - on nystatin  - on metamucil for added fiber, and banatrol for loose stools  GI:    - tolerating diet  PT/OT/PMR- acute rehab on d/c once therapeutic on PO coumadin  Hospitalist medicine following     discussed with Dr Rojelio Guadarrama # [id number]    Assessment:  Please Check When Present   []  GCS  E   V  M     Heart Failure: []Acute, [] acute on chronic , []chronic  Heart Failure:  [] Diastolic (HFpEF), [] Systolic (HFrEF), []Combined (HFpEF and HFrEF), [] RHF, [] Pulm HTN, [] Other    [] TEREZA, [] ATN, [] AIN, [] other  [] CKD1, [] CKD2, [] CKD 3, [] CKD 4, [] CKD 5, []ESRD    Encephalopathy: [] Metabolic, [] Hepatic, [] toxic, [] Neurological, [] Other    Abnormal Nutritional Status: [] malnutrition (see nutrition note), [ ]underweight: BMI < 19, [] morbid obesity: BMI >40, [] Cachexia    [] Sepsis  [] hypovolemic shock,[] cardiogenic shock, [] hemorrhagic shock, [] neurogenic shock  [] Acute Respiratory Failure  []Cerebral edema, [] Brain compression/ herniation,   [] Functional quadriplegia  [] Acute blood loss anemia

## 2019-03-24 NOTE — PROGRESS NOTE ADULT - PROBLEM SELECTOR PLAN 4
- no signs of cystitis; neurogenic bladder?   - s/p youssef catheter removal last night, failed trial of void and is now getting intermittently straight catheterized  - would recommend urology f/u, perhaps patient needs to have youssef replaced and sent to rehab with catheter  - repeat UA with very few white cells, doubt cystitis   - ensure daily bowel movements and bulk stool with metamucil  - early mobilization   - meds reviewed- none of these meds are likely causing urinary retention   - urology following

## 2019-03-24 NOTE — PROGRESS NOTE ADULT - SUBJECTIVE AND OBJECTIVE BOX
SUBJECTIVE: Pt seen and examined, resting in bed no chest pains or SOB, feels better overall today  +PE on CTA chest, now on heparin drip and bridging to coumadin; denies chest pains/SOB  3/22 given TOV and has failed, cont straight cath Q8hrs, will need youssef placed for rehab- will call urology   still occasionally having quite lose stools vs soft formed stools per staff    Vital Signs Last 24 Hrs  T(C): 36.9 (24 Mar 2019 12:00), Max: 37.2 (23 Mar 2019 20:25)  T(F): 98.4 (24 Mar 2019 12:00), Max: 99 (23 Mar 2019 20:25)  HR: 88 (24 Mar 2019 12:00) (79 - 93)  BP: 117/80 (24 Mar 2019 12:00) (117/80 - 163/99)  BP(mean): --  RR: 18 (24 Mar 2019 12:00) (17 - 20)  SpO2: 99% (24 Mar 2019 12:00) (95% - 99%)    PHYSICAL EXAM:    General: No Acute Distress     Neurological: Awake, alert oriented to person, place and time, Following Commands, Speech Fluent, Moving all extremities, Muscle Strength normal in all four extremities, No Drift, Sensation to Light Touch Intact    Pulmonary: Clear to Auscultation, No Rales, No Rhonchi, No Wheezes     Cardiovascular: S1, S2, Regular Rate and Rhythm     Gastrointestinal: Soft, Nontender, Nondistended     Incision: right groin: no ecchymosis/hematoma    LABS:                               10.8   10.6  )-----------( 422      ( 24 Mar 2019 06:20 )             31.5   03-23    135  |  99  |  15  ----------------------------<  103<H>  3.6   |  24  |  0.70    Ca    9.1      23 Mar 2019 07:12    PT/INR - ( 24 Mar 2019 12:40 )   PT: 14.1 sec;   INR: 1.22 ratio      PTT - ( 24 Mar 2019 12:40 )  PTT:62.6 sec             Hemoglobin A1C, Whole Blood: 5.6 % (03-04 @ 22:39)    MEDICATIONS:    heparin drip @ 11cc/hr    MEDICATIONS  (STANDING):  enalapril 10 milliGRAM(s) Oral daily  heparin  Infusion 1100 Unit(s)/Hr (11 mL/Hr) IV Continuous <Continuous>  psyllium Powder 1 Packet(s) Oral two times a day  simvastatin 40 milliGRAM(s) Oral at bedtime  vancomycin    Solution 125 milliGRAM(s) Oral every 6 hours  warfarin 5 milliGRAM(s) Oral <User Schedule>  zinc oxide 20% Ointment 1 Application(s) Topical two times a day    MEDICATIONS  (PRN):  acetaminophen    Suspension .. 815 milliGRAM(s) Oral every 6 hours PRN Mild Pain (1 - 3)    DIET: [x] Regular [] CCD [] Renal [] Puree [] Dysphagia [] Tube Feeds:     IMAGING:   < from: CT Head No Cont (03.21.19 @ 14:55) >  INTERPRETATION:  History: Subarachnoid hemorrhage. Follow-up ventricular   size. Change in mental status.    Description: A noncontrast head CT was performed. Axial images were   performed from the skull base to the vertex with coronal/sagittal   reconstructions.    Comparison is made to a prior head CT from 03/19/2019.    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    The ventricles are stable in size compared tothe 03/19/2019 head CT   study.    No significant residual or recurrent subarachnoid hemorrhage is   visualized.    Chronic white matter changes and cerebral volume loss are again noted.   There is no evidence for acute infarct or superimposed new acute   hemorrhage.    Marked degenerative changes of the temporal mandibular joints is again   noted.    IMPRESSION:    Stable ventricular size.    < end of copied text >      < from: CT Head No Cont (03.19.19 @ 19:57) >  INTERPRETATION:  History: Status post aneurysm coiling. Subarachnoid   hemorrhage. Follow-up ventricular size. Patient now on heparin drip.    Description: A noncontrast head CT is compared to a prior head CT study   from 03/05/2019.    Aneurysm coiling is again noted in the suprasellar cistern, with   associated metallic streak artifact.    There has been a marked interval decrease in the diffuse subarachnoid   hemorrhage compared to the prior head CT study. There is no evidence for   new acute intracranial hemorrhage.     There has been a mild to moderate interval increase in size of the   lateral and third ventricles compared to the prior head CT study.    There is no evidence for superimposed acute vascular territory infarct or   new acute intracranial hemorrhage.    Severe degenerative changes are noted about the temporomandibular joints.    IMPRESSION:    There has been a marked interval decrease in the diffuse subarachnoid   hemorrhage compared to the prior head CT study. There is no evidence for   new acute intracranial hemorrhage.     There has been a mild to moderate interval increase in size of the   lateral and third ventricles compared to the prior head CT study.    < end of copied text >

## 2019-03-24 NOTE — PROGRESS NOTE ADULT - SUBJECTIVE AND OBJECTIVE BOX
Patient feels that her stools have formed but she is still incontinent. She can not tell when her bladder is full.    GENERAL: No fevers, no chills.  EYES: No blurry vision,  No photophobia  ENT: No sore throat.  No dysphagia  Cardiovascular: No chest pain, palpitations, orthopnea  Pulmonary: No cough, no wheezing. No shortness of breath  Gastrointestinal: No abdominal pain, no diarrhea, no constipation.  No melena.  No hematochezia  : No dysuria.  No hematuria  Musculoskeletal: No weakness.  No myalgias.  Dermatology:  No rashes.  Neuro: No Headache.  No vertigo.  No dizziness.  : incontinent    Vital Signs Last 24 Hrs  T(C): 36.9 (24 Mar 2019 12:00), Max: 37.2 (23 Mar 2019 20:25)  T(F): 98.4 (24 Mar 2019 12:00), Max: 99 (23 Mar 2019 20:25)  HR: 88 (24 Mar 2019 12:00) (79 - 93)  BP: 117/80 (24 Mar 2019 12:00) (117/80 - 163/99)  BP(mean): --  RR: 18 (24 Mar 2019 12:00) (17 - 20)  SpO2: 99% (24 Mar 2019 12:00) (95% - 99%)    GENERAL: NAD, well-developed  HEAD:  Atraumatic, Normocephalic  EYES: EOMI, PERRLA, conjunctiva and sclera clear  ENT: Pharynx not erythematous  PULMONARY: Clear to auscultation bilaterally; No wheeze  CARDIOVASCULAR: Regular rate and rhythm; No murmurs, rubs, or gallops  ABDOMEN: Soft, Nontender, Nondistended; Bowel sounds present  EXTREMITIES:  2+ Peripheral Pulses, No clubbing, cyanosis, or edema  MUSCULOSKELETAL: No calf tenderness  PSYCH: AAOx3, normal affect  SKIN: warm and dry, No rashes or lesions

## 2019-03-24 NOTE — PROVIDER CONTACT NOTE (OTHER) - SITUATION
Pt unable to void on their own. Straight catheterize PRN order discontinued after youssef was placed by urology. Youssef removed 3/22. pt has been straight catheterized several times. requires a PRN order

## 2019-03-25 LAB
APTT BLD: 66 SEC — HIGH (ref 27.5–36.3)
HCT VFR BLD CALC: 32.8 % — LOW (ref 34.5–45)
HGB BLD-MCNC: 11.1 G/DL — LOW (ref 11.5–15.5)
INR BLD: 1.24 RATIO — HIGH (ref 0.88–1.16)
MCHC RBC-ENTMCNC: 31.8 PG — SIGNIFICANT CHANGE UP (ref 27–34)
MCHC RBC-ENTMCNC: 34 GM/DL — SIGNIFICANT CHANGE UP (ref 32–36)
MCV RBC AUTO: 93.5 FL — SIGNIFICANT CHANGE UP (ref 80–100)
PLATELET # BLD AUTO: 317 K/UL — SIGNIFICANT CHANGE UP (ref 150–400)
PROTHROM AB SERPL-ACNC: 14.4 SEC — HIGH (ref 10–12.9)
RBC # BLD: 3.51 M/UL — LOW (ref 3.8–5.2)
RBC # FLD: 13 % — SIGNIFICANT CHANGE UP (ref 10.3–14.5)
WBC # BLD: 10.3 K/UL — SIGNIFICANT CHANGE UP (ref 3.8–10.5)
WBC # FLD AUTO: 10.3 K/UL — SIGNIFICANT CHANGE UP (ref 3.8–10.5)

## 2019-03-25 PROCEDURE — 70450 CT HEAD/BRAIN W/O DYE: CPT | Mod: 26

## 2019-03-25 PROCEDURE — 99232 SBSQ HOSP IP/OBS MODERATE 35: CPT

## 2019-03-25 RX ORDER — WARFARIN SODIUM 2.5 MG/1
7 TABLET ORAL
Qty: 0 | Refills: 0 | Status: COMPLETED | OUTPATIENT
Start: 2019-03-25 | End: 2019-03-25

## 2019-03-25 RX ADMIN — Medication 10 MILLIGRAM(S): at 06:40

## 2019-03-25 RX ADMIN — ZINC OXIDE 1 APPLICATION(S): 200 OINTMENT TOPICAL at 06:40

## 2019-03-25 RX ADMIN — Medication 125 MILLIGRAM(S): at 00:03

## 2019-03-25 RX ADMIN — WARFARIN SODIUM 7 MILLIGRAM(S): 2.5 TABLET ORAL at 21:43

## 2019-03-25 RX ADMIN — SIMVASTATIN 40 MILLIGRAM(S): 20 TABLET, FILM COATED ORAL at 21:43

## 2019-03-25 RX ADMIN — Medication 1 PACKET(S): at 06:40

## 2019-03-25 RX ADMIN — ZINC OXIDE 1 APPLICATION(S): 200 OINTMENT TOPICAL at 18:26

## 2019-03-25 RX ADMIN — Medication 1 PACKET(S): at 18:26

## 2019-03-25 RX ADMIN — Medication 125 MILLIGRAM(S): at 06:40

## 2019-03-25 NOTE — PROGRESS NOTE ADULT - ASSESSMENT
73 yo Female with HTN and HLD presenting with headaches x 3 days and syncope, found to have subarachnoid hemorrhage on 3/4, s/p coiling of ACOMM aneurysm. Course complicated by c diff infection requiring rectal tube (now out), urinary retention and acute pulmonary embolism now on heparin bridge to coumadin.

## 2019-03-25 NOTE — PROGRESS NOTE ADULT - PROBLEM SELECTOR PLAN 4
Failed trial of void and is now getting intermittently straight catheterized.  Urology to follow.   - repeat UA with very few white cells, doubt cystitis   - ensure daily bowel movements and bulk stool with metamucil  - early mobilization

## 2019-03-25 NOTE — CHART NOTE - NSCHARTNOTEFT_GEN_A_CORE
called to evaluate confusion, patient oriented x3 for me and DIAZ however heparin drip held, CTH obtained, stable vents and restarted heparin and to give coumadin 7mg x1 tonight.    updated.

## 2019-03-25 NOTE — PROGRESS NOTE ADULT - SUBJECTIVE AND OBJECTIVE BOX
CC: f/u for cdif    Patient very lethargic, is not answering questions well just awakened from sleep    REVIEW OF SYSTEMS:  All other review of systems cannot get (Comprehensive ROS)    Antimicrobials Day #  :    Other Medications Reviewed    T(F): 98.8 (03-25-19 @ 16:34), Max: 98.8 (03-25-19 @ 16:34)  HR: 84 (03-25-19 @ 16:34)  BP: 136/87 (03-25-19 @ 16:34)  RR: 18 (03-25-19 @ 16:34)  SpO2: 95% (03-25-19 @ 16:34)  Wt(kg): --    PHYSICAL EXAM:  General: lethargic no acute distress  Eyes:  anicteric, no conjunctival injection, no discharge  Oropharynx: no lesions or injection 	  Neck: supple, without adenopathy  Lungs: clear to auscultation  Heart: regular rate and rhythm; no murmur, rubs or gallops  Abdomen: soft, nondistended, nontender, without mass or organomegaly  Skin: no lesions  Extremities: no clubbing, cyanosis, or edema  Neurologic: lethargic, moves all extremities    LAB RESULTS:                        11.1   10.3  )-----------( 317      ( 25 Mar 2019 05:10 )             32.8       RADIOLOGY REVIEWED:    < from: CT Head No Cont (03.21.19 @ 14:55) >  IMPRESSION:    Stable ventricular size.      < end of copied text >    Assessment:  Patient admitted for sah, had coil of aneurysm, developed urinary retention , was getting straight cath then youssef placed and then youssef removed and still requires straight cath. Had cdif colitis now finished a course of po vanco with no further diarrhea. She is lethargic but no fever or leukocytosis to suggest an active infection. I suspect her urine will continue to be colonized and may need to have a youssef again or more frequent straight cath but doubt colonized urine warrants antibiotics nor is the cause for current lethargy  Plan:  continue to monitor off antibiotics  if she gets fever or leukocytosis then she will need blood and urine cultures and ua checked  favor keep youssef for now  await read of new ct head just done

## 2019-03-25 NOTE — PROGRESS NOTE ADULT - ASSESSMENT
74 yr old F h/o HTN on ASA presenting with syncope 3 hours ago. Patient has been having neck pain for 3 days, in which she was woken up suddenly due to neck pain. Per patient's , patient got out of bed, walked to foot of bed, was not responding to , then fell to the floor and was not responsive for one minute, until she came back to baseline. Patient does not remember walking or falling, but states she was aware of her surroundings. EMS was called and she was brought to ED. Denies head trauma, prior occurrence, denies being on blood thinners. States she feels mildly nauseous but denies vomiting, weakness, incontinence. States she returned to baseline. She was found to have SAH and was transferred here for further investigations and management.  HH1, MF3     PROCEDURE:  3/4 s/p cerebral angiogram/ coiling of ACOMM aneurysm, hospital course c/b cdiff, placement of rectal tube, urinary retention requiring catheterization and UTI- not treated per ID, youssef was placed for urinary retention and given another TOV 3/22 and has failed to date    PLAN:  Neuro:   - SAH- stable- s/p coiling  - continue home antihypertensive enalapril- BP controlled  - Urology consult appreciated- youssef placed for persistent urinary retention and complete bladder emptying in setting of UTI not being treated with antibiotics per ID;      failed TOV 3/22 cont straight cath for now, will need youssef placed for rehab; urology recalled for further recommendations  respiratory:   - encouraged incentive spirometry  -+acute PE on CTA chest,  now on heparin drip with PTT 66.0 goal PTT 60-90 and bridging with coumadin INR now 1.24 goal 2-3; will give coumadin 7mg x 1 tonight  CV:  - vital signs stable  DVT ppx:   - venodynes, sq lovenox  ID:   - ID following  - Cdiff- completed 10 days of vanco po, rectal tube is out, stool is more formed but more lose at times  - asymptomatic bacteriuria: + urine culture- observe off systemic abx per ID; leukocytosis improved  - thrush - on nystatin  - on metamucil for added fiber, and banatrol for loose stools  GI:    - tolerating diet  PT/OT/PMR- acute rehab on d/c once therapeutic on PO coumadin  Hospitalist medicine following     discussed with Dr Rojelio Guadarrama # [id number]    Assessment:  Please Check When Present   []  GCS  E   V  M     Heart Failure: []Acute, [] acute on chronic , []chronic  Heart Failure:  [] Diastolic (HFpEF), [] Systolic (HFrEF), []Combined (HFpEF and HFrEF), [] RHF, [] Pulm HTN, [] Other    [] TEREZA, [] ATN, [] AIN, [] other  [] CKD1, [] CKD2, [] CKD 3, [] CKD 4, [] CKD 5, []ESRD    Encephalopathy: [] Metabolic, [] Hepatic, [] toxic, [] Neurological, [] Other    Abnormal Nutritional Status: [] malnutrition (see nutrition note), [ ]underweight: BMI < 19, [] morbid obesity: BMI >40, [] Cachexia    [] Sepsis  [] hypovolemic shock,[] cardiogenic shock, [] hemorrhagic shock, [] neurogenic shock  [] Acute Respiratory Failure  []Cerebral edema, [] Brain compression/ herniation,   [] Functional quadriplegia  [] Acute blood loss anemia

## 2019-03-25 NOTE — PROGRESS NOTE ADULT - SUBJECTIVE AND OBJECTIVE BOX
This is a 74 year old female who developed urinary retention after falling and developing a SAH.  She was also found to have a cerebral aneurysm s/p angiogram and coiling.   Since then she has developed difficulty voiding. Bowel continence happens 50% of the time per . She had an indwelling catheter until 3/22, and has required intermittent catheterizations since then, for a failed TOV.  Our recommendation was to start proper antibiotic treatment for her UTI; however, she only received ceftriaxone for 1 day.  She was being treated for a C.diff infection, and her stools are more formed now.    T(F): 98.8, Max: 98.8 (03-25-19 @ 16:34)  HR: 84  BP: 136/87  SpO2: 95%    OUT:    Intermittent Catheterization - Urethral: 600 mL  Total OUT: 600 mL      Gen NAD  Abd soft, NT   no SP tenderness or CVAT      03-25 @ 05:10    WBC 10.3  / Hct 32.8  / SCr --       03-24 @ 06:20    WBC 10.6  / Hct 31.5  / SCr --

## 2019-03-25 NOTE — PROGRESS NOTE ADULT - ASSESSMENT
73 yo female found with urinary retention, recent uti that has not undergone treatment     -Patient needs an indwelling Low catheter for maximal drainage in the setting of UTI and persistently high residuals  -Check urine culture on catheter placement  -UTI may be contributing to patient's urinary retention. Would not consider this asymptomatic bacteriuria, needs antibiotics if repeat culture is positive  -Continue OOB, limit narcotics, anticholinergics  -Medications reviewed for side effects  -If has persistent retention, will need outpatient urodynamics to determine if SAH has affected voiding and further action/plan  -[facility] [phone]

## 2019-03-25 NOTE — PROGRESS NOTE ADULT - SUBJECTIVE AND OBJECTIVE BOX
Patient is a 74y old  Female who presents with a chief complaint of SAH (25 Mar 2019 11:35)    HPI: Pt in bed. Feels well. Stools more formed. Being straight-cathed for retention.     Vital Signs Last 24 Hrs  T(C): 36.9 (25 Mar 2019 08:12), Max: 37.2 (24 Mar 2019 16:42)  T(F): 98.5 (25 Mar 2019 08:12), Max: 98.9 (24 Mar 2019 16:42)  HR: 81 (25 Mar 2019 08:12) (79 - 99)  BP: 133/85 (25 Mar 2019 08:12) (120/79 - 139/95)  BP(mean): --  RR: 18 (25 Mar 2019 08:12) (18 - 20)  SpO2: 100% (25 Mar 2019 08:12) (96% - 100%)                          11.1   10.3  )-----------( 317      ( 25 Mar 2019 05:10 )             32.8       MEDICATIONS  (STANDING):  enalapril 10 milliGRAM(s) Oral daily  heparin  Infusion 1100 Unit(s)/Hr (11 mL/Hr) IV Continuous <Continuous>  psyllium Powder 1 Packet(s) Oral two times a day  simvastatin 40 milliGRAM(s) Oral at bedtime  warfarin 7 milliGRAM(s) Oral <User Schedule>  zinc oxide 20% Ointment 1 Application(s) Topical two times a day    MEDICATIONS  (PRN):  acetaminophen    Suspension .. 815 milliGRAM(s) Oral every 6 hours PRN Mild Pain (1 - 3)

## 2019-03-25 NOTE — PROGRESS NOTE ADULT - SUBJECTIVE AND OBJECTIVE BOX
SUBJECTIVE: Pt seen and examined, resting in bed no chest pains or SOB, feels better overall today  +PE on CTA chest, now on heparin drip and bridging to coumadin; denies chest pains/SOB  3/22 given TOV and has failed, cont straight cath Q8hrs, will need youssef placed for rehab- called urology for f/u consult  still occasionally having quite lose stools vs soft formed stools per staff    Vital Signs Last 24 Hrs  T(C): 36.9 (25 Mar 2019 08:12), Max: 37.2 (24 Mar 2019 16:42)  T(F): 98.5 (25 Mar 2019 08:12), Max: 98.9 (24 Mar 2019 16:42)  HR: 81 (25 Mar 2019 08:12) (79 - 99)  BP: 133/85 (25 Mar 2019 08:12) (117/80 - 139/95)  BP(mean): --  RR: 18 (25 Mar 2019 08:12) (18 - 20)  SpO2: 100% (25 Mar 2019 08:12) (96% - 100%)    PHYSICAL EXAM:    General: No Acute Distress     Neurological: Awake, alert oriented to person, place and time, Following Commands, Speech Fluent, Moving all extremities, Muscle Strength normal in all four extremities, No Drift, Sensation to Light Touch Intact    Pulmonary: Clear to Auscultation, No Rales, No Rhonchi, No Wheezes     Cardiovascular: S1, S2, Regular Rate and Rhythm     Gastrointestinal: Soft, Nontender, Nondistended     Incision: right groin: no ecchymosis/hematoma    LABS:                           11.1   10.3  )-----------( 317      ( 25 Mar 2019 05:10 )             32.8   PT/INR - ( 25 Mar 2019 05:10 )   PT: 14.4 sec;   INR: 1.24 ratio      PTT - ( 25 Mar 2019 05:10 )  PTT:66.0 sec    MEDICATIONS  (STANDING):  enalapril 10 milliGRAM(s) Oral daily  heparin  Infusion 1100 Unit(s)/Hr (11 mL/Hr) IV Continuous <Continuous>  psyllium Powder 1 Packet(s) Oral two times a day  simvastatin 40 milliGRAM(s) Oral at bedtime  warfarin 7 milliGRAM(s) Oral <User Schedule>  zinc oxide 20% Ointment 1 Application(s) Topical two times a day    MEDICATIONS  (PRN):  acetaminophen    Suspension .. 815 milliGRAM(s) Oral every 6 hours PRN Mild Pain (1 - 3)    DIET: [x] Regular [] CCD [] Renal [] Puree [] Dysphagia [] Tube Feeds:     IMAGING:   < from: CT Head No Cont (03.21.19 @ 14:55) >  INTERPRETATION:  History: Subarachnoid hemorrhage. Follow-up ventricular   size. Change in mental status.    Description: A noncontrast head CT was performed. Axial images were   performed from the skull base to the vertex with coronal/sagittal   reconstructions.    Comparison is made to a prior head CT from 03/19/2019.    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    The ventricles are stable in size compared tothe 03/19/2019 head CT   study.    No significant residual or recurrent subarachnoid hemorrhage is   visualized.    Chronic white matter changes and cerebral volume loss are again noted.   There is no evidence for acute infarct or superimposed new acute   hemorrhage.    Marked degenerative changes of the temporal mandibular joints is again   noted.    IMPRESSION:    Stable ventricular size.    < end of copied text >      < from: CT Head No Cont (03.19.19 @ 19:57) >  INTERPRETATION:  History: Status post aneurysm coiling. Subarachnoid   hemorrhage. Follow-up ventricular size. Patient now on heparin drip.    Description: A noncontrast head CT is compared to a prior head CT study   from 03/05/2019.    Aneurysm coiling is again noted in the suprasellar cistern, with   associated metallic streak artifact.    There has been a marked interval decrease in the diffuse subarachnoid   hemorrhage compared to the prior head CT study. There is no evidence for   new acute intracranial hemorrhage.     There has been a mild to moderate interval increase in size of the   lateral and third ventricles compared to the prior head CT study.    There is no evidence for superimposed acute vascular territory infarct or   new acute intracranial hemorrhage.    Severe degenerative changes are noted about the temporomandibular joints.    IMPRESSION:    There has been a marked interval decrease in the diffuse subarachnoid   hemorrhage compared to the prior head CT study. There is no evidence for   new acute intracranial hemorrhage.     There has been a mild to moderate interval increase in size of the   lateral and third ventricles compared to the prior head CT study.    < end of copied text >

## 2019-03-26 DIAGNOSIS — E87.1 HYPO-OSMOLALITY AND HYPONATREMIA: ICD-10-CM

## 2019-03-26 LAB
ANION GAP SERPL CALC-SCNC: 13 MMOL/L — SIGNIFICANT CHANGE UP (ref 5–17)
APPEARANCE UR: ABNORMAL
BACTERIA # UR AUTO: ABNORMAL
BILIRUB UR-MCNC: NEGATIVE — SIGNIFICANT CHANGE UP
BUN SERPL-MCNC: 12 MG/DL — SIGNIFICANT CHANGE UP (ref 7–23)
CALCIUM SERPL-MCNC: 9.1 MG/DL — SIGNIFICANT CHANGE UP (ref 8.4–10.5)
CHLORIDE SERPL-SCNC: 92 MMOL/L — LOW (ref 96–108)
CO2 SERPL-SCNC: 27 MMOL/L — SIGNIFICANT CHANGE UP (ref 22–31)
COLOR SPEC: YELLOW — SIGNIFICANT CHANGE UP
CREAT SERPL-MCNC: 0.8 MG/DL — SIGNIFICANT CHANGE UP (ref 0.5–1.3)
DIFF PNL FLD: ABNORMAL
EPI CELLS # UR: 3 /HPF — SIGNIFICANT CHANGE UP
GLUCOSE SERPL-MCNC: 115 MG/DL — HIGH (ref 70–99)
GLUCOSE UR QL: NEGATIVE — SIGNIFICANT CHANGE UP
HCT VFR BLD CALC: 32.3 % — LOW (ref 34.5–45)
HGB BLD-MCNC: 10.9 G/DL — LOW (ref 11.5–15.5)
HYALINE CASTS # UR AUTO: 0 /LPF — SIGNIFICANT CHANGE UP (ref 0–2)
INR BLD: 1.44 RATIO — HIGH (ref 0.88–1.16)
KETONES UR-MCNC: NEGATIVE — SIGNIFICANT CHANGE UP
LEUKOCYTE ESTERASE UR-ACNC: ABNORMAL
MCHC RBC-ENTMCNC: 30.8 PG — SIGNIFICANT CHANGE UP (ref 27–34)
MCHC RBC-ENTMCNC: 33.7 GM/DL — SIGNIFICANT CHANGE UP (ref 32–36)
MCV RBC AUTO: 91.5 FL — SIGNIFICANT CHANGE UP (ref 80–100)
NITRITE UR-MCNC: POSITIVE
PH UR: 5.5 — SIGNIFICANT CHANGE UP (ref 5–8)
PLATELET # BLD AUTO: 363 K/UL — SIGNIFICANT CHANGE UP (ref 150–400)
POTASSIUM SERPL-MCNC: 4 MMOL/L — SIGNIFICANT CHANGE UP (ref 3.5–5.3)
POTASSIUM SERPL-SCNC: 4 MMOL/L — SIGNIFICANT CHANGE UP (ref 3.5–5.3)
PROT UR-MCNC: 100 — SIGNIFICANT CHANGE UP
PROTHROM AB SERPL-ACNC: 16.6 SEC — HIGH (ref 10–12.9)
RBC # BLD: 3.54 M/UL — LOW (ref 3.8–5.2)
RBC # FLD: 12.7 % — SIGNIFICANT CHANGE UP (ref 10.3–14.5)
RBC CASTS # UR COMP ASSIST: 25 /HPF — HIGH (ref 0–4)
SODIUM SERPL-SCNC: 132 MMOL/L — LOW (ref 135–145)
SP GR SPEC: 1.02 — SIGNIFICANT CHANGE UP (ref 1.01–1.02)
UROBILINOGEN FLD QL: NEGATIVE — SIGNIFICANT CHANGE UP
WBC # BLD: 12.5 K/UL — HIGH (ref 3.8–10.5)
WBC # FLD AUTO: 12.5 K/UL — HIGH (ref 3.8–10.5)
WBC UR QL: 428 /HPF — HIGH (ref 0–5)

## 2019-03-26 PROCEDURE — 99232 SBSQ HOSP IP/OBS MODERATE 35: CPT

## 2019-03-26 PROCEDURE — 99233 SBSQ HOSP IP/OBS HIGH 50: CPT

## 2019-03-26 RX ORDER — SODIUM CHLORIDE 9 MG/ML
1000 INJECTION INTRAMUSCULAR; INTRAVENOUS; SUBCUTANEOUS
Qty: 0 | Refills: 0 | Status: DISCONTINUED | OUTPATIENT
Start: 2019-03-26 | End: 2019-03-29

## 2019-03-26 RX ORDER — WARFARIN SODIUM 2.5 MG/1
7 TABLET ORAL ONCE
Qty: 0 | Refills: 0 | Status: COMPLETED | OUTPATIENT
Start: 2019-03-26 | End: 2019-03-26

## 2019-03-26 RX ADMIN — SODIUM CHLORIDE 50 MILLILITER(S): 9 INJECTION INTRAMUSCULAR; INTRAVENOUS; SUBCUTANEOUS at 11:01

## 2019-03-26 RX ADMIN — SODIUM CHLORIDE 50 MILLILITER(S): 9 INJECTION INTRAMUSCULAR; INTRAVENOUS; SUBCUTANEOUS at 20:00

## 2019-03-26 RX ADMIN — ZINC OXIDE 1 APPLICATION(S): 200 OINTMENT TOPICAL at 18:01

## 2019-03-26 RX ADMIN — ZINC OXIDE 1 APPLICATION(S): 200 OINTMENT TOPICAL at 06:36

## 2019-03-26 RX ADMIN — SIMVASTATIN 40 MILLIGRAM(S): 20 TABLET, FILM COATED ORAL at 21:15

## 2019-03-26 RX ADMIN — Medication 1 PACKET(S): at 18:01

## 2019-03-26 RX ADMIN — WARFARIN SODIUM 7 MILLIGRAM(S): 2.5 TABLET ORAL at 21:16

## 2019-03-26 RX ADMIN — Medication 10 MILLIGRAM(S): at 06:35

## 2019-03-26 RX ADMIN — Medication 1 PACKET(S): at 06:35

## 2019-03-26 NOTE — PROGRESS NOTE ADULT - SUBJECTIVE AND OBJECTIVE BOX
CC: f/u for   leukocytosis, cdif  Patient reports she is better today, youssef reinserted    REVIEW OF SYSTEMS:  All other review of systems negative (Comprehensive ROS)    Antimicrobials Day #  :    Other Medications Reviewed    T(F): 98.3 (19 @ 12:13), Max: 98.7 (19 @ 19:51)  HR: 67 (19 @ 12:13)  BP: 133/85 (19 @ 12:13)  RR: 917 (19 @ 12:13)  SpO2: 96% (19 @ 08:28)  Wt(kg): --    PHYSICAL EXAM:  General: more alert, no acute distress  Eyes:  anicteric, no conjunctival injection, no discharge  Oropharynx: no lesions or injection 	  Neck: supple, without adenopathy  Lungs: clear to auscultation  Heart: regular rate and rhythm; no murmur, rubs or gallops  Abdomen: soft, nondistended, nontender, without mass or organomegaly  Skin: no lesions  Extremities: no clubbing, cyanosis, or edema  Neurologic: alert, oriented, moves all extremities    LAB RESULTS:                        10.9   12.5  )-----------( 363      ( 26 Mar 2019 06:39 )             32.3         132<L>  |  92<L>  |  12  ----------------------------<  115<H>  4.0   |  27  |  0.80    Ca    9.1      26 Mar 2019 08:33        Urinalysis Basic - ( 26 Mar 2019 11:29 )    Color: Yellow / Appearance: Slightly Turbid / S.018 / pH: x  Gluc: x / Ketone: Negative  / Bili: Negative / Urobili: Negative   Blood: x / Protein: 100 / Nitrite: Positive   Leuk Esterase: Large / RBC: 25 /hpf /  /HPF   Sq Epi: x / Non Sq Epi: 3 /hpf / Bacteria: Many      MICROBIOLOGY:  RECENT CULTURES:      Culture - Urine (03.15.19 @ 00:53)    -  Amikacin: S <=16    -  Ampicillin: R >16 These ampicillin results predict results for amoxicillin    -  Ampicillin/Sulbactam: I 16/8    -  Aztreonam: S <=4    -  Cefazolin: S <=8 For uncomplicated UTI with K. pneumoniae, E. coli, or P. mirablis: INDRA <=16 is sensitive and INDRA >=32 is resistant. This also predicts results for oral agents cefaclor, cefdinir, cefpodoxime, cefprozil, cefuroxime axetil, cephalexin and locarbef for uncomplicated UTI. Note that some isolates may be susceptible to these agents while testing resistant to cefazolin.    -  Cefepime: S <=4    -  Cefoxitin: S <=8    -  Ceftriaxone: S <=1 Enterobacter, Citrobacter, and Serratia may develop resistance during prolonged therapy    -  Ciprofloxacin: S <=1    -  Ertapenem: S <=1    -  Gentamicin: S <=4    -  Imipenem: S <=1    -  Levofloxacin: S <=2    -  Meropenem: S <=1    -  Nitrofurantoin: S <=32 Should not be used to treat pyelonephritis    -  Piperacillin/Tazobactam: S <=16    -  Tigecycline: S <=2    -  Tobramycin: S <=4    -  Trimethoprim/Sulfamethoxazole: S <=2/38    Specimen Source: .Urine Catheterized    Culture Results:   >100,000 CFU/ml Escherichia coli    Organism Identification: Escherichia coli    Organism: Escherichia coli    Method Type: INDRA    RADIOLOGY REVIEWED:      < from: CT Head No Cont (19 @ 17:41) >  IMPRESSION:    No acute intracranial pathology is noted. If the patient has new and   persistent symptoms, consider short interval follow-up head CT or brain   MRI follow-up if there are no MRI contraindications.      Assessment:  Patient with sah, aneurysm rupture s/p coil , developed cdif now treated, urinary retention youssef now replaced. Mental status is better today. Pyuria as expected with straight cath and youssef. Trying not to give systemic abx   Plan:  monitor off abx  any decompensation start ceftriaxone after blood cultures  await urine culture  check labs in am

## 2019-03-26 NOTE — PROGRESS NOTE ADULT - ASSESSMENT
74 yr old F h/o HTN on ASA presenting with syncope 3 hours ago. Patient has been having neck pain for 3 days, in which she was woken up suddenly due to neck pain. Per patient's , patient got out of bed, walked to foot of bed, was not responding to , then fell to the floor and was not responsive for one minute, until she came back to baseline. Patient does not remember walking or falling, but states she was aware of her surroundings. EMS was called and she was brought to ED. Denies head trauma, prior occurrence, denies being on blood thinners. States she feels mildly nauseous but denies vomiting, weakness, incontinence. States she returned to baseline. She was found to have SAH and was transferred here for further investigations and management.  HH1, MF3     PROCEDURE:  3/4 s/p cerebral angiogram/ coiling of ACOMM aneurysm, hospital course c/b cdiff, placement of rectal tube, urinary retention requiring catheterization and UTI- not treated per ID, youssef was placed for urinary retention and given another TOV 3/22 and has failed to date; 3/26 youssef replaced and UA/culture sent and will discuss again with ID need for antibiotics    PLAN:  Neuro:   - SAH- stable- s/p coiling  - continue home antihypertensive enalapril- BP controlled  - Urology consult appreciated- youssef placed today for persistent urinary retention and complete bladder emptying in setting of UTI not being treated with antibiotics per ID;     urology recalled for further recommendations  respiratory:   - encouraged incentive spirometry  -+acute PE on CTA chest,  now on therapeutic heparin drip with goal PTT 60-90 and bridging with coumadin INR now 1.44 goal 2-3; will give coumadin 7mg x 1 tonight  CV:  - vital signs stable  DVT ppx:   - venodynes, sq lovenox  ID:   - ID following  - Cdiff- completed 10 days of vanco po, rectal tube is out, stool is more formed   - asymptomatic bacteriuria: + urine culture 3/15/19- observe off systemic abx per ID; leukocytosis now elevated today; blood and urine cultures sent and will trend and f/u cultures and discuss with ID need for antibiotics  - thrush - on nystatin  - on metamucil for added fiber, and banatrol for loose stools  GI:    - tolerating diet  PT/OT/PMR- acute rehab on d/c once therapeutic on PO coumadin  Hospitalist medicine following   IVF for hydration in setting of urinary retention and hyponatremia   f/u am labs- CBC w diff, BMP, coags   updated at bedside    will discuss with Dr Rojelio Guadarrama # [id number]    Assessment:  Please Check When Present   []  GCS  E   V  M     Heart Failure: []Acute, [] acute on chronic , []chronic  Heart Failure:  [] Diastolic (HFpEF), [] Systolic (HFrEF), []Combined (HFpEF and HFrEF), [] RHF, [] Pulm HTN, [] Other    [] TEREZA, [] ATN, [] AIN, [] other  [] CKD1, [] CKD2, [] CKD 3, [] CKD 4, [] CKD 5, []ESRD    Encephalopathy: [] Metabolic, [] Hepatic, [] toxic, [] Neurological, [] Other    Abnormal Nutritional Status: [] malnutrition (see nutrition note), [ ]underweight: BMI < 19, [] morbid obesity: BMI >40, [] Cachexia    [] Sepsis  [] hypovolemic shock,[] cardiogenic shock, [] hemorrhagic shock, [] neurogenic shock  [] Acute Respiratory Failure  []Cerebral edema, [] Brain compression/ herniation,   [] Functional quadriplegia  [] Acute blood loss anemia

## 2019-03-26 NOTE — PROGRESS NOTE ADULT - PROBLEM SELECTOR PLAN 6
- BP controlled  - c/w ace inhibitor
restarted ACE-I. Labetalol d/c'd.
cont Low
restarted ACE-I. Can increase Enalapril. monitor
- BP controlled  - c/w ace inhibitor

## 2019-03-26 NOTE — PROGRESS NOTE ADULT - SUBJECTIVE AND OBJECTIVE BOX
Patient is a 74y old  Female who presents with a chief complaint of SAH (26 Mar 2019 14:40)    HPI: Low re-inserted today. Intermittent periods of confusion reported. Currently awake and alert.     Vital Signs Last 24 Hrs  T(C): 36.8 (26 Mar 2019 12:13), Max: 37.1 (25 Mar 2019 16:34)  T(F): 98.3 (26 Mar 2019 12:13), Max: 98.8 (25 Mar 2019 16:34)  HR: 67 (26 Mar 2019 12:13) (67 - 88)  BP: 133/85 (26 Mar 2019 12:13) (119/81 - 145/89)  BP(mean): --  RR: 917 (26 Mar 2019 12:13) (17 - 917)  SpO2: 96% (26 Mar 2019 08:28) (95% - 100%)                          10.9   12.5  )-----------( 363      ( 26 Mar 2019 06:39 )             32.3     03-26    132<L>  |  92<L>  |  12  ----------------------------<  115<H>  4.0   |  27  |  0.80    Ca    9.1      26 Mar 2019 08:33      MEDICATIONS  (STANDING):  enalapril 10 milliGRAM(s) Oral daily  heparin  Infusion 1100 Unit(s)/Hr (11 mL/Hr) IV Continuous <Continuous>  psyllium Powder 1 Packet(s) Oral two times a day  simvastatin 40 milliGRAM(s) Oral at bedtime  sodium chloride 0.9%. 1000 milliLiter(s) (50 mL/Hr) IV Continuous <Continuous>  warfarin 7 milliGRAM(s) Oral once  zinc oxide 20% Ointment 1 Application(s) Topical two times a day    MEDICATIONS  (PRN):  acetaminophen    Suspension .. 815 milliGRAM(s) Oral every 6 hours PRN Mild Pain (1 - 3)

## 2019-03-26 NOTE — PROGRESS NOTE ADULT - ASSESSMENT
75 yo female found with urinary retention, recent uti that has not undergone treatment     -Follow up urine culture  -Patient needs an indwelling Low catheter for maximal drainage in the setting of UTI and persistently high residuals, Keep Low  -UTI may be contributing to patient's urinary retention. Would not consider this asymptomatic bacteriuria, needs antibiotics if repeat culture is positive  -Continue OOB, limit narcotics, anticholinergics  -If has persistent retention, will need outpatient urodynamics to determine if SAH has affected voiding and further action/plan  -[facility] [phone]

## 2019-03-26 NOTE — PROGRESS NOTE ADULT - SUBJECTIVE AND OBJECTIVE BOX
SUBJECTIVE: Pt seen and examined, resting in bed no chest pains or SOB, feels better overall today  +PE on CTA chest, now on heparin drip and bridging to coumadin; denies chest pains/SOB  3/22 given TOV and has failed again with high residuals, youssef reinserted and UA/culture sent and will discuss with ID regarding antibiotics- appreciate urology follow up  completed course of PO vanco for cdiff 3/24/19, now with soft formed stools per staff  reported episodic confusion from yesterday not appreciated    Vital Signs Last 24 Hrs  T(C): 36.8 (26 Mar 2019 12:13), Max: 37.1 (25 Mar 2019 16:34)  T(F): 98.3 (26 Mar 2019 12:13), Max: 98.8 (25 Mar 2019 16:34)  HR: 67 (26 Mar 2019 12:13) (67 - 88)  BP: 133/85 (26 Mar 2019 12:13) (119/81 - 145/89)  BP(mean): --  RR: 917 (26 Mar 2019 12:13) (17 - 917)  SpO2: 96% (26 Mar 2019 08:28) (95% - 100%)    PHYSICAL EXAM:    General: No Acute Distress     Neurological: Awake, alert oriented to person, place and time, Following Commands, Speech Fluent, Moving all extremities, Muscle Strength normal in all four extremities, No Drift, Sensation to Light Touch Intact    Pulmonary: Clear to Auscultation, No Rales, No Rhonchi, No Wheezes     Cardiovascular: S1, S2, Regular Rate and Rhythm     Gastrointestinal: Soft, Nontender, Nondistended     Incision: right groin: no ecchymosis/hematoma    LABS:                                         10.9   12.5  )-----------( 363      ( 26 Mar 2019 06:39 )             32.3   03-26    132<L>  |  92<L>  |  12  ----------------------------<  115<H>  4.0   |  27  |  0.80    Ca    9.1      26 Mar 2019 08:33    PT/INR - ( 26 Mar 2019 06:39 )   PT: 16.6 sec;   INR: 1.44 ratio       PTT - ( 25 Mar 2019 05:10 )  PTT:66.0 sec      MEDICATIONS  (STANDING):  enalapril 10 milliGRAM(s) Oral daily  heparin  Infusion 1100 Unit(s)/Hr (11 mL/Hr) IV Continuous <Continuous>  psyllium Powder 1 Packet(s) Oral two times a day  simvastatin 40 milliGRAM(s) Oral at bedtime  warfarin 7 milliGRAM(s) Oral <User Schedule>  zinc oxide 20% Ointment 1 Application(s) Topical two times a day    MEDICATIONS  (PRN):  acetaminophen    Suspension .. 815 milliGRAM(s) Oral every 6 hours PRN Mild Pain (1 - 3)    DIET: [x] Regular [] CCD [] Renal [] Puree [] Dysphagia [] Tube Feeds:     IMAGING:   < from: CT Head No Cont (03.25.19 @ 17:41) >  INTERPRETATION:  History: Episode of confusion. Patient on   anticoagulation.    Description: A noncontrast head CT was performed. Axial images were   performed from the skull base to the vertex with coronal/sagittal   reconstructions.    Comparison is made to head CT study from 03/21/2019.    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 No residual or recurrent   subarachnoid hemorrhage is noted.    There is no evidence for acute infarct, acute hemorrhage, mass effect,   calvarial fracture, or acute hydrocephalus. The ventricles are stable in   size.    Mild to moderate patchy hypodensity without mass effect is noted in the   periventricular white matter whichmost likely represents chronic   microvascular ischemic changes given the patient's age.    Cerebral volume loss is present with secondary proportional prominence of   the sulci and ventricles.    No lytic or blastic calvarial lesions are noted. The visualized portions   of the paranasal sinuses and mastoid air cells are clear.    Marked degenerative changes of the temporal mandibular joints is again   noted.    IMPRESSION:    No acute intracranial pathology is noted. If the patient has new and   persistent symptoms, consider short interval follow-up head CT or brain   MRI follow-up if there are no MRI contraindications.    < end of copied text >

## 2019-03-26 NOTE — PROGRESS NOTE ADULT - SUBJECTIVE AND OBJECTIVE BOX
Patient in continued urinary retention.  Urine cloudy, UCx ordered, to be sent.    T(F): 98.4, Max: 98.8 (03-25-19 @ 16:34)  HR: 82  BP: 119/81  SpO2: 96%    OUT:    Intermittent Catheterization - Urethral: 900 mL    Gen NAD   Low in place draining cloudy yellow urine    03-26 @ 06:39  WBC 12.5  / Hct 32.3  / SCr 0.8

## 2019-03-27 LAB
ANION GAP SERPL CALC-SCNC: 11 MMOL/L — SIGNIFICANT CHANGE UP (ref 5–17)
APTT BLD: 69 SEC — HIGH (ref 27.5–36.3)
BASOPHILS # BLD AUTO: 0 K/UL — SIGNIFICANT CHANGE UP (ref 0–0.2)
BASOPHILS NFR BLD AUTO: 0.4 % — SIGNIFICANT CHANGE UP (ref 0–2)
BUN SERPL-MCNC: 13 MG/DL — SIGNIFICANT CHANGE UP (ref 7–23)
CALCIUM SERPL-MCNC: 8.9 MG/DL — SIGNIFICANT CHANGE UP (ref 8.4–10.5)
CHLORIDE SERPL-SCNC: 99 MMOL/L — SIGNIFICANT CHANGE UP (ref 96–108)
CO2 SERPL-SCNC: 26 MMOL/L — SIGNIFICANT CHANGE UP (ref 22–31)
CREAT SERPL-MCNC: 0.72 MG/DL — SIGNIFICANT CHANGE UP (ref 0.5–1.3)
EOSINOPHIL # BLD AUTO: 0.3 K/UL — SIGNIFICANT CHANGE UP (ref 0–0.5)
EOSINOPHIL NFR BLD AUTO: 2.2 % — SIGNIFICANT CHANGE UP (ref 0–6)
GLUCOSE SERPL-MCNC: 103 MG/DL — HIGH (ref 70–99)
HCT VFR BLD CALC: 28.9 % — LOW (ref 34.5–45)
HGB BLD-MCNC: 10.7 G/DL — LOW (ref 11.5–15.5)
INR BLD: 1.67 RATIO — HIGH (ref 0.88–1.16)
LYMPHOCYTES # BLD AUTO: 2.5 K/UL — SIGNIFICANT CHANGE UP (ref 1–3.3)
LYMPHOCYTES # BLD AUTO: 21.9 % — SIGNIFICANT CHANGE UP (ref 13–44)
MCHC RBC-ENTMCNC: 34.1 PG — HIGH (ref 27–34)
MCHC RBC-ENTMCNC: 37 GM/DL — HIGH (ref 32–36)
MCV RBC AUTO: 92.2 FL — SIGNIFICANT CHANGE UP (ref 80–100)
MONOCYTES # BLD AUTO: 1 K/UL — HIGH (ref 0–0.9)
MONOCYTES NFR BLD AUTO: 8.6 % — SIGNIFICANT CHANGE UP (ref 2–14)
NEUTROPHILS # BLD AUTO: 7.6 K/UL — HIGH (ref 1.8–7.4)
NEUTROPHILS NFR BLD AUTO: 66.8 % — SIGNIFICANT CHANGE UP (ref 43–77)
PLATELET # BLD AUTO: 398 K/UL — SIGNIFICANT CHANGE UP (ref 150–400)
POTASSIUM SERPL-MCNC: 3.9 MMOL/L — SIGNIFICANT CHANGE UP (ref 3.5–5.3)
POTASSIUM SERPL-SCNC: 3.9 MMOL/L — SIGNIFICANT CHANGE UP (ref 3.5–5.3)
PROTHROM AB SERPL-ACNC: 19.5 SEC — HIGH (ref 10–12.9)
RBC # BLD: 3.13 M/UL — LOW (ref 3.8–5.2)
RBC # FLD: 12.9 % — SIGNIFICANT CHANGE UP (ref 10.3–14.5)
SODIUM SERPL-SCNC: 136 MMOL/L — SIGNIFICANT CHANGE UP (ref 135–145)
WBC # BLD: 11.4 K/UL — HIGH (ref 3.8–10.5)
WBC # FLD AUTO: 11.4 K/UL — HIGH (ref 3.8–10.5)

## 2019-03-27 PROCEDURE — 99233 SBSQ HOSP IP/OBS HIGH 50: CPT

## 2019-03-27 PROCEDURE — 99232 SBSQ HOSP IP/OBS MODERATE 35: CPT

## 2019-03-27 RX ORDER — FOSFOMYCIN TROMETHAMINE 3 G/1
3 POWDER ORAL ONCE
Qty: 0 | Refills: 0 | Status: COMPLETED | OUTPATIENT
Start: 2019-03-27 | End: 2019-03-27

## 2019-03-27 RX ORDER — WARFARIN SODIUM 2.5 MG/1
7 TABLET ORAL ONCE
Qty: 0 | Refills: 0 | Status: COMPLETED | OUTPATIENT
Start: 2019-03-27 | End: 2019-03-27

## 2019-03-27 RX ORDER — VANCOMYCIN HCL 1 G
125 VIAL (EA) INTRAVENOUS EVERY 12 HOURS
Qty: 0 | Refills: 0 | Status: DISCONTINUED | OUTPATIENT
Start: 2019-03-27 | End: 2019-03-29

## 2019-03-27 RX ADMIN — ZINC OXIDE 1 APPLICATION(S): 200 OINTMENT TOPICAL at 18:12

## 2019-03-27 RX ADMIN — Medication 1 PACKET(S): at 06:14

## 2019-03-27 RX ADMIN — Medication 125 MILLIGRAM(S): at 18:12

## 2019-03-27 RX ADMIN — FOSFOMYCIN TROMETHAMINE 3 GRAM(S): 3 POWDER ORAL at 14:59

## 2019-03-27 RX ADMIN — Medication 1 PACKET(S): at 18:12

## 2019-03-27 RX ADMIN — ZINC OXIDE 1 APPLICATION(S): 200 OINTMENT TOPICAL at 06:14

## 2019-03-27 RX ADMIN — WARFARIN SODIUM 7 MILLIGRAM(S): 2.5 TABLET ORAL at 21:20

## 2019-03-27 RX ADMIN — Medication 10 MILLIGRAM(S): at 06:14

## 2019-03-27 RX ADMIN — SIMVASTATIN 40 MILLIGRAM(S): 20 TABLET, FILM COATED ORAL at 21:20

## 2019-03-27 NOTE — PROGRESS NOTE ADULT - ASSESSMENT
74 yr old F h/o HTN on ASA presenting with syncope 3 hours ago. Patient has been having neck pain for 3 days, in which she was woken up suddenly due to neck pain. Found to have SAH   HH1, MF3 .   3/4 s/p cerebral angiogram/ coiling of ACOMM aneurysm, hospital course c/b cdiff, placement of rectal tube, urinary retention requiring catheterization and E coli UTI .  Failed multiple trial of voids,  3/26 youssef replaced. On 3/18 patient with dyspnea on exertion. CTA chest revealed RUL PE. Started on heparin gtt 3/18. Now being bridged to coumadin     Plan    Neuro stable.  Vitals stable  PE- INR 1.67. Coumadin 7mg tonight. Continue hep gtt until INR =>2.0  UTI- Rpt UA +. F/u CX/ 1 dose of Fosfomycin as per ID .   Cdiff- Extended treatment for 5 days in setting of abx   D/w patient and  plans. Possible d/c to [facility] acute rehab by Friday

## 2019-03-27 NOTE — PROGRESS NOTE ADULT - SUBJECTIVE AND OBJECTIVE BOX
CC: f/u for cystitis, cdif    Patient reports has poor control of bowels but no diarrhea, youssef back in place, wants to go to rehab    REVIEW OF SYSTEMS:  All other review of systems negative (Comprehensive ROS)    Antimicrobials Day #  :    Other Medications Reviewed    T(F): 97.5 (19 @ 10:51), Max: 98.7 (19 @ 23:21)  HR: 85 (19 @ 10:51)  BP: 93/62 (19 @ 10:51)  RR: 18 (19 @ 10:51)  SpO2: 93% (19 @ 10:51)  Wt(kg): --    PHYSICAL EXAM:  General: alert, no acute distress  Eyes:  anicteric, no conjunctival injection, no discharge  Oropharynx: no lesions or injection 	  Neck: supple, without adenopathy  Lungs: clear to auscultation  Heart: regular rate and rhythm; no murmur, rubs or gallops  Abdomen: soft, nondistended, nontender, without mass or organomegaly  Skin: no lesions  Extremities: no clubbing, cyanosis, or edema  Neurologic: alert, oriented, moves all extremities    LAB RESULTS:                        10.7   11.4  )-----------( 398      ( 27 Mar 2019 05:33 )             28.9         136  |  99  |  13  ----------------------------<  103<H>  3.9   |  26  |  0.72    Ca    8.9      27 Mar 2019 05:33        Urinalysis Basic - ( 26 Mar 2019 11:29 )    Color: Yellow / Appearance: Slightly Turbid / S.018 / pH: x  Gluc: x / Ketone: Negative  / Bili: Negative / Urobili: Negative   Blood: x / Protein: 100 / Nitrite: Positive   Leuk Esterase: Large / RBC: 25 /hpf /  /HPF   Sq Epi: x / Non Sq Epi: 3 /hpf / Bacteria: Many      MICROBIOLOGY:  RECENT CULTURES:  uc lf gnr    RADIOLOGY REVIEWED:  < from: CT Head No Cont (19 @ 17:41) >    IMPRESSION:    No acute intracranial pathology is noted. If the patient has new and   persistent symptoms, consider short interval follow-up head CT or brain   MRI follow-up if there are no MRI contraindications.    < end of copied text >      Assessment:  Patient with sah, aneurysm coiled, cdif colitis treated and controlled, urinary retention now had youssef replaced.  thinks cystitis contributing to retention so will give  a dose of monurol which should be low risk for relapse of cdif.   Plan: monurol for one dose  give po vanco for 5 days bid to hopefully avert cdif relapse

## 2019-03-27 NOTE — CHART NOTE - NSCHARTNOTEFT_GEN_A_CORE
Patient with >100k GNR in urine.  Please treat UTI as is likely contributing to retention.  Follow up as outpatient for TOV as planned. Patient with >100k GNR in urine.  Please treat as complicated UTI as UTI is likely primary cause of, or is contributing to new-onset urinary retention.  Would give at least 2 doses Fosfomycin, 72 hour course not sufficient for complicated UTI.  Will defer to ID, as antibiotic therapy for UTI needs to be balanced against risk of C Diff.  Follow up as outpatient for TOV as planned. Patient with >100k GNR in urine.  Please treat as complicated UTI as UTI is likely primary cause of, or is contributing to new-onset urinary retention.  Would give at least 2 doses Fosfomycin, 72 hour course not sufficient for complicated UTI.  Will defer to ID, as antibiotic therapy for UTI needs to be balanced against risk of C Diff.  Follow up as outpatient for TOV as planned.  [facility] [phone]   Will sign off, please call if questions

## 2019-03-27 NOTE — PROGRESS NOTE ADULT - PROBLEM SELECTOR PROBLEM 5
HTN (hypertension)
Hypokalemia
Urinary retention
HTN (hypertension)
Hypokalemia
Hydrocephalus
HTN (hypertension)
HTN (hypertension)

## 2019-03-27 NOTE — PROGRESS NOTE ADULT - SUBJECTIVE AND OBJECTIVE BOX
SUBJECTIVE:   No new complaints   OVERNIGHT EVENTS: none    Vital Signs Last 24 Hrs  T(C): 36.4 (27 Mar 2019 10:51), Max: 37.1 (26 Mar 2019 23:21)  T(F): 97.5 (27 Mar 2019 10:51), Max: 98.7 (26 Mar 2019 23:21)  HR: 85 (27 Mar 2019 10:51) (75 - 98)  BP: 93/62 (27 Mar 2019 10:51) (93/62 - 148/89)  BP(mean): --  RR: 18 (27 Mar 2019 10:51) (17 - 18)  SpO2: 93% (27 Mar 2019 10:51) (93% - 98%)    PHYSICAL EXAM:    Constitutional: No Acute Distress     Neurological: Alert Ox3, Speech clear, Following Commands, Moving all Extremities 5/5      Pulmonary: Clear to Auscultation, No rales,     Cardiovascular: S1, S2, Regular rate and rhythm     Gastrointestinal: Soft, Non-tender, Non-distended     Extremities: No calf tenderness         LABS:                        10.7   11.4  )-----------( 398      ( 27 Mar 2019 05:33 )             28.9    03-27    136  |  99  |  13  ----------------------------<  103<H>  3.9   |  26  |  0.72    Ca    8.9      27 Mar 2019 05:33    PT/INR - ( 27 Mar 2019 05:34 )   PT: 19.5 sec;   INR: 1.67 ratio         PTT - ( 27 Mar 2019 05:34 )  PTT:69.0 sec          IMAGING:         MEDICATIONS:  Antibiotics:  fosfomycin 3 Gram(s) Oral once  vancomycin    Solution 125 milliGRAM(s) Oral every 12 hours    acetaminophen    Suspension .. 815 milliGRAM(s) Oral every 6 hours PRN Mild Pain (1 - 3)  enalapril 10 milliGRAM(s) Oral daily  psyllium Powder 1 Packet(s) Oral two times a day  heparin  Infusion 1100 Unit(s)/Hr IV Continuous <Continuous>  simvastatin 40 milliGRAM(s) Oral at bedtime  sodium chloride 0.9%. 1000 milliLiter(s) IV Continuous <Continuous>  warfarin 7 milliGRAM(s) Oral once  zinc oxide 20% Ointment 1 Application(s) Topical two times a day      DIET:

## 2019-03-27 NOTE — CHART NOTE - NSCHARTNOTEFT_GEN_A_CORE
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 diff, placement of rectal tube, urinary retention requiring catheterization and E coli UTI, CT chest revealed pulmonary embolism now on Coumadin.      Source: Patient [x]    Family [x]     other [x]; Medical record;  and daughter     Pt disoriented to time as per documentation, confirmed information with . Pt reports good appetite and PO intake. Noted 50-75% PO intake as per flow sheets - improved since previous RD note. Pt reports drinking Ensure Enlive 2xday. Denies difficulty chewing/swallowing. Pt denies nausea or vomiting. Reports loose BM, last one yesterday (03/26) - provided recommendations to help with food per pt's  request. Attempted to obtain food preferences - pt did not have any at this time. Provided education on Coumadin and its interaction with vitamin K. Pt denies having questions/concerns about diet and nutrition education provided.     Diet: Regular + Ensure Enlive 2x daily (700 nessa and 40 gm protein)     Enteral /Parenteral Nutrition: n/a    Current Weight: (03/06) 179.8 pounds - will continue to monitor.   % Weight Change: n/a    Pertinent Medications: MEDICATIONS  (STANDING):  enalapril 10 milliGRAM(s) Oral daily  heparin  Infusion 1100 Unit(s)/Hr (11 mL/Hr) IV Continuous <Continuous>  psyllium Powder 1 Packet(s) Oral two times a day  simvastatin 40 milliGRAM(s) Oral at bedtime  sodium chloride 0.9%. 1000 milliLiter(s) (50 mL/Hr) IV Continuous <Continuous>  vancomycin    Solution 125 milliGRAM(s) Oral every 12 hours  warfarin 7 milliGRAM(s) Oral once  zinc oxide 20% Ointment 1 Application(s) Topical two times a day    MEDICATIONS  (PRN):  acetaminophen    Suspension .. 815 milliGRAM(s) Oral every 6 hours PRN Mild Pain (1 - 3)    Pertinent Labs: (03/27) Glu 103 mg/dL<H>   (03/04) HbA1c 5.6%     Skin: no noted pressure injuries as per documentation.   No noted edema as per flow sheets.     Estimated Needs:   [x] no change since previous assessment  [ ] recalculated:     Previous Nutrition Diagnosis: [x] Inadequate PO intake     Nutrition Diagnosis is [x] resolved - being addressed with nutritional supplementation  Pt currently at goal: pt to meet > 80% of estimated nutrition needs     New Nutrition Diagnosis: [x] not applicable    Interventions:     1. Recommend continue diet and supplementation as above.   2. Encourage PO intake and obtain food preferences (pt did not have any at this time).   3. Provided recommendations to help with loose stools through foods per pt's  request, discussed foods considered as stool binders, described foods recommended and not recommended.   4. Provided education on Coumadin and its interaction with vitamin K. Reviewed content of vitamin K in foods.   5. Obtain current weight to identify changes if any.     Monitoring and Evaluation:     [x] PO intake [x] Tolerance to diet prescription [x] weights [x] follow up per protocol    RD remains available.  Mayra Frost MS, RDN, #[phone]

## 2019-03-27 NOTE — PROGRESS NOTE ADULT - SUBJECTIVE AND OBJECTIVE BOX
Patient is a 74y old  Female who presents with a chief complaint of SAH (27 Mar 2019 11:45)    HPI: No recurrent diarrhea. Walked with PT this am. Currently sitting up in chair. Feels well.     Vital Signs Last 24 Hrs  T(C): 36.4 (27 Mar 2019 10:51), Max: 37.1 (26 Mar 2019 23:21)  T(F): 97.5 (27 Mar 2019 10:51), Max: 98.7 (26 Mar 2019 23:21)  HR: 85 (27 Mar 2019 10:51) (75 - 98)  BP: 93/62 (27 Mar 2019 10:51) (93/62 - 148/89)  BP(mean): --  RR: 18 (27 Mar 2019 10:51) (17 - 18)  SpO2: 93% (27 Mar 2019 10:51) (93% - 98%)                          10.7   11.4  )-----------( 398      ( 27 Mar 2019 05:33 )             28.9     03-27    136  |  99  |  13  ----------------------------<  103<H>  3.9   |  26  |  0.72    Ca    8.9      27 Mar 2019 05:33      MEDICATIONS  (STANDING):  enalapril 10 milliGRAM(s) Oral daily  fosfomycin 3 Gram(s) Oral once  heparin  Infusion 1100 Unit(s)/Hr (11 mL/Hr) IV Continuous <Continuous>  psyllium Powder 1 Packet(s) Oral two times a day  simvastatin 40 milliGRAM(s) Oral at bedtime  sodium chloride 0.9%. 1000 milliLiter(s) (50 mL/Hr) IV Continuous <Continuous>  vancomycin    Solution 125 milliGRAM(s) Oral every 12 hours  zinc oxide 20% Ointment 1 Application(s) Topical two times a day    MEDICATIONS  (PRN):  acetaminophen    Suspension .. 815 milliGRAM(s) Oral every 6 hours PRN Mild Pain (1 - 3)

## 2019-03-28 LAB
-  AMIKACIN: SIGNIFICANT CHANGE UP
-  AMPICILLIN/SULBACTAM: SIGNIFICANT CHANGE UP
-  AMPICILLIN: SIGNIFICANT CHANGE UP
-  AZTREONAM: SIGNIFICANT CHANGE UP
-  CEFAZOLIN: SIGNIFICANT CHANGE UP
-  CEFEPIME: SIGNIFICANT CHANGE UP
-  CEFOXITIN: SIGNIFICANT CHANGE UP
-  CEFTRIAXONE: SIGNIFICANT CHANGE UP
-  CIPROFLOXACIN: SIGNIFICANT CHANGE UP
-  ERTAPENEM: SIGNIFICANT CHANGE UP
-  GENTAMICIN: SIGNIFICANT CHANGE UP
-  IMIPENEM: SIGNIFICANT CHANGE UP
-  LEVOFLOXACIN: SIGNIFICANT CHANGE UP
-  MEROPENEM: SIGNIFICANT CHANGE UP
-  NITROFURANTOIN: SIGNIFICANT CHANGE UP
-  PIPERACILLIN/TAZOBACTAM: SIGNIFICANT CHANGE UP
-  TIGECYCLINE: SIGNIFICANT CHANGE UP
-  TOBRAMYCIN: SIGNIFICANT CHANGE UP
-  TRIMETHOPRIM/SULFAMETHOXAZOLE: SIGNIFICANT CHANGE UP
ANION GAP SERPL CALC-SCNC: 14 MMOL/L — SIGNIFICANT CHANGE UP (ref 5–17)
APTT BLD: 87.5 SEC — HIGH (ref 27.5–36.3)
BUN SERPL-MCNC: 13 MG/DL — SIGNIFICANT CHANGE UP (ref 7–23)
CALCIUM SERPL-MCNC: 8.9 MG/DL — SIGNIFICANT CHANGE UP (ref 8.4–10.5)
CHLORIDE SERPL-SCNC: 98 MMOL/L — SIGNIFICANT CHANGE UP (ref 96–108)
CO2 SERPL-SCNC: 24 MMOL/L — SIGNIFICANT CHANGE UP (ref 22–31)
CREAT SERPL-MCNC: 0.79 MG/DL — SIGNIFICANT CHANGE UP (ref 0.5–1.3)
CULTURE RESULTS: SIGNIFICANT CHANGE UP
GLUCOSE SERPL-MCNC: 95 MG/DL — SIGNIFICANT CHANGE UP (ref 70–99)
HCT VFR BLD CALC: 32.1 % — LOW (ref 34.5–45)
HGB BLD-MCNC: 10.1 G/DL — LOW (ref 11.5–15.5)
INR BLD: 1.88 RATIO — HIGH (ref 0.88–1.16)
MCHC RBC-ENTMCNC: 28.8 PG — SIGNIFICANT CHANGE UP (ref 27–34)
MCHC RBC-ENTMCNC: 31.5 GM/DL — LOW (ref 32–36)
MCV RBC AUTO: 91.6 FL — SIGNIFICANT CHANGE UP (ref 80–100)
METHOD TYPE: SIGNIFICANT CHANGE UP
ORGANISM # SPEC MICROSCOPIC CNT: SIGNIFICANT CHANGE UP
ORGANISM # SPEC MICROSCOPIC CNT: SIGNIFICANT CHANGE UP
PLATELET # BLD AUTO: 453 K/UL — HIGH (ref 150–400)
POTASSIUM SERPL-MCNC: 4.1 MMOL/L — SIGNIFICANT CHANGE UP (ref 3.5–5.3)
POTASSIUM SERPL-SCNC: 4.1 MMOL/L — SIGNIFICANT CHANGE UP (ref 3.5–5.3)
PROTHROM AB SERPL-ACNC: 21.8 SEC — HIGH (ref 10–12.9)
RBC # BLD: 3.5 M/UL — LOW (ref 3.8–5.2)
RBC # FLD: 12.7 % — SIGNIFICANT CHANGE UP (ref 10.3–14.5)
SODIUM SERPL-SCNC: 136 MMOL/L — SIGNIFICANT CHANGE UP (ref 135–145)
SPECIMEN SOURCE: SIGNIFICANT CHANGE UP
WBC # BLD: 9 K/UL — SIGNIFICANT CHANGE UP (ref 3.8–10.5)
WBC # FLD AUTO: 9 K/UL — SIGNIFICANT CHANGE UP (ref 3.8–10.5)

## 2019-03-28 PROCEDURE — 99233 SBSQ HOSP IP/OBS HIGH 50: CPT

## 2019-03-28 PROCEDURE — 99232 SBSQ HOSP IP/OBS MODERATE 35: CPT

## 2019-03-28 RX ORDER — WARFARIN SODIUM 2.5 MG/1
7 TABLET ORAL ONCE
Qty: 0 | Refills: 0 | Status: COMPLETED | OUTPATIENT
Start: 2019-03-28 | End: 2019-03-28

## 2019-03-28 RX ORDER — PETROLATUM,WHITE
1 JELLY (GRAM) TOPICAL
Qty: 0 | Refills: 0 | Status: DISCONTINUED | OUTPATIENT
Start: 2019-03-28 | End: 2019-03-29

## 2019-03-28 RX ADMIN — Medication 125 MILLIGRAM(S): at 18:05

## 2019-03-28 RX ADMIN — Medication 125 MILLIGRAM(S): at 06:16

## 2019-03-28 RX ADMIN — Medication 10 MILLIGRAM(S): at 06:16

## 2019-03-28 RX ADMIN — ZINC OXIDE 1 APPLICATION(S): 200 OINTMENT TOPICAL at 06:16

## 2019-03-28 RX ADMIN — Medication 1 PACKET(S): at 18:04

## 2019-03-28 RX ADMIN — Medication 1 APPLICATION(S): at 18:04

## 2019-03-28 RX ADMIN — Medication 1 PACKET(S): at 06:16

## 2019-03-28 RX ADMIN — ZINC OXIDE 1 APPLICATION(S): 200 OINTMENT TOPICAL at 18:06

## 2019-03-28 RX ADMIN — SIMVASTATIN 40 MILLIGRAM(S): 20 TABLET, FILM COATED ORAL at 21:33

## 2019-03-28 RX ADMIN — WARFARIN SODIUM 7 MILLIGRAM(S): 2.5 TABLET ORAL at 21:34

## 2019-03-28 NOTE — PROGRESS NOTE ADULT - SUBJECTIVE AND OBJECTIVE BOX
CC: f/u for uti, cdif    Patient reports  she is much better  REVIEW OF SYSTEMS:  All other review of systems negative (Comprehensive ROS)    Antimicrobials Day #  :  vancomycin    Solution 125 milliGRAM(s) Oral every 12 hours  s/p monurol  Other Medications Reviewed    T(F): 97.8 (03-28-19 @ 15:15), Max: 98.5 (03-27-19 @ 20:29)  HR: 94 (03-28-19 @ 15:15)  BP: 110/75 (03-28-19 @ 15:15)  RR: 18 (03-28-19 @ 15:15)  SpO2: 97% (03-28-19 @ 15:15)  Wt(kg): --    PHYSICAL EXAM:  General: alert, no acute distress  Eyes:  anicteric, no conjunctival injection, no discharge  Oropharynx: no lesions or injection 	  Neck: supple, without adenopathy  Lungs: clear to auscultation  Heart: regular rate and rhythm; no murmur, rubs or gallops  Abdomen: soft, nondistended, nontender, without mass or organomegaly  Skin: no lesions  Extremities: no clubbing, cyanosis, or edema  Neurologic: alert, oriented, moves all extremities    LAB RESULTS:                        10.1   9.0   )-----------( 453      ( 28 Mar 2019 06:02 )             32.1     03-28    136  |  98  |  13  ----------------------------<  95  4.1   |  24  |  0.79    Ca    8.9      28 Mar 2019 06:02          MICROBIOLOGY:  RECENT CULTURES:  03-26 @ 17:05 .Urine Catheterized Escherichia coli    >100,000 CFU/ml Escherichia coli      03-26 @ 13:32 .Blood Blood     No growth to date.        Assessment:  Patient with sah, aneurysm coiled, developed urinary retention initially tx for youssef then youssef pulled. Developed cdif and that is now controlled. Had mild rise of wbc and more confused. Cultures done, ct head ok.  Had youssef replaced and got a dose of monurol for ecoli cystitis, doing much better.   Plan:  a couple more days of po vanco  no further systemic abx for now  favor to keep youssef

## 2019-03-28 NOTE — PROGRESS NOTE ADULT - SUBJECTIVE AND OBJECTIVE BOX
SUBJECTIVE:     OVERNIGHT EVENTS: none    Vital Signs Last 24 Hrs  T(C): 36.6 (28 Mar 2019 15:15), Max: 36.9 (27 Mar 2019 20:29)  T(F): 97.8 (28 Mar 2019 15:15), Max: 98.5 (27 Mar 2019 20:29)  HR: 94 (28 Mar 2019 15:15) (72 - 95)  BP: 110/75 (28 Mar 2019 15:15) (110/75 - 143/87)  BP(mean): --  RR: 18 (28 Mar 2019 15:15) (17 - 20)  SpO2: 97% (28 Mar 2019 15:15) (96% - 99%)    PHYSICAL EXAM:      Neurological: Alert Ox3, Speech clear, Following Commands, Moving all Extremities 5/5      Pulmonary: Clear to Auscultation, No rales,     Cardiovascular: S1, S2, Regular rate and rhythm     Gastrointestinal: Soft, Non-tender, Non-distended     Extremities: No calf tenderness       LABS:                        10.1   9.0   )-----------( 453      ( 28 Mar 2019 06:02 )             32.1    03-28    136  |  98  |  13  ----------------------------<  95  4.1   |  24  |  0.79    Ca    8.9      28 Mar 2019 06:02    PT/INR - ( 28 Mar 2019 06:02 )   PT: 21.8 sec;   INR: 1.88 ratio   PTT:87.5 sec        IMAGING:         MEDICATIONS:    vancomycin    Solution 125 milliGRAM(s) Oral every 12 hours  acetaminophen    Suspension .. 815 milliGRAM(s) Oral every 6 hours PRN Mild Pain (1 - 3)  enalapril 10 milliGRAM(s) Oral daily  psyllium Powder 1 Packet(s) Oral two times a day  AQUAPHOR (petrolatum Ointment) 1 Application(s) Topical two times a day  heparin  Infusion 1100 Unit(s)/Hr IV Continuous <Continuous>  simvastatin 40 milliGRAM(s) Oral at bedtime  sodium chloride 0.9%. 1000 milliLiter(s) IV Continuous <Continuous>  warfarin 7 milliGRAM(s) Oral once  zinc oxide 20% Ointment 1 Application(s) Topical two times a day      DIET:

## 2019-03-28 NOTE — PROGRESS NOTE ADULT - SUBJECTIVE AND OBJECTIVE BOX
Patient is a 74y old  Female who presents with a chief complaint of SAH (27 Mar 2019 13:43)    HPI: Pt up in bed. Feels well. Family around bedside.     Vital Signs Last 24 Hrs  T(C): 36.6 (28 Mar 2019 15:15), Max: 36.9 (27 Mar 2019 16:34)  T(F): 97.8 (28 Mar 2019 15:15), Max: 98.5 (27 Mar 2019 20:29)  HR: 94 (28 Mar 2019 15:15) (72 - 95)  BP: 110/75 (28 Mar 2019 15:15) (110/75 - 143/87)  BP(mean): --  RR: 18 (28 Mar 2019 15:15) (17 - 20)  SpO2: 97% (28 Mar 2019 15:15) (96% - 99%)                          10.1   9.0   )-----------( 453      ( 28 Mar 2019 06:02 )             32.1     03-28    136  |  98  |  13  ----------------------------<  95  4.1   |  24  |  0.79    Ca    8.9      28 Mar 2019 06:02      MEDICATIONS  (STANDING):  AQUAPHOR (petrolatum Ointment) 1 Application(s) Topical two times a day  enalapril 10 milliGRAM(s) Oral daily  heparin  Infusion 1100 Unit(s)/Hr (11 mL/Hr) IV Continuous <Continuous>  psyllium Powder 1 Packet(s) Oral two times a day  simvastatin 40 milliGRAM(s) Oral at bedtime  sodium chloride 0.9%. 1000 milliLiter(s) (50 mL/Hr) IV Continuous <Continuous>  vancomycin    Solution 125 milliGRAM(s) Oral every 12 hours  warfarin 7 milliGRAM(s) Oral once  zinc oxide 20% Ointment 1 Application(s) Topical two times a day    MEDICATIONS  (PRN):  acetaminophen    Suspension .. 815 milliGRAM(s) Oral every 6 hours PRN Mild Pain (1 - 3)

## 2019-03-28 NOTE — PROGRESS NOTE ADULT - ASSESSMENT
75 yo Female with HTN and HLD presenting with headaches x 3 days and syncope, found to have subarachnoid hemorrhage on 3/4, s/p coiling of ACOMM aneurysm. Course complicated by c diff infection requiring rectal tube (now out), urinary retention and acute pulmonary embolism now on heparin bridge to coumadin.

## 2019-03-28 NOTE — PROGRESS NOTE ADULT - ASSESSMENT
74 yr old F h/o HTN on ASA presenting with syncope 3 hours ago. Patient has been having neck pain for 3 days, in which she was woken up suddenly due to neck pain. Found to have SAH   HH1, MF3 .   3/4 s/p cerebral angiogram/ coiling of ACOMM aneurysm, hospital course c/b cdiff, placement of rectal tube, urinary retention requiring catheterization and E coli UTI .  Failed multiple trial of voids, Requiring intermittent straight cath On 3/18 patient with dyspnea on exertion. CTA chest revealed RUL PE. Started on heparin gtt 3/18. Now being bridged to coumadin     Plan    Neuro stable.  Vitals stable  PE- INR 1.88 Coumadin 7mg tonight. Continue hep gtt until INR =>2.0  Urinary retention-Continue w Intermittent straight cath. Outpatient urology follow up   UTI- Ecoli Received  dose of Fosfomycin on 3/27/19   Cdiff- Extended treatment until 3/31 in setting of abx   D/w patient and  plans. Possible d/c to [facility] acute rehab by Friday

## 2019-03-29 ENCOUNTER — INPATIENT (INPATIENT)
Facility: HOSPITAL | Age: 74
LOS: 9 days | Discharge: ANOTHER HOSPITAL | DRG: 949 | End: 2019-04-08
Attending: PSYCHIATRY & NEUROLOGY | Admitting: PSYCHIATRY & NEUROLOGY
Payer: MEDICARE

## 2019-03-29 ENCOUNTER — TRANSCRIPTION ENCOUNTER (OUTPATIENT)
Age: 74
End: 2019-03-29

## 2019-03-29 VITALS
RESPIRATION RATE: 18 BRPM | SYSTOLIC BLOOD PRESSURE: 129 MMHG | DIASTOLIC BLOOD PRESSURE: 90 MMHG | TEMPERATURE: 99 F | OXYGEN SATURATION: 100 % | HEART RATE: 75 BPM

## 2019-03-29 VITALS
WEIGHT: 197.53 LBS | HEART RATE: 92 BPM | SYSTOLIC BLOOD PRESSURE: 123 MMHG | HEIGHT: 65 IN | OXYGEN SATURATION: 96 % | TEMPERATURE: 98 F | DIASTOLIC BLOOD PRESSURE: 82 MMHG | RESPIRATION RATE: 14 BRPM

## 2019-03-29 DIAGNOSIS — I63.9 CEREBRAL INFARCTION, UNSPECIFIED: ICD-10-CM

## 2019-03-29 DIAGNOSIS — I26.99 OTHER PULMONARY EMBOLISM WITHOUT ACUTE COR PULMONALE: ICD-10-CM

## 2019-03-29 LAB
ANION GAP SERPL CALC-SCNC: 13 MMOL/L — SIGNIFICANT CHANGE UP (ref 5–17)
APTT BLD: 124.7 SEC — CRITICAL HIGH (ref 27.5–36.3)
APTT BLD: 141.5 SEC — CRITICAL HIGH (ref 27.5–36.3)
BUN SERPL-MCNC: 10 MG/DL — SIGNIFICANT CHANGE UP (ref 7–23)
CALCIUM SERPL-MCNC: 9.3 MG/DL — SIGNIFICANT CHANGE UP (ref 8.4–10.5)
CHLORIDE SERPL-SCNC: 99 MMOL/L — SIGNIFICANT CHANGE UP (ref 96–108)
CO2 SERPL-SCNC: 26 MMOL/L — SIGNIFICANT CHANGE UP (ref 22–31)
CREAT SERPL-MCNC: 0.79 MG/DL — SIGNIFICANT CHANGE UP (ref 0.5–1.3)
GLUCOSE SERPL-MCNC: 88 MG/DL — SIGNIFICANT CHANGE UP (ref 70–99)
HCT VFR BLD CALC: 31.9 % — LOW (ref 34.5–45)
HGB BLD-MCNC: 11.2 G/DL — LOW (ref 11.5–15.5)
INR BLD: 2.17 RATIO — HIGH (ref 0.88–1.16)
INR BLD: 2.18 RATIO — HIGH (ref 0.88–1.16)
MCHC RBC-ENTMCNC: 32 PG — SIGNIFICANT CHANGE UP (ref 27–34)
MCHC RBC-ENTMCNC: 35.2 GM/DL — SIGNIFICANT CHANGE UP (ref 32–36)
MCV RBC AUTO: 91 FL — SIGNIFICANT CHANGE UP (ref 80–100)
PLATELET # BLD AUTO: 420 K/UL — HIGH (ref 150–400)
POTASSIUM SERPL-MCNC: 4.2 MMOL/L — SIGNIFICANT CHANGE UP (ref 3.5–5.3)
POTASSIUM SERPL-SCNC: 4.2 MMOL/L — SIGNIFICANT CHANGE UP (ref 3.5–5.3)
PROTHROM AB SERPL-ACNC: 25.4 SEC — HIGH (ref 10–12.9)
PROTHROM AB SERPL-ACNC: 25.5 SEC — HIGH (ref 10–12.9)
RBC # BLD: 3.51 M/UL — LOW (ref 3.8–5.2)
RBC # FLD: 12.8 % — SIGNIFICANT CHANGE UP (ref 10.3–14.5)
SODIUM SERPL-SCNC: 138 MMOL/L — SIGNIFICANT CHANGE UP (ref 135–145)
WBC # BLD: 8.1 K/UL — SIGNIFICANT CHANGE UP (ref 3.8–10.5)
WBC # FLD AUTO: 8.1 K/UL — SIGNIFICANT CHANGE UP (ref 3.8–10.5)

## 2019-03-29 PROCEDURE — 75894 X-RAYS TRANSCATH THERAPY: CPT

## 2019-03-29 PROCEDURE — 84436 ASSAY OF TOTAL THYROXINE: CPT

## 2019-03-29 PROCEDURE — 71045 X-RAY EXAM CHEST 1 VIEW: CPT

## 2019-03-29 PROCEDURE — 86901 BLOOD TYPING SEROLOGIC RH(D): CPT

## 2019-03-29 PROCEDURE — 85610 PROTHROMBIN TIME: CPT

## 2019-03-29 PROCEDURE — 75898 FOLLOW-UP ANGIOGRAPHY: CPT | Mod: 59

## 2019-03-29 PROCEDURE — 97110 THERAPEUTIC EXERCISES: CPT

## 2019-03-29 PROCEDURE — 87177 OVA AND PARASITES SMEARS: CPT

## 2019-03-29 PROCEDURE — 36223 PLACE CATH CAROTID/INOM ART: CPT | Mod: XS

## 2019-03-29 PROCEDURE — 99239 HOSP IP/OBS DSCHRG MGMT >30: CPT

## 2019-03-29 PROCEDURE — 71275 CT ANGIOGRAPHY CHEST: CPT

## 2019-03-29 PROCEDURE — C1769: CPT

## 2019-03-29 PROCEDURE — 61624 TCAT PERM OCCLS/EMBOLJ CNS: CPT

## 2019-03-29 PROCEDURE — 87324 CLOSTRIDIUM AG IA: CPT

## 2019-03-29 PROCEDURE — 81001 URINALYSIS AUTO W/SCOPE: CPT

## 2019-03-29 PROCEDURE — 93970 EXTREMITY STUDY: CPT

## 2019-03-29 PROCEDURE — 83036 HEMOGLOBIN GLYCOSYLATED A1C: CPT

## 2019-03-29 PROCEDURE — 87493 C DIFF AMPLIFIED PROBE: CPT

## 2019-03-29 PROCEDURE — 97530 THERAPEUTIC ACTIVITIES: CPT

## 2019-03-29 PROCEDURE — C1894: CPT

## 2019-03-29 PROCEDURE — 93306 TTE W/DOPPLER COMPLETE: CPT

## 2019-03-29 PROCEDURE — 36224 PLACE CATH CAROTD ART: CPT

## 2019-03-29 PROCEDURE — 82962 GLUCOSE BLOOD TEST: CPT

## 2019-03-29 PROCEDURE — 93888 INTRACRANIAL LIMITED STUDY: CPT

## 2019-03-29 PROCEDURE — 97116 GAIT TRAINING THERAPY: CPT

## 2019-03-29 PROCEDURE — C1889: CPT

## 2019-03-29 PROCEDURE — 87086 URINE CULTURE/COLONY COUNT: CPT

## 2019-03-29 PROCEDURE — 87046 STOOL CULTR AEROBIC BACT EA: CPT

## 2019-03-29 PROCEDURE — 85730 THROMBOPLASTIN TIME PARTIAL: CPT

## 2019-03-29 PROCEDURE — C2628: CPT

## 2019-03-29 PROCEDURE — C1760: CPT

## 2019-03-29 PROCEDURE — 87507 IADNA-DNA/RNA PROBE TQ 12-25: CPT

## 2019-03-29 PROCEDURE — 86850 RBC ANTIBODY SCREEN: CPT

## 2019-03-29 PROCEDURE — 83735 ASSAY OF MAGNESIUM: CPT

## 2019-03-29 PROCEDURE — 85027 COMPLETE CBC AUTOMATED: CPT

## 2019-03-29 PROCEDURE — 99285 EMERGENCY DEPT VISIT HI MDM: CPT | Mod: 25

## 2019-03-29 PROCEDURE — 87045 FECES CULTURE AEROBIC BACT: CPT

## 2019-03-29 PROCEDURE — 87040 BLOOD CULTURE FOR BACTERIA: CPT

## 2019-03-29 PROCEDURE — 84480 ASSAY TRIIODOTHYRONINE (T3): CPT

## 2019-03-29 PROCEDURE — 80048 BASIC METABOLIC PNL TOTAL CA: CPT

## 2019-03-29 PROCEDURE — 84100 ASSAY OF PHOSPHORUS: CPT

## 2019-03-29 PROCEDURE — 36226 PLACE CATH VERTEBRAL ART: CPT

## 2019-03-29 PROCEDURE — 93886 INTRACRANIAL COMPLETE STUDY: CPT

## 2019-03-29 PROCEDURE — 80061 LIPID PANEL: CPT

## 2019-03-29 PROCEDURE — 97166 OT EVAL MOD COMPLEX 45 MIN: CPT

## 2019-03-29 PROCEDURE — 99232 SBSQ HOSP IP/OBS MODERATE 35: CPT

## 2019-03-29 PROCEDURE — C1887: CPT

## 2019-03-29 PROCEDURE — 76377 3D RENDER W/INTRP POSTPROCES: CPT

## 2019-03-29 PROCEDURE — 70450 CT HEAD/BRAIN W/O DYE: CPT

## 2019-03-29 PROCEDURE — 87186 SC STD MICRODIL/AGAR DIL: CPT

## 2019-03-29 PROCEDURE — 87449 NOS EACH ORGANISM AG IA: CPT

## 2019-03-29 PROCEDURE — 84145 PROCALCITONIN (PCT): CPT

## 2019-03-29 PROCEDURE — G0515: CPT

## 2019-03-29 PROCEDURE — 84443 ASSAY THYROID STIM HORMONE: CPT

## 2019-03-29 PROCEDURE — 97161 PT EVAL LOW COMPLEX 20 MIN: CPT

## 2019-03-29 PROCEDURE — 86900 BLOOD TYPING SEROLOGIC ABO: CPT

## 2019-03-29 PROCEDURE — 36228 PLACE CATH INTRACRANIAL ART: CPT

## 2019-03-29 RX ORDER — SIMVASTATIN 20 MG/1
40 TABLET, FILM COATED ORAL AT BEDTIME
Qty: 0 | Refills: 0 | Status: DISCONTINUED | OUTPATIENT
Start: 2019-03-29 | End: 2019-03-29

## 2019-03-29 RX ORDER — PSYLLIUM SEED (WITH DEXTROSE)
1 POWDER (GRAM) ORAL
Qty: 0 | Refills: 0 | COMMUNITY
Start: 2019-03-29

## 2019-03-29 RX ORDER — SIMVASTATIN 20 MG/1
1 TABLET, FILM COATED ORAL
Qty: 0 | Refills: 0 | COMMUNITY
Start: 2019-03-29

## 2019-03-29 RX ORDER — ACETAMINOPHEN 500 MG
25.47 TABLET ORAL
Qty: 0 | Refills: 0 | COMMUNITY
Start: 2019-03-29

## 2019-03-29 RX ORDER — PANTOPRAZOLE SODIUM 20 MG/1
40 TABLET, DELAYED RELEASE ORAL
Qty: 0 | Refills: 0 | Status: DISCONTINUED | OUTPATIENT
Start: 2019-03-29 | End: 2019-04-08

## 2019-03-29 RX ORDER — PETROLATUM,WHITE
1 JELLY (GRAM) TOPICAL
Qty: 0 | Refills: 0 | COMMUNITY
Start: 2019-03-29

## 2019-03-29 RX ORDER — ZINC OXIDE 200 MG/G
1 OINTMENT TOPICAL
Qty: 0 | Refills: 0 | COMMUNITY
Start: 2019-03-29

## 2019-03-29 RX ORDER — PETROLATUM,WHITE
1 JELLY (GRAM) TOPICAL
Qty: 0 | Refills: 0 | Status: DISCONTINUED | OUTPATIENT
Start: 2019-03-29 | End: 2019-04-03

## 2019-03-29 RX ORDER — VANCOMYCIN HCL 1 G
1 VIAL (EA) INTRAVENOUS
Qty: 0 | Refills: 0 | COMMUNITY
Start: 2019-03-29

## 2019-03-29 RX ORDER — ACETAMINOPHEN 500 MG
815 TABLET ORAL EVERY 6 HOURS
Qty: 0 | Refills: 0 | Status: DISCONTINUED | OUTPATIENT
Start: 2019-03-29 | End: 2019-04-07

## 2019-03-29 RX ORDER — VANCOMYCIN HCL 1 G
125 VIAL (EA) INTRAVENOUS EVERY 12 HOURS
Qty: 0 | Refills: 0 | Status: DISCONTINUED | OUTPATIENT
Start: 2019-03-29 | End: 2019-04-02

## 2019-03-29 RX ORDER — PSYLLIUM SEED (WITH DEXTROSE)
1 POWDER (GRAM) ORAL
Qty: 0 | Refills: 0 | Status: DISCONTINUED | OUTPATIENT
Start: 2019-03-29 | End: 2019-04-03

## 2019-03-29 RX ORDER — ATORVASTATIN CALCIUM 80 MG/1
20 TABLET, FILM COATED ORAL AT BEDTIME
Qty: 0 | Refills: 0 | Status: DISCONTINUED | OUTPATIENT
Start: 2019-03-29 | End: 2019-04-08

## 2019-03-29 RX ORDER — ZINC OXIDE 200 MG/G
1 OINTMENT TOPICAL
Qty: 0 | Refills: 0 | Status: DISCONTINUED | OUTPATIENT
Start: 2019-03-29 | End: 2019-04-03

## 2019-03-29 RX ORDER — WARFARIN SODIUM 2.5 MG/1
7 TABLET ORAL ONCE
Qty: 0 | Refills: 0 | Status: COMPLETED | OUTPATIENT
Start: 2019-03-29 | End: 2019-03-29

## 2019-03-29 RX ADMIN — ZINC OXIDE 1 APPLICATION(S): 200 OINTMENT TOPICAL at 06:00

## 2019-03-29 RX ADMIN — Medication 10 MILLIGRAM(S): at 06:00

## 2019-03-29 RX ADMIN — Medication 1 PACKET(S): at 06:01

## 2019-03-29 RX ADMIN — Medication 1 PACKET(S): at 21:28

## 2019-03-29 RX ADMIN — Medication 1 APPLICATION(S): at 06:00

## 2019-03-29 RX ADMIN — Medication 125 MILLIGRAM(S): at 21:30

## 2019-03-29 RX ADMIN — WARFARIN SODIUM 7 MILLIGRAM(S): 2.5 TABLET ORAL at 21:30

## 2019-03-29 RX ADMIN — Medication 125 MILLIGRAM(S): at 06:00

## 2019-03-29 RX ADMIN — ATORVASTATIN CALCIUM 20 MILLIGRAM(S): 80 TABLET, FILM COATED ORAL at 21:29

## 2019-03-29 NOTE — PROVIDER CONTACT NOTE (CRITICAL VALUE NOTIFICATION) - ACTION/TREATMENT ORDERED:
no treatment at this time
repeated ptt
Provider to RN order to hold the heparin infusion until a new lab results. Draw a stat aptt now. Once results appear will discuss course of treatment. Will continue to monitor.
Redraw PTT, turn off heparin gtt at this time.

## 2019-03-29 NOTE — DISCHARGE NOTE PROVIDER - NSDCACTIVITY_GEN_ALL_CORE
No heavy lifting/straining/Walking - Outdoors allowed/Do not make important decisions/Stairs allowed/Walking - Indoors allowed/Bathing allowed/Do not drive or operate machinery/Showering allowed

## 2019-03-29 NOTE — PROGRESS NOTE ADULT - PROBLEM SELECTOR PROBLEM 3
Clostridium difficile infection
Clostridium difficile infection
Hydrocephalus
Clostridium difficile infection
Hydrocephalus
Acute pulmonary embolism
Clostridium difficile infection
Clostridium difficile infection
Hydrocephalus
Acute pulmonary embolism

## 2019-03-29 NOTE — H&P ADULT - NSHPPHYSICALEXAM_GEN_ALL_CORE
Vital Signs Last 24 Hrs  T(C): 36.9 (29 Mar 2019 17:26), Max: 37 (29 Mar 2019 13:04)  T(F): 98.4 (29 Mar 2019 17:26), Max: 98.6 (29 Mar 2019 13:04)  HR: 92 (29 Mar 2019 17:26) (73 - 97)  BP: 123/82 (29 Mar 2019 17:26) (113/77 - 157/98)  BP(mean): --  RR: 14 (29 Mar 2019 17:26) (14 - 18)  SpO2: 96% (29 Mar 2019 17:26) (96% - 100%)    ----------------------------------------------------------------------------------------  PHYSICAL EXAM  Constitutional - NAD, Comfortable  HEENT - NCAT, EOMI  Neck - Supple, No limited ROM  Chest - Breathing comfortably, No wheezing  Cardiovascular - S1S2   Abdomen - Soft   Extremities - No C/C/E, No calf tenderness   Neurologic Exam -                    Cognitive - Awake, Alert, AAO to self, place, date, month with choices,     Communication - Fluent, No dysarthria, naming and repetition intact     Memory - 0/3 word recall even with cues;     Attention- able to name days of week backwards, unable to spell WORLD backwards     Cranial Nerves - CN 2-12 intact     Motor - No focal deficits                    LEFT    UE - ShAB 5/5, EF 5/5, EE 5/5, WE 4/5,  5/5                    RIGHT UE - ShAB 5/5, EF 5/5, EE 5/5, WE 4/5,  5/5                    LEFT    LE - HF 5/5, KE 5/5, DF 5/5, PF 5/5                    RIGHT LE - HF 5/5, KE 5/5, DF 5/5, PF 5/5        Sensory - Intact to LT     Reflexes - DTR Intact, No primitive reflexive     Coordination - FTN intact  Psychiatric - Mood stable, Affect WNL  Skin - intact

## 2019-03-29 NOTE — PROGRESS NOTE ADULT - REASON FOR ADMISSION
SAH
Admitted 3/4 HH1 MF 3 SAH ; s/p cerebral angiogram for coiling of anterior communicating artery aneurysm
SAH
Admitted 3/4 HH1 MF 3 SAH ; s/p cerebral angiogram for coiling of anterior communicating artery aneurysm
Admitted 3/4 HH1 MF 3 SAH ; s/p cerebral angiogram for coiling of anterior communicating artery aneurysm
SAH
subarachnoid hemorrhage
subarachnoid hemorrhage
SAH

## 2019-03-29 NOTE — PROGRESS NOTE ADULT - PROBLEM SELECTOR PROBLEM 1
Subarachnoid hemorrhage
Acute pulmonary embolism
Leukocytosis
Leukocytosis
Acute pulmonary embolism
Leukocytosis
Urinary retention
Acute pulmonary embolism
Urinary retention

## 2019-03-29 NOTE — H&P ADULT - NSHPLABSRESULTS_GEN_ALL_CORE
EXAM:  CT BRAIN                            PROCEDURE DATE:  03/25/2019            INTERPRETATION:  History: Episode of confusion. Patient on   anticoagulation.    Description: A noncontrast head CT was performed. Axial images were   performed from the skull base to the vertex with coronal/sagittal   reconstructions.    Comparison is made to head CT study from 03/21/2019.    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 No residual or recurrent   subarachnoid hemorrhage is noted.    There is no evidence for acute infarct, acute hemorrhage, mass effect,   calvarial fracture, or acute hydrocephalus. The ventricles are stable in   size.    Mild to moderate patchy hypodensity without mass effect is noted in the   periventricular white matter whichmost likely represents chronic   microvascular ischemic changes given the patient's age.    Cerebral volume loss is present with secondary proportional prominence of   the sulci and ventricles.    No lytic or blastic calvarial lesions are noted. The visualized portions   of the paranasal sinuses and mastoid air cells are clear.    Marked degenerative changes of the temporal mandibular joints is again   noted.    IMPRESSION:    No acute intracranial pathology is noted. If the patient has new and   persistent symptoms, consider short interval follow-up head CT or brain   MRI follow-up if there are no MRI contraindications.                    SABINO REYES M.D., ATTENDING RADIOLOGIST  This document has been electronically signed. Mar 25 2019  6:11PM
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
## 2019-03-29 NOTE — DISCHARGE NOTE PROVIDER - NSDCCPCAREPLAN_GEN_ALL_CORE_FT
PRINCIPAL DISCHARGE DIAGNOSIS  Diagnosis: Subarachnoid hemorrhage  Assessment and Plan of Treatment: 3/4/19 she underwent a cerebral angiogram and coiling of anterior communicating artery aneurysm (ACOMM)   Dr Serrato- neurosurgeon- upon discharge from rehab.   Dr Castro- stroke neurologist- upon discharge from rehab.      SECONDARY DISCHARGE DIAGNOSES  Diagnosis: E-coli UTI  Assessment and Plan of Treatment: completed treatement with fosfamycin x 1 dose on 3/27/19 per ID    Diagnosis: HTN (hypertension)  Assessment and Plan of Treatment: HTN (hypertension)  Please continue medications and follow up with your primary care physician and/or cardiologist upon dishcarge from rehab.    Diagnosis: Clostridium difficile infection  Assessment and Plan of Treatment: Clostridium difficile infection  continue PO vancomycin thru 3/31/19 prophylaxis after given fofamycin x 1 on 3/27/19 for UTI.    Diagnosis: Acute pulmonary embolism  Assessment and Plan of Treatment: Please continue coumadin at 7mg x 1 tonight 3/29/19 and dose daily coumadin based on INR with goal of 2-3. Follow up wiht your cardiologist and/or primary care physcian upon discharge from rehab.    Diagnosis: Urinary retention  Assessment and Plan of Treatment: Please maintain youssef and obtain urodynamics, trial of void, and urology follow up with Dr Burrows upon discharge from rehab.    Diagnosis: HLD (hyperlipidemia)  Assessment and Plan of Treatment: Please continue medications and follow up with your primary care physician and/or cardiologist upon discharge from rehab.    Diagnosis: Intracerebral aneurysm  Assessment and Plan of Treatment: 3/4/19 she underwent a cerebral angiogram and coiling of anterior communicating artery aneurysm (ACOMM)

## 2019-03-29 NOTE — H&P ADULT - ASSESSMENT
77 yo female s/p SAH due to Acomm aneurysm rupture, now with functional deficits.    #s/p SAH    #PE  Continue coumadin, PT INR    #Cdiff hx  Vancomycin to continue, monitor BMs    #UTI EColi  Abx completed    #Low-void trials   eval    #HTN  Continue regimen, monitor BP closely    Precautions:           fall, seizure, aspiration                                                                       Diet: reg c thins    DVT Prophylaxis:       on coumadin for PE                                                                   Medical Prognosis: good    Prescreen Comparison: I have reviewed the prescreen information and I found no relevant changes between the preadmission  screening and my post admission evaluation.     Expected Therapy:   P.T.   1     hrs/day           O. T.   1   hrs/day           S.L.P.   1     hrs/day                    P&O      eval                                             Excpected Frequency: 5 days/7 day period    Rehab Potential:      good                                     Estimated Disposition:     home                     ELOS:   21           days      Rationale For Inpatient Rehab Admission- Patient demonstrates the following:     [X] Medically appropriate for rehabilitation admission   [X] Has attainable rehab goals with an approrpriate discharge plan  [X] Has rehabilitation potential (expected to make significant improvement within a reasonable period of time)  [X] Requires close medical management by a rehab physician, rehab nursing care and comprehensive interdisciplinary team (including         PT, OT, SLP and/or prosthetics and orthotics)

## 2019-03-29 NOTE — H&P ADULT - HISTORY OF PRESENT ILLNESS
74 yr old Female c PMHx of HTN on ASA presents to ED s/p syncope and 3 day hx of neck pain on 3/4/19. Admitted to [facility] where admission workup revealed large SAH on CT brain.  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 Hospital course notable for cdiff diarrhea evaluated by ID and treated with course of Flagyl and Vancomycin; also urinary retention (youssef was reinserted on 3/26/19), failed multiple TOVs, concurrent E coli UTI was discovered on 3/27/19 and treated with fosfomycin. On 3/18/19 patient developed dyspnea. CTA chest revealed RUL PE. Started on heparin gtt 3/18/19 and bridged to coumadin.     *Will need urodynamics and outpatient urology follow up; 74 yr old Female c PMHx of HTN on ASA (? indication) presents to ED s/p syncope and 3 day hx of neck pain on 3/4/19. Admitted to [facility] where admission workup revealed large SAH on CT brain.  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 Hospital course notable for cdiff diarrhea evaluated by ID and treated with course of Flagyl and Vancomycin; also urinary retention (youssef was reinserted on 3/26/19), failed multiple TOVs, concurrent E coli UTI was discovered on 3/27/19 and treated with fosfomycin. On 3/18/19 patient developed dyspnea. CTA chest revealed RUL PE. Started on heparin gtt 3/18/19 and bridged to coumadin.     *Will need urodynamics and outpatient urology follow up

## 2019-03-29 NOTE — DISCHARGE NOTE PROVIDER - HOSPITAL COURSE
74 yr old F h/o HTN on ASA presenting with syncope 3 hours ago. Patient has been having neck pain for 3 days, in which she was woken up suddenly due to neck pain. Per patient's , patient got out of bed, walked to foot of bed, was not responding to , then fell to the floor and was not responsive for one minute, until she came back to baseline. Patient does not remember walking or falling, but states she was aware of her surroundings. EMS was called and she was brought to ED. Denies head trauma, prior occurrence, denies being on blood thinners. States she feels mildly nauseous but denies vomiting, weakness, incontinence. States she returned to baseline. She was found to have SAH and was transferred here for further investigations and management.  HH1, MF3 (04 Mar 2019 11:14)        Patient admitted and found to have SAH on CTH. She was admitted to NSC[facility] and cared for per protocol. 3/4/19 she underwent a cerebral angiogram and coiling of anterior communicating artery aneurysm (ACOMM) with hospital course complicated by cdiff with rectal tube placement/removal and completed 10 day course of PO vancomycin which has been extended to 3/31/19 after fosfamycin x 1 dose given for ecoli UTI on 3/27/19; urinary retention failing multiple TOV and youssef replaced on 3/26/19 and she will need urodynamics and outpatient urology follow up; acute pulmonary embolism on 3/18/19 CTA chest- heparin bridged to coumadin with this am INR 2.18 74 yr old F h/o HTN on ASA presenting with syncope 3 hours ago. Patient has been having neck pain for 3 days, in which she was woken up suddenly due to neck pain. Per patient's , patient got out of bed, walked to foot of bed, was not responding to , then fell to the floor and was not responsive for one minute, until she came back to baseline. Patient does not remember walking or falling, but states she was aware of her surroundings. EMS was called and she was brought to ED. Denies head trauma, prior occurrence, denies being on blood thinners. States she feels mildly nauseous but denies vomiting, weakness, incontinence. States she returned to baseline. She was found to have SAH and was transferred here for further investigations and management.  HH1, MF3 (04 Mar 2019 11:14)        Patient admitted and found to have SAH on CTH. She was admitted to [facility]SCU and cared for per protocol. 3/4/19 she underwent a cerebral angiogram and coiling of anterior communicating artery aneurysm (ACOMM) with hospital course complicated by cdiff with rectal tube placement/removal and completed 10 day course of PO vancomycin which has been extended to 3/31/19 after fosfamycin x 1 dose given for ecoli UTI on 3/27/19; ID has followed in consult, urinary retention failing multiple TOV and youssef replaced on 3/26/19 and she will need urodynamics and outpatient urology follow up per urology consult; acute pulmonary embolism on 3/18/19 CTA chest- heparin started 3/18/19 and bridged to coumadin with this am INR 2.18. She has been followed by hospitalist medicine during her stay. PT/OT/PMR evaluated him and ultimately recommended acute rehab. Most recent CTH on 3/25/19 showed mild ventricular dilatation which has been stable.     On 3/29/19 she is medically and neurologically stable for discharge to rehab with youssef in place. 74 yr old F h/o HTN on ASA presenting with syncope 3 hours ago. Patient has been having neck pain for 3 days, in which she was woken up suddenly due to neck pain. Per patient's , patient got out of bed, walked to foot of bed, was not responding to , then fell to the floor and was not responsive for one minute, until she came back to baseline. Patient does not remember walking or falling, but states she was aware of her surroundings. EMS was called and she was brought to ED. Denies head trauma, prior occurrence, denies being on blood thinners. States she feels mildly nauseous but denies vomiting, weakness, incontinence. States she returned to baseline. She was found to have SAH and was transferred here for further investigations and management.  HH1, MF3 (04 Mar 2019 11:14)        Patient admitted and found to have SAH on CTH. She was admitted to NSCU and cared for per protocol. 3/4/19 she underwent a cerebral angiogram and coiling of anterior communicating artery aneurysm (ACOMM) with hospital course complicated by cdiff with rectal tube placement/removal and completed 10 day course of PO vancomycin which has been extended to 3/31/19 after fosfamycin x 1 dose given for ecoli UTI on 3/27/19; ID has followed in consult, urinary retention failing multiple TOV and youssef replaced on 3/26/19 and she will need urodynamics and outpatient urology follow up per urology consult; acute pulmonary embolism on 3/18/19 CTA chest- heparin started 3/18/19 and bridged to coumadin with this am INR 2.18. She has been followed by hospitalist medicine during her stay. PT/OT/PMR evaluated him and ultimately recommended acute rehab. Most recent CTH on 3/25/19 showed mild ventricular dilatation which has been stable.     On 3/29/19 she is medically and neurologically stable for discharge to rehab with youssef in place.         More than 30 minutes were spent educating the patient and family regarding condition, medications, follow up plans, signs and symptoms to be concerned with, preparing paperwork, and questions answered regarding discharge.

## 2019-03-29 NOTE — PROVIDER CONTACT NOTE (OTHER) - ACTION/TREATMENT ORDERED:
PRN order for straight cath
Continue heparin infusion as ordered.  Maintain IV patency.  Recheck aptt values today at 20:30.
No repeat CTH at this time
heparin drip D/C'd

## 2019-03-29 NOTE — PROGRESS NOTE ADULT - PROBLEM SELECTOR PROBLEM 4
Hydrocephalus
Hydrocephalus
Urinary retention
Hydrocephalus
Hypokalemia
Clostridium difficile infection
HTN (hypertension)
HTN (hypertension)
Urinary retention
Clostridium difficile infection

## 2019-03-29 NOTE — PROVIDER CONTACT NOTE (CRITICAL VALUE NOTIFICATION) - ASSESSMENT
stable
Pt in isolation precautions
Pt receiving heparin infusion at 12ml/hr. Pt aPTT at 2030 in therapeutic range of 63.3. Most recent aPTT at 0258 was 122.3
no signs of bleeding

## 2019-03-29 NOTE — DISCHARGE NOTE PROVIDER - NSDCFUADDINST_GEN_ALL_CORE_FT
please notify physician if fevers, bleeding, swelling, pain not relieved by medication, increased sluggishness or irritability, increased nausea or vomiting, inability to tolerate foods or liquids.   please do not engage in strenuous activity, heavy lifting, drive, or return to work or school until cleared by surgeon.   please keep incision clean and dry, do not submerge wound in water for prolonged periods of time, pat dry after showering, and do not use any creams or ointments to incision.   Do not drive or return to school/work until cleared by surgeon

## 2019-03-29 NOTE — H&P ADULT - NSHPREVIEWOFSYSTEMS_GEN_ALL_CORE
REVIEW OF SYSTEMS:  CONSTITUTIONAL: No fever, weight loss, or fatigue  EYES: No eye pain, visual disturbances, or discharge  ENMT:  No difficulty hearing, tinnitus, vertigo; No sinus or throat pain  NECK: No pain or stiffness  BREASTS: No pain, masses, or nipple discharge  RESPIRATORY: No cough, wheezing, chills or hemoptysis; No shortness of breath  CARDIOVASCULAR: No chest pain, palpitations, dizziness, or leg swelling  GASTROINTESTINAL: No abdominal or epigastric pain. No nausea, vomiting, or hematemesis; No diarrhea or constipation. No melena or hematochezia.  GENITOURINARY: No dysuria, frequency, hematuria, or incontinence  NEUROLOGICAL: No headaches, memory loss, loss of strength, numbness, or tremors  SKIN: No itching, burning, rashes, or lesions   LYMPH NODES: No enlarged glands  ENDOCRINE: No heat or cold intolerance; No hair loss  MUSCULOSKELETAL: No joint pain or swelling; No muscle, back, or extremity pain  PSYCHIATRIC: No depression, anxiety, mood swings, or difficulty sleeping  HEME/LYMPH: No easy bruising, or bleeding gums  ALLERY AND IMMUNOLOGIC: No hives or eczema REVIEW OF SYSTEMS:  CONSTITUTIONAL: No fever, weight loss, or fatigue  EYES: No eye pain, visual disturbances, or discharge  ENMT:  No difficulty hearing, tinnitus, vertigo; No sinus or throat pain  NECK: No pain or stiffness  BREASTS: No pain, masses, or nipple discharge  RESPIRATORY: No cough, wheezing, chills or hemoptysis; No shortness of breath  CARDIOVASCULAR: No chest pain, palpitations, dizziness, or leg swelling  GASTROINTESTINAL: No abdominal or epigastric pain. No nausea, vomiting, or hematemesis; No diarrhea or constipation. No melena or hematochezia.  GENITOURINARY: No dysuria, frequency, hematuria, +youssef, fecal incontinence  NEUROLOGICAL: No headaches, memory loss, loss of strength, numbness, or tremors  SKIN: No itching, burning, rashes, or lesions   LYMPH NODES: No enlarged glands  ENDOCRINE: No heat or cold intolerance; No hair loss  MUSCULOSKELETAL: No joint pain or swelling; No muscle, back, or extremity pain  PSYCHIATRIC: No depression, anxiety, mood swings, or difficulty sleeping  HEME/LYMPH: No easy bruising, or bleeding gums  ALLERY AND IMMUNOLOGIC: No hives or eczema

## 2019-03-29 NOTE — PROGRESS NOTE ADULT - PROBLEM SELECTOR PROBLEM 2
Acute pulmonary embolism
Acute pulmonary embolism
Clostridium difficile infection
Acute pulmonary embolism
Clostridium difficile infection
Hyponatremia
Acute pulmonary embolism
Acute pulmonary embolism
Clostridium difficile infection
Hyponatremia

## 2019-03-29 NOTE — DISCHARGE NOTE PROVIDER - CARE PROVIDERS DIRECT ADDRESSES
[email or web address]

## 2019-03-29 NOTE — PROGRESS NOTE ADULT - PROVIDER SPECIALTY LIST ADULT
Hospitalist
Infectious Disease
Internal Medicine
NSICU
Neurology
Neurology
Neurosurgery
Urology
Urology
Hospitalist
Neurosurgery

## 2019-03-29 NOTE — PROGRESS NOTE ADULT - SUBJECTIVE AND OBJECTIVE BOX
SUBJECTIVE: patient anxiosu to go to rehab +youssef s/p fosafmycin x 1 on 3/27/19 for ecoli UTI per ID and to cont vanco PO thru 3/31/19 for cdiff "prophylaxis"    Vital Signs Last 24 Hrs  T(C): 37 (29 Mar 2019 13:04), Max: 37 (29 Mar 2019 13:04)  T(F): 98.6 (29 Mar 2019 13:04), Max: 98.6 (29 Mar 2019 13:04)  HR: 75 (29 Mar 2019 13:04) (73 - 97)  BP: 129/90 (29 Mar 2019 13:04) (113/77 - 157/98)  BP(mean): --  RR: 18 (29 Mar 2019 13:04) (18 - 18)  SpO2: 100% (29 Mar 2019 13:04) (97% - 100%)    PHYSICAL EXAM:      Neurological: Alert Ox3, Speech clear, Following Commands, Moving all Extremities 5/5      Pulmonary: Clear to Auscultation, No rales,     Cardiovascular: S1, S2, Regular rate and rhythm     Gastrointestinal: Soft, Non-tender, Non-distended     Extremities: No calf tenderness       LABS:  PT/INR - ( 29 Mar 2019 08:23 )   PT: 25.4 sec;   INR: 2.18 ratio       PTT - ( 29 Mar 2019 08:23 )  PTT:141.5 sec                      11.2   8.1   )-----------( 420      ( 29 Mar 2019 06:32 )               31.9     IMAGING: no new imaging      MEDICATIONS:    vancomycin    Solution 125 milliGRAM(s) Oral every 12 hours  acetaminophen    Suspension .. 815 milliGRAM(s) Oral every 6 hours PRN Mild Pain (1 - 3)  enalapril 10 milliGRAM(s) Oral daily  psyllium Powder 1 Packet(s) Oral two times a day  AQUAPHOR (petrolatum Ointment) 1 Application(s) Topical two times a day  heparin  Infusion 1100 Unit(s)/Hr IV Continuous <Continuous>  simvastatin 40 milliGRAM(s) Oral at bedtime  sodium chloride 0.9%. 1000 milliLiter(s) IV Continuous <Continuous>  warfarin 7 milliGRAM(s) Oral once  zinc oxide 20% Ointment 1 Application(s) Topical two times a day

## 2019-03-29 NOTE — H&P ADULT - NSHPSOCIALHISTORY_GEN_ALL_CORE
Social History  No tobacco, alcohol or drug use    Functional History  Lives with  in co-op, 3 steps to enter with handrails, then 12 steps up to living quarters +HR  Independent prior, no assistive device,  retired able to help, daughter lives nearby    Current Functional Status  Min assist with bed mobility and transfers  Supervision level with gait RW 25ft x2

## 2019-03-29 NOTE — PROGRESS NOTE ADULT - ASSESSMENT
74 yr old F h/o HTN on ASA presenting with syncope 3 hours ago. Patient has been having neck pain for 3 days, in which she was woken up suddenly due to neck pain. Found to have SAH   HH1, MF3 .   3/4 s/p cerebral angiogram/ coiling of ACOMM aneurysm, hospital course c/b cdiff, placement of rectal tube, urinary retention requiring catheterization and E coli UTI .  Failed multiple trial of voids, Requiring intermittent straight cath On 3/18 patient with dyspnea on exertion. CTA chest revealed RUL PE. Started on heparin gtt 3/18. Now being bridged to coumadin     Plan    Neuro stable.  Vitals stable  acute PE- INR 2.18 Coumadin 7mg tonight, further dosing at rehab for INR goal 2-3  Urinary retention-Continue youssef,  Outpatient urology follow up and urodynamics/TOV at that time; will d/c to rehab w youssef in place today  UTI- Ecoli Received  dose of Fosfomycin on 3/27/19   Cdiff- Extended treatment until 3/31 in setting of abx   D/w patient and  [facility]  d/c IVL    will discuss with Dr Serrato  [id number]

## 2019-03-29 NOTE — DISCHARGE NOTE PROVIDER - REASON FOR ADMISSION
SAH; 3/4/19 s/p cerebral angiogram and coiling of anterior communicating artery aneurysm (ACOMM) with hospital course complicated by cdiff with rectal tube placement/removal and completed 10 day course of PO vancomycin which has been extended to 3/31/19 after fosfamycin x 1 dose given for ecoli UTI on 3/27/19; urinary retention failing multiple TOV and youssef replaced on 3/26/19 and she will need urodynamics and outpatient urology follow up; acute pulmonary embolism on 3/18/19 CTA chest- heparin bridged to coumadin with this am INR 2.18 SAH SAH on [facility].  3/4/19 she underwent a cerebral angiogram and coiling of anterior communicating artery aneurysm (ACOMM) with hospital course complicated by cdiff with rectal tube placement/removal and completed 10 day course of PO vancomycin which has been extended to 3/31/19 after fosfamycin x 1 dose given for ecoli UTI on 3/27/19; ID has followed in consult, urinary retention failing multiple TOV and youssef replaced on 3/26/19 and she will need urodynamics and outpatient urology follow up per urology consult; acute pulmonary embolism on 3/18/19 CTA chest- heparin started 3/18/19 and bridged to coumadin with this am INR 2.18. SAH on [facility]. 3/4/19 she underwent a cerebral angiogram and coiling of anterior communicating artery aneurysm (ACOMM) with hospital course complicated by cdiff with rectal tube placement/removal and completed 10 day course of PO vancomycin which has been extended to 3/31/19 after fosfamycin x 1 dose given for ecoli UTI on 3/27/19; ID has followed in consult, urinary retention failing multiple TOV and youssef replaced on 3/26/19 and she will need urodynamics and outpatient urology follow up per urology consult; acute pulmonary embolism on 3/18/19 CTA chest- heparin started 3/18/19 and bridged to coumadin with this am INR 2.18.

## 2019-03-29 NOTE — PROGRESS NOTE ADULT - PROBLEM SELECTOR PLAN 3
- c/w Vancomycin, to complete course tomorrow, per infectious disease
- management per primary team
cont Vanco
cont Vanco
monitoring ventricle size
- c/w heparin drip with bridge to coumadin per primary team
- management per primary team
Completed treatment
Completed treatment
- c/w heparin drip with bridge to coumadin- dose 7.5 mg today

## 2019-03-29 NOTE — DISCHARGE NOTE PROVIDER - CARE PROVIDER_API CALL
Johnny Serrato)  Neurological Surgery  [address] [facility], [address] [place] [address]  Phone: [phone]  Fax: [phone]  Follow Up Time:     Hector Castro)  Neurology; Vascular Neurology  [address] [facility], [address] [place] [address]  Phone: [phone]  Fax: [phone]  Follow Up Time:     Johnny Burrows)  Urology  [address] [place] [address], [place] [address]  Phone: [phone]  Fax: [phone]  Follow Up Time:

## 2019-03-29 NOTE — PROGRESS NOTE ADULT - PROBLEM SELECTOR PLAN 1
- RESOLVED
- c/w heparin drip with bridge to coumadin per primary team
unclear etiology. ?PE. will d/w ID.
Hep gtt for now. monitoring ventricle size to see if shunt needed
improved. asymptomatic
Low reinserted. F/u repeat urine cx. Will treat if positive per Urology recs.
- c/w heparin drip with bridge to coumadin per primary team
Continue Low. Fosfomycin given for E coli UTI.
Continue Low. Fosfomycin given for E coli UTI.
Low reinserted yesterday. Urine cx growing gram negative rods. To get Fosfomycin per ID recs.

## 2019-03-29 NOTE — PROGRESS NOTE ADULT - RESPIRATORY
detailed exam
Breath Sounds equal & clear to percussion & auscultation, no accessory muscle use

## 2019-03-29 NOTE — PROGRESS NOTE ADULT - SUBJECTIVE AND OBJECTIVE BOX
Patient is a 74y old  Female who presents with a chief complaint of SAH     HPI: Pt up in chair. Feels well.     Vital Signs Last 24 Hrs  T(C): 37 (29 Mar 2019 13:04), Max: 37 (29 Mar 2019 13:04)  T(F): 98.6 (29 Mar 2019 13:04), Max: 98.6 (29 Mar 2019 13:04)  HR: 75 (29 Mar 2019 13:04) (73 - 97)  BP: 129/90 (29 Mar 2019 13:04) (110/75 - 157/98)  BP(mean): --  RR: 18 (29 Mar 2019 13:04) (18 - 18)  SpO2: 100% (29 Mar 2019 13:04) (97% - 100%)                          11.2   8.1   )-----------( 420      ( 29 Mar 2019 06:32 )             31.9     03-29    138  |  99  |  10  ----------------------------<  88  4.2   |  26  |  0.79    Ca    9.3      29 Mar 2019 06:30    MEDICATIONS  (STANDING):  AQUAPHOR (petrolatum Ointment) 1 Application(s) Topical two times a day  enalapril 10 milliGRAM(s) Oral daily  psyllium Powder 1 Packet(s) Oral two times a day  simvastatin 40 milliGRAM(s) Oral at bedtime  vancomycin    Solution 125 milliGRAM(s) Oral every 12 hours  zinc oxide 20% Ointment 1 Application(s) Topical two times a day    MEDICATIONS  (PRN):  acetaminophen    Suspension .. 815 milliGRAM(s) Oral every 6 hours PRN Mild Pain (1 - 3)

## 2019-03-30 LAB
ALBUMIN SERPL ELPH-MCNC: 2.6 G/DL — LOW (ref 3.3–5)
ALP SERPL-CCNC: 96 U/L — SIGNIFICANT CHANGE UP (ref 40–120)
ALT FLD-CCNC: 66 U/L DA — HIGH (ref 10–45)
ANION GAP SERPL CALC-SCNC: 10 MMOL/L — SIGNIFICANT CHANGE UP (ref 5–17)
AST SERPL-CCNC: 36 U/L — SIGNIFICANT CHANGE UP (ref 10–40)
BASOPHILS # BLD AUTO: 0.1 K/UL — SIGNIFICANT CHANGE UP (ref 0–0.2)
BASOPHILS NFR BLD AUTO: 1 % — SIGNIFICANT CHANGE UP (ref 0–2)
BILIRUB SERPL-MCNC: 0.2 MG/DL — SIGNIFICANT CHANGE UP (ref 0.2–1.2)
BUN SERPL-MCNC: 14 MG/DL — SIGNIFICANT CHANGE UP (ref 7–23)
CALCIUM SERPL-MCNC: 9.2 MG/DL — SIGNIFICANT CHANGE UP (ref 8.4–10.5)
CHLORIDE SERPL-SCNC: 101 MMOL/L — SIGNIFICANT CHANGE UP (ref 96–108)
CO2 SERPL-SCNC: 26 MMOL/L — SIGNIFICANT CHANGE UP (ref 22–31)
CREAT SERPL-MCNC: 0.73 MG/DL — SIGNIFICANT CHANGE UP (ref 0.5–1.3)
EOSINOPHIL # BLD AUTO: 0.3 K/UL — SIGNIFICANT CHANGE UP (ref 0–0.5)
EOSINOPHIL NFR BLD AUTO: 3.6 % — SIGNIFICANT CHANGE UP (ref 0–6)
GLUCOSE SERPL-MCNC: 89 MG/DL — SIGNIFICANT CHANGE UP (ref 70–99)
HCT VFR BLD CALC: 32 % — LOW (ref 34.5–45)
HCV AB S/CO SERPL IA: 0.06 S/CO — SIGNIFICANT CHANGE UP (ref 0–0.79)
HCV AB SERPL-IMP: SIGNIFICANT CHANGE UP
HGB BLD-MCNC: 10.3 G/DL — LOW (ref 11.5–15.5)
LYMPHOCYTES # BLD AUTO: 1.5 K/UL — SIGNIFICANT CHANGE UP (ref 1–3.3)
LYMPHOCYTES # BLD AUTO: 18.7 % — SIGNIFICANT CHANGE UP (ref 13–44)
MCHC RBC-ENTMCNC: 29.9 PG — SIGNIFICANT CHANGE UP (ref 27–34)
MCHC RBC-ENTMCNC: 32.2 GM/DL — SIGNIFICANT CHANGE UP (ref 32–36)
MCV RBC AUTO: 93 FL — SIGNIFICANT CHANGE UP (ref 80–100)
MONOCYTES # BLD AUTO: 0.5 K/UL — SIGNIFICANT CHANGE UP (ref 0–0.9)
MONOCYTES NFR BLD AUTO: 6.7 % — SIGNIFICANT CHANGE UP (ref 1–9)
NEUTROPHILS # BLD AUTO: 5.6 K/UL — SIGNIFICANT CHANGE UP (ref 1.8–7.4)
NEUTROPHILS NFR BLD AUTO: 70 % — SIGNIFICANT CHANGE UP (ref 43–77)
PLATELET # BLD AUTO: 382 K/UL — SIGNIFICANT CHANGE UP (ref 150–400)
POTASSIUM SERPL-MCNC: 4 MMOL/L — SIGNIFICANT CHANGE UP (ref 3.5–5.3)
POTASSIUM SERPL-SCNC: 4 MMOL/L — SIGNIFICANT CHANGE UP (ref 3.5–5.3)
PROT SERPL-MCNC: 6.6 G/DL — SIGNIFICANT CHANGE UP (ref 6–8.3)
RBC # BLD: 3.44 M/UL — LOW (ref 3.8–5.2)
RBC # FLD: 13 % — SIGNIFICANT CHANGE UP (ref 10.3–14.5)
SODIUM SERPL-SCNC: 137 MMOL/L — SIGNIFICANT CHANGE UP (ref 135–145)
WBC # BLD: 8.1 K/UL — SIGNIFICANT CHANGE UP (ref 3.8–10.5)
WBC # FLD AUTO: 8.1 K/UL — SIGNIFICANT CHANGE UP (ref 3.8–10.5)

## 2019-03-30 PROCEDURE — 99223 1ST HOSP IP/OBS HIGH 75: CPT

## 2019-03-30 PROCEDURE — 99222 1ST HOSP IP/OBS MODERATE 55: CPT

## 2019-03-30 RX ORDER — WARFARIN SODIUM 2.5 MG/1
7 TABLET ORAL ONCE
Qty: 0 | Refills: 0 | Status: COMPLETED | OUTPATIENT
Start: 2019-03-30 | End: 2019-03-30

## 2019-03-30 RX ADMIN — Medication 1 APPLICATION(S): at 17:45

## 2019-03-30 RX ADMIN — Medication 1 APPLICATION(S): at 05:34

## 2019-03-30 RX ADMIN — Medication 125 MILLIGRAM(S): at 05:36

## 2019-03-30 RX ADMIN — PANTOPRAZOLE SODIUM 40 MILLIGRAM(S): 20 TABLET, DELAYED RELEASE ORAL at 05:33

## 2019-03-30 RX ADMIN — WARFARIN SODIUM 7 MILLIGRAM(S): 2.5 TABLET ORAL at 21:29

## 2019-03-30 RX ADMIN — ZINC OXIDE 1 APPLICATION(S): 200 OINTMENT TOPICAL at 17:47

## 2019-03-30 RX ADMIN — ATORVASTATIN CALCIUM 20 MILLIGRAM(S): 80 TABLET, FILM COATED ORAL at 21:25

## 2019-03-30 RX ADMIN — Medication 125 MILLIGRAM(S): at 17:48

## 2019-03-30 RX ADMIN — Medication 10 MILLIGRAM(S): at 05:33

## 2019-03-30 RX ADMIN — Medication 1 PACKET(S): at 17:46

## 2019-03-30 NOTE — CONSULT NOTE ADULT - ASSESSMENT
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    #s/p SAH  comprehensive PT/OT/ rehab    #PE  Continue coumadin, per PT/INR  INR currently therapuetic    #C. diff history  Vancomycin oral q 12h to continue, monitor BMs    #UTI E. Coli  antibiotics completed    #GERD  continue pantoprazole    #Low in place  consider void trials   eval    #HTN  BP controlled on enalapril  Continue regimen, monitor BP closely    DVT Prophylaxis: on coumadin for PE 76 year old female s/p SAH due to A comm aneurysm rupture, now with functional deficits.    #s/p SAH  comprehensive PT/OT/ rehab    #PE  Continue coumadin, per PT/INR  INR currently therapuetic    #C. diff history  Vancomycin oral q 12h to continue, monitor BMs    #UTI E. Coli  antibiotics completed    #GERD  continue pantoprazole    #Low in place  consider void trials   eval  Will need urodynamic studies and outpatient urology follow up    #HTN  BP controlled on enalapril  Continue regimen, monitor BP closely    DVT Prophylaxis: on coumadin for PE

## 2019-03-30 NOTE — CONSULT NOTE ADULT - SUBJECTIVE AND OBJECTIVE BOX
Patient is a 74 year old  female who presents with a chief complaint of s/p SAH/Acomm aneurysm coiling now with functional deficits (29 Mar 2019 14:25)    HPI: 74 year old female with PMHx of HTN on ASA (? indication) presents to ED s/p syncope and 3 day history of neck pain on 3/4/2019. Admitted to [facility] where admission workup revealed large SAH on CT brain.  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 Hospital course notable for cdiff diarrhea evaluated by ID and treated with course of Flagyl and Vancomycin; also urinary retention (youssef was reinserted on 3/26/19), failed multiple TOVs, concurrent E coli UTI was discovered on 3/27/19 and treated with fosfomycin. On 3/18/19 patient developed dyspnea. CTA chest revealed RUL PE. Started on heparin gtt 3/18/19 and bridged to coumadin.       MEDICATIONS  (STANDING):  AQUAPHOR (petrolatum Ointment) 1 Application(s) Topical two times a day  atorvastatin 20 milliGRAM(s) Oral at bedtime  enalapril 10 milliGRAM(s) Oral daily  pantoprazole    Tablet 40 milliGRAM(s) Oral before breakfast  psyllium Powder 1 Packet(s) Oral two times a day  vancomycin    Solution 125 milliGRAM(s) Oral every 12 hours  zinc oxide 20% Ointment 1 Application(s) Topical two times a day    MEDICATIONS  (PRN):  acetaminophen    Suspension .. 815 milliGRAM(s) Oral every 6 hours PRN Mild Pain (1 - 3)      REVIEW OF SYSTEMS:  CONSTITUTIONAL: No fever, weight loss, or fatigue  EYES: No eye pain, visual disturbances, or discharge  ENMT:  No difficulty hearing, tinnitus, vertigo; No sinus or throat pain  NECK: No pain or stiffness  RESPIRATORY: No cough, wheezing, chills or hemoptysis; No shortness of breath  CARDIOVASCULAR: No chest pain, palpitations, dizziness, or leg swelling  GASTROINTESTINAL: No abdominal or epigastric pain. No nausea, vomiting, or hematemesis; No diarrhea or constipation. No melena or hematochezia.  GENITOURINARY: No dysuria, frequency, hematuria, or incontinence  NEUROLOGICAL: No headaches, memory loss, loss of strength, numbness, or tremors  SKIN: No itching, burning, rashes, or lesions   LYMPH NODES: No enlarged glands  ENDOCRINE: No heat or cold intolerance; No hair loss  MUSCULOSKELETAL: No joint pain or swelling; No muscle, back, or extremity pain  PSYCHIATRIC: No depression, anxiety, mood swings, or difficulty sleeping  HEME/LYMPH: No easy bruising, or bleeding gums  ALLERGY AND IMMUNOLOGIC: No hives or eczema    PHYSICAL EXAM:  T(C): 36.7 (03-30-19 @ 08:10), Max: 37.1 (03-29-19 @ 21:35)  HR: 95 (03-30-19 @ 08:10) (75 - 95)  BP: 130/86 (03-30-19 @ 08:10) (123/82 - 144/87)  RR: 14 (03-30-19 @ 08:10) (14 - 18)  SpO2: 97% (03-30-19 @ 08:10) (96% - 100%)    I&O's Summary    29 Mar 2019 07:01  -  30 Mar 2019 07:00  --------------------------------------------------------  IN: 360 mL / OUT: 750 mL / NET: -390 mL        GENERAL: NAD, well-groomed, well-developed  HEAD:  Atraumatic, Normocephalic  EYES: EOMI, PERRL, conjunctiva and sclera clear  ENMT: No tonsillar erythema, exudates, or enlargement; Moist mucous membranes, Good dentition, No lesions  NECK: Supple, No JVD, Normal thyroid  NERVOUS SYSTEM:  Alert & Oriented X3, Good concentration; Motor Strength 5/5 B/L upper and lower extremities; DTRs 2+ intact and symmetric  CHEST/LUNG: Clear to ascultation bilaterally; No rales, rhonchi, wheezing, or rubs  HEART: Regular rate and rhythm; No murmurs, rubs, or gallops  ABDOMEN: Soft, Nontender, Nondistended; Bowel sounds present  EXTREMITIES:  2+ Peripheral Pulses, No clubbing, cyanosis, or edema  LYMPH: No lymphadenopathy noted  SKIN: No rashes or lesions    LABS:                        10.3   8.1   )-----------( 382      ( 30 Mar 2019 05:20 )             32.0     03-30    137  |  101  |  14  ----------------------------<  89  4.0   |  26  |  0.73    Ca    9.2      30 Mar 2019 05:20    TPro  6.6  /  Alb  2.6<L>  /  TBili  0.2  /  DBili  x   /  AST  36  /  ALT  66<H>  /  AlkPhos  96  03-30    PT/INR - ( 29 Mar 2019 08:23 )   PT: 25.4 sec;   INR: 2.18 ratio         PTT - ( 29 Mar 2019 08:23 )  PTT:141.5 sec    CAPILLARY BLOOD GLUCOSE              Culture - Urine (collected 03-26-19 @ 17:05)  Source: .Urine Catheterized  Final Report (03-28-19 @ 12:13):    >100,000 CFU/ml Escherichia coli  Organism: Escherichia coli (03-28-19 @ 12:13)  Organism: Escherichia coli (03-28-19 @ 12:13)      -  Amikacin: S <=16      -  Ampicillin: R >16 These ampicillin results predict results for amoxicillin      -  Ampicillin/Sulbactam: I 16/8      -  Aztreonam: S <=4      -  Cefazolin: I 16 For uncomplicated UTI with K. pneumoniae, E. coli, or P. mirablis: INDRA <=16 is sensitive and INDRA >=32 is resistant. This also predicts results for oral agents cefaclor, cefdinir, cefpodoxime, cefprozil, cefuroxime axetil, cephalexin and locarbeffor uncomplicated UTI. Note that some isolates may be susceptible to these agents while testing resistant to cefazolin.      -  Cefepime: S <=4      -  Cefoxitin: S <=8      -  Ceftriaxone: S <=1 Enterobacter, Citrobacter, and Serratia may develop resistance during prolonged therapy      -  Ciprofloxacin: S <=1      -  Ertapenem: S <=1      -  Gentamicin: S <=4      -  Imipenem: S <=1      -  Levofloxacin: S <=2      -  Meropenem: S <=1      -  Nitrofurantoin: S <=32 Should not be used to treat pyelonephritis      -  Piperacillin/Tazobactam: S <=16      -  Tigecycline: S <=2      -  Tobramycin: S <=4      -  Trimethoprim/Sulfamethoxazole: S <=2/38      Method Type: INDRA    Culture - Blood (collected 03-26-19 @ 13:32)  Source: .Blood Blood  Preliminary Report (03-27-19 @ 14:01):    No growth to date.    Culture - Blood (collected 03-26-19 @ 13:32)  Source: .Blood Blood  Preliminary Report (03-27-19 @ 14:01):    No growth to date.        RADIOLOGY & ADDITIONAL TESTS:    Imaging Personally Reviewed:  [x] YES  [ ] NO    Consultant(s) Notes Reviewed:  [x] YES  [ ] NO    Care Discussed with Consultants/Other Providers [x] YES  [ ] NO Patient is a 74 year old  female who presents with a chief complaint of s/p SAH/A comm aneurysm coiling now with functional deficits (29 Mar 2019 14:25)    HPI: 74 year old female with history of HTN on ASA (? indication) presents to ED s/p syncope and 3 day history of neck pain on 3/4/2019. Admitted to [facility] where admission workup revealed large SAH on CT brain.  Following CTA revealed A Comm aneurysm rupture. Patient underwent a successful coiling of A COMM aneurysm. Hospital course notable for C. diff diarrhea evaluated by ID and treated with course of Flagyl and Vancomycin; also urinary retention (youssef was reinserted on 3/26/2019), failed multiple TOVs, concurrent E coli UTI was discovered on 3/27/2019 and treated with fosfomycin. On 3/18/2019 patient developed dyspnea. CTA chest revealed RUL PE. Started on heparin gtt 3/18/19 and bridged to coumadin.     PAST MEDICAL HISTORY:  Dyslipidemia   HTN (Hypertension).     PAST SURGICAL HISTORY:  Status Post Total Knee Replace left.   FAMILY HISTORY: No pertinent positive family history reported by patient    Social History:  No tobacco, alcohol or drug use  Functional History: Denies smoking, drinks alcohol occasionally, denies illicit drug use	    Allergies: No Known Allergies     MEDICATIONS  (STANDING):  AQUAPHOR (petrolatum Ointment) 1 Application(s) Topical two times a day  atorvastatin 20 milliGRAM(s) Oral at bedtime  enalapril 10 milliGRAM(s) Oral daily  pantoprazole    Tablet 40 milliGRAM(s) Oral before breakfast  psyllium Powder 1 Packet(s) Oral two times a day  vancomycin    Solution 125 milliGRAM(s) Oral every 12 hours  zinc oxide 20% Ointment 1 Application(s) Topical two times a day    MEDICATIONS  (PRN):  acetaminophen    Suspension .. 815 milliGRAM(s) Oral every 6 hours PRN Mild Pain (1 - 3)    REVIEW OF SYSTEMS:  CONSTITUTIONAL: No fever, weight loss, has fatigue  EYES: No eye pain, visual disturbances, or discharge  ENMT:  No difficulty hearing, tinnitus, vertigo; No sinus or throat pain  NECK: No pain or stiffness  RESPIRATORY: No cough, wheezing, chills or hemoptysis; No shortness of breath  CARDIOVASCULAR: No chest pain, palpitations, dizziness, or leg swelling  GASTROINTESTINAL: No abdominal or epigastric pain. No nausea, vomiting, or hematemesis; No diarrhea or constipation. No melena or hematochezia.  GENITOURINARY: No dysuria, frequency, hematuria, or incontinence  NEUROLOGICAL: No headaches, memory loss, loss of strength, numbness, or tremors  SKIN: No itching, burning, rashes, or lesions   ENDOCRINE: No heat or cold intolerance; No hair loss  MUSCULOSKELETAL: No joint pain or swelling; No muscle, back, or extremity pain  PSYCHIATRIC: No depression, anxiety, mood swings, or difficulty sleeping  HEME/LYMPH: No easy bruising, or bleeding gums  ALLERGY AND IMMUNOLOGIC: No hives or eczema      PHYSICAL EXAM:  T(C): 36.7 (03-30-19 @ 08:10), Max: 37.1 (03-29-19 @ 21:35)  HR: 95 (03-30-19 @ 08:10) (75 - 95)  BP: 130/86 (03-30-19 @ 08:10) (123/82 - 144/87)  RR: 14 (03-30-19 @ 08:10) (14 - 18)  SpO2: 97% (03-30-19 @ 08:10) (96% - 100%)  I&O's Summary    29 Mar 2019 07:01  -  30 Mar 2019 07:00  --------------------------------------------------------  IN: 360 mL / OUT: 750 mL / NET: -390 mL        GENERAL: Elderly female, NAD, well-groomed, well-developed  HEAD:  Atraumatic, Normocephalic  EYES: EOMI, PERRL, conjunctiva and sclera clear  ENMT: No tonsillar erythema, exudates, or enlargement; Moist mucous membranes, Good dentition, No lesions  NECK: Supple, No JVD, Normal thyroid  NERVOUS SYSTEM:  Alert & Oriented X3, poor attention, concentration; Motor Strength 5/5 B/L upper and lower extremities; DTRs 2+ intact and symmetric  CHEST/LUNG: Clear to ascultation bilaterally; No rales, rhonchi, wheezing, or rubs  HEART: Regular rate and rhythm; No murmurs, rubs, or gallops  ABDOMEN: Soft, Nontender, Nondistended; Bowel sounds present  : + Youssef in place, draining clear urine  EXTREMITIES:  2+ Peripheral Pulses, No clubbing, cyanosis, or edema  SKIN: No rash        LABS:                        10.3   8.1   )-----------( 382      ( 30 Mar 2019 05:20 )             32.0     03-30    137  |  101  |  14  ----------------------------<  89  4.0   |  26  |  0.73    Ca    9.2      30 Mar 2019 05:20    TPro  6.6  /  Alb  2.6<L>  /  TBili  0.2  /  DBili  x   /  AST  36  /  ALT  66<H>  /  AlkPhos  96  03-30    PT/INR - ( 29 Mar 2019 08:23 )   PT: 25.4 sec;   INR: 2.18 ratio         PTT - ( 29 Mar 2019 08:23 )  PTT:141.5 sec    Culture - Urine (collected 03-26-19 @ 17:05)  Source: .Urine Catheterized  Final Report (03-28-19 @ 12:13):    >100,000 CFU/ml Escherichia coli  Organism: Escherichia coli (03-28-19 @ 12:13)  Organism: Escherichia coli (03-28-19 @ 12:13)      -  Amikacin: S <=16      -  Ampicillin: R >16 These ampicillin results predict results for amoxicillin      -  Ampicillin/Sulbactam: I 16/8      -  Aztreonam: S <=4      -  Cefazolin: I 16 For uncomplicated UTI with K. pneumoniae, E. coli, or P. mirablis: INDRA <=16 is sensitive and INDRA >=32 is resistant. This also predicts results for oral agents cefaclor, cefdinir, cefpodoxime, cefprozil, cefuroxime axetil, cephalexin and locarbeffor uncomplicated UTI. Note that some isolates may be susceptible to these agents while testing resistant to cefazolin.      -  Cefepime: S <=4      -  Cefoxitin: S <=8      -  Ceftriaxone: S <=1 Enterobacter, Citrobacter, and Serratia may develop resistance during prolonged therapy      -  Ciprofloxacin: S <=1      -  Ertapenem: S <=1      -  Gentamicin: S <=4      -  Imipenem: S <=1      -  Levofloxacin: S <=2      -  Meropenem: S <=1      -  Nitrofurantoin: S <=32 Should not be used to treat pyelonephritis      -  Piperacillin/Tazobactam: S <=16      -  Tigecycline: S <=2      -  Tobramycin: S <=4      -  Trimethoprim/Sulfamethoxazole: S <=2/38      Method Type: INDRA    Culture - Blood (collected 03-26-19 @ 13:32)  Source: .Blood Blood  Preliminary Report (03-27-19 @ 14:01):    No growth to date.    Culture - Blood (collected 03-26-19 @ 13:32)  Source: .Blood Blood  Preliminary Report (03-27-19 @ 14:01):    No growth to date.        RADIOLOGY & ADDITIONAL TESTS:    Imaging Personally Reviewed:  [x] YES  [ ] NO    Consultant(s) Notes Reviewed:  [x] YES  [ ] NO    Care Discussed with Consultants/Other Providers [x] YES  [ ] NO Patient is a 74 year old  female who presents with a chief complaint of s/p SAH/A comm aneurysm coiling now with functional deficits (29 Mar 2019 14:25)    HPI: 74 year old female with history of HTN on ASA (? indication) presents to ED s/p syncope and 3 day history of neck pain on 3/4/2019. Admitted to [facility] where admission workup revealed large SAH on CT brain.  Following CTA revealed A Comm aneurysm rupture. Patient underwent a successful coiling of A COMM aneurysm. Hospital course notable for C. diff diarrhea evaluated by ID and treated with course of Flagyl and Vancomycin; also urinary retention (youssef was reinserted on 3/26/2019), failed multiple TOVs, concurrent E coli UTI was discovered on 3/27/2019 and treated with fosfomycin. On 3/18/2019 patient developed dyspnea. CTA chest revealed RUL PE. Started on heparin gtt 3/18/19 and bridged to coumadin. Patient seen and examined, reports she feels better denies any complaints at time of assessment.    PAST MEDICAL HISTORY:  Dyslipidemia   HTN (Hypertension).     PAST SURGICAL HISTORY:  Status Post Total Knee Replace left.   FAMILY HISTORY: No pertinent positive family history reported by patient    Social History:  No tobacco, alcohol or drug use  Functional History: Denies smoking, drinks alcohol occasionally, denies illicit drug use	    Allergies: No Known Allergies     MEDICATIONS  (STANDING):  AQUAPHOR (petrolatum Ointment) 1 Application(s) Topical two times a day  atorvastatin 20 milliGRAM(s) Oral at bedtime  enalapril 10 milliGRAM(s) Oral daily  pantoprazole    Tablet 40 milliGRAM(s) Oral before breakfast  psyllium Powder 1 Packet(s) Oral two times a day  vancomycin    Solution 125 milliGRAM(s) Oral every 12 hours  zinc oxide 20% Ointment 1 Application(s) Topical two times a day    MEDICATIONS  (PRN):  acetaminophen    Suspension .. 815 milliGRAM(s) Oral every 6 hours PRN Mild Pain (1 - 3)    REVIEW OF SYSTEMS:  CONSTITUTIONAL: No fever, weight loss, has fatigue  EYES: No eye pain, visual disturbances, or discharge  ENMT:  No difficulty hearing, tinnitus, vertigo; No sinus or throat pain  NECK: No pain or stiffness  RESPIRATORY: No cough, wheezing, chills or hemoptysis; No shortness of breath  CARDIOVASCULAR: No chest pain, palpitations, dizziness, or leg swelling  GASTROINTESTINAL: No abdominal or epigastric pain. No nausea, vomiting, or hematemesis; No diarrhea or constipation. No melena or hematochezia.  GENITOURINARY: No dysuria, frequency, hematuria, or incontinence  NEUROLOGICAL: No headaches, memory loss, loss of strength, numbness, or tremors  SKIN: No itching, burning, rashes, or lesions   ENDOCRINE: No heat or cold intolerance; No hair loss  MUSCULOSKELETAL: No joint pain or swelling; No muscle, back, or extremity pain  PSYCHIATRIC: No depression, anxiety, mood swings, or difficulty sleeping  HEME/LYMPH: No easy bruising, or bleeding gums  ALLERGY AND IMMUNOLOGIC: No hives or eczema      PHYSICAL EXAM:  T(C): 36.7 (03-30-19 @ 08:10), Max: 37.1 (03-29-19 @ 21:35)  HR: 95 (03-30-19 @ 08:10) (75 - 95)  BP: 130/86 (03-30-19 @ 08:10) (123/82 - 144/87)  RR: 14 (03-30-19 @ 08:10) (14 - 18)  SpO2: 97% (03-30-19 @ 08:10) (96% - 100%)  I&O's Summary    29 Mar 2019 07:01  -  30 Mar 2019 07:00  --------------------------------------------------------  IN: 360 mL / OUT: 750 mL / NET: -390 mL        GENERAL: Elderly female, NAD, well-groomed, well-developed  HEAD:  Atraumatic, Normocephalic  EYES: EOMI, PERRL, conjunctiva and sclera clear  ENMT: No tonsillar erythema, exudates, or enlargement; Moist mucous membranes, Good dentition, No lesions  NECK: Supple, No JVD, Normal thyroid  NERVOUS SYSTEM:  Alert & Oriented X3, poor attention, concentration; Motor Strength 5/5 B/L upper and lower extremities; DTRs 2+ intact and symmetric  CHEST/LUNG: Clear to ascultation bilaterally; No rales, rhonchi, wheezing, or rubs  HEART: Regular rate and rhythm; No murmurs, rubs, or gallops  ABDOMEN: Soft, Nontender, Nondistended; Bowel sounds present  : + Youssef in place, draining clear urine  EXTREMITIES:  2+ Peripheral Pulses, No clubbing, cyanosis, or edema  SKIN: No rash        LABS:                        10.3   8.1   )-----------( 382      ( 30 Mar 2019 05:20 )             32.0     03-30    137  |  101  |  14  ----------------------------<  89  4.0   |  26  |  0.73    Ca    9.2      30 Mar 2019 05:20    TPro  6.6  /  Alb  2.6<L>  /  TBili  0.2  /  DBili  x   /  AST  36  /  ALT  66<H>  /  AlkPhos  96  03-30    PT/INR - ( 29 Mar 2019 08:23 )   PT: 25.4 sec;   INR: 2.18 ratio         PTT - ( 29 Mar 2019 08:23 )  PTT:141.5 sec    Culture - Urine (collected 03-26-19 @ 17:05)  Source: .Urine Catheterized  Final Report (03-28-19 @ 12:13):    >100,000 CFU/ml Escherichia coli  Organism: Escherichia coli (03-28-19 @ 12:13)  Organism: Escherichia coli (03-28-19 @ 12:13)      -  Amikacin: S <=16      -  Ampicillin: R >16 These ampicillin results predict results for amoxicillin      -  Ampicillin/Sulbactam: I 16/8      -  Aztreonam: S <=4      -  Cefazolin: I 16 For uncomplicated UTI with K. pneumoniae, E. coli, or P. mirablis: INDRA <=16 is sensitive and INDRA >=32 is resistant. This also predicts results for oral agents cefaclor, cefdinir, cefpodoxime, cefprozil, cefuroxime axetil, cephalexin and locarbeffor uncomplicated UTI. Note that some isolates may be susceptible to these agents while testing resistant to cefazolin.      -  Cefepime: S <=4      -  Cefoxitin: S <=8      -  Ceftriaxone: S <=1 Enterobacter, Citrobacter, and Serratia may develop resistance during prolonged therapy      -  Ciprofloxacin: S <=1      -  Ertapenem: S <=1      -  Gentamicin: S <=4      -  Imipenem: S <=1      -  Levofloxacin: S <=2      -  Meropenem: S <=1      -  Nitrofurantoin: S <=32 Should not be used to treat pyelonephritis      -  Piperacillin/Tazobactam: S <=16      -  Tigecycline: S <=2      -  Tobramycin: S <=4      -  Trimethoprim/Sulfamethoxazole: S <=2/38      Method Type: INDRA    Culture - Blood (collected 03-26-19 @ 13:32)  Source: .Blood Blood  Preliminary Report (03-27-19 @ 14:01):    No growth to date.    Culture - Blood (collected 03-26-19 @ 13:32)  Source: .Blood Blood  Preliminary Report (03-27-19 @ 14:01):    No growth to date.        RADIOLOGY & ADDITIONAL TESTS:    Imaging Personally Reviewed:  [x] YES  [ ] NO    Consultant(s) Notes Reviewed:  [x] YES  [ ] NO    Care Discussed with Consultants/Other Providers [x] YES  [ ] NO

## 2019-03-30 NOTE — CONSULT NOTE ADULT - REASON FOR ADMISSION
s/p SAH/Acomm aneurysm coiling now with functional deficits s/p SAH/A comm aneurysm coiling now with functional deficits

## 2019-03-30 NOTE — PROGRESS NOTE ADULT - SUBJECTIVE AND OBJECTIVE BOX
CC: Gait dysfunction    Subjective: Patient seen and evaluated at the bedside.    no chest pain, no nausea, vomitting, no fever, shills.   No acute events overngiht. Has been tolerating rehabilitation program.    acetaminophen    Suspension .. 815 milliGRAM(s) Oral every 6 hours PRN  AQUAPHOR (petrolatum Ointment) 1 Application(s) Topical two times a day  atorvastatin 20 milliGRAM(s) Oral at bedtime  enalapril 10 milliGRAM(s) Oral daily  pantoprazole    Tablet 40 milliGRAM(s) Oral before breakfast  psyllium Powder 1 Packet(s) Oral two times a day  vancomycin    Solution 125 milliGRAM(s) Oral every 12 hours  warfarin 7 milliGRAM(s) Oral once  zinc oxide 20% Ointment 1 Application(s) Topical two times a day      T(C): 36.7 (03-30-19 @ 08:10), Max: 37.1 (03-29-19 @ 21:35)  HR: 95 (03-30-19 @ 08:10) (75 - 95)  BP: 130/86 (03-30-19 @ 08:10) (123/82 - 144/87)  RR: 14 (03-30-19 @ 08:10) (14 - 18)  SpO2: 97% (03-30-19 @ 08:10) (96% - 100%)    Exam:  NAD  HEENT: EOMI  Heart: RRR, S1/2  Lungs: CTA bilaterally  Abd: soft ND  Ext: no calf pain  Neuro: exam unchanged    Impression:  Cerebral infarction  Undefined  No pertinent family history in first degree relatives  Handoff  MEWS Score  Dyslipidemia  HTN (Hypertension)  Pulmonary thromboembolism  Status Post Total Knee Replace      LEONARD DUPREE 77 yo female s/p SAH due to Acomm aneurysm rupture, now with functional deficits.  Plan:  - continue medical management as per primary team  - continue PT/OT/SLP  - on coumadin 7mg for PE, check INR tomorrow  - [facility] stable  - Patient is stable to continue current rehabilitation program

## 2019-03-31 LAB
CULTURE RESULTS: SIGNIFICANT CHANGE UP
CULTURE RESULTS: SIGNIFICANT CHANGE UP
INR BLD: 2.52 RATIO — HIGH (ref 0.88–1.16)
PROTHROM AB SERPL-ACNC: 29.1 SEC — HIGH (ref 10–12.9)
SPECIMEN SOURCE: SIGNIFICANT CHANGE UP
SPECIMEN SOURCE: SIGNIFICANT CHANGE UP

## 2019-03-31 PROCEDURE — 99233 SBSQ HOSP IP/OBS HIGH 50: CPT

## 2019-03-31 PROCEDURE — 99232 SBSQ HOSP IP/OBS MODERATE 35: CPT

## 2019-03-31 RX ORDER — WARFARIN SODIUM 2.5 MG/1
7 TABLET ORAL ONCE
Qty: 0 | Refills: 0 | Status: COMPLETED | OUTPATIENT
Start: 2019-03-31 | End: 2019-03-31

## 2019-03-31 RX ADMIN — Medication 1 PACKET(S): at 05:09

## 2019-03-31 RX ADMIN — ZINC OXIDE 1 APPLICATION(S): 200 OINTMENT TOPICAL at 18:19

## 2019-03-31 RX ADMIN — ATORVASTATIN CALCIUM 20 MILLIGRAM(S): 80 TABLET, FILM COATED ORAL at 21:25

## 2019-03-31 RX ADMIN — Medication 125 MILLIGRAM(S): at 05:16

## 2019-03-31 RX ADMIN — Medication 1 APPLICATION(S): at 05:18

## 2019-03-31 RX ADMIN — Medication 1 PACKET(S): at 18:19

## 2019-03-31 RX ADMIN — Medication 1 APPLICATION(S): at 18:18

## 2019-03-31 RX ADMIN — PANTOPRAZOLE SODIUM 40 MILLIGRAM(S): 20 TABLET, DELAYED RELEASE ORAL at 05:15

## 2019-03-31 RX ADMIN — Medication 125 MILLIGRAM(S): at 18:18

## 2019-03-31 RX ADMIN — Medication 10 MILLIGRAM(S): at 05:09

## 2019-03-31 RX ADMIN — WARFARIN SODIUM 7 MILLIGRAM(S): 2.5 TABLET ORAL at 21:24

## 2019-03-31 NOTE — PROGRESS NOTE ADULT - ASSESSMENT
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    #s/p SAH  comprehensive PT/OT/ rehab    #PE  Continue coumadin, per PT/INR  INR currently therapuetic    #C. diff history  Vancomycin oral  125mg q 12h to continue, monitor BMs    #UTI E. Coli  antibiotics completed    #GERD  continue pantoprazole    #Low in place  consider void trials   eval  Will need urodynamic studies and outpatient urology follow up    #HTN  BP controlled on enalapril  Continue regimen, monitor BP closely    DVT Prophylaxis: on coumadin for PE

## 2019-03-31 NOTE — PROGRESS NOTE ADULT - SUBJECTIVE AND OBJECTIVE BOX
Patient is a 74 year old  Female who presents with a chief complaint of s/p SAH/Acomm aneurysm coiling now with functional deficits (31 Mar 2019 09:03)    Patient seen and examined, reports she feels better denies any complaints at time of assessment.      MEDICATIONS  (STANDING):  AQUAPHOR (petrolatum Ointment) 1 Application(s) Topical two times a day  atorvastatin 20 milliGRAM(s) Oral at bedtime  enalapril 10 milliGRAM(s) Oral daily  pantoprazole    Tablet 40 milliGRAM(s) Oral before breakfast  psyllium Powder 1 Packet(s) Oral two times a day  vancomycin    Solution 125 milliGRAM(s) Oral every 12 hours  warfarin 7 milliGRAM(s) Oral once  zinc oxide 20% Ointment 1 Application(s) Topical two times a day    MEDICATIONS  (PRN):  acetaminophen    Suspension .. 815 milliGRAM(s) Oral every 6 hours PRN Mild Pain (1 - 3)      REVIEW OF SYSTEMS:  CONSTITUTIONAL: No fever, weight loss, has fatigue  EYES: No eye pain, visual disturbances, or discharge  ENMT:  No difficulty hearing, tinnitus, vertigo; No sinus or throat pain  NECK: No pain or stiffness  RESPIRATORY: No cough, wheezing, chills or hemoptysis; No shortness of breath  CARDIOVASCULAR: No chest pain, palpitations, dizziness, or leg swelling  GASTROINTESTINAL: No abdominal or epigastric pain. No nausea, vomiting, or hematemesis; No diarrhea or constipation. No melena or hematochezia.  GENITOURINARY: No dysuria, frequency, hematuria, or incontinence  NEUROLOGICAL: No headaches, memory loss, loss of strength, numbness, or tremors  SKIN: No itching, burning, rashes, or lesions   ENDOCRINE: No heat or cold intolerance; No hair loss  MUSCULOSKELETAL: No joint pain or swelling; No muscle, back, or extremity pain  PSYCHIATRIC: No depression, anxiety, mood swings, or difficulty sleeping  HEME/LYMPH: No easy bruising, or bleeding gums  ALLERGY AND IMMUNOLOGIC: No hives or eczema        PHYSICAL EXAM:  T(C): 37.2 (03-31-19 @ 08:21), Max: 37.2 (03-31-19 @ 08:21)  HR: 90 (03-31-19 @ 08:21) (84 - 92)  BP: 126/86 (03-31-19 @ 08:21) (126/86 - 137/88)  RR: 14 (03-31-19 @ 08:21) (12 - 14)  SpO2: 99% (03-31-19 @ 08:21) (97% - 99%)  Wt(kg): --  I&O's Summary    30 Mar 2019 07:01  -  31 Mar 2019 07:00  --------------------------------------------------------  IN: 240 mL / OUT: 1165 mL / NET: -925 mL        GENERAL: Elderly female, NAD, well-groomed, well-developed  HEAD:  Atraumatic, Normocephalic  EYES: EOMI, PERRL, conjunctiva and sclera clear  ENMT: Moist mucous membranes  NECK: Supple, No JVD, Normal thyroid  NERVOUS SYSTEM:  Alert & Oriented X3, no new deficit  CHEST/LUNG: Clear to ascultation bilaterally; No rales, rhonchi, wheezing, or rubs  HEART: Regular rate and rhythm; No murmurs, rubs, or gallops  ABDOMEN: Soft, Nontender, Nondistended; Bowel sounds present  : + Low in place, draining clear urine  EXTREMITIES:  2+ Peripheral Pulses, No clubbing, cyanosis, or edema  SKIN: No rash        LABS:                        10.3   8.1   )-----------( 382      ( 30 Mar 2019 05:20 )             32.0     03-30    137  |  101  |  14  ----------------------------<  89  4.0   |  26  |  0.73    Ca    9.2      30 Mar 2019 05:20    TPro  6.6  /  Alb  2.6<L>  /  TBili  0.2  /  DBili  x   /  AST  36  /  ALT  66<H>  /  AlkPhos  96  03-30    PT/INR - ( 31 Mar 2019 05:20 )   PT: 29.1 sec;   INR: 2.52 ratio             Culture - Urine (collected 03-26-19 @ 17:05)  Source: .Urine Catheterized  Final Report (03-28-19 @ 12:13):    >100,000 CFU/ml Escherichia coli  Organism: Escherichia coli (03-28-19 @ 12:13)  Organism: Escherichia coli (03-28-19 @ 12:13)      -  Amikacin: S <=16      -  Ampicillin: R >16 These ampicillin results predict results for amoxicillin      -  Ampicillin/Sulbactam: I 16/8      -  Aztreonam: S <=4      -  Cefazolin: I 16 For uncomplicated UTI with K. pneumoniae, E. coli, or P. mirablis: INDRA <=16 is sensitive and INDRA >=32 is resistant. This also predicts results for oral agents cefaclor, cefdinir, cefpodoxime, cefprozil, cefuroxime axetil, cephalexin and locarbeffor uncomplicated UTI. Note that some isolates may be susceptible to these agents while testing resistant to cefazolin.      -  Cefepime: S <=4      -  Cefoxitin: S <=8      -  Ceftriaxone: S <=1 Enterobacter, Citrobacter, and Serratia may develop resistance during prolonged therapy      -  Ciprofloxacin: S <=1      -  Ertapenem: S <=1      -  Gentamicin: S <=4      -  Imipenem: S <=1      -  Levofloxacin: S <=2      -  Meropenem: S <=1      -  Nitrofurantoin: S <=32 Should not be used to treat pyelonephritis      -  Piperacillin/Tazobactam: S <=16      -  Tigecycline: S <=2      -  Tobramycin: S <=4      -  Trimethoprim/Sulfamethoxazole: S <=2/38      Method Type: INDRA    Culture - Blood (collected 03-26-19 @ 13:32)  Source: .Blood Blood  Preliminary Report (03-27-19 @ 14:01):    No growth to date.    Culture - Blood (collected 03-26-19 @ 13:32)  Source: .Blood Blood  Preliminary Report (03-27-19 @ 14:01):    No growth to date.        RADIOLOGY & ADDITIONAL TESTS:    Imaging Personally Reviewed:  [x] YES  [ ] NO    Consultant(s) Notes Reviewed:  [x] YES  [ ] NO    Care Discussed with Consultants/Other Providers [x] YES  [ ] NO

## 2019-03-31 NOTE — PROGRESS NOTE ADULT - SUBJECTIVE AND OBJECTIVE BOX
CC: Gait dysfunction    Subjective: Patient seen and evaluated at the bedside.  No chest pain, no nausea, vomitting, no fever, chills.   No acute events overnight. Has been tolerating rehabilitation program.    acetaminophen    Suspension .. 815 milliGRAM(s) Oral every 6 hours PRN  AQUAPHOR (petrolatum Ointment) 1 Application(s) Topical two times a day  atorvastatin 20 milliGRAM(s) Oral at bedtime  enalapril 10 milliGRAM(s) Oral daily  pantoprazole    Tablet 40 milliGRAM(s) Oral before breakfast  psyllium Powder 1 Packet(s) Oral two times a day  vancomycin    Solution 125 milliGRAM(s) Oral every 12 hours  warfarin 7 milliGRAM(s) Oral once  zinc oxide 20% Ointment 1 Application(s) Topical two times a day      Vital Signs Last 24 Hrs  T(C): 37.2 (31 Mar 2019 08:21), Max: 37.2 (31 Mar 2019 08:21)  T(F): 99 (31 Mar 2019 08:21), Max: 99 (31 Mar 2019 08:21)  HR: 90 (31 Mar 2019 08:21) (84 - 92)  BP: 126/86 (31 Mar 2019 08:21) (126/86 - 137/88)  BP(mean): --  RR: 14 (31 Mar 2019 08:21) (12 - 14)  SpO2: 99% (31 Mar 2019 08:21) (97% - 99%)    Exam:  NAD  HEENT: EOMI  Heart: RRR, S1/2  Lungs: CTA bilaterally  Abd: soft ND  Ext: no calf pain  Neuro: exam unchanged    Impression:  Cerebral infarction  Undefined  No pertinent family history in first degree relatives  Handoff  MEWS Score  Dyslipidemia  HTN (Hypertension)  Pulmonary thromboembolism  Status Post Total Knee Replace      LEONARD DUPREE 75 yo female s/p SAH due to Acomm aneurysm rupture, now with functional deficits.  Plan:  - continue medical management as per primary team  - continue PT/OT/SLP  - on coumadin 7mg for PE, INR 2.52  - CVS stable  - Patient is stable to continue current rehabilitation program

## 2019-04-01 DIAGNOSIS — I60.2 NONTRAUMATIC SUBARACHNOID HEMORRHAGE FROM ANTERIOR COMMUNICATING ARTERY: ICD-10-CM

## 2019-04-01 LAB
INR BLD: 2.33 RATIO — HIGH (ref 0.88–1.16)
PROTHROM AB SERPL-ACNC: 26.8 SEC — HIGH (ref 10–12.9)

## 2019-04-01 PROCEDURE — 99233 SBSQ HOSP IP/OBS HIGH 50: CPT

## 2019-04-01 RX ORDER — WARFARIN SODIUM 2.5 MG/1
7 TABLET ORAL ONCE
Qty: 0 | Refills: 0 | Status: COMPLETED | OUTPATIENT
Start: 2019-04-01 | End: 2019-04-01

## 2019-04-01 RX ADMIN — Medication 1 PACKET(S): at 06:33

## 2019-04-01 RX ADMIN — PANTOPRAZOLE SODIUM 40 MILLIGRAM(S): 20 TABLET, DELAYED RELEASE ORAL at 06:33

## 2019-04-01 RX ADMIN — Medication 10 MILLIGRAM(S): at 06:33

## 2019-04-01 RX ADMIN — ZINC OXIDE 1 APPLICATION(S): 200 OINTMENT TOPICAL at 06:32

## 2019-04-01 RX ADMIN — WARFARIN SODIUM 7 MILLIGRAM(S): 2.5 TABLET ORAL at 21:00

## 2019-04-01 RX ADMIN — Medication 125 MILLIGRAM(S): at 18:44

## 2019-04-01 RX ADMIN — Medication 125 MILLIGRAM(S): at 06:33

## 2019-04-01 RX ADMIN — ATORVASTATIN CALCIUM 20 MILLIGRAM(S): 80 TABLET, FILM COATED ORAL at 21:00

## 2019-04-01 RX ADMIN — Medication 1 APPLICATION(S): at 06:32

## 2019-04-01 NOTE — PROGRESS NOTE ADULT - SUBJECTIVE AND OBJECTIVE BOX
CHIEF COMPLAINT: NAD, 'Little slow'.       HISTORY OF PRESENT ILLNESS  74 yr old Female c PMHx of HTN on ASA (? indication) presents to ED s/p syncope and 3 day hx of neck pain on 3/4/19. Admitted to [facility] where admission workup revealed large SAH on CT brain.  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 Hospital course notable for cdiff diarrhea evaluated by ID and treated with course of Flagyl and Vancomycin; also urinary retention (youssef was reinserted on 3/26/19), failed multiple TOVs, concurrent E coli UTI was discovered on 3/27/19 and treated with fosfomycin. On 3/18/19 patient developed dyspnea. CTA chest revealed RUL PE. Started on heparin gtt 3/18/19 and bridged to coumadin.     *Will need urodynamics and outpatient urology follow up (29 Mar 2019 14:25)      PAST MEDICAL & SURGICAL HISTORY:  Dyslipidemia  HTN (Hypertension)  Status Post Total Knee Replace: left        REVIEW OF SYMPTOMS  [X] Constitutional WNL     [X] Cardio WNL            [X] Resp WNL           [X] GI WNL                      [X] Heme WNL              [X] Endo WNL                     [X] Skin WNL                 [X] MSK WNL                   [X] Psych WNL      VITALS  Vital Signs Last 24 Hrs  T(C): 36.6 (01 Apr 2019 07:42), Max: 36.7 (31 Mar 2019 19:52)  T(F): 97.9 (01 Apr 2019 07:42), Max: 98.1 (31 Mar 2019 19:52)  HR: 73 (01 Apr 2019 07:42) (71 - 100)  BP: 119/81 (01 Apr 2019 07:42) (106/72 - 142/82)  BP(mean): --  RR: 14 (01 Apr 2019 07:42) (14 - 14)  SpO2: 96% (01 Apr 2019 07:42) (96% - 97%)      PHYSICAL EXAM  Constitutional - NAD, Comfortable  HEENT - NCAT, EOMI  Neck - Supple, No limited ROM  Chest - CTA bilaterally, No wheeze, No rhonchi, No crackles  Cardiovascular - RRR, S1S2, No murmurs  Abdomen - BS+, Soft, NTND  Extremities - No C/C/E, No calf tenderness   Skin-no rash  Wounds-na      Neurologic Exam - alert and awake, follows commands, no new weakness                                                 Balance - impaired     Psychiatric - Mood stable, Affect WNL         FUNCTIONAL PROGRESS  Gait - min A/CG   ADLs - CG  Transfers -min A/CG  Functional transfer - min A/CG    RECENT LABS          PT/INR - ( 01 Apr 2019 07:00 )   PT: 26.8 sec;   INR: 2.33 ratio               Direct LDL: 103 mg/dL (03-04-19 @ 23:32)    Hemoglobin A1C, Whole Blood: 5.6 % (03-04-19 @ 22:39)              RADIOLOGY/OTHER RESULTS      CURRENT MEDICATIONS  MEDICATIONS  (STANDING):  AQUAPHOR (petrolatum Ointment) 1 Application(s) Topical two times a day  atorvastatin 20 milliGRAM(s) Oral at bedtime  enalapril 5 milliGRAM(s) Oral daily  pantoprazole    Tablet 40 milliGRAM(s) Oral before breakfast  psyllium Powder 1 Packet(s) Oral two times a day  vancomycin    Solution 125 milliGRAM(s) Oral every 12 hours  warfarin 7 milliGRAM(s) Oral once  zinc oxide 20% Ointment 1 Application(s) Topical two times a day    MEDICATIONS  (PRN):  acetaminophen    Suspension .. 815 milliGRAM(s) Oral every 6 hours PRN Mild Pain (1 - 3)      ASSESSMENT & PLAN          GI/Bowel Management - Psyllium   Management - Toilet Q2  Skin - Turn Q2  Pain - Tylenol PRN  DVT PPX - Coumadin  Diet - reg c thins    Continue comprehensive acute rehab program consisting of 3hrs/day of OT/PT and SLP. CHIEF COMPLAINT: NAD, 'Little slow'.       HISTORY OF PRESENT ILLNESS  74 yr old Female c PMHx of HTN on ASA (? indication) presents to ED s/p syncope and 3 day hx of neck pain on 3/4/19. Admitted to [facility] where admission workup revealed large SAH on CT brain.  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 Hospital course notable for cdiff diarrhea evaluated by ID and treated with course of Flagyl and Vancomycin; also urinary retention (youssef was reinserted on 3/26/19), failed multiple TOVs, concurrent E coli UTI was discovered on 3/27/19 and treated with fosfomycin. On 3/18/19 patient developed dyspnea. CTA chest revealed RUL PE. Started on heparin gtt 3/18/19 and bridged to coumadin.     *Will need urodynamics and outpatient urology follow up (29 Mar 2019 14:25)      PAST MEDICAL & SURGICAL HISTORY:  Dyslipidemia  HTN (Hypertension)  Status Post Total Knee Replace: left        REVIEW OF SYMPTOMS  [X] Constitutional WNL     [X] Cardio WNL            [X] Resp WNL           [X] GI WNL                      [X] Heme WNL              [X] Endo WNL                     [X] Skin WNL                 [X] MSK WNL                   [X] Psych WNL      VITALS  Vital Signs Last 24 Hrs  T(C): 36.6 (01 Apr 2019 07:42), Max: 36.7 (31 Mar 2019 19:52)  T(F): 97.9 (01 Apr 2019 07:42), Max: 98.1 (31 Mar 2019 19:52)  HR: 73 (01 Apr 2019 07:42) (71 - 100)  BP: 119/81 (01 Apr 2019 07:42) (106/72 - 142/82)  BP(mean): --  RR: 14 (01 Apr 2019 07:42) (14 - 14)  SpO2: 96% (01 Apr 2019 07:42) (96% - 97%)      PHYSICAL EXAM  Constitutional - NAD, Comfortable  HEENT - NCAT, EOMI  Neck - Supple, No limited ROM  Chest - CTA bilaterally, No wheeze, No rhonchi, No crackles  Cardiovascular - RRR, S1S2, No murmurs  Abdomen - BS+, Soft, NTND  Extremities - No C/C/E, No calf tenderness   Skin-no rash  Wounds-right groin      Neurologic Exam - alert and awake, follows commands, no new weakness                                                 Balance - impaired     Psychiatric - Mood stable, Affect WNL         FUNCTIONAL PROGRESS  Gait - min A/CG   ADLs - CG  Transfers -min A/CG  Functional transfer - min A/CG    RECENT LABS          PT/INR - ( 01 Apr 2019 07:00 )   PT: 26.8 sec;   INR: 2.33 ratio               Direct LDL: 103 mg/dL (03-04-19 @ 23:32)    Hemoglobin A1C, Whole Blood: 5.6 % (03-04-19 @ 22:39)              RADIOLOGY/OTHER RESULTS      CURRENT MEDICATIONS  MEDICATIONS  (STANDING):  AQUAPHOR (petrolatum Ointment) 1 Application(s) Topical two times a day  atorvastatin 20 milliGRAM(s) Oral at bedtime  enalapril 5 milliGRAM(s) Oral daily  pantoprazole    Tablet 40 milliGRAM(s) Oral before breakfast  psyllium Powder 1 Packet(s) Oral two times a day  vancomycin    Solution 125 milliGRAM(s) Oral every 12 hours  warfarin 7 milliGRAM(s) Oral once  zinc oxide 20% Ointment 1 Application(s) Topical two times a day    MEDICATIONS  (PRN):  acetaminophen    Suspension .. 815 milliGRAM(s) Oral every 6 hours PRN Mild Pain (1 - 3)      ASSESSMENT & PLAN          GI/Bowel Management - Psyllium   Management - Toilet Q2  Skin - Turn Q2  Pain - Tylenol PRN  DVT PPX - Coumadin  Diet - reg c thins    Continue comprehensive acute rehab program consisting of 3hrs/day of OT/PT and SLP.

## 2019-04-01 NOTE — PROGRESS NOTE ADULT - ATTENDING COMMENTS
No new complaints, chart reviewed, will resume care   Neurological exam stable, tolerates therapy well    Continue full dose AC for PE, goal INR  Low cath/urinary retention/ evaluation/ ?Flomax  Cdiff colitis on  Vancomycin  Full program

## 2019-04-01 NOTE — PROGRESS NOTE ADULT - ASSESSMENT
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    #s/p SAH  comprehensive PT/OT/ rehab    #PE  Continue coumadin, per PT/INR  INR currently therapuetic    #C. diff history  Vancomycin oral q 12h to continue, monitor BMs    #UTI E. Coli  antibiotics completed    #GERD  continue pantoprazole    #Low in place  consider void trials   eval  Will need urodynamic studies and outpatient urology follow up    #HTN  BP controlled on enalapril  Continue regimen, monitor BP closely    DVT Prophylaxis: on coumadin for PE

## 2019-04-01 NOTE — PROGRESS NOTE ADULT - ASSESSMENT
75 yo female s/p SAH due to Acomm aneurysm rupture, now with functional deficits.    #s/p SAH  -Continue PT/OT/SLP program. Monitor VS and mental status on Coumadin.     #PE  Continue coumadin, PT INR continues, therapeutic today.     #Cdiff hx  Vancomycin to continue, monitor BMs-no diarrhea reported.     #UTI EColi  Abx completed prior to admission.     #Low-void trials   eval-awaiting. Will follow recommendations.     #HTN  Adjust regimen, monitor BP closely, Enalapril to 5mg for SBP 90s in therapy.     Precautions:           fall, seizure, aspiration                                                                       Diet: reg c thins    DVT Prophylaxis:       on coumadin for PE                                                                   Medical Prognosis: good    Prescreen Comparison: I have reviewed the prescreen information and I found no relevant changes between the preadmission  screening and my post admission evaluation.     Expected Therapy:   P.T.   1     hrs/day           O. T.   1   hrs/day           S.L.P.   1     hrs/day                    P&O      eval                                             Excpected Frequency: 5 days/7 day period    Rehab Potential:      good                                     Estimated Disposition:     home                     ELOS:   21           days      Rationale For Inpatient Rehab Admission- Patient demonstrates the following:     [X] Medically appropriate for rehabilitation admission   [X] Has attainable rehab goals with an approrpriate discharge plan  [X] Has rehabilitation potential (expected to make significant improvement within a reasonable period of time)  [X] Requires close medical management by a rehab physician, rehab nursing care and comprehensive interdisciplinary team (including         PT, OT, SLP and/or prosthetics and orthotics)

## 2019-04-01 NOTE — PROGRESS NOTE ADULT - SUBJECTIVE AND OBJECTIVE BOX
Patient is a 74y old  Female who presents with a chief complaint of s/p SAH/ A comm aneurysm coiling now with functional deficits (31 Mar 2019 09:05)      Patient seen and examined at bedside, no events overnight, in no acute distress.     ALLERGIES:  No Known Allergies    MEDICATIONS:  atorvastatin 20 milliGRAM(s) Oral at bedtime  warfarin 7 milliGRAM(s) Oral once    Vital Signs Last 24 Hrs  T(C): 36.6 (01 Apr 2019 07:42), Max: 36.7 (31 Mar 2019 19:52)  T(F): 97.9 (01 Apr 2019 07:42), Max: 98.1 (31 Mar 2019 19:52)  HR: 73 (01 Apr 2019 07:42) (71 - 100)  BP: 119/81 (01 Apr 2019 07:42) (106/72 - 142/82)  BP(mean): --  RR: 14 (01 Apr 2019 07:42) (14 - 14)  SpO2: 96% (01 Apr 2019 07:42) (96% - 97%)  I&O's Summary    31 Mar 2019 07:01  -  01 Apr 2019 07:00  --------------------------------------------------------  IN: 0 mL / OUT: 675 mL / NET: -675 mL          PAST MEDICAL & SURGICAL HISTORY:  Dyslipidemia  HTN (Hypertension)  Status Post Total Knee Replace: left      Home Medications:  acetaminophen 160 mg/5 mL oral suspension: 25.47 milliliter(s) orally every 6 hours, As needed, Mild Pain (1 - 3) (29 Mar 2019 14:40)  Coumadin 2 mg oral tablet: 3.5 tab(s) (7mg total) orally x 1 tonight with daily dosing based on INR, goal INR 2-3 for PE  (29 Mar 2019 14:40)  enalapril 10 mg oral tablet: 1 tab(s) orally once a day (29 Mar 2019 14:40)  petrolatum topical ointment: 1 application topically 2 times a day (29 Mar 2019 14:40)  psyllium 3.4 g/7 g oral powder for reconstitution: 1 packet(s) orally 2 times a day (29 Mar 2019 14:40)  simvastatin 40 mg oral tablet: 1 tab(s) orally once a day (at bedtime) (29 Mar 2019 14:40)  vancomycin 125 mg oral capsule: 1 cap(s) orally 2 times a day to compolete thru 3/31/19. (29 Mar 2019 14:40)  zinc oxide 20% topical ointment: 1 application topically 2 times a day (29 Mar 2019 14:40)      PHYSICAL EXAM:  General: NAD  ENT: MMM  Neck: Supple, No JVD  Lungs: Clear to percussion bilaterally  Cardio: RRR, S1/S2, No murmurs  Abdomen: Soft, Nontender, Nondistended; Bowel sounds present  Neuro: no new deficits   Extremities: No clubbing, cyanosis, or edema    LABS:                        10.3   8.1   )-----------( 382      ( 30 Mar 2019 05:20 )             32.0     03-30    137  |  101  |  14  ----------------------------<  89  4.0   |  26  |  0.73    Ca    9.2      30 Mar 2019 05:20    TPro  6.6  /  Alb  2.6  /  TBili  0.2  /  DBili  x   /  AST  36  /  ALT  66  /  AlkPhos  96  03-30    PT/INR - ( 01 Apr 2019 07:00 )   PT: 26.8 sec;   INR: 2.33 ratio      03-04 Chol 184 mg/dL  mg/dL HDL 49 mg/dL Trig 158 mg/dL    CAPILLARY BLOOD GLUCOSE    03-04 [id number] 5.6    RADIOLOGY & ADDITIONAL TESTS: no new tests     Care Discussed with Consultants/Other Providers: PM&R team

## 2019-04-02 LAB
INR BLD: 2.14 RATIO — HIGH (ref 0.88–1.16)
PROTHROM AB SERPL-ACNC: 24.5 SEC — HIGH (ref 10–12.9)

## 2019-04-02 PROCEDURE — 99233 SBSQ HOSP IP/OBS HIGH 50: CPT

## 2019-04-02 PROCEDURE — 99232 SBSQ HOSP IP/OBS MODERATE 35: CPT

## 2019-04-02 RX ORDER — TAMSULOSIN HYDROCHLORIDE 0.4 MG/1
0.4 CAPSULE ORAL AT BEDTIME
Qty: 0 | Refills: 0 | Status: DISCONTINUED | OUTPATIENT
Start: 2019-04-02 | End: 2019-04-08

## 2019-04-02 RX ORDER — WARFARIN SODIUM 2.5 MG/1
7 TABLET ORAL ONCE
Qty: 0 | Refills: 0 | Status: COMPLETED | OUTPATIENT
Start: 2019-04-02 | End: 2019-04-02

## 2019-04-02 RX ADMIN — TAMSULOSIN HYDROCHLORIDE 0.4 MILLIGRAM(S): 0.4 CAPSULE ORAL at 21:37

## 2019-04-02 RX ADMIN — Medication 1 PACKET(S): at 05:41

## 2019-04-02 RX ADMIN — Medication 125 MILLIGRAM(S): at 05:41

## 2019-04-02 RX ADMIN — PANTOPRAZOLE SODIUM 40 MILLIGRAM(S): 20 TABLET, DELAYED RELEASE ORAL at 05:41

## 2019-04-02 RX ADMIN — ATORVASTATIN CALCIUM 20 MILLIGRAM(S): 80 TABLET, FILM COATED ORAL at 21:37

## 2019-04-02 RX ADMIN — Medication 5 MILLIGRAM(S): at 05:41

## 2019-04-02 RX ADMIN — WARFARIN SODIUM 7 MILLIGRAM(S): 2.5 TABLET ORAL at 21:43

## 2019-04-02 NOTE — PROGRESS NOTE ADULT - SUBJECTIVE AND OBJECTIVE BOX
CHIEF COMPLAINT: No events overnight.       HISTORY OF PRESENT ILLNESS  74 yr old Female c PMHx of HTN on ASA (? indication) presents to ED s/p syncope and 3 day hx of neck pain on 3/4/19. Admitted to [facility] where admission workup revealed large SAH on CT brain.  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 Hospital course notable for cdiff diarrhea evaluated by ID and treated with course of Flagyl and Vancomycin; also urinary retention (youssef was reinserted on 3/26/19), failed multiple TOVs, concurrent E coli UTI was discovered on 3/27/19 and treated with fosfomycin. On 3/18/19 patient developed dyspnea. CTA chest revealed RUL PE. Started on heparin gtt 3/18/19 and bridged to coumadin.     *Will need urodynamics and outpatient urology follow up (29 Mar 2019 14:25)      PAST MEDICAL & SURGICAL HISTORY:  Dyslipidemia  HTN (Hypertension)  Status Post Total Knee Replace: left        REVIEW OF SYMPTOMS  [X] Constitutional WNL     [X] Cardio WNL            [X] Resp WNL           [X] GI WNL                 [X] Heme WNL              [X] Endo WNL                     [X] Skin WNL                 [X] MSK WNL                   [X] Psych WNL      VITALS  Vital Signs Last 24 Hrs  T(C): 36.5 (02 Apr 2019 08:10), Max: 36.9 (01 Apr 2019 20:18)  T(F): 97.7 (02 Apr 2019 08:10), Max: 98.5 (01 Apr 2019 20:18)  HR: 85 (02 Apr 2019 08:10) (70 - 85)  BP: 117/81 (02 Apr 2019 08:10) (117/81 - 149/73)  BP(mean): --  RR: 14 (02 Apr 2019 08:10) (14 - 14)  SpO2: 98% (02 Apr 2019 08:10) (97% - 98%)      PHYSICAL EXAM  Constitutional - NAD  HEENT - NCAT, EOMI  Neck - Supple, No limited ROM  Chest - CTA bilaterally, No wheeze, No rhonchi, No crackles  Cardiovascular - RRR, S1S2, No murmurs  Abdomen - BS+, Soft, NTND  Extremities - No C/C/E, No calf tenderness   Skin-no rash  Wounds-right groin      Neurologic Exam - no new deficits                     Psychiatric - Mood stable, Affect WNL         FUNCTIONAL PROGRESS  Gait - min A/CG  ADLs - CG A  Transfers - CG A  Functional transfer - CG A    RECENT LABS          PT/INR - ( 02 Apr 2019 09:14 )   PT: 24.5 sec;   INR: 2.14 ratio               Direct LDL: 103 mg/dL (03-04-19 @ 23:32)    Hemoglobin A1C, Whole Blood: 5.6 % (03-04-19 @ 22:39)              RADIOLOGY/OTHER RESULTS      CURRENT MEDICATIONS  MEDICATIONS  (STANDING):  AQUAPHOR (petrolatum Ointment) 1 Application(s) Topical two times a day  atorvastatin 20 milliGRAM(s) Oral at bedtime  enalapril 5 milliGRAM(s) Oral daily  pantoprazole    Tablet 40 milliGRAM(s) Oral before breakfast  psyllium Powder 1 Packet(s) Oral two times a day  vancomycin    Solution 125 milliGRAM(s) Oral every 12 hours  warfarin 7 milliGRAM(s) Oral once  zinc oxide 20% Ointment 1 Application(s) Topical two times a day    MEDICATIONS  (PRN):  acetaminophen    Suspension .. 815 milliGRAM(s) Oral every 6 hours PRN Mild Pain (1 - 3)      ASSESSMENT & PLAN      GI/Bowel Management - Psyllium   Management - Toilet Q2  Skin - Turn Q2  Pain - Tylenol PRN  DVT PPX - Coumadin  Diet - reg c thins    Continue comprehensive acute rehab program consisting of 3hrs/day of OT/PT and SLP. CHIEF COMPLAINT: No events overnight.       HISTORY OF PRESENT ILLNESS  74 yr old Female c PMHx of HTN on ASA (? indication) presents to ED s/p syncope and 3 day hx of neck pain on 3/4/19. Admitted to [facility] where admission workup revealed large SAH on CT brain.  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 Hospital course notable for cdiff diarrhea evaluated by ID and treated with course of Flagyl and Vancomycin; also urinary retention (youssef was reinserted on 3/26/19), failed multiple TOVs, concurrent E coli UTI was discovered on 3/27/19 and treated with fosfomycin. On 3/18/19 patient developed dyspnea. CTA chest revealed RUL PE. Started on heparin gtt 3/18/19 and bridged to coumadin.     *Will need urodynamics and outpatient urology follow up (29 Mar 2019 14:25)      PAST MEDICAL & SURGICAL HISTORY:  Dyslipidemia  HTN (Hypertension)  Status Post Total Knee Replace: left        REVIEW OF SYMPTOMS  [X] Constitutional WNL     [X] Cardio WNL            [X] Resp WNL           [X] GI WNL                 [X] Heme WNL              [X] Endo WNL                     [X] Skin WNL                 [X] MSK WNL                   [X] Psych WNL      VITALS  Vital Signs Last 24 Hrs  T(C): 36.5 (02 Apr 2019 08:10), Max: 36.9 (01 Apr 2019 20:18)  T(F): 97.7 (02 Apr 2019 08:10), Max: 98.5 (01 Apr 2019 20:18)  HR: 85 (02 Apr 2019 08:10) (70 - 85)  BP: 117/81 (02 Apr 2019 08:10) (117/81 - 149/73)  BP(mean): --  RR: 14 (02 Apr 2019 08:10) (14 - 14)  SpO2: 98% (02 Apr 2019 08:10) (97% - 98%)      PHYSICAL EXAM  Constitutional - NAD  HEENT - NCAT, EOMI  Neck - Supple, No limited ROM  Chest - CTA bilaterally, No wheeze, No rhonchi, No crackles  Cardiovascular - RRR, S1S2, No murmurs  Abdomen - BS+, Soft, NTND  Extremities - No C/C/E, No calf tenderness   Skin-no rash  Wounds-right groin      Neurologic Exam - no new focal  deficits                     Psychiatric - Mood stable, Affect WNL         FUNCTIONAL PROGRESS  Gait - min A/CG  ADLs - CG A  Transfers - CG A  Functional transfer - CG A    RECENT LABS          PT/INR - ( 02 Apr 2019 09:14 )   PT: 24.5 sec;   INR: 2.14 ratio               Direct LDL: 103 mg/dL (03-04-19 @ 23:32)    Hemoglobin A1C, Whole Blood: 5.6 % (03-04-19 @ 22:39)              RADIOLOGY/OTHER RESULTS      CURRENT MEDICATIONS  MEDICATIONS  (STANDING):  AQUAPHOR (petrolatum Ointment) 1 Application(s) Topical two times a day  atorvastatin 20 milliGRAM(s) Oral at bedtime  enalapril 5 milliGRAM(s) Oral daily  pantoprazole    Tablet 40 milliGRAM(s) Oral before breakfast  psyllium Powder 1 Packet(s) Oral two times a day  vancomycin    Solution 125 milliGRAM(s) Oral every 12 hours  warfarin 7 milliGRAM(s) Oral once  zinc oxide 20% Ointment 1 Application(s) Topical two times a day    MEDICATIONS  (PRN):  acetaminophen    Suspension .. 815 milliGRAM(s) Oral every 6 hours PRN Mild Pain (1 - 3)      ASSESSMENT & PLAN      GI/Bowel Management - Psyllium   Management - Toilet Q2  Skin - Turn Q2  Pain - Tylenol PRN  DVT PPX - Coumadin  Diet - reg c thins    Continue comprehensive acute rehab program consisting of 3hrs/day of OT/PT and SLP.

## 2019-04-02 NOTE — CONSULT NOTE ADULT - SUBJECTIVE AND OBJECTIVE BOX
Patient is a 74y old  Female who presents with a chief complaint of s/p SAH/Acomm aneurysm coiling now with functional deficits. (02 Apr 2019 13:31)      HPI:  74 yr old Female c PMHx of HTN on ASA (? indication) presents to ED s/p syncope and 3 day hx of neck pain on 3/4/19. Admitted to [facility] where admission workup revealed large SAH on CT brain.  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 Hospital course notable for cdiff diarrhea evaluated by ID and treated with course of Flagyl and Vancomycin; also urinary retention (youssef was reinserted on 3/26/19), failed multiple TOVs, concurrent E coli UTI was discovered on 3/27/19 and treated with fosfomycin. On 3/18/19 patient developed dyspnea. CTA chest revealed RUL PE. Started on heparin gtt 3/18/19 and bridged to coumadin. Now at [facility] for rehab.    Asked to see her for urinary retention. Failed void trial prior to rehab. Also had UTI treated.    Has youssef in place and comfortable. Denies hematuria.      PAST MEDICAL & SURGICAL HISTORY:  Dyslipidemia  HTN (Hypertension)  Status Post Total Knee Replace: left      REVIEW OF SYSTEMS:    CONSTITUTIONAL:  no fevers or chills  RESPIRATORY: No shortness of breath  CARDIOVASCULAR: No chest pain  GASTROINTESTINAL: No abdominal or epigastric pain.        MEDICATIONS  (STANDING):  AQUAPHOR (petrolatum Ointment) 1 Application(s) Topical two times a day  atorvastatin 20 milliGRAM(s) Oral at bedtime  enalapril 5 milliGRAM(s) Oral daily  pantoprazole    Tablet 40 milliGRAM(s) Oral before breakfast  psyllium Powder 1 Packet(s) Oral two times a day  warfarin 7 milliGRAM(s) Oral once  zinc oxide 20% Ointment 1 Application(s) Topical two times a day    MEDICATIONS  (PRN):  acetaminophen    Suspension .. 815 milliGRAM(s) Oral every 6 hours PRN Mild Pain (1 - 3)      Allergies    No Known Allergies      SOCIAL HISTORY: No illicit drug use    Vital Signs Last 24 Hrs  T(C): 36.9 (02 Apr 2019 20:18), Max: 36.9 (02 Apr 2019 20:18)  T(F): 98.4 (02 Apr 2019 20:18), Max: 98.4 (02 Apr 2019 20:18)  HR: 88 (02 Apr 2019 20:18) (72 - 88)  BP: 135/85    PHYSICAL EXAM:    Constitutional: NAD, well-developed  Abd: Soft, NT/ND  : +Youssef  Neurological: A/O x 3  Psychiatric: Normal mood, normal affect

## 2019-04-02 NOTE — PROGRESS NOTE ADULT - ASSESSMENT
75 yo female s/p SAH due to Acomm aneurysm rupture, now with functional deficits.    #s/p SAH  -Continue PT/OT/SLP program. Monitor VS and mental status on Coumadin.     #PE  Continue coumadin, PT INR continues, therapeutic today. No SOB.     #Cdiff hx  Vancomycin to continue, monitor BMs-no diarrhea reported.     #UTI EColi  Abx completed prior to admission.     #Low-void trials   eval-awaiting. Will follow up c  today.     #HTN  Adjust regimen, monitor BP closely, Enalapril to 5mg. BP stable, no weakness. seen by medicine.      Precautions:           fall, seizure, aspiration                                                                       Diet: reg c thins    DVT Prophylaxis:       on coumadin for PE                                                                   Medical Prognosis: good    Prescreen Comparison: I have reviewed the prescreen information and I found no relevant changes between the preadmission  screening and my post admission evaluation.     Expected Therapy:   P.T.   1     hrs/day           O. T.   1   hrs/day           S.L.P.   1     hrs/day                    P&O      eval                                             Excpected Frequency: 5 days/7 day period    Rehab Potential:      good                                     Estimated Disposition:     home                     ELOS:   21           days      Rationale For Inpatient Rehab Admission- Patient demonstrates the following:     [X] Medically appropriate for rehabilitation admission   [X] Has attainable rehab goals with an approrpriate discharge plan  [X] Has rehabilitation potential (expected to make significant improvement within a reasonable period of time)  [X] Requires close medical management by a rehab physician, rehab nursing care and comprehensive interdisciplinary team (including         PT, OT, SLP and/or prosthetics and orthotics)

## 2019-04-02 NOTE — PROGRESS NOTE ADULT - ATTENDING COMMENTS
No acute events overnight  Stable neurological exam , tolerates therapy well.  Goal INR on Coumadin for PE  Will ask ID to comment on duration on Vancomycin therapy for C diff   Gu evaluation for urinary retention

## 2019-04-02 NOTE — CONSULT NOTE ADULT - ASSESSMENT
Urinary retention after recent SAH. Has Low. Failed last void trial. Prior UTI treated. Denies prior  surgery.    Likely neurogenic bladder s/p SAH.      PLAN:  - Continue Low  - Start Flomax  - Avoid constipation  - Avoid anticholinergics  - Eventual void trial

## 2019-04-02 NOTE — PROGRESS NOTE ADULT - SUBJECTIVE AND OBJECTIVE BOX
Patient is a 74y old  Female who presents with a chief complaint of s/p SAH/Acomm aneurysm coiling now with functional deficits (01 Apr 2019 14:24)    Patient seen and examined, reports she feels better denies any complaints at time of assessment.      MEDICATIONS  (STANDING):  AQUAPHOR (petrolatum Ointment) 1 Application(s) Topical two times a day  atorvastatin 20 milliGRAM(s) Oral at bedtime  enalapril 5 milliGRAM(s) Oral daily  pantoprazole    Tablet 40 milliGRAM(s) Oral before breakfast  psyllium Powder 1 Packet(s) Oral two times a day  vancomycin    Solution 125 milliGRAM(s) Oral every 12 hours  zinc oxide 20% Ointment 1 Application(s) Topical two times a day    MEDICATIONS  (PRN):  acetaminophen    Suspension .. 815 milliGRAM(s) Oral every 6 hours PRN Mild Pain (1 - 3)      REVIEW OF SYSTEMS:  CONSTITUTIONAL: No fever, weight loss, has fatigue  EYES: No eye pain, visual disturbances, or discharge  ENMT:  No difficulty hearing, tinnitus, vertigo; No sinus or throat pain  NECK: No pain or stiffness  RESPIRATORY: No cough, wheezing, chills or hemoptysis; No shortness of breath  CARDIOVASCULAR: No chest pain, palpitations, dizziness, or leg swelling  GASTROINTESTINAL: No abdominal or epigastric pain. No nausea, vomiting, or hematemesis; No diarrhea or constipation. No melena or hematochezia.  GENITOURINARY: No dysuria, frequency, hematuria, or incontinence  NEUROLOGICAL: No headaches, memory loss, loss of strength, numbness, or tremors  SKIN: No itching, burning, rashes, or lesions   ENDOCRINE: No heat or cold intolerance; No hair loss  MUSCULOSKELETAL: No joint pain or swelling; No muscle, back, or extremity pain  PSYCHIATRIC: No depression, anxiety, mood swings, or difficulty sleeping  HEME/LYMPH: No easy bruising, or bleeding gums  ALLERGY AND IMMUNOLOGIC: No hives or eczema        PHYSICAL EXAM:  T(C): 36.9 (04-01-19 @ 20:18), Max: 36.9 (04-01-19 @ 20:18)  HR: 72 (04-02-19 @ 05:44) (70 - 72)  BP: 149/73 (04-02-19 @ 05:44) (128/78 - 149/73)  RR: 14 (04-01-19 @ 20:18) (14 - 14)  SpO2: 97% (04-01-19 @ 20:18) (97% - 97%)    I&O's Summary    01 Apr 2019 07:01  -  02 Apr 2019 07:00  --------------------------------------------------------  IN: 1010 mL / OUT: 1250 mL / NET: -240 mL        GENERAL: Elderly female, NAD, well-groomed, well-developed  HEAD:  Atraumatic, Normocephalic  EYES: EOMI, PERRL, conjunctiva and sclera clear  ENMT: Moist mucous membranes  NECK: Supple, No JVD, Normal thyroid  NERVOUS SYSTEM:  Alert & Oriented X3, no new deficit  CHEST/LUNG: Clear to ascultation bilaterally; No rales, rhonchi, wheezing, or rubs  HEART: Regular rate and rhythm; No murmurs, rubs, or gallops  ABDOMEN: Soft, Nontender, Nondistended; Bowel sounds present  : + Low in place, draining clear urine  EXTREMITIES:  2+ Peripheral Pulses, No clubbing, cyanosis, or edema  SKIN: No rash          LABS:  PT/INR - ( 01 Apr 2019 07:00 )   PT: 26.8 sec;   INR: 2.33 ratio               RADIOLOGY & ADDITIONAL TESTS:    Imaging Personally Reviewed:  [x] YES  [ ] NO    Consultant(s) Notes Reviewed:  [x] YES  [ ] NO    Care Discussed with Consultants/Other Providers [x] YES  [ ] NO Patient is a 74y old  Female who presents with a chief complaint of s/p SAH/Acomm aneurysm coiling now with functional deficits (01 Apr 2019 14:24)    Patient seen and examined, reports she feels better, slept well, denies any complaints at time of assessment.      MEDICATIONS  (STANDING):  AQUAPHOR (petrolatum Ointment) 1 Application(s) Topical two times a day  atorvastatin 20 milliGRAM(s) Oral at bedtime  enalapril 5 milliGRAM(s) Oral daily  pantoprazole    Tablet 40 milliGRAM(s) Oral before breakfast  psyllium Powder 1 Packet(s) Oral two times a day  vancomycin    Solution 125 milliGRAM(s) Oral every 12 hours  zinc oxide 20% Ointment 1 Application(s) Topical two times a day    MEDICATIONS  (PRN):  acetaminophen    Suspension .. 815 milliGRAM(s) Oral every 6 hours PRN Mild Pain (1 - 3)      REVIEW OF SYSTEMS:  CONSTITUTIONAL: No fever, weight loss,  fatigue  EYES: No eye pain, visual disturbances, or discharge  ENMT:  No difficulty hearing, tinnitus, vertigo; No sinus or throat pain  NECK: No pain or stiffness  RESPIRATORY: No cough, wheezing, chills or hemoptysis; No shortness of breath  CARDIOVASCULAR: No chest pain, palpitations, dizziness, or leg swelling  GASTROINTESTINAL: No abdominal or epigastric pain. No nausea, vomiting, or hematemesis; No diarrhea or constipation. No melena or hematochezia.  GENITOURINARY: No dysuria, frequency, hematuria, or incontinence  NEUROLOGICAL: No headaches, memory loss, loss of strength, numbness, or tremors  SKIN: No itching, burning, rashes, or lesions   ENDOCRINE: No heat or cold intolerance; No hair loss  MUSCULOSKELETAL: No joint pain or swelling; No muscle, back, or extremity pain  PSYCHIATRIC: No depression, anxiety, mood swings, or difficulty sleeping  HEME/LYMPH: No easy bruising, or bleeding gums  ALLERGY AND IMMUNOLOGIC: No hives or eczema        PHYSICAL EXAM:  T(C): 36.9 (04-01-19 @ 20:18), Max: 36.9 (04-01-19 @ 20:18)  HR: 72 (04-02-19 @ 05:44) (70 - 72)  BP: 149/73 (04-02-19 @ 05:44) (128/78 - 149/73)  RR: 14 (04-01-19 @ 20:18) (14 - 14)  SpO2: 97% (04-01-19 @ 20:18) (97% - 97%)    I&O's Summary    01 Apr 2019 07:01  -  02 Apr 2019 07:00  --------------------------------------------------------  IN: 1010 mL / OUT: 1250 mL / NET: -240 mL        GENERAL: Elderly female, NAD, well-groomed, well-developed  HEAD:  Atraumatic, Normocephalic  EYES: EOMI, PERRL, conjunctiva and sclera clear  ENMT: Moist mucous membranes  NECK: Supple, No JVD, Normal thyroid  NERVOUS SYSTEM:  Alert & Oriented X3, no new deficit  CHEST/LUNG: Clear to ascultation bilaterally; No rales, rhonchi, wheezing, or rubs  HEART: Regular rate and rhythm; No murmurs, rubs, or gallops  ABDOMEN: Soft, Nontender, Nondistended; Bowel sounds present  : + Low in place, draining clear urine  EXTREMITIES:  2+ Peripheral Pulses, No clubbing, cyanosis, or edema  SKIN: No rash          LABS:  PT/INR - ( 01 Apr 2019 07:00 )   PT: 26.8 sec;   INR: 2.33 ratio               RADIOLOGY & ADDITIONAL TESTS:    Imaging Personally Reviewed:  [x] YES  [ ] NO    Consultant(s) Notes Reviewed:  [x] YES  [ ] NO    Care Discussed with Consultants/Other Providers [x] YES  [ ] NO

## 2019-04-03 LAB
ALBUMIN SERPL ELPH-MCNC: 2.7 G/DL — LOW (ref 3.3–5)
ALP SERPL-CCNC: 81 U/L — SIGNIFICANT CHANGE UP (ref 40–120)
ALT FLD-CCNC: 47 U/L DA — HIGH (ref 10–45)
ANION GAP SERPL CALC-SCNC: 10 MMOL/L — SIGNIFICANT CHANGE UP (ref 5–17)
AST SERPL-CCNC: 30 U/L — SIGNIFICANT CHANGE UP (ref 10–40)
BILIRUB SERPL-MCNC: 0.2 MG/DL — SIGNIFICANT CHANGE UP (ref 0.2–1.2)
BUN SERPL-MCNC: 21 MG/DL — SIGNIFICANT CHANGE UP (ref 7–23)
CALCIUM SERPL-MCNC: 8.7 MG/DL — SIGNIFICANT CHANGE UP (ref 8.4–10.5)
CHLORIDE SERPL-SCNC: 102 MMOL/L — SIGNIFICANT CHANGE UP (ref 96–108)
CO2 SERPL-SCNC: 25 MMOL/L — SIGNIFICANT CHANGE UP (ref 22–31)
CREAT SERPL-MCNC: 0.82 MG/DL — SIGNIFICANT CHANGE UP (ref 0.5–1.3)
GLUCOSE SERPL-MCNC: 93 MG/DL — SIGNIFICANT CHANGE UP (ref 70–99)
HCT VFR BLD CALC: 31.9 % — LOW (ref 34.5–45)
HGB BLD-MCNC: 10.3 G/DL — LOW (ref 11.5–15.5)
INR BLD: 2.25 RATIO — HIGH (ref 0.88–1.16)
MCHC RBC-ENTMCNC: 29.4 PG — SIGNIFICANT CHANGE UP (ref 27–34)
MCHC RBC-ENTMCNC: 32.2 GM/DL — SIGNIFICANT CHANGE UP (ref 32–36)
MCV RBC AUTO: 91.5 FL — SIGNIFICANT CHANGE UP (ref 80–100)
PLATELET # BLD AUTO: 289 K/UL — SIGNIFICANT CHANGE UP (ref 150–400)
POTASSIUM SERPL-MCNC: 3.9 MMOL/L — SIGNIFICANT CHANGE UP (ref 3.5–5.3)
POTASSIUM SERPL-SCNC: 3.9 MMOL/L — SIGNIFICANT CHANGE UP (ref 3.5–5.3)
PROT SERPL-MCNC: 6.3 G/DL — SIGNIFICANT CHANGE UP (ref 6–8.3)
PROTHROM AB SERPL-ACNC: 25.8 SEC — HIGH (ref 10–12.9)
RBC # BLD: 3.49 M/UL — LOW (ref 3.8–5.2)
RBC # FLD: 13.3 % — SIGNIFICANT CHANGE UP (ref 10.3–14.5)
SODIUM SERPL-SCNC: 137 MMOL/L — SIGNIFICANT CHANGE UP (ref 135–145)
WBC # BLD: 5.5 K/UL — SIGNIFICANT CHANGE UP (ref 3.8–10.5)
WBC # FLD AUTO: 5.5 K/UL — SIGNIFICANT CHANGE UP (ref 3.8–10.5)

## 2019-04-03 PROCEDURE — 99233 SBSQ HOSP IP/OBS HIGH 50: CPT

## 2019-04-03 RX ORDER — WARFARIN SODIUM 2.5 MG/1
7 TABLET ORAL ONCE
Qty: 0 | Refills: 0 | Status: COMPLETED | OUTPATIENT
Start: 2019-04-03 | End: 2019-04-03

## 2019-04-03 RX ADMIN — Medication 5 MILLIGRAM(S): at 05:40

## 2019-04-03 RX ADMIN — ATORVASTATIN CALCIUM 20 MILLIGRAM(S): 80 TABLET, FILM COATED ORAL at 21:17

## 2019-04-03 RX ADMIN — Medication 1 APPLICATION(S): at 05:40

## 2019-04-03 RX ADMIN — TAMSULOSIN HYDROCHLORIDE 0.4 MILLIGRAM(S): 0.4 CAPSULE ORAL at 21:18

## 2019-04-03 RX ADMIN — Medication 1 PACKET(S): at 05:40

## 2019-04-03 RX ADMIN — PANTOPRAZOLE SODIUM 40 MILLIGRAM(S): 20 TABLET, DELAYED RELEASE ORAL at 05:40

## 2019-04-03 RX ADMIN — WARFARIN SODIUM 7 MILLIGRAM(S): 2.5 TABLET ORAL at 21:18

## 2019-04-03 NOTE — PROGRESS NOTE ADULT - ASSESSMENT
77 yo female s/p SAH due to Acomm aneurysm rupture, now with functional deficits.    #s/p SAH  -Continue PT/OT/SLP program. Monitor VS and mental status on Coumadin.     #PE  Continue coumadin with goal  INR , today. Remains asymptomatic    #Cdiff hx  Vancomycin to be stopped as per ID, will monitor clinically     #UTI EColi  Abx completed prior to admission.     #Low-void trials   eval-  evaluation appreciated Flomax started, delayed voiding trial    #HTN  Adjust regimen, monitor BP closely, Enalapril to 5mg. BP stable, no weakness. seen by medicine.      Precautions:           fall, seizure, aspiration                                                                       Diet: reg c thins

## 2019-04-03 NOTE — DIETITIAN INITIAL EVALUATION ADULT. - ENERGY NEEDS
Ht: 5'5"   Wt: 93.5 kg (206 lbs)    IBW: 56.8 kg (125 lbs) +-10%    %IBW: 165%    BMI: 34.3                    Edema: none    Skin: intact

## 2019-04-03 NOTE — PROGRESS NOTE ADULT - ATTENDING COMMENTS
Multidisciplinary team meeting today:  patient's functional goals and needs, functional and clinical  progress were discussed, barriers to discharge were identified. Anticipate discharge home with home care, 24/7 supervision for safety    EDOD 4/11/19

## 2019-04-03 NOTE — DIETITIAN INITIAL EVALUATION ADULT. - OTHER INFO
Pt reported good appetite. Pt likes the foods in house. Consumes % of her meals. Pt denies N/V/D/C, no chewing or swallowing problems. NKFA. Pt reported  lbs. Current wt 206 lbs. Pt reported good appetite. Pt likes the foods in house. Consumes % of her meals. Pt denies N/V/D/C, no chewing or swallowing problems. NKFA. Pt reported  lbs. Current wt 206 lbs. Pt with hx of HTN and HLD was suggested DASH/TLC diet. Pt expressed good understanding and decided to try the recommended diet.

## 2019-04-03 NOTE — PROGRESS NOTE ADULT - SUBJECTIVE AND OBJECTIVE BOX
CHIEF COMPLAINT: no new complaints, good daily progress in therapy      HISTORY OF PRESENT ILLNESS    74 yr old Female c PMHx of HTN on ASA (? indication) presents to ED s/p syncope and 3 day hx of neck pain on 3/4/19. Admitted to [facility] where admission workup revealed large SAH on CT brain.  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 Hospital course notable for cdiff diarrhea evaluated by ID and treated with course of Flagyl and Vancomycin; also urinary retention (youssef was reinserted on 3/26/19), failed multiple TOVs, concurrent E coli UTI was discovered on 3/27/19 and treated with fosfomycin. On 3/18/19 patient developed dyspnea. CTA chest revealed RUL PE. Started on heparin gtt 3/18/19 and bridged to coumadin.     *Will need urodynamics and outpatient urology follow up (29 Mar 2019 14:25)        PAST MEDICAL & SURGICAL HISTORY:  Dyslipidemia  HTN (Hypertension)  Status Post Total Knee Replace: left      VITALS  Vital Signs Last 24 Hrs  T(C): 36.6 (03 Apr 2019 07:53), Max: 36.9 (02 Apr 2019 20:18)  T(F): 97.9 (03 Apr 2019 07:53), Max: 98.4 (02 Apr 2019 20:18)  HR: 93 (03 Apr 2019 07:53) (83 - 93)  BP: 129/76 (03 Apr 2019 07:53) (126/86 - 135/85)  BP(mean): --  RR: 14 (03 Apr 2019 07:53) (14 - 14)  SpO2: 99% (03 Apr 2019 07:53) (97% - 99%)      PHYSICAL EXAM  Constitutional - NAD, Comfortable  HEENT - NCAT, EOMI  Neck - Supple, No limited ROM  Chest - CTA bilaterally,  Cardiovascular - RRR, S1S2, No murmurs  Abdomen - BS+, Soft, NTND  Extremities - No C/C/E, No calf tenderness   Skin-no rash  Neurologic Exam -  no new focal deficits                     RECENT LABS                        10.3   5.5   )-----------( 289      ( 03 Apr 2019 06:00 )             31.9     04-03    137  |  102  |  21  ----------------------------<  93  3.9   |  25  |  0.82    Ca    8.7      03 Apr 2019 06:00    TPro  6.3  /  Alb  2.7<L>  /  TBili  0.2  /  DBili  x   /  AST  30  /  ALT  47<H>  /  AlkPhos  81  04-03    PT/INR - ( 03 Apr 2019 06:00 )   PT: 25.8 sec;   INR: 2.25 ratio           LIVER FUNCTIONS - ( 03 Apr 2019 06:00 )  Alb: 2.7 g/dL / Pro: 6.3 g/dL / ALK PHOS: 81 U/L / ALT: 47 U/L DA / AST: 30 U/L / GGT: x             Direct LDL: 103 mg/dL (03-04-19 @ 23:32)    Hemoglobin A1C, Whole Blood: 5.6 % (03-04-19 @ 22:39)                  RADIOLOGY/OTHER RESULTS      CURRENT MEDICATIONS  MEDICATIONS  (STANDING):  atorvastatin 20 milliGRAM(s) Oral at bedtime  enalapril 5 milliGRAM(s) Oral daily  pantoprazole    Tablet 40 milliGRAM(s) Oral before breakfast  tamsulosin 0.4 milliGRAM(s) Oral at bedtime    MEDICATIONS  (PRN):  acetaminophen    Suspension .. 815 milliGRAM(s) Oral every 6 hours PRN Mild Pain (1 - 3)

## 2019-04-04 PROCEDURE — 99233 SBSQ HOSP IP/OBS HIGH 50: CPT

## 2019-04-04 PROCEDURE — 99232 SBSQ HOSP IP/OBS MODERATE 35: CPT

## 2019-04-04 RX ORDER — WARFARIN SODIUM 2.5 MG/1
7 TABLET ORAL DAILY
Qty: 0 | Refills: 0 | Status: DISCONTINUED | OUTPATIENT
Start: 2019-04-04 | End: 2019-04-05

## 2019-04-04 RX ADMIN — WARFARIN SODIUM 7 MILLIGRAM(S): 2.5 TABLET ORAL at 21:12

## 2019-04-04 RX ADMIN — Medication 5 MILLIGRAM(S): at 05:35

## 2019-04-04 RX ADMIN — TAMSULOSIN HYDROCHLORIDE 0.4 MILLIGRAM(S): 0.4 CAPSULE ORAL at 21:13

## 2019-04-04 RX ADMIN — ATORVASTATIN CALCIUM 20 MILLIGRAM(S): 80 TABLET, FILM COATED ORAL at 21:12

## 2019-04-04 RX ADMIN — PANTOPRAZOLE SODIUM 40 MILLIGRAM(S): 20 TABLET, DELAYED RELEASE ORAL at 05:35

## 2019-04-04 NOTE — PROGRESS NOTE ADULT - ASSESSMENT
75 yo female s/p SAH due to Acomm aneurysm rupture, now with functional deficits.    #s/p SAH  -Continue PT/OT/SLP program. Monitor VS and mental status on Coumadin.     #PE  Continue coumadin with goal INR, therapeutic today. Remains asymptomatic    #Cdiff hx  Vancomycin to be stopped as per ID, will monitor clinically. No diarrhea.      #UTI EColi  Abx completed prior to admission.     #Low-void trials   eval-  evaluation appreciated Flomax started, delayed voiding trial    #HTN  Adjust regimen, monitor BP closely, Enalapril to 5mg. BP stable, no weakness. seen by medicine.      Precautions:           fall, seizure, aspiration                                                                       Diet: reg c thins 75 yo female s/p SAH due to Acomm aneurysm rupture, now with functional deficits.    #s/p SAH  -Continue PT/OT/SLP program. Monitor VS and mental status on Coumadin.     #PE  Continue coumadin with goal INR, therapeutic today. Remains asymptomatic    #Cdiff hx  Vancomycin stopped as per ID, will monitor clinically. No diarrhea reported. Off laxatives.       #UTI EColi  Abx completed prior to admission.     #Low-void trials   eval-  evaluation appreciated Flomax started, delayed voiding trial    #HTN  Adjust regimen, monitor BP closely, Enalapril to 5mg. BP stable, no weakness. seen by medicine.      Precautions:           fall, seizure, aspiration                                                                       Diet: reg c thins

## 2019-04-04 NOTE — PROGRESS NOTE ADULT - SUBJECTIVE AND OBJECTIVE BOX
CHIEF COMPLAINT: no acute events overnight. NAD.       HISTORY OF PRESENT ILLNESS  74 yr old Female c PMHx of HTN on ASA (? indication) presents to ED s/p syncope and 3 day hx of neck pain on 3/4/19. Admitted to [facility] where admission workup revealed large SAH on CT brain.  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 Hospital course notable for cdiff diarrhea evaluated by ID and treated with course of Flagyl and Vancomycin; also urinary retention (youssef was reinserted on 3/26/19), failed multiple TOVs, concurrent E coli UTI was discovered on 3/27/19 and treated with fosfomycin. On 3/18/19 patient developed dyspnea. CTA chest revealed RUL PE. Started on heparin gtt 3/18/19 and bridged to coumadin.     *Will need urodynamics and outpatient urology follow up (29 Mar 2019 14:25)      PAST MEDICAL & SURGICAL HISTORY:  Dyslipidemia  HTN (Hypertension)  Status Post Total Knee Replace: left       REVIEW OF SYMPTOMS  [X] Constitutional WNL     [X] Cardio WNL            [X] Resp WNL           [X] GI WNL                      [X] Heme WNL              [X] Endo WNL                     [X] Skin WNL                 [X] MSK WNL                   [X] Psych WNL      VITALS  Vital Signs Last 24 Hrs  T(C): 36.7 (04 Apr 2019 07:32), Max: 36.7 (03 Apr 2019 20:08)  T(F): 98 (04 Apr 2019 07:32), Max: 98.1 (03 Apr 2019 20:08)  HR: 75 (04 Apr 2019 07:32) (75 - 86)  BP: 134/83 (04 Apr 2019 07:32) (126/81 - 155/97)  BP(mean): --  RR: 14 (04 Apr 2019 07:32) (12 - 14)  SpO2: 99% (04 Apr 2019 07:32) (98% - 99%)      PHYSICAL EXAM  Constitutional - NAD, Comfortable  HEENT - NCAT, EOMI  Neck - Supple, No limited ROM  Chest - CTA bilaterally,  Cardiovascular - RRR, S1S2, No murmurs  Abdomen - BS+, Soft, NTND  Extremities - No C/C/E, No calf tenderness   Skin-no rash    Neurologic Exam -  no new focal deficits                  Psychiatric - Mood stable, Affect WNL       RECENT LABS                        10.3   5.5   )-----------( 289      ( 03 Apr 2019 06:00 )             31.9     04-03    137  |  102  |  21  ----------------------------<  93  3.9   |  25  |  0.82    Ca    8.7      03 Apr 2019 06:00    TPro  6.3  /  Alb  2.7<L>  /  TBili  0.2  /  DBili  x   /  AST  30  /  ALT  47<H>  /  AlkPhos  81  04-03    PT/INR - ( 03 Apr 2019 06:00 )   PT: 25.8 sec;   INR: 2.25 ratio           LIVER FUNCTIONS - ( 03 Apr 2019 06:00 )  Alb: 2.7 g/dL / Pro: 6.3 g/dL / ALK PHOS: 81 U/L / ALT: 47 U/L DA / AST: 30 U/L / GGT: x             Direct LDL: 103 mg/dL (03-04-19 @ 23:32)    Hemoglobin A1C, Whole Blood: 5.6 % (03-04-19 @ 22:39)              RADIOLOGY/OTHER RESULTS      CURRENT MEDICATIONS  MEDICATIONS  (STANDING):  atorvastatin 20 milliGRAM(s) Oral at bedtime  enalapril 5 milliGRAM(s) Oral daily  pantoprazole    Tablet 40 milliGRAM(s) Oral before breakfast  tamsulosin 0.4 milliGRAM(s) Oral at bedtime  warfarin 7 milliGRAM(s) Oral daily    MEDICATIONS  (PRN):  acetaminophen    Suspension .. 815 milliGRAM(s) Oral every 6 hours PRN Mild Pain (1 - 3)      ASSESSMENT & PLAN          GI/Bowel Management - hold for diarrhea   Management - Toilet Q2  Skin - Turn Q2  Pain - Tylenol PRN  DVT PPX - Coumadin    Continue comprehensive acute rehab program consisting of 3hrs/day of OT/PT and SLP.

## 2019-04-04 NOTE — PROGRESS NOTE ADULT - SUBJECTIVE AND OBJECTIVE BOX
Patient is a 74 year old  Female who presents with a chief complaint of s/p SAH/Acomm aneurysm coiling now with functional deficits (03 Apr 2019 13:47)    Patient seen and examined, reports she feels better, slept well, denies any complaints at time of assessment.      MEDICATIONS  (STANDING):  atorvastatin 20 milliGRAM(s) Oral at bedtime  enalapril 5 milliGRAM(s) Oral daily  pantoprazole    Tablet 40 milliGRAM(s) Oral before breakfast  tamsulosin 0.4 milliGRAM(s) Oral at bedtime  warfarin 7 milliGRAM(s) Oral daily    MEDICATIONS  (PRN):  acetaminophen    Suspension .. 815 milliGRAM(s) Oral every 6 hours PRN Mild Pain (1 - 3)      REVIEW OF SYSTEMS:  CONSTITUTIONAL: No fever, weight loss,  fatigue  EYES: No eye pain, visual disturbances, or discharge  ENMT:  No difficulty hearing, tinnitus, vertigo; No sinus or throat pain  NECK: No pain or stiffness  RESPIRATORY: No cough, wheezing, chills or hemoptysis; No shortness of breath  CARDIOVASCULAR: No chest pain, palpitations, dizziness, or leg swelling  GASTROINTESTINAL: No abdominal or epigastric pain. No nausea, vomiting, or hematemesis; No diarrhea or constipation. No melena or hematochezia.  GENITOURINARY: No dysuria, frequency, hematuria, or incontinence  NEUROLOGICAL: No headaches, memory loss, loss of strength, numbness, or tremors  SKIN: No itching, burning, rashes, or lesions   ENDOCRINE: No heat or cold intolerance; No hair loss  MUSCULOSKELETAL: No joint pain or swelling; No muscle, back, or extremity pain  PSYCHIATRIC: No depression, anxiety, mood swings, or difficulty sleeping  HEME/LYMPH: No easy bruising, or bleeding gums  ALLERGY AND IMMUNOLOGIC: No hives or eczema          PHYSICAL EXAM:  T(C): 36.7 (04-04-19 @ 07:32), Max: 36.7 (04-03-19 @ 20:08)  HR: 75 (04-04-19 @ 07:32) (75 - 86)  BP: 134/83 (04-04-19 @ 07:32) (126/81 - 155/97)  RR: 14 (04-04-19 @ 07:32) (12 - 14)  SpO2: 99% (04-04-19 @ 07:32) (98% - 99%)    I&O's Summary    03 Apr 2019 07:01  -  04 Apr 2019 07:00  --------------------------------------------------------  IN: 250 mL / OUT: 1550 mL / NET: -1300 mL        GENERAL: Elderly female, NAD, well-groomed, well-developed  HEAD:  Atraumatic, Normocephalic  EYES: EOMI, PERRL, conjunctiva and sclera clear  ENMT: Moist mucous membranes  NECK: Supple, No JVD, Normal thyroid  NERVOUS SYSTEM:  Alert & Oriented X3, no new deficit  CHEST/LUNG: Clear to ascultation bilaterally; No rales, rhonchi, wheezing, or rubs  HEART: Regular rate and rhythm; No murmurs, rubs, or gallops  ABDOMEN: Soft, Nontender, Nondistended; Bowel sounds present  : + Low in place, draining clear urine  EXTREMITIES:  2+ Peripheral Pulses, No clubbing, cyanosis, or edema  SKIN: No rash      LABS:                        10.3   5.5   )-----------( 289      ( 03 Apr 2019 06:00 )             31.9     04-03    137  |  102  |  21  ----------------------------<  93  3.9   |  25  |  0.82    Ca    8.7      03 Apr 2019 06:00    TPro  6.3  /  Alb  2.7<L>  /  TBili  0.2  /  DBili  x   /  AST  30  /  ALT  47<H>  /  AlkPhos  81  04-03    PT/INR - ( 03 Apr 2019 06:00 )   PT: 25.8 sec;   INR: 2.25 ratio                 RADIOLOGY & ADDITIONAL TESTS:    Imaging Personally Reviewed:  [x] YES  [ ] NO    Consultant(s) Notes Reviewed:  [x] YES  [ ] NO    Care Discussed with Consultants/Other Providers [x] YES  [ ] NO Patient is a 74 year old  Female who presents with a chief complaint of s/p SAH/A comm aneurysm coiling now with functional deficits (03 Apr 2019 13:47)    Patient seen and examined, reports she feels better, slept well, asking when her Low can come out, denies any other complaints at time of assessment.      MEDICATIONS  (STANDING):  atorvastatin 20 milliGRAM(s) Oral at bedtime  enalapril 5 milliGRAM(s) Oral daily  pantoprazole    Tablet 40 milliGRAM(s) Oral before breakfast  tamsulosin 0.4 milliGRAM(s) Oral at bedtime  warfarin 7 milliGRAM(s) Oral daily    MEDICATIONS  (PRN):  acetaminophen    Suspension .. 815 milliGRAM(s) Oral every 6 hours PRN Mild Pain (1 - 3)      REVIEW OF SYSTEMS:  CONSTITUTIONAL: No fever, weight loss,  fatigue  EYES: No eye pain, visual disturbances, or discharge  ENMT:  No difficulty hearing, tinnitus, vertigo; No sinus or throat pain  NECK: No pain or stiffness  RESPIRATORY: No cough, wheezing, chills or hemoptysis; No shortness of breath  CARDIOVASCULAR: No chest pain, palpitations, dizziness, or leg swelling  GASTROINTESTINAL: No abdominal or epigastric pain. No nausea, vomiting, or hematemesis; No diarrhea or constipation. No melena or hematochezia.  GENITOURINARY: No dysuria, frequency, hematuria, or incontinence  NEUROLOGICAL: No headaches, memory loss, loss of strength, numbness, or tremors  SKIN: No itching, burning, rashes, or lesions   ENDOCRINE: No heat or cold intolerance; No hair loss  MUSCULOSKELETAL: No joint pain or swelling; No muscle, back, or extremity pain  PSYCHIATRIC: No depression, anxiety, mood swings, or difficulty sleeping  HEME/LYMPH: No easy bruising, or bleeding gums  ALLERGY AND IMMUNOLOGIC: No hives or eczema          PHYSICAL EXAM:  T(C): 36.7 (04-04-19 @ 07:32), Max: 36.7 (04-03-19 @ 20:08)  HR: 75 (04-04-19 @ 07:32) (75 - 86)  BP: 134/83 (04-04-19 @ 07:32) (126/81 - 155/97)  RR: 14 (04-04-19 @ 07:32) (12 - 14)  SpO2: 99% (04-04-19 @ 07:32) (98% - 99%)    I&O's Summary    03 Apr 2019 07:01  -  04 Apr 2019 07:00  --------------------------------------------------------  IN: 250 mL / OUT: 1550 mL / NET: -1300 mL        GENERAL: Elderly female, NAD, well-groomed, well-developed  HEAD:  Atraumatic, Normocephalic  EYES: EOMI, PERRL, conjunctiva and sclera clear  ENMT: Moist mucous membranes  NECK: Supple, No JVD, Normal thyroid  NERVOUS SYSTEM:  Alert & Oriented X3, no new deficit  CHEST/LUNG: Clear to ascultation bilaterally; No rales, rhonchi, wheezing, or rubs  HEART: Regular rate and rhythm; No murmurs, rubs, or gallops  ABDOMEN: Soft, Nontender, Nondistended; Bowel sounds present  : + Low in place, draining clear urine  EXTREMITIES:  2+ Peripheral Pulses, No clubbing, cyanosis, or edema  SKIN: No rash      LABS:                        10.3   5.5   )-----------( 289      ( 03 Apr 2019 06:00 )             31.9     04-03    137  |  102  |  21  ----------------------------<  93  3.9   |  25  |  0.82    Ca    8.7      03 Apr 2019 06:00    TPro  6.3  /  Alb  2.7<L>  /  TBili  0.2  /  DBili  x   /  AST  30  /  ALT  47<H>  /  AlkPhos  81  04-03    PT/INR - ( 03 Apr 2019 06:00 )   PT: 25.8 sec;   INR: 2.25 ratio                 RADIOLOGY & ADDITIONAL TESTS:    Imaging Personally Reviewed:  [x] YES  [ ] NO    Consultant(s) Notes Reviewed:  [x] YES  [ ] NO    Care Discussed with Consultants/Other Providers [x] YES  [ ] NO

## 2019-04-04 NOTE — PROGRESS NOTE ADULT - ASSESSMENT
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    #s/p SAH  comprehensive PT/OT/ rehab    #PE  Continue coumadin, per PT/INR  INR currently therapuetic    #C. diff history  Vancomycin oral  125mg q 12h to continue, monitor BMs    #UTI E. Coli  antibiotics completed    #GERD  continue pantoprazole    #Low in place  consider void trials   eval  Will need urodynamic studies and outpatient urology follow up    #HTN  BP controlled on enalapril  Continue regimen, monitor BP closely    DVT Prophylaxis: on coumadin for PE 76 year old female s/p SAH due to A comm aneurysm rupture, now with functional deficits.    #s/p SAH  comprehensive PT/OT/ rehab    #PE  Continue coumadin, per PT/INR  INR currently therapuetic    #C. diff history  Vancomycin oral  125mg q 12h to continue, monitor BMs    #UTI E. Coli  antibiotics completed    #GERD  continue pantoprazole    #Urinary retention- Low in place  consider void trials  continue flomax   eval appreciated  Will need urodynamic studies and outpatient urology follow up    #HTN  BP controlled on enalapril  Continue regimen, monitor BP closely    DVT Prophylaxis: on coumadin for PE 76 year old female s/p SAH due to A comm aneurysm rupture, now with functional deficits.    #s/p SAH  comprehensive PT/OT/ rehab    #PE  Continue coumadin, per PT/INR  INR currently therapuetic    #C. diff history  completed Vancomycin oral, monitor BMs    #UTI E. Coli  antibiotics completed    #GERD  continue pantoprazole    #Urinary retention- Low in place  consider void trials  continue flomax   eval appreciated  Will need urodynamic studies and outpatient urology follow up    #HTN  BP controlled on enalapril  Continue regimen, monitor BP closely    #HLD  continue atorvastatin     DVT Prophylaxis: on coumadin for PE, INR therapeutic

## 2019-04-04 NOTE — PROGRESS NOTE ADULT - ATTENDING COMMENTS
Patient medically and neurologically stable stable. Making progress towards rehab goals.   Continue rehab program.     Anticipating voiding trial in few days Patient medically and neurologically stable stable. Making progress towards rehab goals.   Continue rehab program.     Anticipating voiding trial in few days.  Goal INR

## 2019-04-05 LAB
ALBUMIN SERPL ELPH-MCNC: 2.8 G/DL — LOW (ref 3.3–5)
ALP SERPL-CCNC: 82 U/L — SIGNIFICANT CHANGE UP (ref 40–120)
ALT FLD-CCNC: 40 U/L DA — SIGNIFICANT CHANGE UP (ref 10–45)
ANION GAP SERPL CALC-SCNC: 9 MMOL/L — SIGNIFICANT CHANGE UP (ref 5–17)
AST SERPL-CCNC: 26 U/L — SIGNIFICANT CHANGE UP (ref 10–40)
BILIRUB SERPL-MCNC: 0.2 MG/DL — SIGNIFICANT CHANGE UP (ref 0.2–1.2)
BUN SERPL-MCNC: 25 MG/DL — HIGH (ref 7–23)
CALCIUM SERPL-MCNC: 8.9 MG/DL — SIGNIFICANT CHANGE UP (ref 8.4–10.5)
CHLORIDE SERPL-SCNC: 104 MMOL/L — SIGNIFICANT CHANGE UP (ref 96–108)
CO2 SERPL-SCNC: 27 MMOL/L — SIGNIFICANT CHANGE UP (ref 22–31)
CREAT SERPL-MCNC: 0.89 MG/DL — SIGNIFICANT CHANGE UP (ref 0.5–1.3)
GLUCOSE SERPL-MCNC: 96 MG/DL — SIGNIFICANT CHANGE UP (ref 70–99)
HCT VFR BLD CALC: 33.2 % — LOW (ref 34.5–45)
HGB BLD-MCNC: 10.5 G/DL — LOW (ref 11.5–15.5)
INR BLD: 2.83 RATIO — HIGH (ref 0.88–1.16)
MCHC RBC-ENTMCNC: 29 PG — SIGNIFICANT CHANGE UP (ref 27–34)
MCHC RBC-ENTMCNC: 31.6 GM/DL — LOW (ref 32–36)
MCV RBC AUTO: 92 FL — SIGNIFICANT CHANGE UP (ref 80–100)
PLATELET # BLD AUTO: 299 K/UL — SIGNIFICANT CHANGE UP (ref 150–400)
POTASSIUM SERPL-MCNC: 4.6 MMOL/L — SIGNIFICANT CHANGE UP (ref 3.5–5.3)
POTASSIUM SERPL-SCNC: 4.6 MMOL/L — SIGNIFICANT CHANGE UP (ref 3.5–5.3)
PROT SERPL-MCNC: 6.5 G/DL — SIGNIFICANT CHANGE UP (ref 6–8.3)
PROTHROM AB SERPL-ACNC: 32.7 SEC — HIGH (ref 10–12.9)
RBC # BLD: 3.61 M/UL — LOW (ref 3.8–5.2)
RBC # FLD: 13.4 % — SIGNIFICANT CHANGE UP (ref 10.3–14.5)
SODIUM SERPL-SCNC: 140 MMOL/L — SIGNIFICANT CHANGE UP (ref 135–145)
WBC # BLD: 8 K/UL — SIGNIFICANT CHANGE UP (ref 3.8–10.5)
WBC # FLD AUTO: 8 K/UL — SIGNIFICANT CHANGE UP (ref 3.8–10.5)

## 2019-04-05 PROCEDURE — 99232 SBSQ HOSP IP/OBS MODERATE 35: CPT

## 2019-04-05 RX ORDER — WARFARIN SODIUM 2.5 MG/1
5 TABLET ORAL ONCE
Qty: 0 | Refills: 0 | Status: COMPLETED | OUTPATIENT
Start: 2019-04-05 | End: 2019-04-05

## 2019-04-05 RX ADMIN — Medication 5 MILLIGRAM(S): at 06:02

## 2019-04-05 RX ADMIN — WARFARIN SODIUM 5 MILLIGRAM(S): 2.5 TABLET ORAL at 21:47

## 2019-04-05 RX ADMIN — ATORVASTATIN CALCIUM 20 MILLIGRAM(S): 80 TABLET, FILM COATED ORAL at 21:47

## 2019-04-05 RX ADMIN — TAMSULOSIN HYDROCHLORIDE 0.4 MILLIGRAM(S): 0.4 CAPSULE ORAL at 21:47

## 2019-04-05 RX ADMIN — PANTOPRAZOLE SODIUM 40 MILLIGRAM(S): 20 TABLET, DELAYED RELEASE ORAL at 06:02

## 2019-04-05 NOTE — PROGRESS NOTE ADULT - ASSESSMENT
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    #s/p SAH  comprehensive PT/OT/ rehab    #PE  Continue coumadin, per PT/INR  INR currently therapuetic    #C. diff history  completed Vancomycin oral, monitor BMs    #UTI E. Coli  antibiotics completed    #GERD  continue pantoprazole    #Urinary retention- Low in place  consider void trials  continue flomax   eval appreciated  Will need urodynamic studies and outpatient urology follow up    #HTN  BP controlled on enalapril  Continue regimen, monitor BP closely    #HLD  continue atorvastatin     DVT Prophylaxis: on coumadin for PE, INR therapeutic 76 year old female s/p SAH due to A comm aneurysm rupture, now with functional deficits.    #s/p SAH  comprehensive PT/OT/ rehab    #PE  Continue coumadin, per PT/INR  INR currently therapuetic    #C. diff history  completed Vancomycin oral, monitor BMs    #UTI E. Coli  antibiotics completed    #GERD  continue pantoprazole    #Urinary retention - Low in place  void trial this weekend  continue flomax   eval appreciated  Will need urodynamic studies and outpatient urology follow up    #HTN  BP controlled on enalapril  Continue regimen, monitor BP closely    #HLD  continue atorvastatin     #DVT Prophylaxis: on coumadin for PE, INR therapeutic

## 2019-04-05 NOTE — PROGRESS NOTE ADULT - ATTENDING COMMENTS
Patient medically and neurologically  stable. Making progress towards rehab goals.   Continue rehab program.     Goal INR on Coumadin  Anticipate voiding trial Jos Eantonio night

## 2019-04-05 NOTE — PROGRESS NOTE ADULT - SUBJECTIVE AND OBJECTIVE BOX
Still with youssef - on flomax.    Denies hematuria.    Stable on rehab.      ROS  General: no fever or chills    VITAL SIGNS  Vital Signs Last 24 Hrs  T(C): 36.7 (05 Apr 2019 07:41), Max: 36.9 (04 Apr 2019 20:40)  T(F): 98 (05 Apr 2019 07:41), Max: 98.5 (04 Apr 2019 20:40)  HR: 74 (05 Apr 2019 07:41) (74 - 90)  BP: 148/89       PHYSICAL EXAM:  General: awake and alert  Abdomen: bladder NT        LABS:                        10.5   8.0   )-----------( 299      ( 05 Apr 2019 05:32 )             33.2     04-05    140  |  104  |  25<H>  ----------------------------<  96  4.6   |  27  |  0.89          Prior notes/chart reviewed.

## 2019-04-05 NOTE — PROGRESS NOTE ADULT - ASSESSMENT
75 yo female s/p SAH due to Acomm aneurysm rupture, now with functional deficits.    #s/p SAH  -Continue PT/OT/SLP program. Monitor VS and mental status on Coumadin.     #PE  Continue coumadin with goal INR, therapeutic today. Remains asymptomatic. Labs am. At goal today.     #Cdiff hx  Vancomycin stopped as per ID, will monitor clinically. No diarrhea reported. Off laxatives.       #UTI EColi  Abx completed prior to admission.     #Low-void trials   eval-  evaluation appreciated Flomax started, delayed voiding trial Sunday 4/7 and start PVRs am c ISCV.     #HTN  Adjust regimen, monitor BP closely, Enalapril to 5mg. BP stable, no weakness. seen by medicine.      Precautions:           fall, seizure, aspiration                                                                       Diet: reg c thins

## 2019-04-05 NOTE — PROGRESS NOTE ADULT - SUBJECTIVE AND OBJECTIVE BOX
Patient is a 74 year old  Female who presents with a chief complaint of s/p SAH/Acomm aneurysm coiling now with functional deficits (04 Apr 2019 11:49)    Patient seen and examined, reports she feels better, slept well, denies any other complaints at time of assessment.        MEDICATIONS  (STANDING):  atorvastatin 20 milliGRAM(s) Oral at bedtime  enalapril 5 milliGRAM(s) Oral daily  pantoprazole    Tablet 40 milliGRAM(s) Oral before breakfast  tamsulosin 0.4 milliGRAM(s) Oral at bedtime  warfarin 7 milliGRAM(s) Oral daily    MEDICATIONS  (PRN):  acetaminophen    Suspension .. 815 milliGRAM(s) Oral every 6 hours PRN Mild Pain (1 - 3)      REVIEW OF SYSTEMS:  CONSTITUTIONAL: No fever, weight loss,  fatigue  EYES: No eye pain, visual disturbances, or discharge  ENMT:  No difficulty hearing, tinnitus, vertigo; No sinus or throat pain  NECK: No pain or stiffness  RESPIRATORY: No cough, wheezing, chills or hemoptysis; No shortness of breath  CARDIOVASCULAR: No chest pain, palpitations, dizziness, or leg swelling  GASTROINTESTINAL: No abdominal or epigastric pain. No nausea, vomiting, or hematemesis; No diarrhea or constipation. No melena or hematochezia.  GENITOURINARY: No dysuria, frequency, hematuria, or incontinence  NEUROLOGICAL: No headaches, memory loss, loss of strength, numbness, or tremors  SKIN: No itching, burning, rashes, or lesions   ENDOCRINE: No heat or cold intolerance; No hair loss  MUSCULOSKELETAL: No joint pain or swelling; No muscle, back, or extremity pain  PSYCHIATRIC: No depression, anxiety, mood swings, or difficulty sleeping  HEME/LYMPH: No easy bruising, or bleeding gums  ALLERGY AND IMMUNOLOGIC: No hives or eczema            PHYSICAL EXAM:  T(C): 36.7 (04-05-19 @ 07:41), Max: 36.9 (04-04-19 @ 20:40)  HR: 74 (04-05-19 @ 07:41) (74 - 90)  BP: 148/89 (04-05-19 @ 07:41) (142/88 - 148/89)  RR: 15 (04-05-19 @ 07:41) (15 - 16)  SpO2: 97% (04-05-19 @ 07:41) (97% - 99%)    I&O's Summary    04 Apr 2019 07:01  -  05 Apr 2019 07:00  --------------------------------------------------------  IN: 850 mL / OUT: 1550 mL / NET: -700 mL    GENERAL: Elderly female, NAD, well-groomed, well-developed  HEAD:  Atraumatic, Normocephalic  EYES: EOMI, PERRL, conjunctiva and sclera clear  ENMT: Moist mucous membranes  NECK: Supple, No JVD, Normal thyroid  NERVOUS SYSTEM:  Alert & Oriented X3, no new deficit  CHEST/LUNG: Clear to ascultation bilaterally; No rales, rhonchi, wheezing, or rubs  HEART: Regular rate and rhythm; No murmurs, rubs, or gallops  ABDOMEN: Soft, Nontender, Nondistended; Bowel sounds present  : + Low in place, draining clear urine  EXTREMITIES:  2+ Peripheral Pulses, No clubbing, cyanosis, or edema  SKIN: No rash    LABS:                        10.5   8.0   )-----------( 299      ( 05 Apr 2019 05:32 )             33.2     04-05    140  |  104  |  25<H>  ----------------------------<  96  4.6   |  27  |  0.89    Ca    8.9      05 Apr 2019 05:32    TPro  6.5  /  Alb  2.8<L>  /  TBili  0.2  /  DBili  x   /  AST  26  /  ALT  40  /  AlkPhos  82  04-05    PT/INR - ( 05 Apr 2019 05:32 )   PT: 32.7 sec;   INR: 2.83 ratio           RADIOLOGY & ADDITIONAL TESTS:    Imaging Personally Reviewed:  [x] YES  [ ] NO    Consultant(s) Notes Reviewed:  [x] YES  [ ] NO    Care Discussed with Consultants/Other Providers [x] YES  [ ] NO Patient is a 74 year old  Female who presents with a chief complaint of s/p SAH/Acomm aneurysm coiling now with functional deficits (04 Apr 2019 11:49)    Patient seen and examined, reports she feels better, slept well, denies any other complaints at time of assessment.        MEDICATIONS  (STANDING):  atorvastatin 20 milliGRAM(s) Oral at bedtime  enalapril 5 milliGRAM(s) Oral daily  pantoprazole    Tablet 40 milliGRAM(s) Oral before breakfast  tamsulosin 0.4 milliGRAM(s) Oral at bedtime  warfarin 7 milliGRAM(s) Oral daily    MEDICATIONS  (PRN):  acetaminophen    Suspension .. 815 milliGRAM(s) Oral every 6 hours PRN Mild Pain (1 - 3)      REVIEW OF SYSTEMS:  CONSTITUTIONAL: No fever, weight loss,  fatigue  EYES: No eye pain, visual disturbances, or discharge  ENMT:  No difficulty hearing, tinnitus, vertigo; No sinus or throat pain  NECK: No pain or stiffness  RESPIRATORY: No cough, wheezing, chills or hemoptysis; No shortness of breath  CARDIOVASCULAR: No chest pain, palpitations, dizziness, or leg swelling  GASTROINTESTINAL: No abdominal or epigastric pain. No nausea, vomiting, or hematemesis; No diarrhea or constipation. No melena or hematochezia.  GENITOURINARY: No dysuria, frequency, hematuria, or incontinence  NEUROLOGICAL: No headaches, memory loss, loss of strength, numbness, or tremors  SKIN: No itching, burning, rashes, or lesions   ENDOCRINE: No heat or cold intolerance; No hair loss  MUSCULOSKELETAL: No joint pain or swelling; No muscle, back, or extremity pain  PSYCHIATRIC: No depression, anxiety, mood swings, or difficulty sleeping  HEME/LYMPH: No easy bruising, or bleeding gums  ALLERGY AND IMMUNOLOGIC: No hives or eczema      PHYSICAL EXAM:  T(C): 36.7 (04-05-19 @ 07:41), Max: 36.9 (04-04-19 @ 20:40)  HR: 74 (04-05-19 @ 07:41) (74 - 90)  BP: 148/89 (04-05-19 @ 07:41) (142/88 - 148/89)  RR: 15 (04-05-19 @ 07:41) (15 - 16)  SpO2: 97% (04-05-19 @ 07:41) (97% - 99%)    I&O's Summary    04 Apr 2019 07:01  -  05 Apr 2019 07:00  --------------------------------------------------------  IN: 850 mL / OUT: 1550 mL / NET: -700 mL    GENERAL: Elderly female, NAD, well-groomed, well-developed  HEAD:  Atraumatic, Normocephalic  EYES: EOMI, PERRL, conjunctiva and sclera clear  ENMT: Moist mucous membranes  NECK: Supple, No JVD, Normal thyroid  NERVOUS SYSTEM:  Alert & Oriented X3, no new deficit  CHEST/LUNG: Clear to ascultation bilaterally; No rales, rhonchi, wheezing, or rubs  HEART: Regular rate and rhythm; No murmurs, rubs, or gallops  ABDOMEN: Soft, Nontender, Nondistended; Bowel sounds present  : + Low in place, draining clear urine  EXTREMITIES:  2+ Peripheral Pulses, No clubbing, cyanosis, or edema  SKIN: No rash    LABS:                        10.5   8.0   )-----------( 299      ( 05 Apr 2019 05:32 )             33.2     04-05    140  |  104  |  25<H>  ----------------------------<  96  4.6   |  27  |  0.89    Ca    8.9      05 Apr 2019 05:32    TPro  6.5  /  Alb  2.8<L>  /  TBili  0.2  /  DBili  x   /  AST  26  /  ALT  40  /  AlkPhos  82  04-05    PT/INR - ( 05 Apr 2019 05:32 )   PT: 32.7 sec;   INR: 2.83 ratio           RADIOLOGY & ADDITIONAL TESTS:    Imaging Personally Reviewed:  [x] YES  [ ] NO    Consultant(s) Notes Reviewed:  [x] YES  [ ] NO    Care Discussed with Consultants/Other Providers [x] YES  [ ] NO

## 2019-04-05 NOTE — PROGRESS NOTE ADULT - ASSESSMENT
Rehab s/p SAH. Youssef for retention / neurogenic bladder.    PLAN:    - Continue Flomax  - Recommend void trial Sunday night at Midnight  - Check PVR Monday AM  - Straight caths or replace youssef for recurrent retention

## 2019-04-05 NOTE — PROGRESS NOTE ADULT - SUBJECTIVE AND OBJECTIVE BOX
CHIEF COMPLAINT: NAD, denies complaints.       HISTORY OF PRESENT ILLNESS  74 yr old Female c PMHx of HTN on ASA (? indication) presents to ED s/p syncope and 3 day hx of neck pain on 3/4/19. Admitted to [facility] where admission workup revealed large SAH on CT brain.  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 Hospital course notable for cdiff diarrhea evaluated by ID and treated with course of Flagyl and Vancomycin; also urinary retention (youssef was reinserted on 3/26/19), failed multiple TOVs, concurrent E coli UTI was discovered on 3/27/19 and treated with fosfomycin. On 3/18/19 patient developed dyspnea. CTA chest revealed RUL PE. Started on heparin gtt 3/18/19 and bridged to coumadin.     *Will need urodynamics and outpatient urology follow up (29 Mar 2019 14:25)      PAST MEDICAL & SURGICAL HISTORY:  Dyslipidemia  HTN (Hypertension)  Status Post Total Knee Replace: left        REVIEW OF SYMPTOMS  [X] Constitutional WNL     [X] Cardio WNL            [X] Resp WNL           [X] GI WNL                                [X] Heme WNL              [X] Endo WNL                     [X] Skin WNL                 [X] MSK WNL                    [X] Psych WNL      VITALS  Vital Signs Last 24 Hrs  T(C): 36.7 (05 Apr 2019 07:41), Max: 36.9 (04 Apr 2019 20:40)  T(F): 98 (05 Apr 2019 07:41), Max: 98.5 (04 Apr 2019 20:40)  HR: 74 (05 Apr 2019 07:41) (74 - 90)  BP: 148/89 (05 Apr 2019 07:41) (142/88 - 148/89)  BP(mean): --  RR: 15 (05 Apr 2019 07:41) (15 - 16)  SpO2: 97% (05 Apr 2019 07:41) (97% - 99%)    PHYSICAL EXAM  Constitutional - NAD, Comfortable  HEENT - NCAT, EOMI  Neck - Supple, No limited ROM  Chest - CTA bilaterally,  Cardiovascular - RRR, S1S2, No murmurs  Abdomen - BS+, Soft, NTND  Extremities - No C/C/E, No calf tenderness   Skin-no rash    Neurologic Exam -  no new focal deficits                  Psychiatric - Mood stable, Affect WNL    FUNCTIONAL PROGRESS  Gait - RW c CG  ADLs - CG  Transfers - CG  Functional transfer - CG    RECENT LABS                        10.5   8.0   )-----------( 299      ( 05 Apr 2019 05:32 )             33.2     04-05    140  |  104  |  25<H>  ----------------------------<  96  4.6   |  27  |  0.89    Ca    8.9      05 Apr 2019 05:32    TPro  6.5  /  Alb  2.8<L>  /  TBili  0.2  /  DBili  x   /  AST  26  /  ALT  40  /  AlkPhos  82  04-05    PT/INR - ( 05 Apr 2019 05:32 )   PT: 32.7 sec;   INR: 2.83 ratio           LIVER FUNCTIONS - ( 05 Apr 2019 05:32 )  Alb: 2.8 g/dL / Pro: 6.5 g/dL / ALK PHOS: 82 U/L / ALT: 40 U/L DA / AST: 26 U/L / GGT: x             Direct LDL: 103 mg/dL (03-04-19 @ 23:32)    Hemoglobin A1C, Whole Blood: 5.6 % (03-04-19 @ 22:39)              RADIOLOGY/OTHER RESULTS      CURRENT MEDICATIONS  MEDICATIONS  (STANDING):  atorvastatin 20 milliGRAM(s) Oral at bedtime  enalapril 5 milliGRAM(s) Oral daily  pantoprazole    Tablet 40 milliGRAM(s) Oral before breakfast  tamsulosin 0.4 milliGRAM(s) Oral at bedtime  warfarin 5 milliGRAM(s) Oral once    MEDICATIONS  (PRN):  acetaminophen    Suspension .. 815 milliGRAM(s) Oral every 6 hours PRN Mild Pain (1 - 3)      ASSESSMENT & PLAN        GI/Bowel Management - hold for diarrhea   Management - Toilet Q2  Skin - Turn Q2  Pain - Tylenol PRN  DVT PPX - Coumadin      Continue comprehensive acute rehab program consisting of 3hrs/day of OT/PT and SLP.

## 2019-04-06 LAB
INR BLD: 3.14 RATIO — HIGH (ref 0.88–1.16)
PROTHROM AB SERPL-ACNC: 36.4 SEC — HIGH (ref 10–12.9)

## 2019-04-06 PROCEDURE — 99232 SBSQ HOSP IP/OBS MODERATE 35: CPT

## 2019-04-06 RX ADMIN — PANTOPRAZOLE SODIUM 40 MILLIGRAM(S): 20 TABLET, DELAYED RELEASE ORAL at 06:09

## 2019-04-06 RX ADMIN — TAMSULOSIN HYDROCHLORIDE 0.4 MILLIGRAM(S): 0.4 CAPSULE ORAL at 21:49

## 2019-04-06 RX ADMIN — ATORVASTATIN CALCIUM 20 MILLIGRAM(S): 80 TABLET, FILM COATED ORAL at 21:49

## 2019-04-06 RX ADMIN — Medication 5 MILLIGRAM(S): at 06:09

## 2019-04-06 NOTE — PROGRESS NOTE ADULT - SUBJECTIVE AND OBJECTIVE BOX
Chief complaint: no new complaints    Patient is a 74y old  Female who presents with a chief complaint of s/p SAH/Acomm aneurysm coiling now with functional deficits (05 Apr 2019 10:08)      HEALTH ISSUES - PROBLEM Dx:  Pulmonary thromboembolism               PAST MEDICAL & SURGICAL HISTORY:  Dyslipidemia  HTN (Hypertension)  Status Post Total Knee Replace: left      VITALS  Vital Signs Last 24 Hrs  T(C): 36.8 (06 Apr 2019 08:05), Max: 36.8 (05 Apr 2019 20:13)  T(F): 98.3 (06 Apr 2019 08:05), Max: 98.3 (06 Apr 2019 08:05)  HR: 90 (06 Apr 2019 08:05) (77 - 90)  BP: 115/80 (06 Apr 2019 08:05) (115/80 - 133/80)  BP(mean): --  RR: 14 (06 Apr 2019 08:05) (14 - 14)  SpO2: 98% (06 Apr 2019 08:05) (98% - 98%)      PHYSICAL EXAM  Constitutional - NAD, Comfortable  HEENT - NCAT, EOMI  Neck - Supple, No limited ROM  Chest - CTA bilaterally  Cardiovascular - RRR, S1S2, No murmurs  Abdomen - BS+, Soft, NTND  Extremities - No C/C/E, No calf tenderness   Neurologic Exam -                    Cognitive - Awake, Alert, AAO X3     No new focal deficits           RECENT LABS                        10.5   8.0   )-----------( 299      ( 05 Apr 2019 05:32 )             33.2     04-05    140  |  104  |  25<H>  ----------------------------<  96  4.6   |  27  |  0.89    Ca    8.9      05 Apr 2019 05:32    TPro  6.5  /  Alb  2.8<L>  /  TBili  0.2  /  DBili  x   /  AST  26  /  ALT  40  /  AlkPhos  82  04-05    PT/INR - ( 06 Apr 2019 06:01 )   PT: 36.4 sec;   INR: 3.14 ratio                     CURRENT MEDICATIONS    MEDICATIONS  (STANDING):  atorvastatin 20 milliGRAM(s) Oral at bedtime  enalapril 5 milliGRAM(s) Oral daily  pantoprazole    Tablet 40 milliGRAM(s) Oral before breakfast  tamsulosin 0.4 milliGRAM(s) Oral at bedtime    MEDICATIONS  (PRN):  acetaminophen    Suspension .. 815 milliGRAM(s) Oral every 6 hours PRN Mild Pain (1 - 3)    ASSESSMENT & PLAN          GI/Bowel Management - Dulcolax PRN, Fleet PRN   Management - Toilet Q2  Skin - Turn Q2  Pain - Tylenol PRN     Will hold Coumadin due to supra therapeutic INR  Will repeat INR in the morning  Voiding trial tomorrow    Continue comprehensive acute rehab program consisting of 3hrs/day of OT/PT and SLP.

## 2019-04-07 ENCOUNTER — TRANSCRIPTION ENCOUNTER (OUTPATIENT)
Age: 74
End: 2019-04-07

## 2019-04-07 VITALS
TEMPERATURE: 97 F | SYSTOLIC BLOOD PRESSURE: 109 MMHG | RESPIRATION RATE: 15 BRPM | HEART RATE: 90 BPM | OXYGEN SATURATION: 98 % | DIASTOLIC BLOOD PRESSURE: 70 MMHG

## 2019-04-07 LAB
ALBUMIN SERPL ELPH-MCNC: 3.2 G/DL — LOW (ref 3.3–5)
ALP SERPL-CCNC: 88 U/L — SIGNIFICANT CHANGE UP (ref 40–120)
ALT FLD-CCNC: 33 U/L DA — SIGNIFICANT CHANGE UP (ref 10–45)
ANION GAP SERPL CALC-SCNC: 8 MMOL/L — SIGNIFICANT CHANGE UP (ref 5–17)
APPEARANCE UR: CLEAR — SIGNIFICANT CHANGE UP
APTT BLD: 37.2 SEC — HIGH (ref 27.5–36.3)
AST SERPL-CCNC: 22 U/L — SIGNIFICANT CHANGE UP (ref 10–40)
BASOPHILS # BLD AUTO: 0.1 K/UL — SIGNIFICANT CHANGE UP (ref 0–0.2)
BASOPHILS NFR BLD AUTO: 0.5 % — SIGNIFICANT CHANGE UP (ref 0–2)
BILIRUB SERPL-MCNC: 0.6 MG/DL — SIGNIFICANT CHANGE UP (ref 0.2–1.2)
BILIRUB UR-MCNC: NEGATIVE — SIGNIFICANT CHANGE UP
BUN SERPL-MCNC: 21 MG/DL — SIGNIFICANT CHANGE UP (ref 7–23)
CALCIUM SERPL-MCNC: 9.1 MG/DL — SIGNIFICANT CHANGE UP (ref 8.4–10.5)
CHLORIDE SERPL-SCNC: 98 MMOL/L — SIGNIFICANT CHANGE UP (ref 96–108)
CO2 SERPL-SCNC: 28 MMOL/L — SIGNIFICANT CHANGE UP (ref 22–31)
COLOR SPEC: YELLOW — SIGNIFICANT CHANGE UP
CREAT SERPL-MCNC: 1.03 MG/DL — SIGNIFICANT CHANGE UP (ref 0.5–1.3)
DIFF PNL FLD: ABNORMAL
EOSINOPHIL # BLD AUTO: 0 K/UL — SIGNIFICANT CHANGE UP (ref 0–0.5)
EOSINOPHIL NFR BLD AUTO: 0.1 % — SIGNIFICANT CHANGE UP (ref 0–6)
GLUCOSE BLDC GLUCOMTR-MCNC: 126 MG/DL — HIGH (ref 70–99)
GLUCOSE SERPL-MCNC: 131 MG/DL — HIGH (ref 70–99)
GLUCOSE UR QL: NEGATIVE — SIGNIFICANT CHANGE UP
HCT VFR BLD CALC: 36.8 % — SIGNIFICANT CHANGE UP (ref 34.5–45)
HGB BLD-MCNC: 12 G/DL — SIGNIFICANT CHANGE UP (ref 11.5–15.5)
INR BLD: 2.53 RATIO — HIGH (ref 0.88–1.16)
INR BLD: 2.9 RATIO — HIGH (ref 0.88–1.16)
KETONES UR-MCNC: NEGATIVE — SIGNIFICANT CHANGE UP
LACTATE SERPL-SCNC: 1.8 MMOL/L — SIGNIFICANT CHANGE UP (ref 0.7–2)
LEUKOCYTE ESTERASE UR-ACNC: ABNORMAL
LYMPHOCYTES # BLD AUTO: 0.8 K/UL — LOW (ref 1–3.3)
LYMPHOCYTES # BLD AUTO: 5 % — LOW (ref 13–44)
MCHC RBC-ENTMCNC: 29.6 PG — SIGNIFICANT CHANGE UP (ref 27–34)
MCHC RBC-ENTMCNC: 32.7 GM/DL — SIGNIFICANT CHANGE UP (ref 32–36)
MCV RBC AUTO: 90.6 FL — SIGNIFICANT CHANGE UP (ref 80–100)
MONOCYTES # BLD AUTO: 1.2 K/UL — HIGH (ref 0–0.9)
MONOCYTES NFR BLD AUTO: 3 % — SIGNIFICANT CHANGE UP (ref 2–14)
NEUTROPHILS # BLD AUTO: 19 K/UL — HIGH (ref 1.8–7.4)
NEUTROPHILS NFR BLD AUTO: 86 % — HIGH (ref 43–77)
NITRITE UR-MCNC: POSITIVE
PH UR: 5 — SIGNIFICANT CHANGE UP (ref 5–8)
PLATELET # BLD AUTO: 365 K/UL — SIGNIFICANT CHANGE UP (ref 150–400)
POTASSIUM SERPL-MCNC: 4.3 MMOL/L — SIGNIFICANT CHANGE UP (ref 3.5–5.3)
POTASSIUM SERPL-SCNC: 4.3 MMOL/L — SIGNIFICANT CHANGE UP (ref 3.5–5.3)
PROCALCITONIN SERPL-MCNC: 2.23 NG/ML — HIGH
PROT SERPL-MCNC: 7.8 G/DL — SIGNIFICANT CHANGE UP (ref 6–8.3)
PROT UR-MCNC: 100
PROTHROM AB SERPL-ACNC: 29.2 SEC — HIGH (ref 10–12.9)
PROTHROM AB SERPL-ACNC: 33.6 SEC — HIGH (ref 10–12.9)
RBC # BLD: 4.06 M/UL — SIGNIFICANT CHANGE UP (ref 3.8–5.2)
RBC # FLD: 13.2 % — SIGNIFICANT CHANGE UP (ref 10.3–14.5)
SODIUM SERPL-SCNC: 134 MMOL/L — LOW (ref 135–145)
SP GR SPEC: 1.01 — SIGNIFICANT CHANGE UP (ref 1.01–1.02)
UROBILINOGEN FLD QL: NEGATIVE — SIGNIFICANT CHANGE UP
WBC # BLD: 21.1 K/UL — HIGH (ref 3.8–10.5)
WBC # FLD AUTO: 21.1 K/UL — HIGH (ref 3.8–10.5)

## 2019-04-07 PROCEDURE — 71045 X-RAY EXAM CHEST 1 VIEW: CPT | Mod: 26

## 2019-04-07 PROCEDURE — 99233 SBSQ HOSP IP/OBS HIGH 50: CPT

## 2019-04-07 PROCEDURE — 99232 SBSQ HOSP IP/OBS MODERATE 35: CPT

## 2019-04-07 RX ORDER — SODIUM CHLORIDE 9 MG/ML
500 INJECTION INTRAMUSCULAR; INTRAVENOUS; SUBCUTANEOUS ONCE
Qty: 0 | Refills: 0 | Status: COMPLETED | OUTPATIENT
Start: 2019-04-07 | End: 2019-04-07

## 2019-04-07 RX ORDER — VANCOMYCIN HCL 1 G
VIAL (EA) INTRAVENOUS
Qty: 0 | Refills: 0 | Status: DISCONTINUED | OUTPATIENT
Start: 2019-04-07 | End: 2019-04-07

## 2019-04-07 RX ORDER — LACTOBACILLUS ACIDOPHILUS 100MM CELL
1 CAPSULE ORAL
Qty: 0 | Refills: 0 | COMMUNITY
Start: 2019-04-07

## 2019-04-07 RX ORDER — WARFARIN SODIUM 2.5 MG/1
5 TABLET ORAL ONCE
Qty: 0 | Refills: 0 | Status: COMPLETED | OUTPATIENT
Start: 2019-04-07 | End: 2019-04-07

## 2019-04-07 RX ORDER — VANCOMYCIN HCL 1 G
1500 VIAL (EA) INTRAVENOUS ONCE
Qty: 0 | Refills: 0 | Status: DISCONTINUED | OUTPATIENT
Start: 2019-04-07 | End: 2019-04-07

## 2019-04-07 RX ORDER — PIPERACILLIN AND TAZOBACTAM 4; .5 G/20ML; G/20ML
2.25 INJECTION, POWDER, LYOPHILIZED, FOR SOLUTION INTRAVENOUS
Qty: 0 | Refills: 0 | COMMUNITY
Start: 2019-04-07

## 2019-04-07 RX ORDER — IBUPROFEN 200 MG
600 TABLET ORAL ONCE
Qty: 0 | Refills: 0 | Status: COMPLETED | OUTPATIENT
Start: 2019-04-07 | End: 2019-04-07

## 2019-04-07 RX ORDER — ACETAMINOPHEN 500 MG
935 TABLET ORAL EVERY 6 HOURS
Qty: 0 | Refills: 0 | Status: DISCONTINUED | OUTPATIENT
Start: 2019-04-07 | End: 2019-04-07

## 2019-04-07 RX ORDER — VANCOMYCIN HCL 1 G
1500 VIAL (EA) INTRAVENOUS ONCE
Qty: 0 | Refills: 0 | Status: COMPLETED | OUTPATIENT
Start: 2019-04-07 | End: 2019-04-07

## 2019-04-07 RX ORDER — PIPERACILLIN AND TAZOBACTAM 4; .5 G/20ML; G/20ML
3.38 INJECTION, POWDER, LYOPHILIZED, FOR SOLUTION INTRAVENOUS ONCE
Qty: 0 | Refills: 0 | Status: COMPLETED | OUTPATIENT
Start: 2019-04-07 | End: 2019-04-07

## 2019-04-07 RX ORDER — TAMSULOSIN HYDROCHLORIDE 0.4 MG/1
1 CAPSULE ORAL
Qty: 0 | Refills: 0 | COMMUNITY
Start: 2019-04-07

## 2019-04-07 RX ORDER — ATORVASTATIN CALCIUM 80 MG/1
1 TABLET, FILM COATED ORAL
Qty: 0 | Refills: 0 | COMMUNITY
Start: 2019-04-07

## 2019-04-07 RX ORDER — ACETAMINOPHEN 500 MG
650 TABLET ORAL ONCE
Qty: 0 | Refills: 0 | Status: COMPLETED | OUTPATIENT
Start: 2019-04-07 | End: 2019-04-07

## 2019-04-07 RX ORDER — ACETAMINOPHEN 500 MG
650 TABLET ORAL EVERY 6 HOURS
Qty: 0 | Refills: 0 | Status: DISCONTINUED | OUTPATIENT
Start: 2019-04-07 | End: 2019-04-08

## 2019-04-07 RX ORDER — SODIUM CHLORIDE 9 MG/ML
1000 INJECTION INTRAMUSCULAR; INTRAVENOUS; SUBCUTANEOUS
Qty: 0 | Refills: 0 | Status: DISCONTINUED | OUTPATIENT
Start: 2019-04-07 | End: 2019-04-08

## 2019-04-07 RX ORDER — SODIUM CHLORIDE 9 MG/ML
500 INJECTION INTRAMUSCULAR; INTRAVENOUS; SUBCUTANEOUS
Qty: 0 | Refills: 0 | Status: DISCONTINUED | OUTPATIENT
Start: 2019-04-07 | End: 2019-04-07

## 2019-04-07 RX ORDER — VANCOMYCIN HCL 1 G
250 VIAL (EA) INTRAVENOUS EVERY 6 HOURS
Qty: 0 | Refills: 0 | Status: DISCONTINUED | OUTPATIENT
Start: 2019-04-07 | End: 2019-04-08

## 2019-04-07 RX ORDER — VANCOMYCIN HCL 1 G
1 VIAL (EA) INTRAVENOUS
Qty: 30 | Refills: 0 | OUTPATIENT
Start: 2019-04-07 | End: 2019-04-13

## 2019-04-07 RX ORDER — SODIUM CHLORIDE 9 MG/ML
1000 INJECTION INTRAMUSCULAR; INTRAVENOUS; SUBCUTANEOUS
Qty: 0 | Refills: 0 | Status: DISCONTINUED | OUTPATIENT
Start: 2019-04-07 | End: 2019-04-07

## 2019-04-07 RX ORDER — PANTOPRAZOLE SODIUM 20 MG/1
1 TABLET, DELAYED RELEASE ORAL
Qty: 0 | Refills: 0 | COMMUNITY
Start: 2019-04-07

## 2019-04-07 RX ORDER — SODIUM CHLORIDE 9 MG/ML
1000 INJECTION INTRAMUSCULAR; INTRAVENOUS; SUBCUTANEOUS ONCE
Qty: 0 | Refills: 0 | Status: COMPLETED | OUTPATIENT
Start: 2019-04-07 | End: 2019-04-07

## 2019-04-07 RX ORDER — PIPERACILLIN AND TAZOBACTAM 4; .5 G/20ML; G/20ML
3.38 INJECTION, POWDER, LYOPHILIZED, FOR SOLUTION INTRAVENOUS EVERY 8 HOURS
Qty: 0 | Refills: 0 | Status: DISCONTINUED | OUTPATIENT
Start: 2019-04-07 | End: 2019-04-08

## 2019-04-07 RX ORDER — LACTOBACILLUS ACIDOPHILUS 100MM CELL
1 CAPSULE ORAL
Qty: 0 | Refills: 0 | Status: DISCONTINUED | OUTPATIENT
Start: 2019-04-07 | End: 2019-04-08

## 2019-04-07 RX ADMIN — Medication 650 MILLIGRAM(S): at 13:46

## 2019-04-07 RX ADMIN — Medication 650 MILLIGRAM(S): at 14:19

## 2019-04-07 RX ADMIN — ATORVASTATIN CALCIUM 20 MILLIGRAM(S): 80 TABLET, FILM COATED ORAL at 21:41

## 2019-04-07 RX ADMIN — Medication 250 MILLIGRAM(S): at 23:49

## 2019-04-07 RX ADMIN — PIPERACILLIN AND TAZOBACTAM 200 GRAM(S): 4; .5 INJECTION, POWDER, LYOPHILIZED, FOR SOLUTION INTRAVENOUS at 14:09

## 2019-04-07 RX ADMIN — SODIUM CHLORIDE 2000 MILLILITER(S): 9 INJECTION INTRAMUSCULAR; INTRAVENOUS; SUBCUTANEOUS at 14:18

## 2019-04-07 RX ADMIN — SODIUM CHLORIDE 500 MILLILITER(S): 9 INJECTION INTRAMUSCULAR; INTRAVENOUS; SUBCUTANEOUS at 23:13

## 2019-04-07 RX ADMIN — SODIUM CHLORIDE 2000 MILLILITER(S): 9 INJECTION INTRAMUSCULAR; INTRAVENOUS; SUBCUTANEOUS at 14:15

## 2019-04-07 RX ADMIN — PANTOPRAZOLE SODIUM 40 MILLIGRAM(S): 20 TABLET, DELAYED RELEASE ORAL at 06:19

## 2019-04-07 RX ADMIN — SODIUM CHLORIDE 75 MILLILITER(S): 9 INJECTION INTRAMUSCULAR; INTRAVENOUS; SUBCUTANEOUS at 16:50

## 2019-04-07 RX ADMIN — SODIUM CHLORIDE 75 MILLILITER(S): 9 INJECTION INTRAMUSCULAR; INTRAVENOUS; SUBCUTANEOUS at 20:17

## 2019-04-07 RX ADMIN — SODIUM CHLORIDE 1000 MILLILITER(S): 9 INJECTION INTRAMUSCULAR; INTRAVENOUS; SUBCUTANEOUS at 16:50

## 2019-04-07 RX ADMIN — PIPERACILLIN AND TAZOBACTAM 25 GRAM(S): 4; .5 INJECTION, POWDER, LYOPHILIZED, FOR SOLUTION INTRAVENOUS at 21:42

## 2019-04-07 RX ADMIN — Medication 5 MILLIGRAM(S): at 06:19

## 2019-04-07 RX ADMIN — Medication 650 MILLIGRAM(S): at 17:39

## 2019-04-07 RX ADMIN — Medication 1 TABLET(S): at 17:08

## 2019-04-07 RX ADMIN — Medication 650 MILLIGRAM(S): at 18:10

## 2019-04-07 RX ADMIN — TAMSULOSIN HYDROCHLORIDE 0.4 MILLIGRAM(S): 0.4 CAPSULE ORAL at 21:42

## 2019-04-07 RX ADMIN — WARFARIN SODIUM 5 MILLIGRAM(S): 2.5 TABLET ORAL at 21:42

## 2019-04-07 RX ADMIN — Medication 600 MILLIGRAM(S): at 21:15

## 2019-04-07 RX ADMIN — Medication 300 MILLIGRAM(S): at 17:08

## 2019-04-07 RX ADMIN — Medication 600 MILLIGRAM(S): at 20:17

## 2019-04-07 NOTE — PROVIDER CONTACT NOTE (OTHER) - ASSESSMENT
Patient continues to show signs and symptoms. VS: Temp 102.7 (rectal), /63, P 113, O2  97% with O2 via n/c at  2 liters/min.
Low to leg bag for urinary retention.

## 2019-04-07 NOTE — CHART NOTE - NSCHARTNOTEFT_GEN_A_CORE
RN notified resident on call to assess patient for elevated axillary temperature of ~102. Pt's vitals were:   T(C): 39.5 (04-07-19 @ 12:30)  T(F): 103.1 (04-07-19 @ 12:30), Max: 103.1 (04-07-19 @ 12:30)  -rectally  HR: 117 (04-07-19 @ 12:30) (82 - 117)  BP: 139/89 (04-07-19 @ 12:30) (105/68 - 145/89)  RR:  (16 - 18)  SpO2:  (90% - 97%)  Wt(kg): --  Resident on call notified hospitalist on call, Dr. G Reyes and sepsis workup was ordered (CBC, BMP, CXR, UA, UCx, Procalcitonin, Lactate, BCx x2).  Pt started to become hypoxic and RRT was called at 1307.  Pt was started on IVF, ABx, and Low was d/c'ed.   Dr. Byrd and  at bedside was notified.  Will continue to monitor patient after IVF to determine if acute transfer is warranted.       LABS:                     12.0   21.1  )-----------( 365      ( 07 Apr 2019 12:52 )             36.8     04-07    134<L>  |  98  |  21  ----------------------------<  131<H>  4.3   |  28  |  1.03    Ca    9.1      07 Apr 2019 12:52    TPro  7.8  /  Alb  3.2<L>  /  TBili  0.6  /  DBili  x   /  AST  22  /  ALT  33  /  AlkPhos  88  04-07    PT/INR - ( 07 Apr 2019 12:52 )   PT: 29.2 sec;   INR: 2.53 ratio         PTT - ( 07 Apr 2019 12:52 )  PTT:37.2 sec      < from: Xray Chest 1 View-PORTABLE IMMEDIATE (04.07.19 @ 12:49) >    EXAM:  XR CHEST PORTABLE IMMED 1V      PROCEDURE DATE:  04/07/2019        INTERPRETATION:  INDICATION: Fever    PRIORS: 3/17/2019    VIEWS: Portable AP radiography of the chest performed.    FINDINGS: Heart size appears within normal limits. No hilar or superior   mediastinal abnormalities are identified. There is no evidence for focal   infiltrate, lobar consolidation or pulmonary edema. Stable mild elevation   of the right hemidiaphragm noted. Linear opacity within the left basilar   region likely related to linear scarring versus platelike atelectasis. No   pleural effusion or pneumothorax is demonstrated. No mediastinal shift is   noted. The visualized osseous structures appear unremarkable.    IMPRESSION: No evidence for focal infiltrate or lobar consolidation.    < end of copied text > RN notified resident on call to assess patient for elevated axillary temperature of ~102. Pt's vitals were:   T(C): 39.5 (04-07-19 @ 12:30)  T(F): 103.1 (04-07-19 @ 12:30), Max: 103.1 (04-07-19 @ 12:30)  -rectally  HR: 117 (04-07-19 @ 12:30) (82 - 117)  BP: 139/89 (04-07-19 @ 12:30) (105/68 - 145/89)  RR:  (16 - 18)  SpO2:  (90% - 97%)  Wt(kg): --  Resident on call notified hospitalist on call, Dr. G Reyes and sepsis workup was ordered (CBC, BMP, CXR, UA, UCx, Procalcitonin, Lactate, BCx x2).  Pt started to become hypoxic and RRT was called at 1307.  Pt was started on IVF, ABx, and Low was d/c'ed.  ID c/s placed and spoke with Dr. Irvin (ID on call). Recommended 1 dose of IV vanco 1500mg, and PO vanco 250mg q6 hours. Will see patient in the AM   Dr. Byrd and  at bedside was notified.  Will continue to monitor patient after IVF to determine if acute transfer is warranted.       LABS:                     12.0   21.1  )-----------( 365      ( 07 Apr 2019 12:52 )             36.8     04-07    134<L>  |  98  |  21  ----------------------------<  131<H>  4.3   |  28  |  1.03    Ca    9.1      07 Apr 2019 12:52    TPro  7.8  /  Alb  3.2<L>  /  TBili  0.6  /  DBili  x   /  AST  22  /  ALT  33  /  AlkPhos  88  04-07    PT/INR - ( 07 Apr 2019 12:52 )   PT: 29.2 sec;   INR: 2.53 ratio         PTT - ( 07 Apr 2019 12:52 )  PTT:37.2 sec      < from: Xray Chest 1 View-PORTABLE IMMEDIATE (04.07.19 @ 12:49) >    EXAM:  XR CHEST PORTABLE IMMED 1V      PROCEDURE DATE:  04/07/2019        INTERPRETATION:  INDICATION: Fever    PRIORS: 3/17/2019    VIEWS: Portable AP radiography of the chest performed.    FINDINGS: Heart size appears within normal limits. No hilar or superior   mediastinal abnormalities are identified. There is no evidence for focal   infiltrate, lobar consolidation or pulmonary edema. Stable mild elevation   of the right hemidiaphragm noted. Linear opacity within the left basilar   region likely related to linear scarring versus platelike atelectasis. No   pleural effusion or pneumothorax is demonstrated. No mediastinal shift is   noted. The visualized osseous structures appear unremarkable.    IMPRESSION: No evidence for focal infiltrate or lobar consolidation.    < end of copied text > RN notified resident on call to assess patient for elevated axillary temperature of ~102. Pt's vitals were:   T(C): 39.5 (04-07-19 @ 12:30)  T(F): 103.1 (04-07-19 @ 12:30), Max: 103.1 (04-07-19 @ 12:30)  -rectally  HR: 117 (04-07-19 @ 12:30) (82 - 117)  BP: 139/89 (04-07-19 @ 12:30) (105/68 - 145/89)  RR:  (16 - 18)  SpO2:  (90% - 97%)  Wt(kg): --  Resident on call notified hospitalist on call, Dr. G Reyes and sepsis workup was ordered (CBC, BMP, CXR, UA, UCx, Procalcitonin, Lactate, BCx x2).  Pt started to become hypoxic and RRT was called at 1307.  Pt was started on IVF, ABx, and Low was d/c'ed.  ID c/s placed and spoke with Dr. Irvin (ID on call). Recommended 1 dose of IV vanco 1500mg, and PO vanco 250mg q6 hours. Will see patient in the AM   Dr. Byrd and  at bedside was notified.  Will continue to monitor patient after IVF to determine if acute transfer is warranted.    1950: Notified Hospitalist on-call, Dr. Tran, that patient's current VS: 102.7 F, , /63. Dr. Tran recommended to continue maintenance IVF and ABx for now, Motrin 600mg x1 dose for fever, and will assess patient.         LABS:                     12.0   21.1  )-----------( 365      ( 07 Apr 2019 12:52 )             36.8     04-07    134<L>  |  98  |  21  ----------------------------<  131<H>  4.3   |  28  |  1.03    Ca    9.1      07 Apr 2019 12:52    TPro  7.8  /  Alb  3.2<L>  /  TBili  0.6  /  DBili  x   /  AST  22  /  ALT  33  /  AlkPhos  88  04-07    PT/INR - ( 07 Apr 2019 12:52 )   PT: 29.2 sec;   INR: 2.53 ratio         PTT - ( 07 Apr 2019 12:52 )  PTT:37.2 sec      < from: Xray Chest 1 View-PORTABLE IMMEDIATE (04.07.19 @ 12:49) >    EXAM:  XR CHEST PORTABLE IMMED 1V      PROCEDURE DATE:  04/07/2019        INTERPRETATION:  INDICATION: Fever    PRIORS: 3/17/2019    VIEWS: Portable AP radiography of the chest performed.    FINDINGS: Heart size appears within normal limits. No hilar or superior   mediastinal abnormalities are identified. There is no evidence for focal   infiltrate, lobar consolidation or pulmonary edema. Stable mild elevation   of the right hemidiaphragm noted. Linear opacity within the left basilar   region likely related to linear scarring versus platelike atelectasis. No   pleural effusion or pneumothorax is demonstrated. No mediastinal shift is   noted. The visualized osseous structures appear unremarkable.    IMPRESSION: No evidence for focal infiltrate or lobar consolidation.    < end of copied text > RN notified resident on call to assess patient for elevated axillary temperature of ~102. Pt's vitals were:   T(C): 39.5 (04-07-19 @ 12:30)  T(F): 103.1 (04-07-19 @ 12:30), Max: 103.1 (04-07-19 @ 12:30)  -rectally  HR: 117 (04-07-19 @ 12:30) (82 - 117)  BP: 139/89 (04-07-19 @ 12:30) (105/68 - 145/89)  RR:  (16 - 18)  SpO2:  (90% - 97%)  Wt(kg): --  Resident on call notified hospitalist on call, Dr. G Reyes and sepsis workup was ordered (CBC, BMP, CXR, UA, UCx, Procalcitonin, Lactate, BCx x2).  Pt started to become hypoxic and RRT was called at 1307.  Pt was started on IVF, ABx, and Low was d/c'ed.  ID c/s placed and spoke with Dr. Irvin (ID on call). Recommended 1 dose of IV vanco 1500mg, and PO vanco 250mg q6 hours. Will see patient in the AM   Dr. Byrd and  at bedside was notified.  Will continue to monitor patient after IVF to determine if acute transfer is warranted.    1950: Notified Hospitalist on-call, Dr. Tran, that patient's current VS: 102.7 F, , /63. Dr. Tran recommended to continue maintenance IVF and ABx for now, Motrin 600mg x1 dose for fever, and will assess patient.      Although temp stabilized after Motrin 600mg x1, pt remained hypotensive SBP ~80 despite IVF and Dr. Tran recommend patient to be xfer to ICU.  Dr. Byrd notified about acute transfer and pt's  present at bedside agrees with the plan.      LABS:                     12.0   21.1  )-----------( 365      ( 07 Apr 2019 12:52 )             36.8     04-07    134<L>  |  98  |  21  ----------------------------<  131<H>  4.3   |  28  |  1.03    Ca    9.1      07 Apr 2019 12:52    TPro  7.8  /  Alb  3.2<L>  /  TBili  0.6  /  DBili  x   /  AST  22  /  ALT  33  /  AlkPhos  88  04-07    PT/INR - ( 07 Apr 2019 12:52 )   PT: 29.2 sec;   INR: 2.53 ratio         PTT - ( 07 Apr 2019 12:52 )  PTT:37.2 sec      < from: Xray Chest 1 View-PORTABLE IMMEDIATE (04.07.19 @ 12:49) >    EXAM:  XR CHEST PORTABLE IMMED 1V      PROCEDURE DATE:  04/07/2019        INTERPRETATION:  INDICATION: Fever    PRIORS: 3/17/2019    VIEWS: Portable AP radiography of the chest performed.    FINDINGS: Heart size appears within normal limits. No hilar or superior   mediastinal abnormalities are identified. There is no evidence for focal   infiltrate, lobar consolidation or pulmonary edema. Stable mild elevation   of the right hemidiaphragm noted. Linear opacity within the left basilar   region likely related to linear scarring versus platelike atelectasis. No   pleural effusion or pneumothorax is demonstrated. No mediastinal shift is   noted. The visualized osseous structures appear unremarkable.    IMPRESSION: No evidence for focal infiltrate or lobar consolidation.    Urine Microscopic-Add On (NC) (04.07.19 @ 15:50)    Red Blood Cell - Urine: 5-10 /HPF    White Blood Cell - Urine: 11-25 /HPF    Bacteria: Moderate /HPF    Epithelial Cells: Neg.-Few      < end of copied text >

## 2019-04-07 NOTE — PROGRESS NOTE ADULT - ASSESSMENT
76 year old female s/p SAH due to A comm aneurysm rupture, PE, on coumadin, c. diff, urine retention failed multiple void trials   s/p rrt early today, for fever t max of 103,  now with hypotension, s/p 2-2.5 L fluids since last rrt, icu consulted, d/w eicu attending, and icu PA,   will transfer patient, consider icu for tonight due to hypotension- c/w iv bolus    sepsis/ fever- r/o pna, r/o uti, c/u iv vanco and zosyn, id to f/u in am, am labs.    PE-coumadin, monitor inr    C. diff history  completed Vancomycin oral, monitor BMs    GERD  continue pantoprazole    Urinary retention - TOV DONE early today, still needs straight cath,  Flomax uro following    HTN- enalapril    HLD atorvastatin    d/w family and dr. rocha. , DR. veloz rehab resident.,

## 2019-04-07 NOTE — PROVIDER CONTACT NOTE (OTHER) - SITUATION
Patient on rapid response earlier due to hypoxia. Lab works done, antibiotics administered. Vital signs closely monitored.

## 2019-04-07 NOTE — PROVIDER CONTACT NOTE (CHANGE IN STATUS NOTIFICATION) - BACKGROUND
1230 pt returned to room with chills. /89  R 20 R temp 103.1 POXRA 90% given 2L NC POX increased to 95%. HL Left wrist 22g Orders ana and carried out. Pt started to DeSat to 78 -80 % HR

## 2019-04-07 NOTE — PROGRESS NOTE ADULT - SUBJECTIVE AND OBJECTIVE BOX
Patient is a 74 year old  Female who presents with a chief complaint of s/p SAH/Acomm aneurysm coiling now with functional deficits (2019 11:49)    Patient seen and examined at the bedside, for f/u of rrt earlier today, and low bp, sbp in 80's. no other complaints, no n/v, no sob      Vital Signs Last 24 Hrs  T(C): 37.6 (2019 22:50), Max: 39.5 (2019 12:30)  T(F): 99.6 (2019 22:50), Max: 103.1 (2019 12:30)  HR: 87 (2019 23:21) (82 - 126)  BP: 97/63 (2019 23:21) (81/50 - 139/89)  BP(mean): --  RR: 14 (2019 22:50) (14 - 20)  SpO2: 99% (2019 22:50) (90% - 100%)    NAD, PRISCILLA,MMM,NCAT  SUPPLE  CLEAR  S1S2  SOFT NT BS PRESENT  NO PEDAL EDEMA  AAO X 3  No new focal deficits                       12.0                 134  | 28   | 21           21.1  >-----------< 365     ------------------------< 131                   36.8                 4.3  | 98   | 1.03                                         Ca 9.1   Mg x     Ph x      Urinalysis Basic - ( 2019 15:50 )    Color: Yellow / Appearance: Clear / S.015 / pH: x  Gluc: x / Ketone: Negative  / Bili: Negative / Urobili: Negative   Blood: x / Protein: 100 / Nitrite: Positive   Leuk Esterase: Moderate / RBC: 5-10 /HPF / WBC 11-25 /HPF   Sq Epi: x / Non Sq Epi: Neg.-Few / Bacteria: Moderate /HPF    Labs reviewed:     CXR personally reviewed: Clear lung fields bilaterally    ECG reviewed and interpreted: sinus tachy at 126    PT/INR - ( 2019 12:52 )   PT: 29.2 sec;   INR: 2.53 ratio         PTT - ( 2019 12:52 )  PTT:37.2 sec     from: Xray Chest 1 View-PORTABLE IMMEDIATE (19 @ 12:49) >    EXAM:  XR CHEST PORTABLE IMMED 1V      PROCEDURE DATE:  2019        INTERPRETATION:  INDICATION: Fever    PRIORS: 3/17/2019    VIEWS: Portable AP radiography of the chest performed.    FINDINGS: Heart size appears within normal limits. No hilar or superior   mediastinal abnormalities are identified. There is no evidence for focal   infiltrate, lobar consolidation or pulmonary edema. Stable mild elevation   of the right hemidiaphragm noted. Linear opacity within the left basilar   region likely related to linear scarring versus platelike atelectasis. No   pleural effusion or pneumothorax is demonstrated. No mediastinal shift is   noted. The visualized osseous structures appear unremarkable.    IMPRESSION: No evidence for focal infiltrate or lobar consolidation.      < from: CT Head No Cont (19 @ 17:41) >    IMPRESSION:    No acute intracranial pathology is noted. If the patient has new and   persistent symptoms, consider short interval follow-up head CT or brain   MRI follow-up if there are no MRI contraindications.

## 2019-04-07 NOTE — PROVIDER CONTACT NOTE (OTHER) - ACTION/TREATMENT ORDERED:
Resident on call notified. An order to give patient Motrin 600 mg x1 obtained.
Order for youssef to drainage received.
Orders written and carried out.

## 2019-04-07 NOTE — PROVIDER CONTACT NOTE (CHANGE IN STATUS NOTIFICATION) - ASSESSMENT
136. Dr Chen Dr Reyes in orders written & carried out. Tylenol PO given for temp Low cath removed as per Dr Manuel.

## 2019-04-07 NOTE — PROVIDER CONTACT NOTE (OTHER) - SITUATION
BP 95/69 Hr 103 R 16 Temp 98.4 POX 2L NC 97% Denies any pain or discomfort at this time. Family at bedside. IV fluids running. Pt in good spirits at this time. Awaiting UA specimen.

## 2019-04-07 NOTE — PROVIDER CONTACT NOTE (CHANGE IN STATUS NOTIFICATION) - ASSESSMENT
/81  R 20 R temp 101.1 POX 2L NC 99% Pt denies any pain or discomfort at this time. Vanco stat dose running. Pt encouraged to increase  PO fluids. Family at bedside. Dr. Manuel aware.

## 2019-04-07 NOTE — DISCHARGE NOTE PROVIDER - PROVIDER RX CONTACT NUMBER
Prior Authorization Required on: sildenafil 20mg   Insurance: paid/[employer]   Insurance Phone: [phone]  Patient ID: [id number]  Please Contact the Pharmacy with Prior Auth Status (approval/denial).   Thank You!    Megan Tovar   Pharmacy Technician  [facility]  [phone]    
Prior authorization has been denied.  Only covered for pulmonary arterial hypertension.    RAN SANCHEZ    
Prior authorization submitted to [facility] 06/19/17.    RAN SANCHEZ    
[phone]

## 2019-04-07 NOTE — PROGRESS NOTE ADULT - SUBJECTIVE AND OBJECTIVE BOX
Chief complaint: no complaints, voiding trial tonight    Patient is a 74y old  Female who presents with a chief complaint of s/p SAH/Acomm aneurysm coiling now with functional deficits (06 Apr 2019 12:30)    HEALTH ISSUES - PROBLEM Dx:  Pulmonary thromboembolism              PAST MEDICAL & SURGICAL HISTORY:  Dyslipidemia  HTN (Hypertension)  Status Post Total Knee Replace: left      VITALS  Vital Signs Last 24 Hrs  T(C): 36.6 (07 Apr 2019 08:01), Max: 37.1 (06 Apr 2019 20:06)  T(F): 97.8 (07 Apr 2019 08:01), Max: 98.7 (06 Apr 2019 20:06)  HR: 92 (07 Apr 2019 08:01) (82 - 93)  BP: 105/68 (07 Apr 2019 08:01) (105/68 - 145/89)  BP(mean): --  RR: 16 (07 Apr 2019 08:01) (16 - 16)  SpO2: 94% (07 Apr 2019 08:01) (94% - 97%)      PHYSICAL EXAM  Constitutional - NAD, Comfortable  HEENT - NCAT, EOMI  Neck - Supple, No limited ROM  Chest - CTA bilaterally  Cardiovascular - RRR, S1S2, No murmurs  Abdomen - BS+, Soft, NTND  Extremities - No C/C/E, No calf tenderness   Neurologic Exam -                    Cognitive - Awake, Alert, AAO X3     No new focal deficits                     PT/INR - ( 07 Apr 2019 05:30 )   PT: 33.6 sec;   INR: 2.90 ratio                       CURRENT MEDICATIONS    MEDICATIONS  (STANDING):  atorvastatin 20 milliGRAM(s) Oral at bedtime  enalapril 5 milliGRAM(s) Oral daily  pantoprazole    Tablet 40 milliGRAM(s) Oral before breakfast  tamsulosin 0.4 milliGRAM(s) Oral at bedtime    MEDICATIONS  (PRN):  acetaminophen    Suspension .. 815 milliGRAM(s) Oral every 6 hours PRN Mild Pain (1 - 3)    ASSESSMENT & PLAN          GI/Bowel Management - Dulcolax PRN, Fleet PRN   Management - Toilet Q2  Skin - Turn Q2  Pain - Tylenol PRN    Resume Coumadin, goal INR  TOV tonight       Continue comprehensive acute rehab program consisting of 3hrs/day of OT/PT and SLP.

## 2019-04-07 NOTE — DISCHARGE NOTE PROVIDER - NSDCCPCAREPLAN_GEN_ALL_CORE_FT
PRINCIPAL DISCHARGE DIAGNOSIS  Diagnosis: Sepsis  Assessment and Plan of Treatment: transfer to ICU for higher level of care

## 2019-04-07 NOTE — CHART NOTE - NSCHARTNOTEFT_GEN_A_CORE
F/U rapid reponse for fever, hypoxia/oxygen desaturation.   On exam, awake alert, denies fever chills lightheadedness headache CP dyspnea cough or NVD ABD pain.   O:  Vital Signs Last 24 Hrs  T(C): 39.5 (07 Apr 2019 12:30), Max: 39.5 (07 Apr 2019 12:30)  T(F): 103.1 (07 Apr 2019 12:30), Max: 103.1 (07 Apr 2019 12:30)  HR: 117 (07 Apr 2019 12:30) (82 - 117)  BP: 139/89 (07 Apr 2019 12:30) (105/68 - 145/89)  BP(mean): --  RR: 18 (07 Apr 2019 12:30) (16 - 18)  SpO2: 90% (07 Apr 2019 12:30) (90% - 97%)    Exam  Gen NAD  HEENT mmm trach ML no JVD no nodes  COR tachy no murmurs  Pulm CTAB limited to increased AP diameter  ABD obese SNT    no SPT no CVA   EXT no edema                             12.0   21.1  )-----------( 365      ( 07 Apr 2019 12:52 )             36.8     07 Apr 2019 12:52    134    |  98     |  21     ----------------------------<  131    4.3     |  28     |  1.03     Ca    9.1        07 Apr 2019 12:52    TPro  7.8    /  Alb  3.2    /  TBili  0.6    /  DBili  x      /  AST  22     /  ALT  33     /  AlkPhos  88     07 Apr 2019 12:52    LIVER FUNCTIONS - ( 07 Apr 2019 12:52 )  Alb: 3.2 g/dL / Pro: 7.8 g/dL / ALK PHOS: 88 U/L / ALT: 33 U/L DA / AST: 22 U/L / GGT: x           PT/INR - ( 07 Apr 2019 12:52 )   PT: 29.2 sec;   INR: 2.53 ratio         PTT - ( 07 Apr 2019 12:52 )  PTT:37.2 sec  CAPILLARY BLOOD GLUCOSE      POCT Blood Glucose.: 126 mg/dL (07 Apr 2019 13:07)    A/P    1. hypoxia. now improved from 80 to 97% on 2L NC. DDx existing PE, atelectasis, LL PNA  2. SIRS. meets criteria for leukocytosis fever and tachycardia. plan for trial of 1 L NS bolus. ECG reviewed, sinus tachy. Chest Xray with atelectasis LLL; would presume CT may show infiltrate. Zosyn ordered. Hx of C. Dif and e. Coli UTI ( Cx reviewed). WIll speak with ID, Dr Cleveland group saw pt at [facility]. Check UA again. If procal elevated will pursue further PNA workup;  add on strep and legionaires Ag to urine.   3. general:  response to initial fluids will dictate disposition. F/U rapid response for fever, hypoxia/oxygen desaturation.   On exam, awake alert, denies fever chills lightheadedness headache CP dyspnea cough or NVD ABD pain.   O:  Vital Signs Last 24 Hrs  T(C): 39.5 (07 Apr 2019 12:30), Max: 39.5 (07 Apr 2019 12:30)  T(F): 103.1 (07 Apr 2019 12:30), Max: 103.1 (07 Apr 2019 12:30)  HR: 117 (07 Apr 2019 12:30) (82 - 117)  BP: 139/89 (07 Apr 2019 12:30) (105/68 - 145/89)  BP(mean): --  RR: 18 (07 Apr 2019 12:30) (16 - 18)  SpO2: 90% (07 Apr 2019 12:30) (90% - 97%)    Exam  Gen NAD  HEENT mmm trach ML no JVD no nodes  COR tachy no murmurs  Pulm CTAB limited to increased AP diameter  ABD obese SNT    no SPT no CVA   EXT no edema                             12.0   21.1  )-----------( 365      ( 07 Apr 2019 12:52 )             36.8     07 Apr 2019 12:52    134    |  98     |  21     ----------------------------<  131    4.3     |  28     |  1.03     Ca    9.1        07 Apr 2019 12:52    TPro  7.8    /  Alb  3.2    /  TBili  0.6    /  DBili  x      /  AST  22     /  ALT  33     /  AlkPhos  88     07 Apr 2019 12:52    LIVER FUNCTIONS - ( 07 Apr 2019 12:52 )  Alb: 3.2 g/dL / Pro: 7.8 g/dL / ALK PHOS: 88 U/L / ALT: 33 U/L DA / AST: 22 U/L / GGT: x           PT/INR - ( 07 Apr 2019 12:52 )   PT: 29.2 sec;   INR: 2.53 ratio         PTT - ( 07 Apr 2019 12:52 )  PTT:37.2 sec  CAPILLARY BLOOD GLUCOSE      POCT Blood Glucose.: 126 mg/dL (07 Apr 2019 13:07)    A/P    1. hypoxia. now improved from 80 to 97% on 2L NC. DDx existing PE, atelectasis, LL PNA  2. SIRS. meets criteria for leukocytosis fever and tachycardia. Lactate neg but procal 2.23. plan for trial of 1 L NS bolus. ECG reviewed, sinus tachy. Chest Xray with atelectasis LLL; would presume a potential CT would reveal infiltrate. Zosyn ordered. Hx of C. Dif and e. Coli UTI ( Cx reviewed). WIll speak with ID, Dr Cleveland group saw pt at [facility]. Check UA again. If procal elevated will pursue further PNA workup;  add on strep and legionaires Ag to urine.   3. general:  response to initial fluids will dictate disposition. Based on improved hemodynamics, will continue to monitor in BIU. Any change would prompt re-eval for transfer to tele.   Spoke with Dr Byrd, Dr Manuel PMR Resident and Admitting hospitalist.

## 2019-04-07 NOTE — PROGRESS NOTE ADULT - PROVIDER SPECIALTY LIST ADULT
Hospitalist
Neurology
Rehab Medicine
Urology
Rehab Medicine
Neurology

## 2019-04-07 NOTE — PROGRESS NOTE ADULT - REASON FOR ADMISSION
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
s/p SAH/ A comm aneurysm coiling now with functional deficits
s/p SAH/A comm aneurysm coiling now with functional deficits

## 2019-04-08 ENCOUNTER — INPATIENT (INPATIENT)
Facility: HOSPITAL | Age: 74
LOS: 8 days | Discharge: NURSING, REHAB OR LONG-TERM CARE | DRG: 698 | End: 2019-04-17
Attending: INTERNAL MEDICINE | Admitting: INTERNAL MEDICINE
Payer: MEDICARE

## 2019-04-08 VITALS
OXYGEN SATURATION: 99 % | HEIGHT: 65 IN | HEART RATE: 87 BPM | WEIGHT: 186.07 LBS | SYSTOLIC BLOOD PRESSURE: 101 MMHG | TEMPERATURE: 98 F | RESPIRATION RATE: 19 BRPM | DIASTOLIC BLOOD PRESSURE: 67 MMHG

## 2019-04-08 DIAGNOSIS — T83.511A INFECTION AND INFLAMMATORY REACTION DUE TO INDWELLING URETHRAL CATHETER, INITIAL ENCOUNTER: ICD-10-CM

## 2019-04-08 DIAGNOSIS — Z86.79 PERSONAL HISTORY OF OTHER DISEASES OF THE CIRCULATORY SYSTEM: ICD-10-CM

## 2019-04-08 DIAGNOSIS — G93.40 ENCEPHALOPATHY, UNSPECIFIED: ICD-10-CM

## 2019-04-08 DIAGNOSIS — I26.99 OTHER PULMONARY EMBOLISM WITHOUT ACUTE COR PULMONALE: ICD-10-CM

## 2019-04-08 DIAGNOSIS — R33.9 RETENTION OF URINE, UNSPECIFIED: ICD-10-CM

## 2019-04-08 DIAGNOSIS — N17.9 ACUTE KIDNEY FAILURE, UNSPECIFIED: ICD-10-CM

## 2019-04-08 DIAGNOSIS — A41.9 SEPSIS, UNSPECIFIED ORGANISM: ICD-10-CM

## 2019-04-08 DIAGNOSIS — A04.72 ENTEROCOLITIS DUE TO CLOSTRIDIUM DIFFICILE, NOT SPECIFIED AS RECURRENT: ICD-10-CM

## 2019-04-08 DIAGNOSIS — Z98.890 OTHER SPECIFIED POSTPROCEDURAL STATES: Chronic | ICD-10-CM

## 2019-04-08 LAB
-  K. PNEUMONIAE GROUP: SIGNIFICANT CHANGE UP
ANION GAP SERPL CALC-SCNC: 9 MMOL/L — SIGNIFICANT CHANGE UP (ref 5–17)
APPEARANCE UR: CLEAR — SIGNIFICANT CHANGE UP
BACTERIA # UR AUTO: ABNORMAL /HPF
BASOPHILS # BLD AUTO: 0.1 K/UL — SIGNIFICANT CHANGE UP (ref 0–0.2)
BASOPHILS NFR BLD AUTO: 0.6 % — SIGNIFICANT CHANGE UP (ref 0–2)
BILIRUB UR-MCNC: NEGATIVE — SIGNIFICANT CHANGE UP
BUN SERPL-MCNC: 20 MG/DL — SIGNIFICANT CHANGE UP (ref 7–23)
CALCIUM SERPL-MCNC: 8.6 MG/DL — SIGNIFICANT CHANGE UP (ref 8.4–10.5)
CHLORIDE SERPL-SCNC: 102 MMOL/L — SIGNIFICANT CHANGE UP (ref 96–108)
CO2 SERPL-SCNC: 28 MMOL/L — SIGNIFICANT CHANGE UP (ref 22–31)
COLOR SPEC: YELLOW — SIGNIFICANT CHANGE UP
CREAT SERPL-MCNC: 1.1 MG/DL — SIGNIFICANT CHANGE UP (ref 0.5–1.3)
DIFF PNL FLD: ABNORMAL
EOSINOPHIL # BLD AUTO: 0.1 K/UL — SIGNIFICANT CHANGE UP (ref 0–0.5)
EOSINOPHIL NFR BLD AUTO: 0.9 % — SIGNIFICANT CHANGE UP (ref 0–6)
EPI CELLS # UR: SIGNIFICANT CHANGE UP
GLUCOSE SERPL-MCNC: 111 MG/DL — HIGH (ref 70–99)
GLUCOSE UR QL: NEGATIVE — SIGNIFICANT CHANGE UP
GRAM STN FLD: SIGNIFICANT CHANGE UP
HCT VFR BLD CALC: 30.1 % — LOW (ref 34.5–45)
HGB BLD-MCNC: 9.9 G/DL — LOW (ref 11.5–15.5)
INR BLD: 2.58 RATIO — HIGH (ref 0.88–1.16)
KETONES UR-MCNC: NEGATIVE — SIGNIFICANT CHANGE UP
LEUKOCYTE ESTERASE UR-ACNC: ABNORMAL
LYMPHOCYTES # BLD AUTO: 1 K/UL — SIGNIFICANT CHANGE UP (ref 1–3.3)
LYMPHOCYTES # BLD AUTO: 7.7 % — LOW (ref 13–44)
MAGNESIUM SERPL-MCNC: 1.8 MG/DL — SIGNIFICANT CHANGE UP (ref 1.6–2.6)
MCHC RBC-ENTMCNC: 29.6 PG — SIGNIFICANT CHANGE UP (ref 27–34)
MCHC RBC-ENTMCNC: 32.9 GM/DL — SIGNIFICANT CHANGE UP (ref 32–36)
MCV RBC AUTO: 90.1 FL — SIGNIFICANT CHANGE UP (ref 80–100)
METHOD TYPE: SIGNIFICANT CHANGE UP
MONOCYTES # BLD AUTO: 0.9 K/UL — SIGNIFICANT CHANGE UP (ref 0–0.9)
MONOCYTES NFR BLD AUTO: 6.3 % — SIGNIFICANT CHANGE UP (ref 1–9)
NEUTROPHILS # BLD AUTO: 11.4 K/UL — HIGH (ref 1.8–7.4)
NEUTROPHILS NFR BLD AUTO: 84.6 % — HIGH (ref 43–77)
NITRITE UR-MCNC: NEGATIVE — SIGNIFICANT CHANGE UP
PH UR: 6 — SIGNIFICANT CHANGE UP (ref 5–8)
PHOSPHATE SERPL-MCNC: 3.7 MG/DL — SIGNIFICANT CHANGE UP (ref 2.5–4.5)
PLATELET # BLD AUTO: 297 K/UL — SIGNIFICANT CHANGE UP (ref 150–400)
POTASSIUM SERPL-MCNC: 4 MMOL/L — SIGNIFICANT CHANGE UP (ref 3.5–5.3)
POTASSIUM SERPL-SCNC: 4 MMOL/L — SIGNIFICANT CHANGE UP (ref 3.5–5.3)
PROCALCITONIN SERPL-MCNC: 2.16 NG/ML — HIGH
PROT UR-MCNC: 15
PROTHROM AB SERPL-ACNC: 29.8 SEC — HIGH (ref 10–12.9)
RBC # BLD: 3.34 M/UL — LOW (ref 3.8–5.2)
RBC # FLD: 13.2 % — SIGNIFICANT CHANGE UP (ref 10.3–14.5)
RBC CASTS # UR COMP ASSIST: SIGNIFICANT CHANGE UP /HPF (ref 0–4)
SODIUM SERPL-SCNC: 139 MMOL/L — SIGNIFICANT CHANGE UP (ref 135–145)
SP GR SPEC: 1.01 — SIGNIFICANT CHANGE UP (ref 1.01–1.02)
SPECIMEN SOURCE: SIGNIFICANT CHANGE UP
SPECIMEN SOURCE: SIGNIFICANT CHANGE UP
UROBILINOGEN FLD QL: NEGATIVE — SIGNIFICANT CHANGE UP
WBC # BLD: 13.4 K/UL — HIGH (ref 3.8–10.5)
WBC # FLD AUTO: 13.4 K/UL — HIGH (ref 3.8–10.5)
WBC UR QL: SIGNIFICANT CHANGE UP /HPF (ref 0–5)

## 2019-04-08 PROCEDURE — 99233 SBSQ HOSP IP/OBS HIGH 50: CPT

## 2019-04-08 PROCEDURE — 76775 US EXAM ABDO BACK WALL LIM: CPT | Mod: 26

## 2019-04-08 RX ORDER — AMIKACIN SULFATE 250 MG/ML
500 INJECTION, SOLUTION INTRAMUSCULAR; INTRAVENOUS ONCE
Qty: 0 | Refills: 0 | Status: COMPLETED | OUTPATIENT
Start: 2019-04-08 | End: 2019-04-08

## 2019-04-08 RX ORDER — VANCOMYCIN HCL 1 G
125 VIAL (EA) INTRAVENOUS EVERY 6 HOURS
Qty: 0 | Refills: 0 | Status: DISCONTINUED | OUTPATIENT
Start: 2019-04-08 | End: 2019-04-17

## 2019-04-08 RX ORDER — WARFARIN SODIUM 2.5 MG/1
2 TABLET ORAL ONCE
Qty: 0 | Refills: 0 | Status: COMPLETED | OUTPATIENT
Start: 2019-04-08 | End: 2019-04-08

## 2019-04-08 RX ORDER — SODIUM CHLORIDE 9 MG/ML
1000 INJECTION, SOLUTION INTRAVENOUS ONCE
Qty: 0 | Refills: 0 | Status: COMPLETED | OUTPATIENT
Start: 2019-04-08 | End: 2019-04-08

## 2019-04-08 RX ORDER — LACTOBACILLUS ACIDOPHILUS 100MM CELL
1 CAPSULE ORAL THREE TIMES A DAY
Qty: 0 | Refills: 0 | Status: DISCONTINUED | OUTPATIENT
Start: 2019-04-08 | End: 2019-04-17

## 2019-04-08 RX ORDER — PANTOPRAZOLE SODIUM 20 MG/1
40 TABLET, DELAYED RELEASE ORAL
Qty: 0 | Refills: 0 | Status: DISCONTINUED | OUTPATIENT
Start: 2019-04-08 | End: 2019-04-17

## 2019-04-08 RX ORDER — ATORVASTATIN CALCIUM 80 MG/1
20 TABLET, FILM COATED ORAL AT BEDTIME
Qty: 0 | Refills: 0 | Status: DISCONTINUED | OUTPATIENT
Start: 2019-04-08 | End: 2019-04-17

## 2019-04-08 RX ORDER — ACETAMINOPHEN 500 MG
975 TABLET ORAL EVERY 6 HOURS
Qty: 0 | Refills: 0 | Status: DISCONTINUED | OUTPATIENT
Start: 2019-04-08 | End: 2019-04-17

## 2019-04-08 RX ORDER — TAMSULOSIN HYDROCHLORIDE 0.4 MG/1
0.4 CAPSULE ORAL AT BEDTIME
Qty: 0 | Refills: 0 | Status: DISCONTINUED | OUTPATIENT
Start: 2019-04-08 | End: 2019-04-17

## 2019-04-08 RX ORDER — PIPERACILLIN AND TAZOBACTAM 4; .5 G/20ML; G/20ML
3.38 INJECTION, POWDER, LYOPHILIZED, FOR SOLUTION INTRAVENOUS EVERY 8 HOURS
Qty: 0 | Refills: 0 | Status: DISCONTINUED | OUTPATIENT
Start: 2019-04-08 | End: 2019-04-10

## 2019-04-08 RX ORDER — SODIUM CHLORIDE 9 MG/ML
1000 INJECTION, SOLUTION INTRAVENOUS
Qty: 0 | Refills: 0 | Status: DISCONTINUED | OUTPATIENT
Start: 2019-04-08 | End: 2019-04-09

## 2019-04-08 RX ORDER — VANCOMYCIN HCL 1 G
125 VIAL (EA) INTRAVENOUS EVERY 6 HOURS
Qty: 0 | Refills: 0 | Status: DISCONTINUED | OUTPATIENT
Start: 2019-04-08 | End: 2019-04-08

## 2019-04-08 RX ORDER — SODIUM CHLORIDE 9 MG/ML
500 INJECTION, SOLUTION INTRAVENOUS ONCE
Qty: 0 | Refills: 0 | Status: COMPLETED | OUTPATIENT
Start: 2019-04-08 | End: 2019-04-08

## 2019-04-08 RX ADMIN — TAMSULOSIN HYDROCHLORIDE 0.4 MILLIGRAM(S): 0.4 CAPSULE ORAL at 21:29

## 2019-04-08 RX ADMIN — SODIUM CHLORIDE 100 MILLILITER(S): 9 INJECTION, SOLUTION INTRAVENOUS at 05:15

## 2019-04-08 RX ADMIN — Medication 1 TABLET(S): at 13:32

## 2019-04-08 RX ADMIN — WARFARIN SODIUM 2 MILLIGRAM(S): 2.5 TABLET ORAL at 21:39

## 2019-04-08 RX ADMIN — AMIKACIN SULFATE 102 MILLIGRAM(S): 250 INJECTION, SOLUTION INTRAMUSCULAR; INTRAVENOUS at 18:51

## 2019-04-08 RX ADMIN — SODIUM CHLORIDE 500 MILLILITER(S): 9 INJECTION, SOLUTION INTRAVENOUS at 13:56

## 2019-04-08 RX ADMIN — Medication 975 MILLIGRAM(S): at 14:55

## 2019-04-08 RX ADMIN — SODIUM CHLORIDE 1000 MILLILITER(S): 9 INJECTION, SOLUTION INTRAVENOUS at 03:00

## 2019-04-08 RX ADMIN — Medication 125 MILLIGRAM(S): at 23:22

## 2019-04-08 RX ADMIN — SODIUM CHLORIDE 125 MILLILITER(S): 9 INJECTION, SOLUTION INTRAVENOUS at 19:28

## 2019-04-08 RX ADMIN — Medication 125 MILLIGRAM(S): at 19:27

## 2019-04-08 RX ADMIN — ATORVASTATIN CALCIUM 20 MILLIGRAM(S): 80 TABLET, FILM COATED ORAL at 21:29

## 2019-04-08 RX ADMIN — SODIUM CHLORIDE 125 MILLILITER(S): 9 INJECTION, SOLUTION INTRAVENOUS at 11:22

## 2019-04-08 RX ADMIN — PIPERACILLIN AND TAZOBACTAM 25 GRAM(S): 4; .5 INJECTION, POWDER, LYOPHILIZED, FOR SOLUTION INTRAVENOUS at 05:30

## 2019-04-08 RX ADMIN — Medication 1 TABLET(S): at 05:30

## 2019-04-08 RX ADMIN — PANTOPRAZOLE SODIUM 40 MILLIGRAM(S): 20 TABLET, DELAYED RELEASE ORAL at 05:30

## 2019-04-08 RX ADMIN — PIPERACILLIN AND TAZOBACTAM 25 GRAM(S): 4; .5 INJECTION, POWDER, LYOPHILIZED, FOR SOLUTION INTRAVENOUS at 21:28

## 2019-04-08 RX ADMIN — Medication 1 TABLET(S): at 21:29

## 2019-04-08 RX ADMIN — PIPERACILLIN AND TAZOBACTAM 25 GRAM(S): 4; .5 INJECTION, POWDER, LYOPHILIZED, FOR SOLUTION INTRAVENOUS at 13:32

## 2019-04-08 RX ADMIN — Medication 975 MILLIGRAM(S): at 13:55

## 2019-04-08 NOTE — PROGRESS NOTE ADULT - SUBJECTIVE AND OBJECTIVE BOX
ICU CONSULT  Location of Patient :  [facility] 0010 04 ( [facility])  Attending requesting Consult:Fly Martinez  Chief Complaint :  Fever  Reason For consult : sepsis      Initial HPI on admission:  HPI:  74 year old female with h/o Recent SAH s/p ruptured brain aneurysm s/p Coiling, h/o Cdiff, urinary retention s/p youssef with Ecoli, RUL PE on coumadin, S/p RRT in BIU for fever. started on abx and overnight became hypotensive to sbp 80/50 where Bp improved with fluids. Brought to ICU for closer monitoring      BRIEF HOSPITAL COURSE:   while in ICU SBP, Vitlas, Respiratory status stable      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  Dyslipidemia  HTN (Hypertension)  Status post coil embolization of cerebral aneurysm  Status Post Total Knee Replace: left    Allergies    No Known Allergies    Intolerances      FAMILY HISTORY:    Social history:      Smoking: denies     Drinking: denies     Drug use: deinis    Review of Systems:  CONSTITUTIONAL: No fever, No chills, No fatigue  EYES: No eye pain, No visual disturbances, No discharge  ENMT:  No difficulty hearing, No tinnitus, No vertigo; No sinus or throat pain  NECK: No pain or stiffness  RESPIRATORY: Per above  CARDIOVASCULAR: No chest pain, No palpitations, No dizziness, or No leg swelling  GASTROINTESTINAL: No abdominal or epigastric pain. No nausea, No vomiting, No hematemesis; No diarrhea or constipation. No melena, No hematochezia.  GENITOURINARY: No dysuria, No frequency, No hematuria, No incontinence  NEUROLOGICAL: No headaches, No memory loss, No loss of strength, No numbness, No tremors  SKIN: No itching, No burning, No rashes, No lesions   MUSCULOSKELETAL: No joint pain or swelling; No muscle, back, No extremity pain  PSYCHIATRIC: No depression, No anxiety, No mood swings, No difficulty sleeping    Medications:  MEDICATIONS  (STANDING):  atorvastatin 20 milliGRAM(s) Oral at bedtime  lactated ringers. 1000 milliLiter(s) (100 mL/Hr) IV Continuous <Continuous>  lactobacillus acidophilus 1 Tablet(s) Oral three times a day  pantoprazole    Tablet 40 milliGRAM(s) Oral before breakfast  piperacillin/tazobactam IVPB. 3.375 Gram(s) IV Intermittent every 8 hours    MEDICATIONS  (PRN):      Home Medications:  Last Order Reconciliation Date: 19 @ 02:58 (Admission Reconciliation)  acetaminophen 160 mg/5 mL oral suspension: 25.47 milliliter(s) orally every 6 hours, As needed, Mild Pain (1 - 3) (19 @ 14:40)  Altace 10 mg oral capsule: 1 mg orally once a day  (13 @ 19:39)  atorvastatin 20 mg oral tablet: 1 tab(s) orally once a day (at bedtime) (19 @ 23:34)  Coumadin 2 mg oral tablet: 3.5 tab(s) (7mg total) orally x 1 tonight with daily dosing based on INR, goal INR 2-3 for PE  (19 @ 14:40)  enalapril 10 mg oral tablet: 1 tab(s) orally once a day (19 @ 14:40)  famotidine 20 mg oral tablet: 1  orally   x 2 doses preoperatively (13 @ 19:39)  Flomax 0.4 mg oral capsule: 1 cap(s) orally once a day  (19 @ 09:41)  Keflex 500 mg oral capsule: 1 cap(s) orally every 12 hours  (19 @ 09:41)  lactobacillus acidophilus oral capsule: 1 tab(s) orally 3 times a day (19 @ 23:34)  Lipitor 40 mg oral tablet: 1 mg orally once a day (at bedtime)  (13 @ 19:39)  oxyCODONE 5 mg oral capsule: 1 cap(s) orally every 6 hours, As Needed -for severe pain MDD:4 tabs (19 @ 09:41)  pantoprazole 40 mg oral delayed release tablet: 1 tab(s) orally once a day (before a meal) (19 @ 23:34)  petrolatum topical ointment: 1 application topically 2 times a day (19 @ 23:34)  piperacillin-tazobactam 2 g-0.25 g intravenous injection: 2.25 gram(s) intravenous every 8 hours (19 @ 23:34)  psyllium 3.4 g/7 g oral powder for reconstitution: 1 packet(s) orally 2 times a day (19 @ 23:34)  simvastatin 40 mg oral tablet: 1 tab(s) orally once a day (at bedtime) (19 @ 23:34)  tamsulosin 0.4 mg oral capsule: 1 cap(s) orally once a day (at bedtime) (19 @ 23:34)  vancomycin 125 mg oral capsule: 1 cap(s) orally 2 times a day to compolete thru 3/31/19. (19 @ 23:34)  vancomycin 250 mg oral capsule: 1 cap(s) orally every 6 hours (19 @ 23:34)  zinc oxide 20% topical ointment: 1 application topically 2 times a day (19 @ 23:34)      LABS:                        9.9    13.4  )-----------( 297      ( 2019 05:00 )             30.1     04-08    139  |  102  |  20  ----------------------------<  111<H>  4.0   |  28  |  1.10    Ca    8.6      2019 05:00  Phos  3.7     04-08  Mg     1.8     -    TPro  7.8  /  Alb  3.2<L>  /  TBili  0.6  /  DBili  x   /  AST  22  /  ALT  33  /  AlkPhos  88  04-07      Lactic Acid Trend  19 @ 12:52   -   1.8        PT/INR - ( 2019 05:00 )   PT: 29.8 sec;   INR: 2.58 ratio         PTT - ( 2019 12:52 )  PTT:37.2 sec  Urinalysis Basic - ( 2019 15:50 )    Color: Yellow / Appearance: Clear / S.015 / pH: x  Gluc: x / Ketone: Negative  / Bili: Negative / Urobili: Negative   Blood: x / Protein: 100 / Nitrite: Positive   Leuk Esterase: Moderate / RBC: 5-10 /HPF / WBC 11-25 /HPF   Sq Epi: x / Non Sq Epi: Neg.-Few / Bacteria: Moderate /HPF      Procalcitonin, Serum: 2.16 ng/mL (19 @ 05:00)  Procalcitonin, Serum: 2.23 ng/mL (19 @ 12:52)          CULTURES: (if applicable)    Culture - Blood (collected 19 @ 12:52)  Source: .Blood Blood-Peripheral  Gram Stain (19 @ 07:53):    Growth in aerobic and anaerobic bottles: Gram Negative Rods  Preliminary Report (19 @ 07:53):    Growth in aerobic and anaerobic bottles: Gram Negative Rods    Culture - Blood (collected 19 @ 12:52)  Source: .Blood Blood-Peripheral  Gram Stain (19 @ 08:16):    Growth in anaerobic bottle: Gram Negative Rods    Growth in aerobic bottle: Gram Negative Rods  Preliminary Report (19 @ 08:16):    Growth in anaerobic bottle: Gram Negative Rods    "Due to technical problems, Proteus sp. will Not be reported as part of    the BCID panel until further notice"    ***Blood Panel PCR results on this specimen are available    approximately 3 hours after the Gram stain result.***    Gram stain, PCR, and/or culture results may not always    correspond due to difference in methodologies.    ************************************************************    This PCR assay was performed using [employer].    The following targets are tested for: Enterococcus,    vancomycin resistant enterococci, Listeria monocytogenes,    coagulase negative staphylococci, S. aureus,    methicillin resistant S. aureus, Streptococcus agalactiae    (Group B), S. pneumoniae, S. pyogenes (Group A),    Acinetobacter baumannii, Enterobacter cloacae, E. coli,    Klebsiella oxytoca, K. pneumoniae, Proteus sp.,    Serratia marcescens, Haemophilus influenzae,    Neisseria meningitidis, Pseudomonas aeruginosa, Candida    albicans, C. glabrata, C krusei, C parapsilosis,    C. tropicalis and the KPC resistance gene.    Growth in aerobic bottle: Gram Negative Rods  Organism: Blood Culture PCR (19 @ 07:53)  Organism: Blood Culture PCR (19 @ 07:53)      -  Klebsiella pneumoniae: Detec      Method Type: PCR    Culture - Urine (collected 19 @ 17:05)  Source: .Urine Catheterized  Final Report (19 @ 12:13):    >100,000 CFU/ml Escherichia coli  Organism: Escherichia coli (19 @ 12:13)  Organism: Escherichia coli (19 @ 12:13)      -  Amikacin: S <=16      -  Ampicillin: R >16 These ampicillin results predict results for amoxicillin      -  Ampicillin/Sulbactam: I 16/8      -  Aztreonam: S <=4      -  Cefazolin: I 16 For uncomplicated UTI with K. pneumoniae, E. coli, or P. mirablis: INDRA <=16 is sensitive and INDRA >=32 is resistant. This also predicts results for oral agents cefaclor, cefdinir, cefpodoxime, cefprozil, cefuroxime axetil, cephalexin and locarbeffor uncomplicated UTI. Note that some isolates may be susceptible to these agents while testing resistant to cefazolin.      -  Cefepime: S <=4      -  Cefoxitin: S <=8      -  Ceftriaxone: S <=1 Enterobacter, Citrobacter, and Serratia may develop resistance during prolonged therapy      -  Ciprofloxacin: S <=1      -  Ertapenem: S <=1      -  Gentamicin: S <=4      -  Imipenem: S <=1      -  Levofloxacin: S <=2      -  Meropenem: S <=1      -  Nitrofurantoin: S <=32 Should not be used to treat pyelonephritis      -  Piperacillin/Tazobactam: S <=16      -  Tigecycline: S <=2      -  Tobramycin: S <=4      -  Trimethoprim/Sulfamethoxazole: S <=2/38      Method Type: INDRA    Culture - Blood (collected 19 @ 13:32)  Source: .Blood Blood  Final Report (19 @ 14:00):    No growth at 5 days.    Culture - Blood (collected 19 @ 13:32)  Source: .Blood Blood  Final Report (19 @ 14:00):    No growth at 5 days.            CAPILLARY BLOOD GLUCOSE      POCT Blood Glucose.: 126 mg/dL (2019 13:07)      RADIOLOGY  CXR:  < from: Xray Chest 1 View-PORTABLE IMMEDIATE (19 @ 12:49) >  VIEWS: Portable AP radiography of the chest performed.    FINDINGS: Heart size appears within normal limits. No hilar or superior   mediastinal abnormalities are identified. There is no evidence for focal   infiltrate, lobar consolidation or pulmonary edema. Stable mild elevation   of the right hemidiaphragm noted. Linear opacity within the left basilar   region likely related to linear scarring versus platelike atelectasis. No   pleural effusion or pneumothorax is demonstrated. No mediastinal shift is   noted. The visualized osseous structures appear unremarkable.    IMPRESSION: No evidence for focal infiltrate or lobar consolidation.    < end of copied text >      ECHO:  < from: Transthoracic Echocardiogram (19 @ 18:02) >  1. Normal mitral valve.  2. Normal trileaflet aortic valve.  3. Hyperdynamic left ventricular systolic function.  4. Right ventricle is mildly dilated with normal systolic  function.  5. Estimated right ventricular systolic pressure equals 37  mm Hg, assuming right atrial pressure equals 8 mm Hg,  consistent with borderline pulmonary hypertension.  6. Normal pericardium with no pericardial effusion.    < end of copied text >      VITALS:  T(C): 36.8 (19 @ 05:00), Max: 39.5 (19 @ 12:30)  T(F): 98.2 (19 @ 05:00), Max: 103.1 (19 @ 12:30)  HR: 85 (19 @ 06:00) (80 - 126)  BP: 121/58 (19 @ 06:00) (81/50 - 139/89)  BP(mean): 74 (19 @ 06:00) (69 - 99)  ABP: --  ABP(mean): --  RR: 19 (19 @ 06:00) (14 - 23)  SpO2: 100% (19 06:00) (90% - 100%)  CVP(mm Hg): --  CVP(cm H2O): --    Ins and Outs     19 @ 07:01  -  19 @ 07:00  --------------------------------------------------------  IN: 350 mL / OUT: 1100 mL / NET: -750 mL        Height (cm): 165.1 (19 @ 00:33)  Weight (kg): 84.4 (19 @ 00:33)  BMI (kg/m2): 31 (19 @ 00:33) ICU CONSULT  Location of Patient :  [facility] [id number] 04 ( [facility])  Attending requesting Consult:Fly Martinez  Chief Complaint :  Fever  Reason For consult : sepsis      Initial HPI on admission:  HPI:  74 year old female with h/o Recent SAH s/p ruptured brain aneurysm s/p Coiling, h/o Cdiff, urinary retention s/p youssef with Ecoli, RUL PE on coumadin, S/p RRT in BIU for fever. started on abx and overnight became hypotensive to sbp 80/50 where Bp improved with fluids. Brought to ICU for closer monitoring      BRIEF HOSPITAL COURSE:   while in ICU SBP, Vitlas, Respiratory status stable    PVR this am 1100 cc.    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  Dyslipidemia  HTN (Hypertension)  Status post coil embolization of cerebral aneurysm  Status Post Total Knee Replace: left    Allergies    No Known Allergies    Intolerances      FAMILY HISTORY:    Social history:      Smoking: denies     Drinking: denies     Drug use: deinis    Review of Systems:  CONSTITUTIONAL: +fever, +chills, +fatigue  EYES: No eye pain, No visual disturbances, No discharge  ENMT:  No difficulty hearing, No tinnitus, No vertigo; No sinus or throat pain  NECK: No pain or stiffness  RESPIRATORY: no sob, no cough, no hemoptysis   CARDIOVASCULAR: No chest pain, No palpitations, No dizziness, or No leg swelling  GASTROINTESTINAL: No abdominal or epigastric pain. No nausea, No vomiting, No hematemesis; No diarrhea or constipation. No melena, No hematochezia.  GENITOURINARY: No dysuria, No frequency, No hematuria, No incontinence  NEUROLOGICAL: No headaches, No memory loss, No loss of strength, No numbness, No tremors  SKIN: No itching, No burning, No rashes, No lesions   MUSCULOSKELETAL: No joint pain or swelling; No muscle, back, No extremity pain  PSYCHIATRIC: No depression, No anxiety, No mood swings, No difficulty sleeping    Medications:  MEDICATIONS  (STANDING):  atorvastatin 20 milliGRAM(s) Oral at bedtime  lactated ringers. 1000 milliLiter(s) (100 mL/Hr) IV Continuous <Continuous>  lactobacillus acidophilus 1 Tablet(s) Oral three times a day  pantoprazole    Tablet 40 milliGRAM(s) Oral before breakfast  piperacillin/tazobactam IVPB. 3.375 Gram(s) IV Intermittent every 8 hours    MEDICATIONS  (PRN):      Home Medications:  Last Order Reconciliation Date: 19 @ 02:58 (Admission Reconciliation)  acetaminophen 160 mg/5 mL oral suspension: 25.47 milliliter(s) orally every 6 hours, As needed, Mild Pain (1 - 3) (19 @ 14:40)  Altace 10 mg oral capsule: 1 mg orally once a day  (13 @ 19:39)  atorvastatin 20 mg oral tablet: 1 tab(s) orally once a day (at bedtime) (19 @ 23:34)  Coumadin 2 mg oral tablet: 3.5 tab(s) (7mg total) orally x 1 tonight with daily dosing based on INR, goal INR 2-3 for PE  (19 @ 14:40)  enalapril 10 mg oral tablet: 1 tab(s) orally once a day (19 @ 14:40)  famotidine 20 mg oral tablet: 1  orally   x 2 doses preoperatively (13 @ 19:39)  Flomax 0.4 mg oral capsule: 1 cap(s) orally once a day  (19 @ 09:41)  Keflex 500 mg oral capsule: 1 cap(s) orally every 12 hours  (19 @ 09:41)  lactobacillus acidophilus oral capsule: 1 tab(s) orally 3 times a day (19 @ 23:34)  Lipitor 40 mg oral tablet: 1 mg orally once a day (at bedtime)  (13 @ 19:39)  oxyCODONE 5 mg oral capsule: 1 cap(s) orally every 6 hours, As Needed -for severe pain MDD:4 tabs (19 @ 09:41)  pantoprazole 40 mg oral delayed release tablet: 1 tab(s) orally once a day (before a meal) (19 @ 23:34)  petrolatum topical ointment: 1 application topically 2 times a day (19 @ 23:34)  piperacillin-tazobactam 2 g-0.25 g intravenous injection: 2.25 gram(s) intravenous every 8 hours (19 @ 23:34)  psyllium 3.4 g/7 g oral powder for reconstitution: 1 packet(s) orally 2 times a day (19 @ 23:34)  simvastatin 40 mg oral tablet: 1 tab(s) orally once a day (at bedtime) (19 @ 23:34)  tamsulosin 0.4 mg oral capsule: 1 cap(s) orally once a day (at bedtime) (19 @ 23:34)  vancomycin 125 mg oral capsule: 1 cap(s) orally 2 times a day to compolete thru 3/31/19. (19 @ 23:34)  vancomycin 250 mg oral capsule: 1 cap(s) orally every 6 hours (19 @ 23:34)  zinc oxide 20% topical ointment: 1 application topically 2 times a day (19 @ 23:34)      LABS:                        9.9    13.4  )-----------( 297      ( 2019 05:00 )             30.1     -    139  |  102  |  20  ----------------------------<  111<H>  4.0   |  28  |  1.10    Ca    8.6      2019 05:00  Phos  3.7     -  Mg     1.8         TPro  7.8  /  Alb  3.2<L>  /  TBili  0.6  /  DBili  x   /  AST  22  /  ALT  33  /  AlkPhos  88  -      Lactic Acid Trend  19 @ 12:52   -   1.8        PT/INR - ( 2019 05:00 )   PT: 29.8 sec;   INR: 2.58 ratio         PTT - ( 2019 12:52 )  PTT:37.2 sec  Urinalysis Basic - ( 2019 15:50 )    Color: Yellow / Appearance: Clear / S.015 / pH: x  Gluc: x / Ketone: Negative  / Bili: Negative / Urobili: Negative   Blood: x / Protein: 100 / Nitrite: Positive   Leuk Esterase: Moderate / RBC: 5-10 /HPF / WBC 11-25 /HPF   Sq Epi: x / Non Sq Epi: Neg.-Few / Bacteria: Moderate /HPF      Procalcitonin, Serum: 2.16 ng/mL (19 @ 05:00)  Procalcitonin, Serum: 2.23 ng/mL (19 @ 12:52)          CULTURES: (if applicable)    Culture - Blood (collected 19 @ 12:52)  Source: .Blood Blood-Peripheral  Gram Stain (19 @ 07:53):    Growth in aerobic and anaerobic bottles: Gram Negative Rods  Preliminary Report (19 @ 07:53):    Growth in aerobic and anaerobic bottles: Gram Negative Rods    Culture - Blood (collected 19 @ 12:52)  Source: .Blood Blood-Peripheral  Gram Stain (19 @ 08:16):    Growth in anaerobic bottle: Gram Negative Rods    Growth in aerobic bottle: Gram Negative Rods  Preliminary Report (19 @ 08:16):    Growth in anaerobic bottle: Gram Negative Rods    "Due to technical problems, Proteus sp. will Not be reported as part of    the BCID panel until further notice"    ***Blood Panel PCR results on this specimen are available    approximately 3 hours after the Gram stain result.***    Gram stain, PCR, and/or culture results may not always    correspond due to difference in methodologies.    ************************************************************    This PCR assay was performed using [employer].    The following targets are tested for: Enterococcus,    vancomycin resistant enterococci, Listeria monocytogenes,    coagulase negative staphylococci, S. aureus,    methicillin resistant S. aureus, Streptococcus agalactiae    (Group B), S. pneumoniae, S. pyogenes (Group A),    Acinetobacter baumannii, Enterobacter cloacae, E. coli,    Klebsiella oxytoca, K. pneumoniae, Proteus sp.,    Serratia marcescens, Haemophilus influenzae,    Neisseria meningitidis, Pseudomonas aeruginosa, Candida    albicans, C. glabrata, C krusei, C parapsilosis,    C. tropicalis and the KPC resistance gene.    Growth in aerobic bottle: Gram Negative Rods  Organism: Blood Culture PCR (19 @ 07:53)  Organism: Blood Culture PCR (19 @ 07:53)      -  Klebsiella pneumoniae: Detec      Method Type: PCR    Culture - Urine (collected 19 @ 17:05)  Source: .Urine Catheterized  Final Report (19 @ 12:13):    >100,000 CFU/ml Escherichia coli  Organism: Escherichia coli (19 @ 12:13)  Organism: Escherichia coli (19 @ 12:13)      -  Amikacin: S <=16      -  Ampicillin: R >16 These ampicillin results predict results for amoxicillin      -  Ampicillin/Sulbactam: I 16/8      -  Aztreonam: S <=4      -  Cefazolin: I 16 For uncomplicated UTI with K. pneumoniae, E. coli, or P. mirablis: INDRA <=16 is sensitive and INDRA >=32 is resistant. This also predicts results for oral agents cefaclor, cefdinir, cefpodoxime, cefprozil, cefuroxime axetil, cephalexin and locarbeffor uncomplicated UTI. Note that some isolates may be susceptible to these agents while testing resistant to cefazolin.      -  Cefepime: S <=4      -  Cefoxitin: S <=8      -  Ceftriaxone: S <=1 Enterobacter, Citrobacter, and Serratia may develop resistance during prolonged therapy      -  Ciprofloxacin: S <=1      -  Ertapenem: S <=1      -  Gentamicin: S <=4      -  Imipenem: S <=1      -  Levofloxacin: S <=2      -  Meropenem: S <=1      -  Nitrofurantoin: S <=32 Should not be used to treat pyelonephritis      -  Piperacillin/Tazobactam: S <=16      -  Tigecycline: S <=2      -  Tobramycin: S <=4      -  Trimethoprim/Sulfamethoxazole: S <=2/38      Method Type: INDRA    Culture - Blood (collected 19 @ 13:32)  Source: .Blood Blood  Final Report (19 @ 14:00):    No growth at 5 days.    Culture - Blood (collected 19 @ 13:32)  Source: .Blood Blood  Final Report (19 @ 14:00):    No growth at 5 days.            CAPILLARY BLOOD GLUCOSE      POCT Blood Glucose.: 126 mg/dL (2019 13:07)      RADIOLOGY  CXR:  < from: Xray Chest 1 View-PORTABLE IMMEDIATE (19 @ 12:49) >  VIEWS: Portable AP radiography of the chest performed.    FINDINGS: Heart size appears within normal limits. No hilar or superior   mediastinal abnormalities are identified. There is no evidence for focal   infiltrate, lobar consolidation or pulmonary edema. Stable mild elevation   of the right hemidiaphragm noted. Linear opacity within the left basilar   region likely related to linear scarring versus platelike atelectasis. No   pleural effusion or pneumothorax is demonstrated. No mediastinal shift is   noted. The visualized osseous structures appear unremarkable.    IMPRESSION: No evidence for focal infiltrate or lobar consolidation.    < end of copied text >      ECHO:  < from: Transthoracic Echocardiogram (19 @ 18:02) >  1. Normal mitral valve.  2. Normal trileaflet aortic valve.  3. Hyperdynamic left ventricular systolic function.  4. Right ventricle is mildly dilated with normal systolic  function.  5. Estimated right ventricular systolic pressure equals 37  mm Hg, assuming right atrial pressure equals 8 mm Hg,  consistent with borderline pulmonary hypertension.  6. Normal pericardium with no pericardial effusion.    < end of copied text >      VITALS:  T(C): 36.8 (19 @ 05:00), Max: 39.5 (19 @ 12:30)  T(F): 98.2 (19 @ 05:00), Max: 103.1 (19 @ 12:30)  HR: 85 (19 @ 06:00) (80 - 126)  BP: 121/58 (19 @ 06:00) (81/50 - 139/89)  BP(mean): 74 (19 @ 06:00) (69 - 99)  ABP: --  ABP(mean): --  RR: 19 (19 @ 06:00) (14 - 23)  SpO2: 100% (19 06:00) (90% - 100%)  CVP(mm Hg): --  CVP(cm H2O): --    Ins and Outs     19 @ 07:01  -  19 07:00  --------------------------------------------------------  IN: 350 mL / OUT: 1100 mL / NET: -750 mL        Height (cm): 165.1 (19 @ 00:33)  Weight (kg): 84.4 (19 @ 00:33)  BMI (kg/m2): 31 (19 @ 00:33)

## 2019-04-08 NOTE — H&P ADULT - NSICDXPASTSURGICALHX_GEN_ALL_CORE_FT
PAST SURGICAL HISTORY:  Status post coil embolization of cerebral aneurysm     Status Post Total Knee Replace left

## 2019-04-08 NOTE — DISCHARGE NOTE NURSING/CASE MANAGEMENT/SOCIAL WORK - NSDCDPATPORTLINK_GEN_ALL_CORE
You can access the [facility] Patient Portal, offered by [facility], by registering with the following website: [email or web address]

## 2019-04-08 NOTE — H&P ADULT - HISTORY OF PRESENT ILLNESS
73 yo female, PMHx HTN, HLD, recent admission at [facility] 3/04-29 for SAH s/p coiling of AComm aneurysm, with hospital course prolonged by C. diff colitis dx 3/12 completed 10 day course of PO vanco, urinary retention requiring indwelling youssef 3/26, E. coli UTI tx with fosfomycin x 1 dose 3/27, RUL PE on Coumadin diagnosed 3/18. Patient was discharged to [facility] rehab on 3/29. RRT called 4/7 for fever 102.7'F and hypoxia with SpO2 80's. Patient was started on empiric antibiotics Zosyn, Vanco IV x 1 dose, and PO vanco was restarted per ID consultation recommendations. Labs were sent, found to have new leukocytosis WBC 21, normal lactate 1.8, procalcitonin 2.23, and TEREZA. Indwelling youssef was discontinued, straight cath revealed +UA. Blood and urine cultures pending. Chest xray negative. During the course of the day, patient had received 1.5L bolus and maintenance fluids at 75cc/hr. Patient subsequently became hypotensive overnight with BP 80/50. Patient was given 500cc bolus and BP improved to 109/68. Patient was admitted to ICU for further management of sepsis. 75 yo female, PMHx HTN, HLD, recent admission at [facility] 3/04-29 for SAH s/p coiling of ruptured AComm aneurysm, with hospital course prolonged by C. diff colitis dx 3/12 completed 10 day course of PO vanco, urinary retention requiring indwelling youssef 3/26, E. coli UTI tx with fosfomycin x 1 dose 3/27, RUL PE on Coumadin diagnosed 3/18. Patient was discharged to [facility] rehab on 3/29. RRT called 4/7 for fever 102.7'F and hypoxia with SpO2 80's. Patient was started on empiric antibiotics Zosyn, Vanco IV x 1 dose, and PO vanco was restarted per ID consultation recommendations. Labs were sent, found to have new leukocytosis WBC 21, normal lactate 1.8, procalcitonin 2.23, and TEREZA. Indwelling youssef was discontinued, straight cath revealed +UA. Blood and urine cultures pending. Chest xray negative. During the course of the day, patient had received 1.5L bolus and maintenance fluids at 75cc/hr. Patient subsequently became hypotensive overnight with BP 80/50. Patient was given 500cc bolus and BP improved to 109/68.  states she is not acting her usual self. Patient was admitted to ICU for further management of sepsis.

## 2019-04-08 NOTE — H&P ADULT - COMMENTS
offers no complaints  has cognitive deficits from recent SAH, is an unreliable historian  full ROS was therefore unable to be obtained  all information has been obtained from patient's  at bedside

## 2019-04-08 NOTE — DISCHARGE NOTE NURSING/CASE MANAGEMENT/SOCIAL WORK - NSDCPEPTSTRK_GEN_ALL_CORE
Stroke education booklet/Stroke support groups for patients, families, and friends/Signs and symptoms of stroke/Call 911 for stroke/Stroke warning signs and symptoms/Need for follow up after discharge/Prescribed medications/Risk factors for stroke

## 2019-04-08 NOTE — H&P ADULT - RS GEN PE MLT RESP DETAILS PC
no rhonchi/breath sounds equal/clear to auscultation bilaterally/respirations non-labored/good air movement/no wheezes/no rales/airway patent

## 2019-04-08 NOTE — PROVIDER CONTACT NOTE (EICU) - ASSESSMENT
Tele-evaluation precludes physical exam.  Pertinent physical exam findings as per verbal communication by bedside provider are as following.   Discussed with TERRELL Reyes.  - Noted to have grossly positive UA in the setting of previous E.Coli bacteremias  - Received Flomax on 4/7 PM.

## 2019-04-08 NOTE — PROGRESS NOTE ADULT - ASSESSMENT
Physical Examination:  GENERAL:               Alert, Oriented, No acute distress.    HEENT:                    Pupils equal, reactive to light.  Symmetric. No JVD, Moist MM  PULM:                     Bilateral air entry, Clear to auscultation bilaterally, no significant sputum production, No Rales, No Rhonchi, No Wheezing  CVS:                         S1, S2,  No Murmur  ABD:                        Soft, nondistended, nontender, normoactive bowel sounds,   EXT:                         No edema, nontender, No Cyanosis or Clubbing   Vascular:                Warm Extremities, Normal Capillary refill, Normal Distal Pulses  SKIN:                       Warm and well perfused, no rashes noted.   NEURO:                  Alert, oriented, interactive, nonfocal, follows commands  PSYC:                      Calm, + Insight.      Assessment      Plan      PMD:				                   Notified(Date):  Family Updated: 		                                 Date:       Sedation & Analgesia:	  Diet/Nutrition:		  GI PPx:			    DVT Ppx:		  	RISK                                                          Points  	[ ] Previous VTE                                           	3  	[ ] Thrombophilia                                        	2  	[ ] Lower limb paralysis                              	2   	[ ] Current Cancer                                       	2   	[ ] Immobilization > 24 hrs                        	1  	[ ] ICU/CCU stay > 24 hours                       	1  	[ ] Age > 60                                                   	1  		Total:[ ]      Activity:		                 Head of Bed:                 Glycemic Control:            Lines:  CENTRAL LINE: 	[ ] YES [ ] NO	                    LOCATION:   	                       DATE INSERTED:   	                    REMOVE:  [ ] YES [ ] NO    A-LINE:  	                [ ] YES [ ] NO                      LOCATION:   	                       DATE INSERTED: 		            REMOVE:  [ ] YES [ ] NO    VALERIO: 		        [ ] YES [ ] NO  		                                       DATE INSERTED:		            REMOVE:  [ ] YES [ ] NO      Restraints were deemed necessary to prevent removal of life-sustaining devices [  ] YES   [    ]  NO    Disposition:    Goals of Care: Physical Examination:  GENERAL:               Alert, Oriented, No acute distress.    HEENT:                   No JVD, Moist MM,  PULM:                     Bilateral air entry, Clear to auscultation bilaterally, no significant sputum production, No Rales, No Rhonchi, No Wheezing  CVS:                         S1, S2,  No Murmur  ABD:                        Soft, nondistended, nontender, normoactive bowel sounds,   EXT:                         No edema, nontender, No Cyanosis or Clubbing   Vascular:                Warm Extremities, Normal Capillary refill, Normal Distal Pulses  SKIN:                       Warm and well perfused, no rashes noted.   NEURO:                  Alert, oriented, interactive,  follows commands  PSYC:                      Calm, + Insight.      Assessment  Sepsis due to urinary retention/neurogenic bladder now with GNR bacteremia with PVR >1L on 4/8/19  s/p SAH. Low for retention / neurogenic bladder.  PE on coumadin 3/18/19 CTA Chest Right upper lobe pulmonary embolus.  underlying HTN, high chol  h/o Clostridium difficile diarrhea      Plan  Continue abx  Continue IVF  Urology eval - Called Dr. WORKMAN Recommending to place Low and continue Flomax,   ID consult  Flomax  Daily dose coumadin  PT  OOB    PMD:		Dr. Hernandez at [facility]  Family Updated: 	 bedside	                                 Date: 4/8/19      Sedation & Analgesia:	N/a  Diet/Nutrition:		oral diet  GI PPx:			PPI     DVT Ppx:		Coumadin  	RISK                                                          Points  	[x ] Previous VTE                                           	3  	[ ] Thrombophilia                                        	2  	[ ] Lower limb paralysis                              	2   	[ ] Current Cancer                                       	2   	[ ] Immobilization > 24 hrs                        	1  	x[ ] ICU/CCU stay > 24 hours                       	1  	[x ] Age > 60                                                   	1  		Total:[5 ]      Activity:		          OOB/PT       Head of Bed:               35-45  Glycemic Control:        N/a    Lines:  PIV    Restraints were deemed necessary to prevent removal of life-sustaining devices [  ] YES   [   x ]  NO    Disposition: Can transfer to medical floor. will discuss with hospitalist    Goals of Care: Full COde

## 2019-04-08 NOTE — H&P ADULT - ASSESSMENT
75 yo female admitted to rehab following prolonged hospitalization for rupture of AComm aneurysm s/p coiling c/b C. diff infection, E. coli UTIs, urinary retention requiring youssef, now admitted to ICU from rehab with encephalopathy, severe sepsis likely secondary to UTI with shock and TEREZA.     1. Severe Sepsis  - With evidence of end-organ malperfusion (TEREZA)   - Source likely UTI. CXR without acute infiltrate, no active diarrhea last BM yesterday  - Blood and urine cultures sent and are pending, f/u speciation and sensivity  - Previous UCx reviewed, E. coli pan-sensitive, continue Zosyn   - Will hold off on PO vanco, no active diarrhea or abdominal symptoms with grossly positive UA as source  - Lactate normal but with significant leukocytosis and high fevers, trend leukocytosis, fever curve, procalcitonin    2. Shock  - Likely multifactorial septic and hypovolemic  - Transiently volume responsive  - Still appears intravascularly deplete with ongoing losses due to fever and poor PO intake  - Will continue aggressive IV fluid resuscitation, give additional 1L LR bolus and start LR @ 100cc/hr  - Will start vasoactive agents if fails to sustain MAP >65  - Received Vasotec and Flomax today, will d/c regimen while hypotensive    3. TEREZA  - Likely related to sepsis, r/o ATN due to hypoperfusion during shock state  - Continue IV fluid resuscitation as above  - Monitor renal function and electrolytes  - Replace electrolytes as needed for goal K >4 and Mg >2  - Monitor I&Os    4. Urinary Retention  - Monitor bladder scan, required straight cath >600cc obtained this evening after youssef d/c  - Will hold off on replacing indwelling youssef for now and continue TOV  - If continues to fail, will replace youssef  - Flomax on hold as above for hypotension    5. Encephalopathy  - Secondary to fever, sepsis  -  at bedside to assist with frequent reorientation  - Continue to monitor    6. Pulmonary Embolism  - Continue Warfarin, received 5mg at bedtime for INR 2.5  - F/u INR in AM and continue to dose by level    7. Hypertension  - Hold antihypertensives due to hypotension    8. SAH s/p aneurysmal coiling  - PT consult    9. HLD  - Continue statin  - DASH diet    10. Clostridium difficile  - Completed extended course of PO vanco due to abx for UTI  - No active diarrhea  - Continue Bacid  - Monitor closely for recurrence of infection while on abx for acute infection      CRITICAL CARE TIME SPENT: 50 minutes assessing presenting problems of acute illness, which pose high probability of life threatening deterioration or end organ damage/dysfunction, as well as medical decision making including initiating plan of care, reviewing data, reviewing radiologic exams, discussing with multidisciplinary team, non-inclusive of procedures performed, discussing goals of care with patient's . Case discussed with eICU Attending Dr. Devlin.

## 2019-04-08 NOTE — PROGRESS NOTE ADULT - ASSESSMENT
Retention / neurogenic bladder. Urosepsis - better with abx / IVF. Youssef replaced this AM in ICU.    PLAN:    - follow labs and cultures  - Abx as per ID  - Continue youssef - no more void trials this week

## 2019-04-08 NOTE — H&P ADULT - NSHPSOCIALHISTORY_GEN_ALL_CORE
Now admitted from rehab s/p prolonged hospital course for neuro event  Previously lived at home with   Non-smoker

## 2019-04-08 NOTE — PROGRESS NOTE ADULT - SUBJECTIVE AND OBJECTIVE BOX
The patient is a Patient is a 74y old  Female on rehab s/p SAH.    Admitted to ICU yesterday with sepsis. Recurrent urinary retention / neurogenic bladder. Had youssef removed over the weekend - failed void trial and straight caths started. Developed lethargy / chills.    Improved on IV abx / IV fluids. Being transferred out of ICU today.        ROS  General: no fever or chills this AM      VITAL SIGNS  Vital Signs Last 24 Hrs  T(C): 36.8 (2019 09:00), Max: 39.5 (2019 12:30)  T(F): 98.2 (2019 09:00), Max: 103.1 (2019 12:30)  HR: 115 (2019 10:00) (80 - 126)  BP: 117/92         PHYSICAL EXAM:  General: awake and alert  Abdomen: bladder NT  : youssef clear      LABS:                        9.9    13.4  )-----------( 297      ( 2019 05:00 )             30.1     04-08    139  |  102  |  20  ----------------------------<  111<H>  4.0   |  28  |  1.10    Color: Yellow / Appearance: Clear / S.015 / pH: x  Gluc: x / Ketone: Negative  / Bili: Negative / Urobili: Negative   Blood: x / Protein: 100 / Nitrite: Positive   Leuk Esterase: Moderate / RBC: 5-10 /HPF / WBC 11-25 /HPF   Sq Epi: x / Non Sq Epi: Neg.-Few / Bacteria: Moderate /HPF            Prior notes/chart reviewed.

## 2019-04-08 NOTE — PROVIDER CONTACT NOTE (EICU) - BACKGROUND
Patient is a 75 yo F HTN transferred from [facility] to  for rehab after a large SAH 2/2 Acomm aneurysm rupture.  Pt course was notable for c. diff, and urinary retention (with multiple failed TOVs with ecoli UTI and RUL PE (admission date: 3/4/19-3/29/19).  Patient noted today to have multiple RRTs for hypoxia, tachycardia and fever.  Now s/p 1L followed by maintenance fluids at 100cc/hr.  Patient noted to have 2 subsequent RRTs for hypotension and found to have fever of 102.7 rectally at 1949.  ICU called for eval.

## 2019-04-09 LAB
-  AMIKACIN: SIGNIFICANT CHANGE UP
-  AMIKACIN: SIGNIFICANT CHANGE UP
-  AMPICILLIN/SULBACTAM: SIGNIFICANT CHANGE UP
-  AMPICILLIN/SULBACTAM: SIGNIFICANT CHANGE UP
-  AMPICILLIN: SIGNIFICANT CHANGE UP
-  AMPICILLIN: SIGNIFICANT CHANGE UP
-  AZTREONAM: SIGNIFICANT CHANGE UP
-  AZTREONAM: SIGNIFICANT CHANGE UP
-  CEFAZOLIN: SIGNIFICANT CHANGE UP
-  CEFAZOLIN: SIGNIFICANT CHANGE UP
-  CEFEPIME: SIGNIFICANT CHANGE UP
-  CEFEPIME: SIGNIFICANT CHANGE UP
-  CEFOXITIN: SIGNIFICANT CHANGE UP
-  CEFOXITIN: SIGNIFICANT CHANGE UP
-  CEFTRIAXONE: SIGNIFICANT CHANGE UP
-  CEFTRIAXONE: SIGNIFICANT CHANGE UP
-  CIPROFLOXACIN: SIGNIFICANT CHANGE UP
-  CIPROFLOXACIN: SIGNIFICANT CHANGE UP
-  ERTAPENEM: SIGNIFICANT CHANGE UP
-  ERTAPENEM: SIGNIFICANT CHANGE UP
-  GENTAMICIN: SIGNIFICANT CHANGE UP
-  GENTAMICIN: SIGNIFICANT CHANGE UP
-  IMIPENEM: SIGNIFICANT CHANGE UP
-  IMIPENEM: SIGNIFICANT CHANGE UP
-  LEVOFLOXACIN: SIGNIFICANT CHANGE UP
-  LEVOFLOXACIN: SIGNIFICANT CHANGE UP
-  MEROPENEM: SIGNIFICANT CHANGE UP
-  MEROPENEM: SIGNIFICANT CHANGE UP
-  NITROFURANTOIN: SIGNIFICANT CHANGE UP
-  NITROFURANTOIN: SIGNIFICANT CHANGE UP
-  PIPERACILLIN/TAZOBACTAM: SIGNIFICANT CHANGE UP
-  PIPERACILLIN/TAZOBACTAM: SIGNIFICANT CHANGE UP
-  TIGECYCLINE: SIGNIFICANT CHANGE UP
-  TIGECYCLINE: SIGNIFICANT CHANGE UP
-  TOBRAMYCIN: SIGNIFICANT CHANGE UP
-  TOBRAMYCIN: SIGNIFICANT CHANGE UP
-  TRIMETHOPRIM/SULFAMETHOXAZOLE: SIGNIFICANT CHANGE UP
-  TRIMETHOPRIM/SULFAMETHOXAZOLE: SIGNIFICANT CHANGE UP
ANION GAP SERPL CALC-SCNC: 11 MMOL/L — SIGNIFICANT CHANGE UP (ref 5–17)
BUN SERPL-MCNC: 13 MG/DL — SIGNIFICANT CHANGE UP (ref 7–23)
CALCIUM SERPL-MCNC: 8.3 MG/DL — LOW (ref 8.4–10.5)
CHLORIDE SERPL-SCNC: 102 MMOL/L — SIGNIFICANT CHANGE UP (ref 96–108)
CO2 SERPL-SCNC: 24 MMOL/L — SIGNIFICANT CHANGE UP (ref 22–31)
CREAT SERPL-MCNC: 0.89 MG/DL — SIGNIFICANT CHANGE UP (ref 0.5–1.3)
CULTURE RESULTS: NO GROWTH — SIGNIFICANT CHANGE UP
CULTURE RESULTS: SIGNIFICANT CHANGE UP
GLUCOSE SERPL-MCNC: 107 MG/DL — HIGH (ref 70–99)
HCT VFR BLD CALC: 27.8 % — LOW (ref 34.5–45)
HGB BLD-MCNC: 9.1 G/DL — LOW (ref 11.5–15.5)
INR BLD: 2.17 RATIO — HIGH (ref 0.88–1.16)
MAGNESIUM SERPL-MCNC: 1.7 MG/DL — SIGNIFICANT CHANGE UP (ref 1.6–2.6)
MCHC RBC-ENTMCNC: 29.5 PG — SIGNIFICANT CHANGE UP (ref 27–34)
MCHC RBC-ENTMCNC: 32.8 GM/DL — SIGNIFICANT CHANGE UP (ref 32–36)
MCV RBC AUTO: 89.9 FL — SIGNIFICANT CHANGE UP (ref 80–100)
METHOD TYPE: SIGNIFICANT CHANGE UP
METHOD TYPE: SIGNIFICANT CHANGE UP
ORGANISM # SPEC MICROSCOPIC CNT: SIGNIFICANT CHANGE UP
PHOSPHATE SERPL-MCNC: 3.1 MG/DL — SIGNIFICANT CHANGE UP (ref 2.5–4.5)
PLATELET # BLD AUTO: 312 K/UL — SIGNIFICANT CHANGE UP (ref 150–400)
POTASSIUM SERPL-MCNC: 3.7 MMOL/L — SIGNIFICANT CHANGE UP (ref 3.5–5.3)
POTASSIUM SERPL-SCNC: 3.7 MMOL/L — SIGNIFICANT CHANGE UP (ref 3.5–5.3)
PROTHROM AB SERPL-ACNC: 24.9 SEC — HIGH (ref 10–12.9)
RBC # BLD: 3.09 M/UL — LOW (ref 3.8–5.2)
RBC # FLD: 13 % — SIGNIFICANT CHANGE UP (ref 10.3–14.5)
SODIUM SERPL-SCNC: 137 MMOL/L — SIGNIFICANT CHANGE UP (ref 135–145)
SPECIMEN SOURCE: SIGNIFICANT CHANGE UP
SPECIMEN SOURCE: SIGNIFICANT CHANGE UP
WBC # BLD: 10 K/UL — SIGNIFICANT CHANGE UP (ref 3.8–10.5)
WBC # FLD AUTO: 10 K/UL — SIGNIFICANT CHANGE UP (ref 3.8–10.5)

## 2019-04-09 PROCEDURE — 99233 SBSQ HOSP IP/OBS HIGH 50: CPT

## 2019-04-09 RX ORDER — WARFARIN SODIUM 2.5 MG/1
2 TABLET ORAL ONCE
Qty: 0 | Refills: 0 | Status: COMPLETED | OUTPATIENT
Start: 2019-04-09 | End: 2019-04-09

## 2019-04-09 RX ADMIN — SODIUM CHLORIDE 125 MILLILITER(S): 9 INJECTION, SOLUTION INTRAVENOUS at 04:48

## 2019-04-09 RX ADMIN — TAMSULOSIN HYDROCHLORIDE 0.4 MILLIGRAM(S): 0.4 CAPSULE ORAL at 22:08

## 2019-04-09 RX ADMIN — Medication 5 MILLIGRAM(S): at 18:24

## 2019-04-09 RX ADMIN — Medication 125 MILLIGRAM(S): at 07:25

## 2019-04-09 RX ADMIN — Medication 5 MILLIGRAM(S): at 05:44

## 2019-04-09 RX ADMIN — ATORVASTATIN CALCIUM 20 MILLIGRAM(S): 80 TABLET, FILM COATED ORAL at 22:08

## 2019-04-09 RX ADMIN — Medication 1 TABLET(S): at 13:55

## 2019-04-09 RX ADMIN — SODIUM CHLORIDE 75 MILLILITER(S): 9 INJECTION, SOLUTION INTRAVENOUS at 12:12

## 2019-04-09 RX ADMIN — Medication 1 TABLET(S): at 05:44

## 2019-04-09 RX ADMIN — Medication 125 MILLIGRAM(S): at 18:24

## 2019-04-09 RX ADMIN — PIPERACILLIN AND TAZOBACTAM 25 GRAM(S): 4; .5 INJECTION, POWDER, LYOPHILIZED, FOR SOLUTION INTRAVENOUS at 22:07

## 2019-04-09 RX ADMIN — PIPERACILLIN AND TAZOBACTAM 25 GRAM(S): 4; .5 INJECTION, POWDER, LYOPHILIZED, FOR SOLUTION INTRAVENOUS at 13:55

## 2019-04-09 RX ADMIN — Medication 125 MILLIGRAM(S): at 23:53

## 2019-04-09 RX ADMIN — WARFARIN SODIUM 2 MILLIGRAM(S): 2.5 TABLET ORAL at 22:08

## 2019-04-09 RX ADMIN — Medication 1 TABLET(S): at 22:08

## 2019-04-09 RX ADMIN — Medication 125 MILLIGRAM(S): at 12:17

## 2019-04-09 RX ADMIN — PIPERACILLIN AND TAZOBACTAM 25 GRAM(S): 4; .5 INJECTION, POWDER, LYOPHILIZED, FOR SOLUTION INTRAVENOUS at 05:49

## 2019-04-09 RX ADMIN — PANTOPRAZOLE SODIUM 40 MILLIGRAM(S): 20 TABLET, DELAYED RELEASE ORAL at 05:44

## 2019-04-09 NOTE — PROGRESS NOTE ADULT - ASSESSMENT
75 yo female admitted to rehab following prolonged hospitalization for rupture of AComm aneurysm s/p coiling c/b C. diff infection, E. coli UTIs, urinary retention requiring youssef, now admitted to ICU from rehab with encephalopathy, severe sepsis likely secondary to UTI with shock and TEREZA.

## 2019-04-09 NOTE — PROGRESS NOTE ADULT - ASSESSMENT
73 yo female admitted to rehab following prolonged hospitalization for rupture of AComm aneurysm s/p coiling c/b C. diff infection, E. coli UTIs, urinary retention requiring youssef, now admitted to ICU from rehab with encephalopathy, severe sepsis likely secondary to UTI with shock and TEREZA.     1. Severe Sepsis  - With evidence of end-organ malperfusion (TEREZA)   - Source UTI with secondary bacteremia +Klebsiella, f/u speciation and sensitivity   - Abx broadened to Zosyn and Amikacin by ID  - Continue on empiric PO vanco due to risk for recurrent C. diff infection. No active diarrhea.   - Trend leukocytosis, fever curve, procalcitonin    2. Shock  - Resolved with crystalloid resuscitation  - Never required vasopressors, now hemodynamically stable  - Reintroduce antihypertensives and Flomax as indicated     3. TEREZA  - Likely related to sepsis, r/o ATN due to hypoperfusion during shock state  - Continue IV fluid resuscitation as above  - Monitor renal function and electrolytes  - Replace electrolytes as needed for goal K >4 and Mg >2  - Monitor I&Os  - Avoid nephrotoxins    4. Urinary Retention  - Indwelling youssef placed 4/8 for urinary retention  - Urology following, no further TOVs  at this time    5. Encephalopathy  - Secondary to fever, sepsis  - Resolved    6. Pulmonary Embolism  - Continue Warfarin, received 2mg at bedtime  - F/u INR in AM and continue to dose by level    7. Hypertension  - Reintroduce regimen as clinically indicated now that distributive shock is resolved     8. SAH s/p aneurysmal coiling  - PT, OOB  - PMNR eval for dispo back to [facility]     9. HLD  - Continue statin  - DASH diet    10. Clostridium difficile  - Completed extended course of PO vanco due to abx for UTI  - Now started back on empiric PO vanco for C. diff ppx while on Abx for recurrent UTI/bacteremia  - No active diarrhea  - Continue Bacid  - Monitor closely for recurrence of infection while on abx for acute infection    At this time, patient's vasoplegic/distributive shock secondary to sepsis has resolved, patient is hemodynamically stable and no longer requires ICU level of care. Please reconsult as needed. Plan d/w Dr. Martinez.

## 2019-04-09 NOTE — PROGRESS NOTE ADULT - PROBLEM SELECTOR PLAN 7
- Completed extended course of PO vanco due to abx for UTI  - Now started back on empiric PO vanco for C. diff ppx while on Abx for recurrent UTI/bacteremia  - No active diarrhea  - Continue Bacid  - Monitor closely for recurrence of infection while on abx for acute infection

## 2019-04-09 NOTE — PROGRESS NOTE ADULT - SUBJECTIVE AND OBJECTIVE BOX
CC: f/u for Klebsiella bacteremia & sepsis of  origin     Patient reports that she is feeling okay except for a poor appetite     REVIEW OF SYSTEMS:  All other review of systems negative (Comprehensive ROS)    Antimicrobials Day #  : 2  piperacillin/tazobactam IVPB. 3.375 Gram(s) IV Intermittent every 8 hours  vancomycin    Solution 125 milliGRAM(s) Oral every 6 hours    Other Medications Reviewed    T(F): 99.4 (19 @ 15:51), Max: 99.4 (19 @ 15:51)  HR: 99 (19 @ 15:51)  BP: 140/85 (19 @ 15:51)  RR: 16 (19 @ 15:51)  SpO2: 94% (19 @ 15:51)  Wt(kg): --    PHYSICAL EXAM:  General: alert, no acute distress  Eyes:  anicteric, no conjunctival injection, no discharge  Neck: supple, without adenopathy  Lungs: clear to auscultation  Heart: regular rate and rhythm; no murmur, rubs or gallops  Abdomen: soft, nondistended, nontender  Extremities: no clubbing, cyanosis, or edema  Neurologic: alert, oriented, moves all extremities  : youssef with clear urine    LAB RESULTS:                        9.1    10.0  )-----------( 312      ( 2019 06:25 )             27.8         137  |  102  |  13  ----------------------------<  107<H>  3.7   |  24  |  0.89    Ca    8.3<L>      2019 06:25  Phos  3.1       Mg     1.7             Urinalysis Basic - ( 2019 10:45 )    Color: Yellow / Appearance: Clear / S.010 / pH: x  Gluc: x / Ketone: Negative  / Bili: Negative / Urobili: Negative   Blood: x / Protein: 15 / Nitrite: Negative   Leuk Esterase: Moderate / RBC: 0-4 /HPF / WBC 3-5 /HPF   Sq Epi: x / Non Sq Epi: Neg.-Few / Bacteria: Trace /HPF      MICROBIOLOGY:  RECENT CULTURES:   @ 10:45 .Urine Catheterized     No growth       @ 19:10 .Urine Catheterized     50,000 - 99,000 CFU/mL Gram Negative Rods       @ 12:52 .Blood Blood-Peripheral Blood Culture PCR    Growth in aerobic and anaerobic bottles: Gram Negative Rods  Identification and susceptibility to follow.    Growth in anaerobic bottle: Gram Negative Rods  Growth in aerobic bottle: Gram Negative Rods    RADIOLOGY REVIEWED:

## 2019-04-09 NOTE — PROGRESS NOTE ADULT - ASSESSMENT
74y female, s/p coiling for SAH a few weeks ago, with hospital course was complicated by PE, C diff and E coli cystitis with neurogenic bladder.   Sent to rehab with a youssef, that was removed a few days PTA.  Developed fever with high bladder volumes and is now admitted with klebsiella bacteremia. Youssef reinserted & started on Zosyn + prophylactic vancomycin.  No further fevers & leukocytosis has resolved  Urine cx with GNR as well as expected     Recommendations:  Continue zosyn - follow sensitivity  Continue po vanco for cdif relapse prophylaxis attempt  Follow repeat blood cultures  Daughters updated at beside

## 2019-04-09 NOTE — PROGRESS NOTE ADULT - SUBJECTIVE AND OBJECTIVE BOX
Follow-up Critical Care Progress Note  Chief Complaint : Sepsis    pt feels well much better  no cp, sob, palp, n/v      Allergies :No Known Allergies      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  Dyslipidemia  HTN (Hypertension)  Status post coil embolization of cerebral aneurysm  Status Post Total Knee Replace: left      Medications:  MEDICATIONS  (STANDING):  atorvastatin 20 milliGRAM(s) Oral at bedtime  enalapril 5 milliGRAM(s) Oral every 12 hours  lactated ringers. 1000 milliLiter(s) (125 mL/Hr) IV Continuous <Continuous>  lactobacillus acidophilus 1 Tablet(s) Oral three times a day  pantoprazole    Tablet 40 milliGRAM(s) Oral before breakfast  piperacillin/tazobactam IVPB. 3.375 Gram(s) IV Intermittent every 8 hours  tamsulosin 0.4 milliGRAM(s) Oral at bedtime  vancomycin    Solution 125 milliGRAM(s) Oral every 6 hours    MEDICATIONS  (PRN):  acetaminophen   Tablet .. 975 milliGRAM(s) Oral every 6 hours PRN Temp greater or equal to 38C (100.4F), Mild Pain (1 - 3), Moderate Pain (4 - 6)      LABS:                        9.1    10.0  )-----------( 312      ( 2019 06:25 )             27.8     -    137  |  102  |  13  ----------------------------<  107<H>  3.7   |  24  |  0.89    Ca    8.3<L>      2019 06:25  Phos  3.1     -  Mg     1.7     -    TPro  7.8  /  Alb  3.2<L>  /  TBili  0.6  /  DBili  x   /  AST  22  /  ALT  33  /  AlkPhos  88  04-07            PT/INR - ( 2019 06:25 )   PT: 24.9 sec;   INR: 2.17 ratio         PTT - ( 2019 12:52 )  PTT:37.2 sec  Urinalysis Basic - ( 2019 10:45 )    Color: Yellow / Appearance: Clear / S.010 / pH: x  Gluc: x / Ketone: Negative  / Bili: Negative / Urobili: Negative   Blood: x / Protein: 15 / Nitrite: Negative   Leuk Esterase: Moderate / RBC: 0-4 /HPF / WBC 3-5 /HPF   Sq Epi: x / Non Sq Epi: Neg.-Few / Bacteria: Trace /HPF      Procalcitonin, Serum: 2.16 ng/mL (19 @ 05:00)  Procalcitonin, Serum: 2.23 ng/mL (19 @ 12:52)          CULTURES: (if applicable)    Culture - Urine (collected 19 @ 19:10)  Source: .Urine Catheterized  Preliminary Report (19 @ 21:52):    50,000 - 99,000 CFU/mL Gram Negative Rods    Culture - Blood (collected 19 @ 12:52)  Source: .Blood Blood-Peripheral  Gram Stain (19 @ 07:53):    Growth in aerobic and anaerobic bottles: Gram Negative Rods  Preliminary Report (19 @ 08:58):    Growth in aerobic and anaerobic bottles: Gram Negative Rods    Identification and susceptibility to follow.    See previous culture [id number]    Culture - Blood (collected 19 @ 12:52)  Source: .Blood Blood-Peripheral  Gram Stain (19 @ 08:16):    Growth in anaerobic bottle: Gram Negative Rods    Growth in aerobic bottle: Gram Negative Rods  Preliminary Report (19 @ 08:54):    Growth in aerobic and anaerobic bottles: Gram Negative Rods    Identification and susceptibility to follow.    "Due to technical problems, Proteus sp. will Not be reported as part of    the BCID panel until further notice"    ***Blood Panel PCR results on this specimen are available    approximately 3 hours after the Gram stain result.***    Gram stain, PCR, and/or culture results may not always    correspond due to difference in methodologies.    ************************************************************    This PCR assay was performed using [employer].    The following targets are tested for: Enterococcus,    vancomycin resistant enterococci, Listeria monocytogenes,    coagulase negative staphylococci, S. aureus,    methicillin resistant S. aureus, Streptococcus agalactiae    (Group B), S. pneumoniae, S. pyogenes (Group A),    Acinetobacter baumannii, Enterobacter cloacae, E. coli,    Klebsiella oxytoca, K. pneumoniae, Proteus sp.,    Serratia marcescens, Haemophilus influenzae,    Neisseria meningitidis, Pseudomonas aeruginosa, Candida    albicans, C. glabrata, C krusei, C parapsilosis,    C. tropicalis and the KPC resistance gene.  Organism: Blood Culture PCR (19 @ 07:53)  Organism: Blood Culture PCR (19 @ 07:53)      -  Klebsiella pneumoniae: Detec      Method Type: PCR    Culture - Urine (collected 19 @ 17:05)  Source: .Urine Catheterized  Final Report (19 @ 12:13):    >100,000 CFU/ml Escherichia coli  Organism: Escherichia coli (19 @ 12:13)  Organism: Escherichia coli (19 @ 12:13)      -  Amikacin: S <=16      -  Ampicillin: R >16 These ampicillin results predict results for amoxicillin      -  Ampicillin/Sulbactam: I 16/8      -  Aztreonam: S <=4      -  Cefazolin: I 16 For uncomplicated UTI with K. pneumoniae, E. coli, or P. mirablis: INDRA <=16 is sensitive and INDRA >=32 is resistant. This also predicts results for oral agents cefaclor, cefdinir, cefpodoxime, cefprozil, cefuroxime axetil, cephalexin and locarbeffor uncomplicated UTI. Note that some isolates may be susceptible to these agents while testing resistant to cefazolin.      -  Cefepime: S <=4      -  Cefoxitin: S <=8      -  Ceftriaxone: S <=1 Enterobacter, Citrobacter, and Serratia may develop resistance during prolonged therapy      -  Ciprofloxacin: S <=1      -  Ertapenem: S <=1      -  Gentamicin: S <=4      -  Imipenem: S <=1      -  Levofloxacin: S <=2      -  Meropenem: S <=1      -  Nitrofurantoin: S <=32 Should not be used to treat pyelonephritis      -  Piperacillin/Tazobactam: S <=16      -  Tigecycline: S <=2      -  Tobramycin: S <=4      -  Trimethoprim/Sulfamethoxazole: S <=2/38      Method Type: INDRA    Culture - Blood (collected 19 @ 13:32)  Source: .Blood Blood  Final Report (19 @ 14:00):    No growth at 5 days.    Culture - Blood (collected 19 @ 13:32)  Source: .Blood Blood  Final Report (19 @ 14:00):    No growth at 5 days.            CAPILLARY BLOOD GLUCOSE      POCT Blood Glucose.: 126 mg/dL (2019 13:07)      VITALS:  T(C): 36.8 (19 @ 10:39), Max: 38.4 (19 @ 13:00)  T(F): 98.2 (19 10:39), Max: 101.1 (19 @ 13:00)  HR: 96 (19 10:39) (94 - 127)  BP: 125/75 (19 10:39) (87/73 - 151/83)  BP(mean): 97 (19 @ 04:00) (72 - 101)  ABP: --  ABP(mean): --  RR: 16 (19 @ 10:39) (15 - 24)  SpO2: 97% (19 10:39) (93% - 100%)  CVP(mm Hg): --  CVP(cm H2O): --    Ins and Outs     19 @ 07:01  -  19 @ 07:00  --------------------------------------------------------  IN: 4050 mL / OUT: 3585 mL / NET: 465 mL    19 @ 07:01  -  19 @ 11:49  --------------------------------------------------------  IN: 250 mL / OUT: 0 mL / NET: 250 mL        Height (cm): 165.1 (19 @ 00:33)  Weight (kg): 84.4 (19 00:33)  BMI (kg/m2): 31 (19:33)

## 2019-04-09 NOTE — PROGRESS NOTE ADULT - PROBLEM SELECTOR PLAN 3
Urinary Retention  - Indwelling youssef placed 4/8 for urinary retention  - Urology following, no further TOVs  at this time

## 2019-04-09 NOTE — PROGRESS NOTE ADULT - SUBJECTIVE AND OBJECTIVE BOX
Patient is a 74y old  Female who presents with a chief complaint of Sepsis (2019 11:49)      Patient seen and examined at bedside.  Patient denies any chest pain or shortness of breath.      ALLERGIES:  No Known Allergies    MEDICATIONS:  acetaminophen   Tablet .. 975 milliGRAM(s) Oral every 6 hours PRN  atorvastatin 20 milliGRAM(s) Oral at bedtime  enalapril 5 milliGRAM(s) Oral every 12 hours  lactobacillus acidophilus 1 Tablet(s) Oral three times a day  pantoprazole    Tablet 40 milliGRAM(s) Oral before breakfast  piperacillin/tazobactam IVPB. 3.375 Gram(s) IV Intermittent every 8 hours  tamsulosin 0.4 milliGRAM(s) Oral at bedtime  vancomycin    Solution 125 milliGRAM(s) Oral every 6 hours    Vital Signs Last 24 Hrs  T(F): 99.4 (2019 15:51), Max: 99.4 (2019 15:51)  HR: 99 (2019 15:51) (94 - 109)  BP: 140/85 (2019 15:51) (89/62 - 151/83)  RR: 16 (2019 15:51) (15 - 24)  SpO2: 94% (2019 15:51) (93% - 99%)  I&O's Summary    2019 07:  -  2019 07:00  --------------------------------------------------------  IN: 4050 mL / OUT: 3585 mL / NET: 465 mL    2019 07:  -  2019 16:44  --------------------------------------------------------  IN: 1150 mL / OUT: 900 mL / NET: 250 mL        PHYSICAL EXAM:  General: NAD, A/O x 3  ENT: MMM  Neck: Supple, No JVD  Lungs: Clear to auscultation bilaterally  Cardio: RRR, S1/S2, No murmurs  Abdomen: Soft, Nontender, Nondistended; Bowel sounds present  Extremities: No cyanosis, No edema    LABS:                        9.1    10.0  )-----------( 312      ( 2019 06:25 )             27.8         137  |  102  |  13  ----------------------------<  107  3.7   |  24  |  0.89    Ca    8.3      2019 06:25  Phos  3.1       Mg     1.7         TPro  7.8  /  Alb  3.2  /  TBili  0.6  /  DBili  x   /  AST  22  /  ALT  33  /  AlkPhos  88      eGFR if Non African American: 64 mL/min/1.73M2 (19 @ 06:25)  eGFR if African American: 75 mL/min/1.73M2 (19 @ 06:25)    PT/INR - ( 2019 06:25 )   PT: 24.9 sec;   INR: 2.17 ratio         PTT - ( 2019 12:52 )  PTT:37.2 sec  Lactate, Blood: 1.8 mmol/L ( @ 12:52)        03- Chol 184 mg/dL  mg/dL HDL 49 mg/dL Trig 158 mg/dL        CAPILLARY BLOOD GLUCOSE         [id number] 5.6    Urinalysis Basic - ( 2019 10:45 )    Color: Yellow / Appearance: Clear / S.010 / pH: x  Gluc: x / Ketone: Negative  / Bili: Negative / Urobili: Negative   Blood: x / Protein: 15 / Nitrite: Negative   Leuk Esterase: Moderate / RBC: 0-4 /HPF / WBC 3-5 /HPF   Sq Epi: x / Non Sq Epi: Neg.-Few / Bacteria: Trace /HPF        Culture - Urine (collected 2019 10:45)  Source: .Urine Catheterized  Final Report (2019 16:32):    No growth    Culture - Urine (collected 2019 19:10)  Source: .Urine Catheterized  Preliminary Report (2019 21:52):    50,000 - 99,000 CFU/mL Gram Negative Rods    Culture - Blood (collected 2019 12:52)  Source: .Blood Blood-Peripheral  Gram Stain (2019 07:53):    Growth in aerobic and anaerobic bottles: Gram Negative Rods  Preliminary Report (2019 08:58):    Growth in aerobic and anaerobic bottles: Gram Negative Rods    Identification and susceptibility to follow.    See previous culture [id number]    Culture - Blood (collected 2019 12:52)  Source: .Blood Blood-Peripheral  Gram Stain (2019 08:16):    Growth in anaerobic bottle: Gram Negative Rods    Growth in aerobic bottle: Gram Negative Rods  Preliminary Report (2019 08:54):    Growth in aerobic and anaerobic bottles: Gram Negative Rods    Identification and susceptibility to follow.    "Due to technical problems, Proteus sp. will Not be reported as part of    the BCID panel until further notice"    ***Blood Panel PCR results on this specimen are available    approximately 3 hours after the Gram stain result.***    Gram stain, PCR, and/or culture results may not always    correspond due to difference in methodologies.    ************************************************************    This PCR assay was performed using [employer].    The following targets are tested for: Enterococcus,    vancomycin resistant enterococci, Listeria monocytogenes,    coagulase negative staphylococci, S. aureus,    methicillin resistant S. aureus, Streptococcus agalactiae    (Group B), S. pneumoniae, S. pyogenes (Group A),    Acinetobacter baumannii, Enterobacter cloacae, E. coli,    Klebsiella oxytoca, K. pneumoniae, Proteus sp.,    Serratia marcescens, Haemophilus influenzae,    Neisseria meningitidis, Pseudomonas aeruginosa, Candida    albicans, C. glabrata, C krusei, C parapsilosis,    C. tropicalis and the KPC resistance gene.  Organism: Blood Culture PCR (2019 07:53)  Organism: Blood Culture PCR (2019 07:53)      -  Klebsiella pneumoniae: Detec      Method Type: PCR        RADIOLOGY & ADDITIONAL TESTS:    Care Discussed with Consultants/Other Providers:

## 2019-04-09 NOTE — PROGRESS NOTE ADULT - ASSESSMENT
Physical Examination:  GENERAL:               Alert, Oriented, No acute distress.    HEENT:                   No JVD, Moist MM,  PULM:                     Bilateral air entry, Clear to auscultation bilaterally, no significant sputum production, No Rales, No Rhonchi, No Wheezing  CVS:                         S1, S2,  No Murmur  ABD:                        Soft, nondistended, nontender, normoactive bowel sounds,   NEURO:                  Alert, oriented, interactive,  follows commands  PSYC:                      Calm, + Insight.      Assessment  Sepsis due to urinary retention/neurogenic bladder now with GNR bacteremia with PVR >1L on 4/8/19  s/p SAH. Youssef for retention / neurogenic bladder.  PE on coumadin 3/18/19 CTA Chest Right upper lobe pulmonary embolus.  underlying HTN, high chol  h/o Clostridium difficile diarrhea      Plan  Continue abx as per ID  decrease IVF  urology f/u continue youssef  Flomax  Daily dose coumadin  PT  OOB    PMD:		Dr. Hernandez at [facility]  Family Updated: 	 bedside	                                 Date: 4/8/19      Sedation & Analgesia:	N/a  Diet/Nutrition:		oral diet  GI PPx:			PPI     DVT Ppx:		Coumadin    Activity:		          OOB/PT       Head of Bed:               35-45  Glycemic Control:        N/a    Lines:  PIV    Disposition: Continue care on tele    Goals of Care: Full Code

## 2019-04-09 NOTE — PROGRESS NOTE ADULT - PROBLEM SELECTOR PLAN 4
- Likely related to sepsis, r/o ATN due to hypoperfusion during shock state  - Continue IV fluid resuscitation as above  - Monitor renal function and electrolytes  - Replace electrolytes as needed for goal K >4 and Mg >2  - Monitor I&Os  - Avoid nephrotoxins

## 2019-04-10 LAB
-  AMIKACIN: SIGNIFICANT CHANGE UP
-  AMPICILLIN/SULBACTAM: SIGNIFICANT CHANGE UP
-  AMPICILLIN: SIGNIFICANT CHANGE UP
-  AZTREONAM: SIGNIFICANT CHANGE UP
-  CEFAZOLIN: SIGNIFICANT CHANGE UP
-  CEFEPIME: SIGNIFICANT CHANGE UP
-  CEFOXITIN: SIGNIFICANT CHANGE UP
-  CEFTRIAXONE: SIGNIFICANT CHANGE UP
-  CIPROFLOXACIN: SIGNIFICANT CHANGE UP
-  ERTAPENEM: SIGNIFICANT CHANGE UP
-  GENTAMICIN: SIGNIFICANT CHANGE UP
-  IMIPENEM: SIGNIFICANT CHANGE UP
-  LEVOFLOXACIN: SIGNIFICANT CHANGE UP
-  MEROPENEM: SIGNIFICANT CHANGE UP
-  PIPERACILLIN/TAZOBACTAM: SIGNIFICANT CHANGE UP
-  TOBRAMYCIN: SIGNIFICANT CHANGE UP
-  TRIMETHOPRIM/SULFAMETHOXAZOLE: SIGNIFICANT CHANGE UP
ANION GAP SERPL CALC-SCNC: 11 MMOL/L — SIGNIFICANT CHANGE UP (ref 5–17)
BUN SERPL-MCNC: 9 MG/DL — SIGNIFICANT CHANGE UP (ref 7–23)
CALCIUM SERPL-MCNC: 8.6 MG/DL — SIGNIFICANT CHANGE UP (ref 8.4–10.5)
CHLORIDE SERPL-SCNC: 100 MMOL/L — SIGNIFICANT CHANGE UP (ref 96–108)
CO2 SERPL-SCNC: 25 MMOL/L — SIGNIFICANT CHANGE UP (ref 22–31)
CREAT SERPL-MCNC: 0.9 MG/DL — SIGNIFICANT CHANGE UP (ref 0.5–1.3)
CULTURE RESULTS: SIGNIFICANT CHANGE UP
CULTURE RESULTS: SIGNIFICANT CHANGE UP
GLUCOSE SERPL-MCNC: 99 MG/DL — SIGNIFICANT CHANGE UP (ref 70–99)
HCT VFR BLD CALC: 27.6 % — LOW (ref 34.5–45)
HGB BLD-MCNC: 9.3 G/DL — LOW (ref 11.5–15.5)
INR BLD: 1.77 RATIO — HIGH (ref 0.88–1.16)
MCHC RBC-ENTMCNC: 30.3 PG — SIGNIFICANT CHANGE UP (ref 27–34)
MCHC RBC-ENTMCNC: 33.9 GM/DL — SIGNIFICANT CHANGE UP (ref 32–36)
MCV RBC AUTO: 89.5 FL — SIGNIFICANT CHANGE UP (ref 80–100)
METHOD TYPE: SIGNIFICANT CHANGE UP
ORGANISM # SPEC MICROSCOPIC CNT: SIGNIFICANT CHANGE UP
PLATELET # BLD AUTO: 350 K/UL — SIGNIFICANT CHANGE UP (ref 150–400)
POTASSIUM SERPL-MCNC: 3.9 MMOL/L — SIGNIFICANT CHANGE UP (ref 3.5–5.3)
POTASSIUM SERPL-SCNC: 3.9 MMOL/L — SIGNIFICANT CHANGE UP (ref 3.5–5.3)
PROTHROM AB SERPL-ACNC: 20.2 SEC — HIGH (ref 10–12.9)
RBC # BLD: 3.08 M/UL — LOW (ref 3.8–5.2)
RBC # FLD: 13.1 % — SIGNIFICANT CHANGE UP (ref 10.3–14.5)
SODIUM SERPL-SCNC: 136 MMOL/L — SIGNIFICANT CHANGE UP (ref 135–145)
SPECIMEN SOURCE: SIGNIFICANT CHANGE UP
SPECIMEN SOURCE: SIGNIFICANT CHANGE UP
WBC # BLD: 8.2 K/UL — SIGNIFICANT CHANGE UP (ref 3.8–10.5)
WBC # FLD AUTO: 8.2 K/UL — SIGNIFICANT CHANGE UP (ref 3.8–10.5)

## 2019-04-10 PROCEDURE — 99222 1ST HOSP IP/OBS MODERATE 55: CPT

## 2019-04-10 PROCEDURE — 99233 SBSQ HOSP IP/OBS HIGH 50: CPT

## 2019-04-10 RX ORDER — CEFTRIAXONE 500 MG/1
1 INJECTION, POWDER, FOR SOLUTION INTRAMUSCULAR; INTRAVENOUS EVERY 24 HOURS
Qty: 0 | Refills: 0 | Status: DISCONTINUED | OUTPATIENT
Start: 2019-04-10 | End: 2019-04-17

## 2019-04-10 RX ORDER — WARFARIN SODIUM 2.5 MG/1
3 TABLET ORAL ONCE
Qty: 0 | Refills: 0 | Status: COMPLETED | OUTPATIENT
Start: 2019-04-10 | End: 2019-04-10

## 2019-04-10 RX ADMIN — Medication 1 TABLET(S): at 14:11

## 2019-04-10 RX ADMIN — Medication 1 TABLET(S): at 21:22

## 2019-04-10 RX ADMIN — PIPERACILLIN AND TAZOBACTAM 25 GRAM(S): 4; .5 INJECTION, POWDER, LYOPHILIZED, FOR SOLUTION INTRAVENOUS at 05:23

## 2019-04-10 RX ADMIN — ATORVASTATIN CALCIUM 20 MILLIGRAM(S): 80 TABLET, FILM COATED ORAL at 21:22

## 2019-04-10 RX ADMIN — Medication 1 TABLET(S): at 05:24

## 2019-04-10 RX ADMIN — Medication 5 MILLIGRAM(S): at 05:24

## 2019-04-10 RX ADMIN — Medication 125 MILLIGRAM(S): at 05:23

## 2019-04-10 RX ADMIN — PIPERACILLIN AND TAZOBACTAM 25 GRAM(S): 4; .5 INJECTION, POWDER, LYOPHILIZED, FOR SOLUTION INTRAVENOUS at 14:06

## 2019-04-10 RX ADMIN — Medication 5 MILLIGRAM(S): at 17:01

## 2019-04-10 RX ADMIN — PANTOPRAZOLE SODIUM 40 MILLIGRAM(S): 20 TABLET, DELAYED RELEASE ORAL at 05:24

## 2019-04-10 RX ADMIN — WARFARIN SODIUM 3 MILLIGRAM(S): 2.5 TABLET ORAL at 21:22

## 2019-04-10 RX ADMIN — TAMSULOSIN HYDROCHLORIDE 0.4 MILLIGRAM(S): 0.4 CAPSULE ORAL at 21:22

## 2019-04-10 RX ADMIN — Medication 125 MILLIGRAM(S): at 11:37

## 2019-04-10 RX ADMIN — Medication 125 MILLIGRAM(S): at 17:00

## 2019-04-10 RX ADMIN — Medication 125 MILLIGRAM(S): at 23:48

## 2019-04-10 NOTE — PROGRESS NOTE ADULT - SUBJECTIVE AND OBJECTIVE BOX
Patient is a 74y old  Female who presents with a chief complaint of Sepsis (10 Apr 2019 12:35)      Patient seen and examined at bedside.  Patient states no chest pain or shortness of breath.      ALLERGIES:  No Known Allergies    MEDICATIONS:  acetaminophen   Tablet .. 975 milliGRAM(s) Oral every 6 hours PRN  atorvastatin 20 milliGRAM(s) Oral at bedtime  enalapril 5 milliGRAM(s) Oral every 12 hours  lactobacillus acidophilus 1 Tablet(s) Oral three times a day  pantoprazole    Tablet 40 milliGRAM(s) Oral before breakfast  piperacillin/tazobactam IVPB. 3.375 Gram(s) IV Intermittent every 8 hours  tamsulosin 0.4 milliGRAM(s) Oral at bedtime  vancomycin    Solution 125 milliGRAM(s) Oral every 6 hours  warfarin 3 milliGRAM(s) Oral once    Vital Signs Last 24 Hrs  T(F): 99.4 (10 Apr 2019 16:03), Max: 99.4 (10 Apr 2019 16:03)  HR: 95 (10 Apr 2019 16:03) (92 - 99)  BP: 127/89 (10 Apr 2019 16:03) (122/75 - 152/87)  RR: 16 (10 Apr 2019 16:03) (15 - 18)  SpO2: 97% (10 Apr 2019 16:03) (95% - 97%)  I&O's Summary    2019 07:  -  10 Apr 2019 07:00  --------------------------------------------------------  IN: 1375 mL / OUT: 2800 mL / NET: -1425 mL    10 Apr 2019 07:01  -  10 Apr 2019 17:02  --------------------------------------------------------  IN: 0 mL / OUT: 450 mL / NET: -450 mL        PHYSICAL EXAM:  General: NAD, A/O x 3  ENT: MMM  Neck: Supple, No JVD  Lungs: Clear to auscultation bilaterally  Cardio: RRR, S1/S2, No murmurs  Abdomen: Soft, Nontender, Nondistended; Bowel sounds present  Extremities: No cyanosis, No edema    LABS:                        9.3    8.2   )-----------( 350      ( 10 Apr 2019 06:30 )             27.6     04-10    136  |  100  |  9   ----------------------------<  99  3.9   |  25  |  0.90    Ca    8.6      10 Apr 2019 06:30  Phos  3.1     -  Mg     1.7     -      eGFR if Non African American: 63 mL/min/1.73M2 (04-10-19 @ 06:30)  eGFR if : 73 mL/min/1.73M2 (04-10-19 @ 06:30)    PT/INR - ( 10 Apr 2019 06:30 )   PT: 20.2 sec;   INR: 1.77 ratio                  Chol 184 mg/dL  mg/dL HDL 49 mg/dL Trig 158 mg/dL        CAPILLARY BLOOD GLUCOSE         [id number] 5.6    Urinalysis Basic - ( 2019 10:45 )    Color: Yellow / Appearance: Clear / S.010 / pH: x  Gluc: x / Ketone: Negative  / Bili: Negative / Urobili: Negative   Blood: x / Protein: 15 / Nitrite: Negative   Leuk Esterase: Moderate / RBC: 0-4 /HPF / WBC 3-5 /HPF   Sq Epi: x / Non Sq Epi: Neg.-Few / Bacteria: Trace /HPF        Culture - Urine (collected 2019 10:45)  Source: .Urine Catheterized  Final Report (2019 16:32):    No growth    Culture - Urine (collected 2019 19:10)  Source: .Urine Catheterized  Final Report (2019 19:54):    50,000 - 99,000 CFU/mL Klebsiella pneumoniae    10,000 - 49,000 CFU/mL Enterobacter cloacae  Organism: Klebsiella pneumoniae  Enterobacter cloacae (2019 19:54)  Organism: Enterobacter cloacae (2019 19:54)      -  Amikacin: S <=16      -  Ampicillin: R 16 These ampicillin results predict results for amoxicillin      -  Ampicillin/Sulbactam: R <=8/4      -  Aztreonam: S <=4      -  Cefazolin: R >16      -  Cefepime: S <=4      -  Cefoxitin: R >16      -  Ceftriaxone: S <=1 Enterobacter, Citrobacter, and Serratia may develop resistance during prolonged therapy      -  Ciprofloxacin: S <=1      -  Ertapenem: S <=1      -  Gentamicin: S <=4      -  Imipenem: S <=1      -  Levofloxacin: S <=2      -  Meropenem: S <=1      -  Nitrofurantoin: I 64 Should not be used to treat pyelonephritis      -  Piperacillin/Tazobactam: S <=16      -  Tigecycline: S <=2      -  Tobramycin: S <=4      -  Trimethoprim/Sulfamethoxazole: S <=2/38      Method Type: INDRA  Organism: Klebsiella pneumoniae (2019 19:54)      -  Amikacin: S <=16      -  Ampicillin: R <=8 These ampicillin results predict results for amoxicillin      -  Ampicillin/Sulbactam: S <=8/4      -  Aztreonam: S <=4      -  Cefazolin: S <=8 For uncomplicated UTI with K. pneumoniae, E. coli, or P. mirablis: INDRA <=16 is sensitive and INDRA >=32 is resistant. This also predicts results for oral agents cefaclor, cefdinir, cefpodoxime, cefprozil, cefuroxime axetil, cephalexin and locarbef for uncomplicated UTI. Note that some isolates may be susceptible to these agents while testing resistant to cefazolin.      -  Cefepime: S <=4      -  Cefoxitin: S <=8      -  Ceftriaxone: S <=1 Enterobacter, Citrobacter, and Serratia may develop resistance during prolonged therapy      -  Ciprofloxacin: S <=1      -  Ertapenem: S <=1      -  Gentamicin: S <=4      -  Imipenem: S <=1      -  Levofloxacin: S <=2      -  Meropenem: S <=1      -  Nitrofurantoin: S <=32 Should not be used to treat pyelonephritis      -  Piperacillin/Tazobactam: S <=16      -  Tigecycline: S <=2      -  Tobramycin: S <=4      -  Trimethoprim/Sulfamethoxazole: S <=2/38      Method Type: INDRA    Culture - Blood (collected 2019 12:52)  Source: .Blood Blood-Peripheral  Gram Stain (2019 07:53):    Growth in aerobic and anaerobic bottles: Gram Negative Rods  Final Report (10 Apr 2019 08:56):    Growth in aerobic and anaerobic bottles: Klebsiella pneumoniae    See previous culture [id number]    Culture - Blood (collected 2019 12:52)  Source: .Blood Blood-Peripheral  Gram Stain (2019 08:16):    Growth in anaerobic bottle: Gram Negative Rods    Growth in aerobic bottle: Gram Negative Rods  Final Report (10 Apr 2019 08:55):    Growth in aerobic and anaerobic bottles: Klebsiella pneumoniae    "Due to technical problems, Proteus sp. will Not be reported as part of    the BCID panel until further notice"    ***Blood Panel PCR results on this specimen are available    approximately 3 hours after the Gram stain result.***    Gram stain, PCR, and/or culture results may not always    correspond due to difference in methodologies.    ************************************************************    This PCR assay was performed using [employer].    The following targets are tested for: Enterococcus,    vancomycin resistant enterococci, Listeria monocytogenes,    coagulase negative staphylococci, S. aureus,    methicillin resistant S. aureus, Streptococcus agalactiae    (Group B), S. pneumoniae, S. pyogenes (Group A),    Acinetobacter baumannii, Enterobacter cloacae, E. coli,    Klebsiella oxytoca, K. pneumoniae, Proteus sp.,    Serratia marcescens, Haemophilus influenzae,    Neisseria meningitidis, Pseudomonas aeruginosa, Candida    albicans, C. glabrata, C krusei, C parapsilosis,    C. tropicalis and the KPC resistance gene.  Organism: Blood Culture PCR  Klebsiella pneumoniae (10 Apr 2019 08:55)  Organism: Klebsiella pneumoniae (10 Apr 2019 08:55)      -  Amikacin: S <=16      -  Ampicillin: R <=8 These ampicillin results predict results for amoxicillin      -  Ampicillin/Sulbactam: S <=8/4      -  Aztreonam: S <=4      -  Cefazolin: S <=8      -  Cefepime: S <=4      -  Cefoxitin: S <=8      -  Ceftriaxone: S <=1 Enterobacter, Citrobacter, and Serratia may develop resistance during prolonged therapy      -  Ciprofloxacin: S <=1      -  Ertapenem: S <=1      -  Gentamicin: S <=4      -  Imipenem: S <=1      -  Levofloxacin: S <=2      -  Meropenem: S <=1      -  Piperacillin/Tazobactam: S <=16      -  Tobramycin: S <=4      -  Trimethoprim/Sulfamethoxazole: S <=2/38      Method Type: INDRA  Organism: Blood Culture PCR (10 Apr 2019 08:55)      -  Klebsiella pneumoniae: Detec      Method Type: PCR        RADIOLOGY & ADDITIONAL TESTS:    Care Discussed with Consultants/Other Providers:

## 2019-04-10 NOTE — PROGRESS NOTE ADULT - SUBJECTIVE AND OBJECTIVE BOX
CC: f/u for kleb bacteremia and uti     Patient reports  she is ok  REVIEW OF SYSTEMS:  All other review of systems negative (Comprehensive ROS)    Antimicrobials Day #  :3/10  cefTRIAXone   IVPB 1 Gram(s) IV Intermittent every 24 hours  vancomycin    Solution 125 milliGRAM(s) Oral every 6 hours    Other Medications Reviewed    T(F): 99.4 (04-10-19 @ 16:03), Max: 99.4 (04-10-19 @ 16:03)  HR: 95 (04-10-19 @ 16:03)  BP: 127/89 (04-10-19 @ 16:03)  RR: 16 (04-10-19 @ 16:03)  SpO2: 97% (04-10-19 @ 16:03)  Wt(kg): --    PHYSICAL EXAM:  General: alert, no acute distress  Eyes:  anicteric, no conjunctival injection, no discharge  Oropharynx: no lesions or injection 	  Neck: supple, without adenopathy  Lungs: clear to auscultation  Heart: regular rate and rhythm; no murmur, rubs or gallops  Abdomen: soft, nondistended, nontender, without mass or organomegaly  Skin: no lesions  Extremities: no clubbing, cyanosis, or edema  Neurologic: alert, oriented, moves all extremities    LAB RESULTS:                        9.3    8.2   )-----------( 350      ( 10 Apr 2019 06:30 )             27.6     04-10    136  |  100  |  9   ----------------------------<  99  3.9   |  25  |  0.90    Ca    8.6      10 Apr 2019 06:30  Phos  3.1     04-09  Mg     1.7     04-09          MICROBIOLOGY:  RECENT CULTURES:  04-08 @ 10:45 .Urine Catheterized     No growth      04-07 @ 19:10 .Urine Catheterized Klebsiella pneumoniae  Enterobacter cloacae    50,000 - 99,000 CFU/mL Klebsiella pneumoniae  10,000 - 49,000 CFU/mL Enterobacter cloacae      04-07 @ 12:52 .Blood Blood-Peripheral Blood Culture PCR  Klebsiella pneumoniae    Growth in aerobic and anaerobic bottles: Klebsiella pneumoniae  "Due to technical problems, Proteus sp. will Not be reported as part of  the BCID panel until further notice"  ***Blood Panel PCR results on this specimen are available  approximately 3 hours after the Gram stain result.***  Gram stain, PCR, and/or culture results may not always  correspond due to difference in methodologies.  ************************************************************  This PCR assay was performed using [employer].  The following targets are tested for: Enterococcus,  vancomycin resistant enterococci, Listeria monocytogenes,  coagulase negative staphylococci, S. aureus,  methicillin resistant S. aureus, Streptococcus agalactiae  (Group B), S. pneumoniae, S. pyogenes (Group A),  Acinetobacter baumannii, Enterobacter cloacae, E. coli,  Klebsiella oxytoca, K. pneumoniae, Proteus sp.,  Serratia marcescens, Haemophilus influenzae,  Neisseria meningitidis, Pseudomonas aeruginosa, Candida  albicans, C. glabrata, C krusei, C parapsilosis,  C. tropicalis and the KPC resistance gene.    Growth in anaerobic bottle: Gram Negative Rods  Growth in aerobic bottle: Gram Negative Rods        RADIOLOGY REVIEWED:    < from: US Renal (04.08.19 @ 10:55) >  IMPRESSION:     Bilateral renal calculi present which appears to be within the left renal   pelvis with associated pelvocaliectasis. A CT can be obtained for better   delineation.    < end of copied text >  Assessment:  Patient with recent sah, aneurysm clip, s/p youssef for retention, youssef removed at rehab, got severe sepsis from uti related to retention now improved with youssef and antibiotics  Plan: change to ceftriaxone  will give 7 days iv then 3 days po fine  keep youssef  repeat blood culture

## 2019-04-10 NOTE — CONSULT NOTE ADULT - ATTENDING COMMENTS
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    Patient examined, chart reviewed  Will accept to resume inpatient acute rehabilitation program when medically stable.  spoke with family at bedside, all questions answered.  Case discussed with rehab admission office

## 2019-04-10 NOTE — PROGRESS NOTE ADULT - SUBJECTIVE AND OBJECTIVE BOX
Improved with abx. Comfortable. Low draining clear urine.    ROS  General: no fever or chills      VITAL SIGNS  Vital Signs Last 24 Hrs  T(C): 37.2 (10 Apr 2019 05:19), Max: 37.4 (2019 15:51)  T(F): 99 (10 Apr 2019 05:19), Max: 99.4 (2019 15:51)  HR: 93 (10 Apr 2019 05:19) (93 - 99)  BP: 122/75      PHYSICAL EXAM:  General: awake and alert  Abdomen: bladder NT        LABS:                        9.3    8.2   )-----------( 350      ( 10 Apr 2019 06:30 )             27.6     04-10    136  |  100  |  9   ----------------------------<  99  3.9   |  25  |  0.90        Urinalysis Basic - ( 2019 10:45 )    Color: Yellow / Appearance: Clear / S.010 / pH: x  Gluc: x / Ketone: Negative  / Bili: Negative / Urobili: Negative   Blood: x / Protein: 15 / Nitrite: Negative   Leuk Esterase: Moderate / RBC: 0-4 /HPF / WBC 3-5 /HPF   Sq Epi: x / Non Sq Epi: Neg.-Few / Bacteria: Trace /HPF      Urine Culture: --  Klebsiella pneumoniae  Enterobacter cloacae  --  Blood Culture PCR        Prior notes/chart reviewed.

## 2019-04-10 NOTE — PROGRESS NOTE ADULT - PROBLEM SELECTOR PLAN 4
- Resolved  - Likely related to sepsis, r/o ATN due to hypoperfusion during shock state  -  IV fluid resuscitation was stopped  - Monitor renal function and electrolytes  - Avoid nephrotoxins

## 2019-04-10 NOTE — PROGRESS NOTE ADULT - ASSESSMENT
Physical Examination:  GENERAL:               Alert, Oriented, No acute distress.    HEENT:                   No JVD, Moist MM,  PULM:                     Bilateral air entry, Clear to auscultation bilaterally, no significant sputum production, No Rales, No Rhonchi, No Wheezing  CVS:                         S1, S2,  No Murmur  ABD:                        Soft, nondistended, nontender, normoactive bowel sounds,   NEURO:                  Alert, oriented, interactive,  follows commands  PSYC:                      Calm, + Insight.      Assessment  Sepsis due to urinary retention/neurogenic bladder now with GNR bacteremia with PVR >1L on 4/8/19  s/p SAH. Low for retention / neurogenic bladder.  PE on coumadin 3/18/19 CTA Chest Right upper lobe pulmonary embolus.  underlying HTN, high chol  h/o Clostridium difficile diarrhea      Plan  abx as per ID  Low management as per Urology  continue Flomax  Daily dose coumadin  PT  OOB    Disposition: Continue care on tele, no active ccm issues reconsult as needed    Goals of Care: Full Code

## 2019-04-10 NOTE — PROGRESS NOTE ADULT - SUBJECTIVE AND OBJECTIVE BOX
Follow-up Critical Care Progress Note  Chief Complaint : Sepsis      Pt feels well  no cp, sob, palp, n/v      Allergies :No Known Allergies      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  Dyslipidemia  HTN (Hypertension)  Status post coil embolization of cerebral aneurysm  Status Post Total Knee Replace: left      Medications:  MEDICATIONS  (STANDING):  atorvastatin 20 milliGRAM(s) Oral at bedtime  enalapril 5 milliGRAM(s) Oral every 12 hours  lactobacillus acidophilus 1 Tablet(s) Oral three times a day  pantoprazole    Tablet 40 milliGRAM(s) Oral before breakfast  piperacillin/tazobactam IVPB. 3.375 Gram(s) IV Intermittent every 8 hours  tamsulosin 0.4 milliGRAM(s) Oral at bedtime  vancomycin    Solution 125 milliGRAM(s) Oral every 6 hours    MEDICATIONS  (PRN):  acetaminophen   Tablet .. 975 milliGRAM(s) Oral every 6 hours PRN Temp greater or equal to 38C (100.4F), Mild Pain (1 - 3), Moderate Pain (4 - 6)      LABS:                        9.3    8.2   )-----------( 350      ( 10 Apr 2019 06:30 )             27.6     04-10    136  |  100  |  9   ----------------------------<  99  3.9   |  25  |  0.90    Ca    8.6      10 Apr 2019 06:30  Phos  3.1     04-09  Mg     1.7     04-09              PT/INR - ( 10 Apr 2019 06:30 )   PT: 20.2 sec;   INR: 1.77 ratio             Procalcitonin, Serum: 2.16 ng/mL (04-08-19 @ 05:00)  Procalcitonin, Serum: 2.23 ng/mL (04-07-19 @ 12:52)          CULTURES: (if applicable)    Culture - Urine (collected 04-08-19 @ 10:45)  Source: .Urine Catheterized  Final Report (04-09-19 @ 16:32):    No growth    Culture - Urine (collected 04-07-19 @ 19:10)  Source: .Urine Catheterized  Final Report (04-09-19 @ 19:54):    50,000 - 99,000 CFU/mL Klebsiella pneumoniae    10,000 - 49,000 CFU/mL Enterobacter cloacae  Organism: Klebsiella pneumoniae  Enterobacter cloacae (04-09-19 @ 19:54)  Organism: Enterobacter cloacae (04-09-19 @ 19:54)      -  Amikacin: S <=16      -  Ampicillin: R 16 These ampicillin results predict results for amoxicillin      -  Ampicillin/Sulbactam: R <=8/4      -  Aztreonam: S <=4      -  Cefazolin: R >16      -  Cefepime: S <=4      -  Cefoxitin: R >16      -  Ceftriaxone: S <=1 Enterobacter, Citrobacter, and Serratia may develop resistance during prolonged therapy      -  Ciprofloxacin: S <=1      -  Ertapenem: S <=1      -  Gentamicin: S <=4      -  Imipenem: S <=1      -  Levofloxacin: S <=2      -  Meropenem: S <=1      -  Nitrofurantoin: I 64 Should not be used to treat pyelonephritis      -  Piperacillin/Tazobactam: S <=16      -  Tigecycline: S <=2      -  Tobramycin: S <=4      -  Trimethoprim/Sulfamethoxazole: S <=2/38      Method Type: INDRA  Organism: Klebsiella pneumoniae (04-09-19 @ 19:54)      -  Amikacin: S <=16      -  Ampicillin: R <=8 These ampicillin results predict results for amoxicillin      -  Ampicillin/Sulbactam: S <=8/4      -  Aztreonam: S <=4      -  Cefazolin: S <=8 For uncomplicated UTI with K. pneumoniae, E. coli, or P. mirablis: INDRA <=16 is sensitive and INDRA >=32 is resistant. This also predicts results for oral agents cefaclor, cefdinir, cefpodoxime, cefprozil, cefuroxime axetil, cephalexin and locarbef for uncomplicated UTI. Note that some isolates may be susceptible to these agents while testing resistant to cefazolin.      -  Cefepime: S <=4      -  Cefoxitin: S <=8      -  Ceftriaxone: S <=1 Enterobacter, Citrobacter, and Serratia may develop resistance during prolonged therapy      -  Ciprofloxacin: S <=1      -  Ertapenem: S <=1      -  Gentamicin: S <=4      -  Imipenem: S <=1      -  Levofloxacin: S <=2      -  Meropenem: S <=1      -  Nitrofurantoin: S <=32 Should not be used to treat pyelonephritis      -  Piperacillin/Tazobactam: S <=16      -  Tigecycline: S <=2      -  Tobramycin: S <=4      -  Trimethoprim/Sulfamethoxazole: S <=2/38      Method Type: INDRA    Culture - Blood (collected 04-07-19 @ 12:52)  Source: .Blood Blood-Peripheral  Gram Stain (04-08-19 @ 07:53):    Growth in aerobic and anaerobic bottles: Gram Negative Rods  Final Report (04-10-19 @ 08:56):    Growth in aerobic and anaerobic bottles: Klebsiella pneumoniae    See previous culture [id number]    Culture - Blood (collected 04-07-19 @ 12:52)  Source: .Blood Blood-Peripheral  Gram Stain (04-08-19 @ 08:16):    Growth in anaerobic bottle: Gram Negative Rods    Growth in aerobic bottle: Gram Negative Rods  Final Report (04-10-19 @ 08:55):    Growth in aerobic and anaerobic bottles: Klebsiella pneumoniae    "Due to technical problems, Proteus sp. will Not be reported as part of    the BCID panel until further notice"    ***Blood Panel PCR results on this specimen are available    approximately 3 hours after the Gram stain result.***    Gram stain, PCR, and/or culture results may not always    correspond due to difference in methodologies.    ************************************************************    This PCR assay was performed using [employer].    The following targets are tested for: Enterococcus,    vancomycin resistant enterococci, Listeria monocytogenes,    coagulase negative staphylococci, S. aureus,    methicillin resistant S. aureus, Streptococcus agalactiae    (Group B), S. pneumoniae, S. pyogenes (Group A),    Acinetobacter baumannii, Enterobacter cloacae, E. coli,    Klebsiella oxytoca, K. pneumoniae, Proteus sp.,    Serratia marcescens, Haemophilus influenzae,    Neisseria meningitidis, Pseudomonas aeruginosa, Candida    albicans, C. glabrata, C krusei, C parapsilosis,    C. tropicalis and the KPC resistance gene.  Organism: Blood Culture PCR  Klebsiella pneumoniae (04-10-19 @ 08:55)  Organism: Klebsiella pneumoniae (04-10-19 @ 08:55)      -  Amikacin: S <=16      -  Ampicillin: R <=8 These ampicillin results predict results for amoxicillin      -  Ampicillin/Sulbactam: S <=8/4      -  Aztreonam: S <=4      -  Cefazolin: S <=8      -  Cefepime: S <=4      -  Cefoxitin: S <=8      -  Ceftriaxone: S <=1 Enterobacter, Citrobacter, and Serratia may develop resistance during prolonged therapy      -  Ciprofloxacin: S <=1      -  Ertapenem: S <=1      -  Gentamicin: S <=4      -  Imipenem: S <=1      -  Levofloxacin: S <=2      -  Meropenem: S <=1      -  Piperacillin/Tazobactam: S <=16      -  Tobramycin: S <=4      -  Trimethoprim/Sulfamethoxazole: S <=2/38      Method Type: INDRA  Organism: Blood Culture PCR (04-10-19 @ 08:55)      -  Klebsiella pneumoniae: Detec      Method Type: PCR        VITALS:  T(C): 36.8 (04-10-19 @ 11:05), Max: 37.4 (04-09-19 @ 15:51)  T(F): 98.2 (04-10-19 @ 11:05), Max: 99.4 (04-09-19 @ 15:51)  HR: 92 (04-10-19 @ 11:05) (92 - 99)  BP: 132/95 (04-10-19 @ 11:05) (122/75 - 152/87)  BP(mean): --  ABP: --  ABP(mean): --  RR: 16 (04-10-19 @ 11:05) (15 - 18)  SpO2: 96% (04-10-19 @ 11:05) (94% - 96%)  CVP(mm Hg): --  CVP(cm H2O): --    Ins and Outs     04-09-19 @ 07:01  -  04-10-19 @ 07:00  --------------------------------------------------------  IN: 1375 mL / OUT: 2800 mL / NET: -1425 mL        Height (cm): 165.1 (04-08-19 @ 00:33)  Weight (kg): 84.4 (04-08-19 @ 00:33)  BMI (kg/m2): 31 (04-08-19 @ 00:33)

## 2019-04-10 NOTE — PROGRESS NOTE ADULT - ASSESSMENT
73 yo female admitted to rehab following prolonged hospitalization for rupture of AComm aneurysm s/p coiling c/b C. diff infection, E. coli UTIs, urinary retention requiring youssef, now admitted to ICU from rehab with encephalopathy, severe sepsis likely secondary to UTI with shock and TEREZA.

## 2019-04-10 NOTE — CONSULT NOTE ADULT - ASSESSMENT
This is a 75 yo female s/p SAH due to Acomm aneurysm rupture, PE, and sepsis, now with functional and cognitve deficits.      Recommendations: Patient is stable and will be able to participate in 3 hours a day, 5 days a week of acute IPR. Patient will benefit from an intensive PT, OT, and ST program. ELOS 14 days with disposition to home.   Will discuss with Dr Byrd. This is a 77 yo female s/p SAH due to Acomm aneurysm rupture, PE, and sepsis, now with functional and cognitve deficits.      Recommendations: Patient is stable and will be able to participate in 3 hours a day, 5 days a week of acute IPR. Patient will benefit from an intensive PT, OT, and ST program. ELOS 14 days with disposition to home.   Discussed with Dr Byrd-would like to hold off on acute IPR admission until tomorrow. Would like to continue to trend fevers (have been around 99 F and intermittent) and monitor HR that remains in the 90s. Dr Adams aware.

## 2019-04-11 DIAGNOSIS — Z86.711 PERSONAL HISTORY OF PULMONARY EMBOLISM: ICD-10-CM

## 2019-04-11 DIAGNOSIS — Z48.812 ENCOUNTER FOR SURGICAL AFTERCARE FOLLOWING SURGERY ON THE CIRCULATORY SYSTEM: ICD-10-CM

## 2019-04-11 DIAGNOSIS — I10 ESSENTIAL (PRIMARY) HYPERTENSION: ICD-10-CM

## 2019-04-11 DIAGNOSIS — K21.9 GASTRO-ESOPHAGEAL REFLUX DISEASE WITHOUT ESOPHAGITIS: ICD-10-CM

## 2019-04-11 DIAGNOSIS — Z86.19 PERSONAL HISTORY OF OTHER INFECTIOUS AND PARASITIC DISEASES: ICD-10-CM

## 2019-04-11 DIAGNOSIS — I95.9 HYPOTENSION, UNSPECIFIED: ICD-10-CM

## 2019-04-11 DIAGNOSIS — E78.5 HYPERLIPIDEMIA, UNSPECIFIED: ICD-10-CM

## 2019-04-11 DIAGNOSIS — Z79.01 LONG TERM (CURRENT) USE OF ANTICOAGULANTS: ICD-10-CM

## 2019-04-11 DIAGNOSIS — A41.9 SEPSIS, UNSPECIFIED ORGANISM: ICD-10-CM

## 2019-04-11 DIAGNOSIS — R33.9 RETENTION OF URINE, UNSPECIFIED: ICD-10-CM

## 2019-04-11 DIAGNOSIS — R50.9 FEVER, UNSPECIFIED: ICD-10-CM

## 2019-04-11 DIAGNOSIS — N31.9 NEUROMUSCULAR DYSFUNCTION OF BLADDER, UNSPECIFIED: ICD-10-CM

## 2019-04-11 DIAGNOSIS — Z51.89 ENCOUNTER FOR OTHER SPECIFIED AFTERCARE: ICD-10-CM

## 2019-04-11 DIAGNOSIS — R26.9 UNSPECIFIED ABNORMALITIES OF GAIT AND MOBILITY: ICD-10-CM

## 2019-04-11 DIAGNOSIS — R09.02 HYPOXEMIA: ICD-10-CM

## 2019-04-11 LAB
ANION GAP SERPL CALC-SCNC: 8 MMOL/L — SIGNIFICANT CHANGE UP (ref 5–17)
BUN SERPL-MCNC: 12 MG/DL — SIGNIFICANT CHANGE UP (ref 7–23)
CALCIUM SERPL-MCNC: 9 MG/DL — SIGNIFICANT CHANGE UP (ref 8.4–10.5)
CHLORIDE SERPL-SCNC: 100 MMOL/L — SIGNIFICANT CHANGE UP (ref 96–108)
CO2 SERPL-SCNC: 26 MMOL/L — SIGNIFICANT CHANGE UP (ref 22–31)
CREAT SERPL-MCNC: 0.88 MG/DL — SIGNIFICANT CHANGE UP (ref 0.5–1.3)
GLUCOSE SERPL-MCNC: 108 MG/DL — HIGH (ref 70–99)
HCT VFR BLD CALC: 28.6 % — LOW (ref 34.5–45)
HGB BLD-MCNC: 9.7 G/DL — LOW (ref 11.5–15.5)
INR BLD: 1.92 RATIO — HIGH (ref 0.88–1.16)
MCHC RBC-ENTMCNC: 30 PG — SIGNIFICANT CHANGE UP (ref 27–34)
MCHC RBC-ENTMCNC: 33.8 GM/DL — SIGNIFICANT CHANGE UP (ref 32–36)
MCV RBC AUTO: 89 FL — SIGNIFICANT CHANGE UP (ref 80–100)
PLATELET # BLD AUTO: 398 K/UL — SIGNIFICANT CHANGE UP (ref 150–400)
POTASSIUM SERPL-MCNC: 4.2 MMOL/L — SIGNIFICANT CHANGE UP (ref 3.5–5.3)
POTASSIUM SERPL-SCNC: 4.2 MMOL/L — SIGNIFICANT CHANGE UP (ref 3.5–5.3)
PROTHROM AB SERPL-ACNC: 22 SEC — HIGH (ref 10–12.9)
RBC # BLD: 3.22 M/UL — LOW (ref 3.8–5.2)
RBC # FLD: 13.2 % — SIGNIFICANT CHANGE UP (ref 10.3–14.5)
SODIUM SERPL-SCNC: 134 MMOL/L — LOW (ref 135–145)
WBC # BLD: 9.6 K/UL — SIGNIFICANT CHANGE UP (ref 3.8–10.5)
WBC # FLD AUTO: 9.6 K/UL — SIGNIFICANT CHANGE UP (ref 3.8–10.5)

## 2019-04-11 PROCEDURE — 84145 PROCALCITONIN (PCT): CPT

## 2019-04-11 PROCEDURE — 86803 HEPATITIS C AB TEST: CPT

## 2019-04-11 PROCEDURE — 92507 TX SP LANG VOICE COMM INDIV: CPT

## 2019-04-11 PROCEDURE — 87040 BLOOD CULTURE FOR BACTERIA: CPT

## 2019-04-11 PROCEDURE — 97163 PT EVAL HIGH COMPLEX 45 MIN: CPT

## 2019-04-11 PROCEDURE — 87086 URINE CULTURE/COLONY COUNT: CPT

## 2019-04-11 PROCEDURE — 82962 GLUCOSE BLOOD TEST: CPT

## 2019-04-11 PROCEDURE — 85610 PROTHROMBIN TIME: CPT

## 2019-04-11 PROCEDURE — 36415 COLL VENOUS BLD VENIPUNCTURE: CPT

## 2019-04-11 PROCEDURE — 81001 URINALYSIS AUTO W/SCOPE: CPT

## 2019-04-11 PROCEDURE — 97530 THERAPEUTIC ACTIVITIES: CPT

## 2019-04-11 PROCEDURE — 97535 SELF CARE MNGMENT TRAINING: CPT

## 2019-04-11 PROCEDURE — 85730 THROMBOPLASTIN TIME PARTIAL: CPT

## 2019-04-11 PROCEDURE — 87186 SC STD MICRODIL/AGAR DIL: CPT

## 2019-04-11 PROCEDURE — 80053 COMPREHEN METABOLIC PANEL: CPT

## 2019-04-11 PROCEDURE — 87640 STAPH A DNA AMP PROBE: CPT

## 2019-04-11 PROCEDURE — 97116 GAIT TRAINING THERAPY: CPT

## 2019-04-11 PROCEDURE — 99233 SBSQ HOSP IP/OBS HIGH 50: CPT | Mod: GC

## 2019-04-11 PROCEDURE — 92523 SPEECH SOUND LANG COMPREHEN: CPT

## 2019-04-11 PROCEDURE — 97167 OT EVAL HIGH COMPLEX 60 MIN: CPT

## 2019-04-11 PROCEDURE — 85027 COMPLETE CBC AUTOMATED: CPT

## 2019-04-11 PROCEDURE — 83605 ASSAY OF LACTIC ACID: CPT

## 2019-04-11 PROCEDURE — 71045 X-RAY EXAM CHEST 1 VIEW: CPT

## 2019-04-11 PROCEDURE — 97110 THERAPEUTIC EXERCISES: CPT

## 2019-04-11 PROCEDURE — 87150 DNA/RNA AMPLIFIED PROBE: CPT

## 2019-04-11 RX ORDER — WARFARIN SODIUM 2.5 MG/1
4 TABLET ORAL ONCE
Qty: 0 | Refills: 0 | Status: COMPLETED | OUTPATIENT
Start: 2019-04-11 | End: 2019-04-11

## 2019-04-11 RX ADMIN — Medication 5 MILLIGRAM(S): at 17:22

## 2019-04-11 RX ADMIN — PANTOPRAZOLE SODIUM 40 MILLIGRAM(S): 20 TABLET, DELAYED RELEASE ORAL at 05:59

## 2019-04-11 RX ADMIN — Medication 5 MILLIGRAM(S): at 05:59

## 2019-04-11 RX ADMIN — Medication 1 TABLET(S): at 13:57

## 2019-04-11 RX ADMIN — Medication 125 MILLIGRAM(S): at 17:22

## 2019-04-11 RX ADMIN — WARFARIN SODIUM 4 MILLIGRAM(S): 2.5 TABLET ORAL at 21:58

## 2019-04-11 RX ADMIN — CEFTRIAXONE 100 GRAM(S): 500 INJECTION, POWDER, FOR SOLUTION INTRAMUSCULAR; INTRAVENOUS at 05:59

## 2019-04-11 RX ADMIN — Medication 125 MILLIGRAM(S): at 11:49

## 2019-04-11 RX ADMIN — ATORVASTATIN CALCIUM 20 MILLIGRAM(S): 80 TABLET, FILM COATED ORAL at 21:58

## 2019-04-11 RX ADMIN — Medication 975 MILLIGRAM(S): at 18:51

## 2019-04-11 RX ADMIN — Medication 1 TABLET(S): at 05:59

## 2019-04-11 RX ADMIN — Medication 975 MILLIGRAM(S): at 20:45

## 2019-04-11 RX ADMIN — TAMSULOSIN HYDROCHLORIDE 0.4 MILLIGRAM(S): 0.4 CAPSULE ORAL at 21:58

## 2019-04-11 RX ADMIN — Medication 1 TABLET(S): at 21:58

## 2019-04-11 RX ADMIN — Medication 125 MILLIGRAM(S): at 05:59

## 2019-04-11 NOTE — PROGRESS NOTE ADULT - PROBLEM SELECTOR PLAN 3
Urinary Retention  - Indwelling youssef placed 4/8 for urinary retention  - Urology following, no further TOVs  at this time Urinary Retention  - Indwelling youssef placed 4/8 for urinary retention  - Urology following, no further TOVs  at this time  continue youssef

## 2019-04-11 NOTE — PROGRESS NOTE ADULT - PROBLEM SELECTOR PLAN 7
- Completed extended course of PO vanco due to abx for UTI  - Now started back on empiric PO vanco for C. diff ppx while on Abx for recurrent UTI/bacteremia  - No active diarrhea  - Continue Bacid  - Monitor closely for recurrence of infection while on abx for acute infection - Completed extended course of PO vanco due to abx for UTI  - Now started back on empiric PO vanco for C. diff ppx while on Abx for recurrent UTI/bacteremia   - No active diarrhea  - Continue Bacid  - Monitor closely for recurrence of infection while on abx for acute infection

## 2019-04-11 NOTE — PROGRESS NOTE ADULT - ASSESSMENT
75 yo female admitted to rehab following prolonged hospitalization for rupture of AComm aneurysm s/p coiling c/b C. diff infection, E. coli UTIs, urinary retention requiring youssef, now admitted to ICU from rehab with encephalopathy, severe sepsis likely secondary to UTI with shock and TEREZA. 75 yo female admitted to rehab following prolonged hospitalization for rupture of AComm aneurysm s/p coiling c/b C. diff infection, E. coli UTIs, urinary retention requiring youssef, was  admitted to hospital(ICU)   from rehab with encephalopathy, severe sepsis likely secondary to UTI with shock and TEREZA.

## 2019-04-11 NOTE — PROGRESS NOTE ADULT - SUBJECTIVE AND OBJECTIVE BOX
Patient is a 74y old  Female who presents with a chief complaint of Sepsis.  Pt awake without complaints this morning.  still with low grade temp this am  99.6.  Awaiting return to acute rehab.  Pt eager to return to rehab.       Patient seen and examined at bedside.    ALLERGIES:  No Known Allergies    MEDICATIONS  (STANDING):  atorvastatin 20 milliGRAM(s) Oral at bedtime  cefTRIAXone   IVPB 1 Gram(s) IV Intermittent every 24 hours  enalapril 5 milliGRAM(s) Oral every 12 hours  lactobacillus acidophilus 1 Tablet(s) Oral three times a day  pantoprazole    Tablet 40 milliGRAM(s) Oral before breakfast  tamsulosin 0.4 milliGRAM(s) Oral at bedtime  vancomycin    Solution 125 milliGRAM(s) Oral every 6 hours    MEDICATIONS  (PRN):  acetaminophen   Tablet .. 975 milliGRAM(s) Oral every 6 hours PRN Temp greater or equal to 38C (100.4F), Mild Pain (1 - 3), Moderate Pain (4 - 6)    Vital Signs Last 24 Hrs  T(F): 99.6 (2019 05:01), Max: 99.6 (2019 05:01)  HR: 95 (2019 05:48) (17 - 95)  BP: 142/85 (2019 05:01) (127/89 - 142/85)  RR: 17 (2019 05:01) (15 - 17)  SpO2: 95% (2019 05:01) (95% - 98%)  I&O's Summary    10 Apr 2019 07:01  -  2019 07:00  --------------------------------------------------------  IN: 0 mL / OUT: 1750 mL / NET: -1750 mL      PHYSICAL EXAM:  General: NAD, A/O x 3  ENT: MMM  Neck: Supple, No JVD  Lungs: Clear to auscultation bilaterally  Cardio: RRR, S1/S2, No murmurs  Abdomen: Soft, Nontender, Nondistended; Bowel sounds present  Extremities: No calf tenderness, No pitting edema    LABS:                        9.7    9.6   )-----------( 398      ( 2019 06:05 )             28.6         134  |  100  |  12  ----------------------------<  108  4.2   |  26  |  0.88    Ca    9.0      2019 06:05  Phos  3.1     04-09  Mg     1.7     -      eGFR if Non African American: 65 mL/min/1.73M2 (19 @ 06:05)  eGFR if African American: 76 mL/min/1.73M2 (19 @ 06:05)    PT/INR - ( 2019 06:05 )   PT: 22.0 sec;   INR: 1.92 ratio                  Chol 184 mg/dL  mg/dL HDL 49 mg/dL Trig 158 mg/dL        CAPILLARY BLOOD GLUCOSE         [id number] 5.6    Urinalysis Basic - ( 2019 10:45 )    Color: Yellow / Appearance: Clear / S.010 / pH: x  Gluc: x / Ketone: Negative  / Bili: Negative / Urobili: Negative   Blood: x / Protein: 15 / Nitrite: Negative   Leuk Esterase: Moderate / RBC: 0-4 /HPF / WBC 3-5 /HPF   Sq Epi: x / Non Sq Epi: Neg.-Few / Bacteria: Trace /HPF        Culture - Urine (collected 2019 10:45)  Source: .Urine Catheterized  Final Report (2019 16:32):    No growth    Culture - Urine (collected 2019 19:10)  Source: .Urine Catheterized  Final Report (2019 19:54):    50,000 - 99,000 CFU/mL Klebsiella pneumoniae    10,000 - 49,000 CFU/mL Enterobacter cloacae  Organism: Klebsiella pneumoniae  Enterobacter cloacae (2019 19:54)  Organism: Enterobacter cloacae (2019 19:54)      -  Amikacin: S <=16      -  Ampicillin: R 16 These ampicillin results predict results for amoxicillin      -  Ampicillin/Sulbactam: R <=8/4      -  Aztreonam: S <=4      -  Cefazolin: R >16      -  Cefepime: S <=4      -  Cefoxitin: R >16      -  Ceftriaxone: S <=1 Enterobacter, Citrobacter, and Serratia may develop resistance during prolonged therapy      -  Ciprofloxacin: S <=1      -  Ertapenem: S <=1      -  Gentamicin: S <=4      -  Imipenem: S <=1      -  Levofloxacin: S <=2      -  Meropenem: S <=1      -  Nitrofurantoin: I 64 Should not be used to treat pyelonephritis      -  Piperacillin/Tazobactam: S <=16      -  Tigecycline: S <=2      -  Tobramycin: S <=4      -  Trimethoprim/Sulfamethoxazole: S <=2/38      Method Type: INDRA  Organism: Klebsiella pneumoniae (2019 19:54)      -  Amikacin: S <=16      -  Ampicillin: R <=8 These ampicillin results predict results for amoxicillin      -  Ampicillin/Sulbactam: S <=8/4      -  Aztreonam: S <=4      -  Cefazolin: S <=8 For uncomplicated UTI with K. pneumoniae, E. coli, or P. mirablis: INDRA <=16 is sensitive and INDRA >=32 is resistant. This also predicts results for oral agents cefaclor, cefdinir, cefpodoxime, cefprozil, cefuroxime axetil, cephalexin and locarbef for uncomplicated UTI. Note that some isolates may be susceptible to these agents while testing resistant to cefazolin.      -  Cefepime: S <=4      -  Cefoxitin: S <=8      -  Ceftriaxone: S <=1 Enterobacter, Citrobacter, and Serratia may develop resistance during prolonged therapy      -  Ciprofloxacin: S <=1      -  Ertapenem: S <=1      -  Gentamicin: S <=4      -  Imipenem: S <=1      -  Levofloxacin: S <=2      -  Meropenem: S <=1      -  Nitrofurantoin: S <=32 Should not be used to treat pyelonephritis      -  Piperacillin/Tazobactam: S <=16      -  Tigecycline: S <=2      -  Tobramycin: S <=4      -  Trimethoprim/Sulfamethoxazole: S <=2/38      Method Type: INDRA    Culture - Blood (collected 2019 12:52)  Source: .Blood Blood-Peripheral  Gram Stain (2019 07:53):    Growth in aerobic and anaerobic bottles: Gram Negative Rods  Final Report (10 Apr 2019 08:56):    Growth in aerobic and anaerobic bottles: Klebsiella pneumoniae    See previous culture [id number]    Culture - Blood (collected 2019 12:52)  Source: .Blood Blood-Peripheral  Gram Stain (2019 08:16):    Growth in anaerobic bottle: Gram Negative Rods    Growth in aerobic bottle: Gram Negative Rods  Final Report (10 Apr 2019 08:55):    Growth in aerobic and anaerobic bottles: Klebsiella pneumoniae    "Due to technical problems, Proteus sp. will Not be reported as part of    the BCID panel until further notice"    ***Blood Panel PCR results on this specimen are available    approximately 3 hours after the Gram stain result.***    Gram stain, PCR, and/or culture results may not always    correspond due to difference in methodologies.    ************************************************************    This PCR assay was performed using [employer].    The following targets are tested for: Enterococcus,    vancomycin resistant enterococci, Listeria monocytogenes,    coagulase negative staphylococci, S. aureus,    methicillin resistant S. aureus, Streptococcus agalactiae    (Group B), S. pneumoniae, S. pyogenes (Group A),    Acinetobacter baumannii, Enterobacter cloacae, E. coli,    Klebsiella oxytoca, K. pneumoniae, Proteus sp.,    Serratia marcescens, Haemophilus influenzae,    Neisseria meningitidis, Pseudomonas aeruginosa, Candida    albicans, C. glabrata, C krusei, C parapsilosis,    C. tropicalis and the KPC resistance gene.  Organism: Blood Culture PCR  Klebsiella pneumoniae (10 Apr 2019 08:55)  Organism: Klebsiella pneumoniae (10 Apr 2019 08:55)      -  Amikacin: S <=16      -  Ampicillin: R <=8 These ampicillin results predict results for amoxicillin      -  Ampicillin/Sulbactam: S <=8/4      -  Aztreonam: S <=4      -  Cefazolin: S <=8      -  Cefepime: S <=4      -  Cefoxitin: S <=8      -  Ceftriaxone: S <=1 Enterobacter, Citrobacter, and Serratia may develop resistance during prolonged therapy      -  Ciprofloxacin: S <=1      -  Ertapenem: S <=1      -  Gentamicin: S <=4      -  Imipenem: S <=1      -  Levofloxacin: S <=2      -  Meropenem: S <=1      -  Piperacillin/Tazobactam: S <=16      -  Tobramycin: S <=4      -  Trimethoprim/Sulfamethoxazole: S <=2/38      Method Type: INDRA  Organism: Blood Culture PCR (10 Apr 2019 08:55)      -  Klebsiella pneumoniae: Detec      Method Type: PCR      RADIOLOGY & ADDITIONAL TESTS:    Care Discussed with Consultants/Other Providers:

## 2019-04-11 NOTE — PROGRESS NOTE ADULT - ASSESSMENT
74y female, s/p coiling for SAH a few weeks ago, with hospital course was complicated by PE, C diff and E coli cystitis with neurogenic bladder.   Sent to rehab with a youssef, that was removed a few days PTA.  Developed fever with high bladder volumes and is now admitted with klebsiella bacteremia. Youssef reinserted & started on Zosyn + prophylactic vancomycin.  No further fevers & leukocytosis has resolved  Urine cx with GNR as well as expected  Blood isolate is klebsiella, urine with klebsiella and enterobacter   Youssef for retention  Recommendations:  Continue CTX -   Continue po vanco for cdif relapse prophylaxis attempt  Follow repeat blood cultures  Daughters updated at beside  Follow low grade temp

## 2019-04-11 NOTE — PROGRESS NOTE ADULT - PROBLEM SELECTOR PLAN 2
youssef in place due to retention  urology is following youssef in place due to retention  urology is following  continue youssef

## 2019-04-11 NOTE — PROGRESS NOTE ADULT - PROBLEM SELECTOR PLAN 4
- Resolved  - Likely related to sepsis, r/o ATN due to hypoperfusion during shock state  -  IV fluid resuscitation was stopped  - Monitor renal function and electrolytes  - Avoid nephrotoxins - Resolved (Cr 0.65 --> 1.10-->0.88)  - Likely related to sepsis, r/o ATN due to hypoperfusion during shock state  -  IV fluid resuscitation was stopped  - Monitor renal function and electrolytes  - Avoid nephrotoxins

## 2019-04-11 NOTE — PROGRESS NOTE ADULT - PROBLEM SELECTOR PLAN 6
- PT, OOB  - PMNR eval for dispo back to acute rehab- accepted for 4/11 - PT, OOB  - PMNR  concerned about low grade temps    pt looks great on appropiat ABX   medically stable for transfer

## 2019-04-11 NOTE — PROGRESS NOTE ADULT - SUBJECTIVE AND OBJECTIVE BOX
CC: f/u for klebsiella bacteremia    Patient reports: no complaints, she is still weak ,has youssef for retention    REVIEW OF SYSTEMS:  All other review of systems negative (Comprehensive ROS)    Antimicrobials Day #  :day 4  cefTRIAXone   IVPB 1 Gram(s) IV Intermittent every 24 hours  vancomycin    Solution 125 milliGRAM(s) Oral every 6 hours    Other Medications Reviewed    T(F): 99.1 (04-11-19 @ 15:58), Max: 99.6 (04-11-19 @ 05:01)  HR: 101 (04-11-19 @ 15:58)  BP: 127/76 (04-11-19 @ 15:58)  RR: 15 (04-11-19 @ 15:58)  SpO2: 96% (04-11-19 @ 15:58)  Wt(kg): --    PHYSICAL EXAM:  General: alert, no acute distress  Eyes:  anicteric, no conjunctival injection, no discharge  Oropharynx: no lesions or injection 	  Neck: supple, without adenopathy  Lungs: clear to auscultation  Heart: regular rate and rhythm; no murmur, rubs or gallops  Abdomen: soft, nondistended, nontender, without mass or organomegaly  Skin: no lesions  Extremities: no clubbing, cyanosis, or edema  Neurologic: alert, oriented, moves all extremities   youssef  LAB RESULTS:                        9.7    9.6   )-----------( 398      ( 11 Apr 2019 06:05 )             28.6     04-11    134<L>  |  100  |  12  ----------------------------<  108<H>  4.2   |  26  |  0.88    Ca    9.0      11 Apr 2019 06:05          MICROBIOLOGY:  RECENT CULTURES:  04-08 @ 10:45 .Urine Catheterized     No growth      04-07 @ 19:10 .Urine Catheterized Klebsiella pneumoniae  Enterobacter cloacae    50,000 - 99,000 CFU/mL Klebsiella pneumoniae  10,000 - 49,000 CFU/mL Enterobacter cloacae      04-07 @ 12:52 .Blood Blood-Peripheral Blood Culture PCR  Klebsiella pneumoniae    Growth in aerobic and anaerobic bottles: Klebsiella pneumoniae      Growth in anaerobic bottle: Gram Negative Rods  Growth in aerobic bottle: Gram Negative Rods        RADIOLOGY REVIEWED:    < from: US Renal (04.08.19 @ 10:55) >  IMPRESSION:     Bilateral renal calculi present which appears to be within the left renal   pelvis with associated pelvocaliectasis. A CT can be obtained for better   delineation.    < end of copied text >

## 2019-04-11 NOTE — PROGRESS NOTE ADULT - ATTENDING COMMENTS
I have personally seen and examined patient on the above date.  I discussed the case with TERRELL Calhoun and I agree with findings and plan as detailed per note above, which I have amended where appropriate.      I am not able to find evidence that this patient had "septic shock" on admission.  Patient did have sepsis, however, related to Klebsiella bacteremia from a UTI (which developed from urinary retention) - which has since resolved  Now with youssef for urinary retention  Stable for d/c to acute rehab from medicine point of view. I have personally seen and examined patient on the above date.  I discussed the case with TERRELL Calhoun and I agree with findings and plan as detailed per note above, which I have amended where appropriate.      I am not able to find evidence that this patient had "septic shock" on admission.  Patient did have sepsis, however, related to Klebsiella bacteremia from a UTI (which developed from urinary retention) - which has since resolved  Now with youssef for urinary retention  History of PE: Anticoagulated on Coumadin, monitor INR daily, dose Coumadin tonight INR 1.92  Stable for d/c to acute rehab from medicine point of view.

## 2019-04-12 LAB
ANION GAP SERPL CALC-SCNC: 8 MMOL/L — SIGNIFICANT CHANGE UP (ref 5–17)
BUN SERPL-MCNC: 15 MG/DL — SIGNIFICANT CHANGE UP (ref 7–23)
CALCIUM SERPL-MCNC: 9.2 MG/DL — SIGNIFICANT CHANGE UP (ref 8.4–10.5)
CHLORIDE SERPL-SCNC: 103 MMOL/L — SIGNIFICANT CHANGE UP (ref 96–108)
CO2 SERPL-SCNC: 27 MMOL/L — SIGNIFICANT CHANGE UP (ref 22–31)
CREAT SERPL-MCNC: 0.86 MG/DL — SIGNIFICANT CHANGE UP (ref 0.5–1.3)
GLUCOSE SERPL-MCNC: 101 MG/DL — HIGH (ref 70–99)
HCT VFR BLD CALC: 31.5 % — LOW (ref 34.5–45)
HGB BLD-MCNC: 10 G/DL — LOW (ref 11.5–15.5)
INR BLD: 1.75 RATIO — HIGH (ref 0.88–1.16)
MCHC RBC-ENTMCNC: 28.8 PG — SIGNIFICANT CHANGE UP (ref 27–34)
MCHC RBC-ENTMCNC: 31.8 GM/DL — LOW (ref 32–36)
MCV RBC AUTO: 90.4 FL — SIGNIFICANT CHANGE UP (ref 80–100)
PLATELET # BLD AUTO: 435 K/UL — HIGH (ref 150–400)
POTASSIUM SERPL-MCNC: 4.4 MMOL/L — SIGNIFICANT CHANGE UP (ref 3.5–5.3)
POTASSIUM SERPL-SCNC: 4.4 MMOL/L — SIGNIFICANT CHANGE UP (ref 3.5–5.3)
PROTHROM AB SERPL-ACNC: 20 SEC — HIGH (ref 10–12.9)
RBC # BLD: 3.48 M/UL — LOW (ref 3.8–5.2)
RBC # FLD: 14.3 % — SIGNIFICANT CHANGE UP (ref 10.3–14.5)
SODIUM SERPL-SCNC: 138 MMOL/L — SIGNIFICANT CHANGE UP (ref 135–145)
WBC # BLD: 9.9 K/UL — SIGNIFICANT CHANGE UP (ref 3.8–10.5)
WBC # FLD AUTO: 9.9 K/UL — SIGNIFICANT CHANGE UP (ref 3.8–10.5)

## 2019-04-12 PROCEDURE — 99233 SBSQ HOSP IP/OBS HIGH 50: CPT | Mod: GC

## 2019-04-12 RX ORDER — WARFARIN SODIUM 2.5 MG/1
4 TABLET ORAL ONCE
Qty: 0 | Refills: 0 | Status: COMPLETED | OUTPATIENT
Start: 2019-04-12 | End: 2019-04-12

## 2019-04-12 RX ORDER — SODIUM CHLORIDE 9 MG/ML
500 INJECTION INTRAMUSCULAR; INTRAVENOUS; SUBCUTANEOUS ONCE
Qty: 0 | Refills: 0 | Status: DISCONTINUED | OUTPATIENT
Start: 2019-04-12 | End: 2019-04-17

## 2019-04-12 RX ADMIN — Medication 125 MILLIGRAM(S): at 11:24

## 2019-04-12 RX ADMIN — Medication 5 MILLIGRAM(S): at 06:13

## 2019-04-12 RX ADMIN — PANTOPRAZOLE SODIUM 40 MILLIGRAM(S): 20 TABLET, DELAYED RELEASE ORAL at 06:13

## 2019-04-12 RX ADMIN — ATORVASTATIN CALCIUM 20 MILLIGRAM(S): 80 TABLET, FILM COATED ORAL at 22:30

## 2019-04-12 RX ADMIN — TAMSULOSIN HYDROCHLORIDE 0.4 MILLIGRAM(S): 0.4 CAPSULE ORAL at 22:30

## 2019-04-12 RX ADMIN — Medication 975 MILLIGRAM(S): at 15:17

## 2019-04-12 RX ADMIN — WARFARIN SODIUM 4 MILLIGRAM(S): 2.5 TABLET ORAL at 22:30

## 2019-04-12 RX ADMIN — CEFTRIAXONE 100 GRAM(S): 500 INJECTION, POWDER, FOR SOLUTION INTRAMUSCULAR; INTRAVENOUS at 06:13

## 2019-04-12 RX ADMIN — Medication 1 TABLET(S): at 15:05

## 2019-04-12 RX ADMIN — Medication 125 MILLIGRAM(S): at 17:15

## 2019-04-12 RX ADMIN — Medication 975 MILLIGRAM(S): at 11:22

## 2019-04-12 RX ADMIN — Medication 1 TABLET(S): at 22:30

## 2019-04-12 RX ADMIN — Medication 125 MILLIGRAM(S): at 23:34

## 2019-04-12 RX ADMIN — Medication 1 TABLET(S): at 06:13

## 2019-04-12 RX ADMIN — Medication 125 MILLIGRAM(S): at 06:13

## 2019-04-12 RX ADMIN — Medication 125 MILLIGRAM(S): at 02:47

## 2019-04-12 NOTE — PROGRESS NOTE ADULT - PROBLEM SELECTOR PLAN 6
- PT, OOB  - PMNR  concerned about temps  will continue to monitor   pt looks great on appropiat ABX   medically stable for transfer

## 2019-04-12 NOTE — PROGRESS NOTE ADULT - PROBLEM SELECTOR PLAN 7
- Completed extended course of PO vanco due to abx for UTI  - started on empiric PO vanco for C. diff ppx while on Abx for recurrent UTI/bacteremia   - No active diarrhea  - Continue Bacid  - Monitor closely for recurrence of infection while on abx for acute infection

## 2019-04-12 NOTE — PROGRESS NOTE ADULT - PROBLEM SELECTOR PROBLEM 2
Urinary tract infection associated with indwelling urethral catheter, initial encounter

## 2019-04-12 NOTE — PROGRESS NOTE ADULT - ASSESSMENT
74y female, s/p coiling for SAH a few weeks ago, with hospital course was complicated by PE, C diff and E coli cystitis with neurogenic bladder.   Sent to rehab with a youssef, that was removed a few days PTA.  Developed fever with high bladder volumes and is now admitted with klebsiella bacteremia. Youssef reinserted & started on Zosyn + prophylactic vancomycin.  Initial fever and leukocytosis had moderated  Urine cx with klebsiella and eneterobacter  Blood isolate is klebsiella,   Youssef for retention  She now has intermittent fever, non focal exam  Renal sono was without hydro or obstructing stones, b/l calculi seen  I would include non infectious fever in differential  Recommendations:  Continue CTX -   Continue po vanco for cdif relapse prophylaxis   Follow repeat blood cultures  Follow low grade temps  consider lower extremity dopplers to exclude DVT  If fevers persist would reimage kidneys to exclude development of obstructing stones

## 2019-04-12 NOTE — PROGRESS NOTE ADULT - PROBLEM SELECTOR PLAN 3
Urinary Retention  - Indwelling youssef placed 4/8 for urinary retention  - Urology following, no further TOVs  at this time  continue youssef

## 2019-04-12 NOTE — PROGRESS NOTE ADULT - PROBLEM SELECTOR PLAN 1
as above
shock has resolved  patient out of ICU today  patient with septicemia source is UTI + klebsiella  ID following  On zosyn
shock has resolved  patient with septicemia source is UTI + klebsiella  ID following  On zosyn today is day 3
shock has resolved  patient with septicemia source is UTI + klebsiella  ID following  On zosyn today is day 3
shock has resolved  patient with septicemia source is UTI + klebsiella  ID following  on Rocephin day 2-- still spiking temps, 101.1  Will re culture this morning

## 2019-04-12 NOTE — PROGRESS NOTE ADULT - PROBLEM SELECTOR PROBLEM 4
TEREZA (acute kidney injury)

## 2019-04-12 NOTE — PROGRESS NOTE ADULT - ASSESSMENT
73 yo female admitted to rehab following prolonged hospitalization for rupture of AComm aneurysm s/p coiling c/b C. diff infection, E. coli UTIs, urinary retention requiring youssef, was  admitted to hospital(ICU)   from rehab with encephalopathy, severe sepsis likely secondary to UTI with shock and TEREZA.

## 2019-04-12 NOTE — PROGRESS NOTE ADULT - PROBLEM SELECTOR PROBLEM 7
Clostridium difficile diarrhea

## 2019-04-12 NOTE — PROGRESS NOTE ADULT - PROBLEM/PLAN-1
DISPLAY PLAN FREE TEXT
English

## 2019-04-12 NOTE — PROGRESS NOTE ADULT - PROBLEM SELECTOR PROBLEM 6
H/O spont subarachnoid intracranial hemorrhage d/t cerebral aneurysm

## 2019-04-12 NOTE — PROGRESS NOTE ADULT - PROBLEM SELECTOR PLAN 4
- Resolved (Cr 0.65 --> 1.10-->0.88)  - Likely related to sepsis, r/o ATN due to hypoperfusion during shock state  -  IV fluid resuscitation was stopped  - Monitor renal function and electrolytes  - Avoid nephrotoxins

## 2019-04-12 NOTE — PROGRESS NOTE ADULT - SUBJECTIVE AND OBJECTIVE BOX
Patient is a 74y old  Female who presents with a chief complaint of Sepsis.  Pt without complaints.  Eagerly awaiting return to rehab.  Still having temps last pm       Patient seen and examined at bedside.    ALLERGIES:  No Known Allergies    MEDICATIONS  (STANDING):  atorvastatin 20 milliGRAM(s) Oral at bedtim  cefTRIAXone   IVPB 1 Gram(s) IV Intermittent every 24 hours  enalapril 5 milliGRAM(s) Oral every 12 hours  lactobacillus acidophilus 1 Tablet(s) Oral three times a day  pantoprazole    Tablet 40 milliGRAM(s) Oral before breakfast  tamsulosin 0.4 milliGRAM(s) Oral at bedtime  vancomycin    Solution 125 milliGRAM(s) Oral every 6 hours    MEDICATIONS  (PRN):  acetaminophen   Tablet .. 975 milliGRAM(s) Oral every 6 hours PRN Temp greater or equal to 38C (100.4F), Mild Pain (1 - 3), Moderate Pain (4 - 6)    Vital Signs Last 24 Hrs  T(F): 97.9 (12 Apr 2019 05:13), Max: 101.1 (11 Apr 2019 18:53)  HR: 80 (12 Apr 2019 05:13) (80 - 114)  BP: 132/74 (12 Apr 2019 05:13) (100/72 - 132/74)  RR: 15 (12 Apr 2019 05:13) (14 - 15)  SpO2: 99% (12 Apr 2019 05:13) (96% - 100%)  I&O's Summary    11 Apr 2019 07:01  -  12 Apr 2019 07:00  --------------------------------------------------------  IN: 960 mL / OUT: 1150 mL / NET: -190 mL      PHYSICAL EXAM:  General: NAD, A/O x 3  ENT: MMM  Neck: Supple, No JVD  Lungs: Clear to auscultation bilaterally  Cardio: RRR, S1/S2, No murmurs  Abdomen: Soft, Nontender, Nondistended; Bowel sounds present  Extremities: No calf tenderness, No pitting edema  youssef cath indwelling     LABS:                        10.0   9.9   )-----------( 435      ( 12 Apr 2019 05:20 )             31.5     04-12    138  |  103  |  15  ----------------------------<  101  4.4   |  27  |  0.86    Ca    9.2      12 Apr 2019 05:20      eGFR if Non African American: 67 mL/min/1.73M2 (04-12-19 @ 05:20)  eGFR if African American: 78 mL/min/1.73M2 (04-12-19 @ 05:20)    PT/INR - ( 12 Apr 2019 05:20 )   PT: 20.0 sec;   INR: 1.75 ratio                 03-04 Chol 184 mg/dL  mg/dL HDL 49 mg/dL Trig 158 mg/dL        CAPILLARY BLOOD GLUCOSE        03-04 [id number] 5.6        Culture - Urine (collected 08 Apr 2019 10:45)  Source: .Urine Catheterized  Final Report (09 Apr 2019 16:32):    No growth    Culture - Urine (collected 07 Apr 2019 19:10)  Source: .Urine Catheterized  Final Report (09 Apr 2019 19:54):    50,000 - 99,000 CFU/mL Klebsiella pneumoniae    10,000 - 49,000 CFU/mL Enterobacter cloacae  Organism: Klebsiella pneumoniae  Enterobacter cloacae (09 Apr 2019 19:54)  Organism: Enterobacter cloacae (09 Apr 2019 19:54)      -  Amikacin: S <=16      -  Ampicillin: R 16 These ampicillin results predict results for amoxicillin      -  Ampicillin/Sulbactam: R <=8/4      -  Aztreonam: S <=4      -  Cefazolin: R >16      -  Cefepime: S <=4      -  Cefoxitin: R >16      -  Ceftriaxone: S <=1 Enterobacter, Citrobacter, and Serratia may develop resistance during prolonged therapy      -  Ciprofloxacin: S <=1      -  Ertapenem: S <=1      -  Gentamicin: S <=4      -  Imipenem: S <=1      -  Levofloxacin: S <=2      -  Meropenem: S <=1      -  Nitrofurantoin: I 64 Should not be used to treat pyelonephritis      -  Piperacillin/Tazobactam: S <=16      -  Tigecycline: S <=2      -  Tobramycin: S <=4      -  Trimethoprim/Sulfamethoxazole: S <=2/38      Method Type: INDRA  Organism: Klebsiella pneumoniae (09 Apr 2019 19:54)      -  Amikacin: S <=16      -  Ampicillin: R <=8 These ampicillin results predict results for amoxicillin      -  Ampicillin/Sulbactam: S <=8/4      -  Aztreonam: S <=4      -  Cefazolin: S <=8 For uncomplicated UTI with K. pneumoniae, E. coli, or P. mirablis: INDRA <=16 is sensitive and INDRA >=32 is resistant. This also predicts results for oral agents cefaclor, cefdinir, cefpodoxime, cefprozil, cefuroxime axetil, cephalexin and locarbef for uncomplicated UTI. Note that some isolates may be susceptible to these agents while testing resistant to cefazolin.      -  Cefepime: S <=4      -  Cefoxitin: S <=8      -  Ceftriaxone: S <=1 Enterobacter, Citrobacter, and Serratia may develop resistance during prolonged therapy      -  Ciprofloxacin: S <=1      -  Ertapenem: S <=1      -  Gentamicin: S <=4      -  Imipenem: S <=1      -  Levofloxacin: S <=2      -  Meropenem: S <=1      -  Nitrofurantoin: S <=32 Should not be used to treat pyelonephritis      -  Piperacillin/Tazobactam: S <=16      -  Tigecycline: S <=2      -  Tobramycin: S <=4      -  Trimethoprim/Sulfamethoxazole: S <=2/38      Method Type: INDRA    Culture - Blood (collected 07 Apr 2019 12:52)  Source: .Blood Blood-Peripheral  Gram Stain (08 Apr 2019 07:53):    Growth in aerobic and anaerobic bottles: Gram Negative Rods  Final Report (10 Apr 2019 08:56):    Growth in aerobic and anaerobic bottles: Klebsiella pneumoniae    See previous culture [id number]    Culture - Blood (collected 07 Apr 2019 12:52)  Source: .Blood Blood-Peripheral  Gram Stain (08 Apr 2019 08:16):    Growth in anaerobic bottle: Gram Negative Rods    Growth in aerobic bottle: Gram Negative Rods  Final Report (10 Apr 2019 08:55):    Growth in aerobic and anaerobic bottles: Klebsiella pneumoniae    "Due to technical problems, Proteus sp. will Not be reported as part of    the BCID panel until further notice"    ***Blood Panel PCR results on this specimen are available    approximately 3 hours after the Gram stain result.***    Gram stain, PCR, and/or culture results may not always    correspond due to difference in methodologies.    ************************************************************    This PCR assay was performed using [employer].    The following targets are tested for: Enterococcus,    vancomycin resistant enterococci, Listeria monocytogenes,    coagulase negative staphylococci, S. aureus,    methicillin resistant S. aureus, Streptococcus agalactiae    (Group B), S. pneumoniae, S. pyogenes (Group A),    Acinetobacter baumannii, Enterobacter cloacae, E. coli,    Klebsiella oxytoca, K. pneumoniae, Proteus sp.,    Serratia marcescens, Haemophilus influenzae,    Neisseria meningitidis, Pseudomonas aeruginosa, Candida    albicans, C. glabrata, C krusei, C parapsilosis,    C. tropicalis and the KPC resistance gene.  Organism: Blood Culture PCR  Klebsiella pneumoniae (10 Apr 2019 08:55)  Organism: Klebsiella pneumoniae (10 Apr 2019 08:55)      -  Amikacin: S <=16      -  Ampicillin: R <=8 These ampicillin results predict results for amoxicillin      -  Ampicillin/Sulbactam: S <=8/4      -  Aztreonam: S <=4      -  Cefazolin: S <=8      -  Cefepime: S <=4      -  Cefoxitin: S <=8      -  Ceftriaxone: S <=1 Enterobacter, Citrobacter, and Serratia may develop resistance during prolonged therapy      -  Ciprofloxacin: S <=1      -  Ertapenem: S <=1      -  Gentamicin: S <=4      -  Imipenem: S <=1      -  Levofloxacin: S <=2      -  Meropenem: S <=1      -  Piperacillin/Tazobactam: S <=16      -  Tobramycin: S <=4      -  Trimethoprim/Sulfamethoxazole: S <=2/38      Method Type: INDRA  Organism: Blood Culture PCR (10 Apr 2019 08:55)      -  Klebsiella pneumoniae: Detec      Method Type: PCR      RADIOLOGY & ADDITIONAL TESTS:    Care Discussed with Consultants/Other Providers:

## 2019-04-12 NOTE — PROGRESS NOTE ADULT - SUBJECTIVE AND OBJECTIVE BOX
CC: f/u for klebsiella bacteremia    Patient reports: she is without acute complaints but had fever to 101 last night.  She denies any headaches, cough, chest pain, or GI symptoms.She has youssef for retention    REVIEW OF SYSTEMS:  All other review of systems negative (Comprehensive ROS)    Antimicrobials Day #  :day 5  cefTRIAXone   IVPB 1 Gram(s) IV Intermittent every 24 hours  vancomycin    Solution 125 milliGRAM(s) Oral every 6 hours    Other Medications Reviewed  MEDICATIONS  (STANDING):  atorvastatin 20 milliGRAM(s) Oral at bedtime  cefTRIAXone   IVPB 1 Gram(s) IV Intermittent every 24 hours  enalapril 5 milliGRAM(s) Oral every 12 hours  lactobacillus acidophilus 1 Tablet(s) Oral three times a day  pantoprazole    Tablet 40 milliGRAM(s) Oral before breakfast  sodium chloride 0.9% Bolus 500 milliLiter(s) IV Bolus once  tamsulosin 0.4 milliGRAM(s) Oral at bedtime  vancomycin    Solution 125 milliGRAM(s) Oral every 6 hours  warfarin 4 milliGRAM(s) Oral once    T(F): 98.4 (04-12-19 @ 16:25), Max: 101.1 (04-11-19 @ 18:53)  HR: 82 (04-12-19 @ 16:25)  BP: 103/68 (04-12-19 @ 16:25)  RR: 16 (04-12-19 @ 16:25)  SpO2: 99% (04-12-19 @ 16:25)  Wt(kg): --    PHYSICAL EXAM:  General: alert, no acute distress  Eyes:  anicteric, no conjunctival injection, no discharge  Oropharynx: no lesions or injection 	  Neck: supple, without adenopathy  Lungs: clear to auscultation  Heart: regular rate and rhythm; no murmur, rubs or gallops  Abdomen: soft, nondistended, nontender, without mass or organomegaly  Skin: no lesions  Extremities: no clubbing, cyanosis, or edema  Neurologic: alert, oriented, moves all extremities   youssef  LAB RESULTS:                        10.0   9.9   )-----------( 435      ( 12 Apr 2019 05:20 )             31.5     04-12    138  |  103  |  15  ----------------------------<  101<H>  4.4   |  27  |  0.86    Ca    9.2      12 Apr 2019 05:20          MICROBIOLOGY:  RECENT CULTURES:  04-08 @ 10:45 .Urine Catheterized     No growth      04-07 @ 19:10 .Urine Catheterized Klebsiella pneumoniae  Enterobacter cloacae    50,000 - 99,000 CFU/mL Klebsiella pneumoniae  10,000 - 49,000 CFU/mL Enterobacter cloacae          RADIOLOGY REVIEWED:  < from: US Renal (04.08.19 @ 10:55) >  INTERPRETATION:  CLINICAL INFORMATION: UTI    COMPARISON: CT chest 3/18/2019    TECHNIQUE: Sonography of the kidneys and bladder.     FINDINGS:    Right kidney:  11 cm. Increased echogenicity of the renal parenchyma with   parenchymal thinning compatible with medical renal disease. Multiple   nonobstructive intrarenal calculi measuring up to 1.6 cm. No right-sided   hydronephrosis. No perinephric collections.    Left kidney:  11.9 cm. Increased echogenicity renal parenchyma with   parenchymal thinning compatible with medical renal disease. And calculus   in the midpole may be within the renal pelvis with associated calyceal   dilatation. Additional calculi identified. . No perinephric collections.    Urinary bladder: Bladder is collapsed on a Youssef catheter    IMPRESSION:     Bilateral renal calculi present which appears to be within the left renal   pelvis with associated pelvocaliectasis. A CT can be obtained for better   delineation.    < end of copied text >

## 2019-04-12 NOTE — PROGRESS NOTE ADULT - ATTENDING COMMENTS
I have personally seen and examined patient on the above date.  I discussed the case with TERRELL Calhoun and I agree with findings and plan as detailed per note above, which I have amended where appropriate.      I am not able to find evidence that this patient had "septic shock" on admission.  Patient did have sepsis, however, related to Klebsiella bacteremia from a UTI (which developed from urinary retention) - which has since resolved  Now with youssef for urinary retention  Patient had a true fever overnight - 101.1 ---- repeat blood cultures sent  History of PE: Anticoagulated on Coumadin, monitor INR daily, dose Coumadin tonight INR 1.75

## 2019-04-13 LAB
ANION GAP SERPL CALC-SCNC: 9 MMOL/L — SIGNIFICANT CHANGE UP (ref 5–17)
BUN SERPL-MCNC: 20 MG/DL — SIGNIFICANT CHANGE UP (ref 7–23)
CALCIUM SERPL-MCNC: 8.7 MG/DL — SIGNIFICANT CHANGE UP (ref 8.4–10.5)
CHLORIDE SERPL-SCNC: 102 MMOL/L — SIGNIFICANT CHANGE UP (ref 96–108)
CO2 SERPL-SCNC: 26 MMOL/L — SIGNIFICANT CHANGE UP (ref 22–31)
CREAT SERPL-MCNC: 0.97 MG/DL — SIGNIFICANT CHANGE UP (ref 0.5–1.3)
GLUCOSE SERPL-MCNC: 88 MG/DL — SIGNIFICANT CHANGE UP (ref 70–99)
HCT VFR BLD CALC: 28.7 % — LOW (ref 34.5–45)
HGB BLD-MCNC: 8.9 G/DL — LOW (ref 11.5–15.5)
INR BLD: 1.91 RATIO — HIGH (ref 0.88–1.16)
MCHC RBC-ENTMCNC: 28 PG — SIGNIFICANT CHANGE UP (ref 27–34)
MCHC RBC-ENTMCNC: 31.2 GM/DL — LOW (ref 32–36)
MCV RBC AUTO: 90 FL — SIGNIFICANT CHANGE UP (ref 80–100)
PLATELET # BLD AUTO: 439 K/UL — HIGH (ref 150–400)
POTASSIUM SERPL-MCNC: 3.5 MMOL/L — SIGNIFICANT CHANGE UP (ref 3.5–5.3)
POTASSIUM SERPL-SCNC: 3.5 MMOL/L — SIGNIFICANT CHANGE UP (ref 3.5–5.3)
PROTHROM AB SERPL-ACNC: 21.8 SEC — HIGH (ref 10–12.9)
RBC # BLD: 3.18 M/UL — LOW (ref 3.8–5.2)
RBC # FLD: 13.5 % — SIGNIFICANT CHANGE UP (ref 10.3–14.5)
SODIUM SERPL-SCNC: 137 MMOL/L — SIGNIFICANT CHANGE UP (ref 135–145)
WBC # BLD: 11.8 K/UL — HIGH (ref 3.8–10.5)
WBC # FLD AUTO: 11.8 K/UL — HIGH (ref 3.8–10.5)

## 2019-04-13 PROCEDURE — 99232 SBSQ HOSP IP/OBS MODERATE 35: CPT

## 2019-04-13 RX ORDER — WARFARIN SODIUM 2.5 MG/1
4 TABLET ORAL ONCE
Qty: 0 | Refills: 0 | Status: COMPLETED | OUTPATIENT
Start: 2019-04-13 | End: 2019-04-13

## 2019-04-13 RX ADMIN — Medication 1 TABLET(S): at 06:02

## 2019-04-13 RX ADMIN — CEFTRIAXONE 100 GRAM(S): 500 INJECTION, POWDER, FOR SOLUTION INTRAMUSCULAR; INTRAVENOUS at 06:02

## 2019-04-13 RX ADMIN — Medication 1 TABLET(S): at 21:00

## 2019-04-13 RX ADMIN — PANTOPRAZOLE SODIUM 40 MILLIGRAM(S): 20 TABLET, DELAYED RELEASE ORAL at 06:02

## 2019-04-13 RX ADMIN — Medication 125 MILLIGRAM(S): at 06:02

## 2019-04-13 RX ADMIN — Medication 1 TABLET(S): at 13:56

## 2019-04-13 RX ADMIN — Medication 125 MILLIGRAM(S): at 11:34

## 2019-04-13 RX ADMIN — ATORVASTATIN CALCIUM 20 MILLIGRAM(S): 80 TABLET, FILM COATED ORAL at 21:00

## 2019-04-13 RX ADMIN — Medication 125 MILLIGRAM(S): at 23:37

## 2019-04-13 RX ADMIN — TAMSULOSIN HYDROCHLORIDE 0.4 MILLIGRAM(S): 0.4 CAPSULE ORAL at 21:00

## 2019-04-13 RX ADMIN — Medication 5 MILLIGRAM(S): at 06:02

## 2019-04-13 RX ADMIN — Medication 125 MILLIGRAM(S): at 18:12

## 2019-04-13 RX ADMIN — WARFARIN SODIUM 4 MILLIGRAM(S): 2.5 TABLET ORAL at 21:00

## 2019-04-13 NOTE — PROGRESS NOTE ADULT - ASSESSMENT
74y female, s/p coiling for SAH a few weeks ago, with hospital course was complicated by PE, C diff and E coli cystitis with neurogenic bladder.   Sent to rehab with a youssef, that was removed a few days PTA.  Developed fever with high bladder volumes and is now admitted with klebsiella bacteremia. Youssef reinserted & started on Zosyn + prophylactic vancomycin.  Initial fever and leukocytosis had moderated  Urine cx with klebsiella and eneterobacter  Blood isolate is klebsiella,   Youssef for retention  She now has intermittent fever, non focal exam  Renal sono was without hydro or obstructing stones, b/l calculi seen  I would include non infectious fever in differential  Recommendations:  Continue CTX - day 6/10  Continue po vanco for cdif relapse prophylaxis   Follow repeat blood cultures, still pending  Follow low grade temps  consider lower extremity dopplers to exclude DVT  If fevers persist would reimage kidneys to exclude development of obstructing stones or abscess formation   at bedside and updated

## 2019-04-13 NOTE — PROGRESS NOTE ADULT - ASSESSMENT
75 yo female admitted to rehab following prolonged hospitalization for rupture of AComm aneurysm s/p coiling c/b C. diff infection, E. coli UTIs, urinary retention requiring youssef, was  admitted to hospital(ICU)   from rehab with encephalopathy, severe sepsis likely secondary to UTI with shock and TEREZA. + klebsiella, await reculte results from 4/12. shojessie not hold transfer to rehab, since clinically improved and stable  ID following  on Rocephin     urine retention  youssef in place , flomax, TOV per urology  urology is following  continue youssef.     TEREZA - Likely related to sepsis, resolved, Monitor renal function and electrolytes         H/O spont subarachnoid intracranial hemorrhage d/t cerebral aneurysm- PT, OOB  pt looks good, on appropiat ABX   medically stable for transfer to [facility], rehab resident noified      Clostridium difficile diarrhea.  Completed extended course of PO vanco due to abx for UTI  started on empiric PO vanco for C. diff ppx while on Abx for recurrent UTI/bacteremia     htn- enalapril    gerd- ppi    hld- statin

## 2019-04-13 NOTE — PROGRESS NOTE ADULT - SUBJECTIVE AND OBJECTIVE BOX
Patient is a 74y old  Female who presents with a chief complaint of Sepsis.  spiked temp , recultured 4/12, pending cultures no new complaints no new events overnight, no n/v, no sob, wants to go back to rehab    Vital Signs Last 24 Hrs  T(C): 37 (13 Apr 2019 05:07), Max: 37 (13 Apr 2019 05:07)  T(F): 98.6 (13 Apr 2019 05:07), Max: 98.6 (13 Apr 2019 05:07)  HR: 89 (13 Apr 2019 05:07) (82 - 89)  BP: 119/83 (13 Apr 2019 05:07) (70/48 - 119/83)  BP(mean): --  RR: 15 (13 Apr 2019 05:07) (14 - 16)  SpO2: 97% (13 Apr 2019 05:07) (97% - 99%)      NAD, PRISCILLA,MMM,NCAT  SUPPLE  CLEAR  S1S2  SOFT NT BS Present  NO PEDAL EDEMA  AAO X 3  NO GROSS NEURO DEFICITS  youssef present                8.9                  137  | 26   | 20           11.8  >-----------< 439     ------------------------< 88                    28.7                 3.5  | 102  | 0.97                                         Ca 8.7   Mg x     Ph x        INR: 1.91 ratio (04.13.19 @ 07:08)    Culture Results:   Growth in aerobic and anaerobic bottles: Klebsiella pneumoniae  See previous culture [id number] (04.07.19 @ 12:52)    Organism: Klebsiella pneumoniae (04.07.19 @ 12:52)

## 2019-04-13 NOTE — PROGRESS NOTE ADULT - SUBJECTIVE AND OBJECTIVE BOX
CC: f/u for klebsiella  bacteremia    Patient reports: no complaints today, she is tolerating a diet, no respiratory or GI complaints    REVIEW OF SYSTEMS:  All other review of systems negative (Comprehensive ROS)    Antimicrobials Day #  :day 6/10  cefTRIAXone   IVPB 1 Gram(s) IV Intermittent every 24 hours  vancomycin    Solution 125 milliGRAM(s) Oral every 6 hours    Other Medications Reviewed    T(F): 98.6 (04-13-19 @ 05:07), Max: 98.6 (04-13-19 @ 05:07)  HR: 89 (04-13-19 @ 05:07)  BP: 119/83 (04-13-19 @ 05:07)  RR: 15 (04-13-19 @ 05:07)  SpO2: 97% (04-13-19 @ 05:07)  Wt(kg): --    PHYSICAL EXAM:  General: alert, no acute distress  Eyes:  anicteric, no conjunctival injection, no discharge  Oropharynx: no lesions or injection 	  Neck: supple, without adenopathy  Lungs: clear to auscultation  Heart: regular rate and rhythm; no murmur, rubs or gallops  Abdomen: soft, nondistended, nontender, without mass or organomegaly  Skin: no lesions  Extremities: no clubbing, cyanosis, or edema  Neurologic: alert, oriented, moves all extremities   youssef  LAB RESULTS:                        8.9    11.8  )-----------( 439      ( 13 Apr 2019 06:25 )             28.7     04-13    137  |  102  |  20  ----------------------------<  88  3.5   |  26  |  0.97    Ca    8.7      13 Apr 2019 06:25          MICROBIOLOGY:  RECENT CULTURES:      RADIOLOGY REVIEWED:    < from: US Renal (04.08.19 @ 10:55) >    IMPRESSION:     Bilateral renal calculi present which appears to be within the left renal   pelvis with associated pelvocaliectasis. A CT can be obtained for better   delineation.    < end of copied text >

## 2019-04-14 LAB
ANION GAP SERPL CALC-SCNC: 10 MMOL/L — SIGNIFICANT CHANGE UP (ref 5–17)
BUN SERPL-MCNC: 15 MG/DL — SIGNIFICANT CHANGE UP (ref 7–23)
CALCIUM SERPL-MCNC: 8.9 MG/DL — SIGNIFICANT CHANGE UP (ref 8.4–10.5)
CHLORIDE SERPL-SCNC: 102 MMOL/L — SIGNIFICANT CHANGE UP (ref 96–108)
CO2 SERPL-SCNC: 24 MMOL/L — SIGNIFICANT CHANGE UP (ref 22–31)
CREAT SERPL-MCNC: 0.87 MG/DL — SIGNIFICANT CHANGE UP (ref 0.5–1.3)
GLUCOSE SERPL-MCNC: 100 MG/DL — HIGH (ref 70–99)
HCT VFR BLD CALC: 30 % — LOW (ref 34.5–45)
HGB BLD-MCNC: 9.6 G/DL — LOW (ref 11.5–15.5)
INR BLD: 1.95 RATIO — HIGH (ref 0.88–1.16)
MCHC RBC-ENTMCNC: 29 PG — SIGNIFICANT CHANGE UP (ref 27–34)
MCHC RBC-ENTMCNC: 32.2 GM/DL — SIGNIFICANT CHANGE UP (ref 32–36)
MCV RBC AUTO: 90.3 FL — SIGNIFICANT CHANGE UP (ref 80–100)
PLATELET # BLD AUTO: 494 K/UL — HIGH (ref 150–400)
POTASSIUM SERPL-MCNC: 4.1 MMOL/L — SIGNIFICANT CHANGE UP (ref 3.5–5.3)
POTASSIUM SERPL-SCNC: 4.1 MMOL/L — SIGNIFICANT CHANGE UP (ref 3.5–5.3)
PROTHROM AB SERPL-ACNC: 22.3 SEC — HIGH (ref 10–12.9)
RBC # BLD: 3.32 M/UL — LOW (ref 3.8–5.2)
RBC # FLD: 13.5 % — SIGNIFICANT CHANGE UP (ref 10.3–14.5)
SODIUM SERPL-SCNC: 136 MMOL/L — SIGNIFICANT CHANGE UP (ref 135–145)
WBC # BLD: 12.9 K/UL — HIGH (ref 3.8–10.5)
WBC # FLD AUTO: 12.9 K/UL — HIGH (ref 3.8–10.5)

## 2019-04-14 PROCEDURE — 93970 EXTREMITY STUDY: CPT | Mod: 26

## 2019-04-14 PROCEDURE — 71045 X-RAY EXAM CHEST 1 VIEW: CPT | Mod: 26

## 2019-04-14 PROCEDURE — 99233 SBSQ HOSP IP/OBS HIGH 50: CPT

## 2019-04-14 PROCEDURE — 74176 CT ABD & PELVIS W/O CONTRAST: CPT | Mod: 26

## 2019-04-14 RX ORDER — IBUPROFEN 200 MG
400 TABLET ORAL ONCE
Qty: 0 | Refills: 0 | Status: COMPLETED | OUTPATIENT
Start: 2019-04-14 | End: 2019-04-14

## 2019-04-14 RX ORDER — SODIUM CHLORIDE 9 MG/ML
1000 INJECTION, SOLUTION INTRAVENOUS
Qty: 0 | Refills: 0 | Status: DISCONTINUED | OUTPATIENT
Start: 2019-04-14 | End: 2019-04-16

## 2019-04-14 RX ORDER — WARFARIN SODIUM 2.5 MG/1
4 TABLET ORAL AT BEDTIME
Qty: 0 | Refills: 0 | Status: COMPLETED | OUTPATIENT
Start: 2019-04-14 | End: 2019-04-14

## 2019-04-14 RX ADMIN — SODIUM CHLORIDE 70 MILLILITER(S): 9 INJECTION, SOLUTION INTRAVENOUS at 21:25

## 2019-04-14 RX ADMIN — WARFARIN SODIUM 4 MILLIGRAM(S): 2.5 TABLET ORAL at 21:25

## 2019-04-14 RX ADMIN — TAMSULOSIN HYDROCHLORIDE 0.4 MILLIGRAM(S): 0.4 CAPSULE ORAL at 21:25

## 2019-04-14 RX ADMIN — Medication 125 MILLIGRAM(S): at 11:23

## 2019-04-14 RX ADMIN — Medication 1 TABLET(S): at 05:52

## 2019-04-14 RX ADMIN — CEFTRIAXONE 100 GRAM(S): 500 INJECTION, POWDER, FOR SOLUTION INTRAMUSCULAR; INTRAVENOUS at 05:52

## 2019-04-14 RX ADMIN — Medication 5 MILLIGRAM(S): at 17:40

## 2019-04-14 RX ADMIN — ATORVASTATIN CALCIUM 20 MILLIGRAM(S): 80 TABLET, FILM COATED ORAL at 21:25

## 2019-04-14 RX ADMIN — Medication 125 MILLIGRAM(S): at 23:08

## 2019-04-14 RX ADMIN — Medication 5 MILLIGRAM(S): at 05:53

## 2019-04-14 RX ADMIN — Medication 1 TABLET(S): at 14:54

## 2019-04-14 RX ADMIN — Medication 125 MILLIGRAM(S): at 05:52

## 2019-04-14 RX ADMIN — Medication 125 MILLIGRAM(S): at 17:39

## 2019-04-14 RX ADMIN — Medication 1 TABLET(S): at 21:25

## 2019-04-14 RX ADMIN — SODIUM CHLORIDE 70 MILLILITER(S): 9 INJECTION, SOLUTION INTRAVENOUS at 13:03

## 2019-04-14 RX ADMIN — Medication 400 MILLIGRAM(S): at 12:59

## 2019-04-14 RX ADMIN — Medication 400 MILLIGRAM(S): at 14:50

## 2019-04-14 RX ADMIN — PANTOPRAZOLE SODIUM 40 MILLIGRAM(S): 20 TABLET, DELAYED RELEASE ORAL at 05:52

## 2019-04-14 NOTE — DIETITIAN INITIAL EVALUATION ADULT. - PERTINENT LABORATORY DATA
(4/14/19) Hgb 9.6 L, Hct 30.0 L, Na 136 wnl, K 4.0 wnl, Cl 102 wnl, BUN 15 wnl, Creat 0.87 wnl, Glu 100 H, Pro 8.9 wnl, eGFR 66 wnl

## 2019-04-14 NOTE — DIETITIAN INITIAL EVALUATION ADULT. - FACTORS AFF FOOD INTAKE
other (specify)/none/pt noted w/ soft liquid stools, but reports having a good appetite during time of interview

## 2019-04-14 NOTE — DIETITIAN INITIAL EVALUATION ADULT. - OTHER INFO
Pt seen for LOS. 75 yo female admitted to rehab following prolonged hospitalization for rupture of AComm aneurysm s/p coiling c/b C. diff infection, E. coli UTIs, urinary retention requiring youssef, was admitted to ICU from rehab with encephalopathy, severe sepsis likely secondary to UTI with shock and TEREZA. + klebsiella per MD note. Noted w/ c.diff + diarrhea; however pt denies having GI symptoms. Pt tolerating DASH/TLC diet (d/t hx of HLD, HTN) w/ good PO intakes (consuming 100% of meals per chart review). Pt reports UBW of ~175 lbs; however pt's weight during hospitalization has been ~185-191 lbs (bed scale) & 200 lbs standing up. No edema noted per chart review. Skin intact of PU. Pt meeting ENN on current nutrition rx. Provided nutrition education on generally healthy diet/DASH/TLC diet. Pt on lactobacillus acidophilus d/t multiple ABT rx. No chewing/swallowing difficulties. Will cont to monitor & f/up.

## 2019-04-14 NOTE — PROGRESS NOTE ADULT - SUBJECTIVE AND OBJECTIVE BOX
CC: f/u for fevers     Patient reports: febrile to over 101 earlier, she is without any localizing complaints.No diarrhea and she has a youssef draining bladder.    REVIEW OF SYSTEMS:  All other review of systems negative (Comprehensive ROS)    Antimicrobials Day #  :day 7  cefTRIAXone   IVPB 1 Gram(s) IV Intermittent every 24 hours  vancomycin    Solution 125 milliGRAM(s) Oral every 6 hours    Other Medications Reviewed    T(F): 98.5 (04-14-19 @ 14:51), Max: 101.8 (04-14-19 @ 12:40)  HR: 104 (04-14-19 @ 14:51)  BP: 113/71 (04-14-19 @ 14:51)  RR: 14 (04-14-19 @ 14:51)  SpO2: 96% (04-14-19 @ 14:51)  Wt(kg): --    PHYSICAL EXAM:  General: alert, no acute distress  Eyes:  anicteric, no conjunctival injection, no discharge  Oropharynx: no lesions or injection 	  Neck: supple, without adenopathy  Lungs: clear to auscultation  Heart: regular rate and rhythm; no murmur, rubs or gallops  Abdomen: soft, nondistended, nontender, without mass or organomegaly  Skin: no lesions  Extremities: no clubbing, cyanosis, trace  edema  Neurologic: alert, oriented, moves all extremities   youssef  LAB RESULTS:                        9.6    12.9  )-----------( 494      ( 14 Apr 2019 06:15 )             30.0     04-14    136  |  102  |  15  ----------------------------<  100<H>  4.1   |  24  |  0.87    Ca    8.9      14 Apr 2019 06:15          MICROBIOLOGY:  RECENT CULTURES:  04-12 @ 10:35 .Blood Blood     No growth to date.      04-12 @ 10:25 .Blood Blood     No growth to date.          RADIOLOGY REVIEWED:    < from: US Duplex Venous Lower Ext Complete, Bilateral (04.14.19 @ 14:51) >  IMPRESSION:     No evidence of right lower extremity deep venous thrombosis.    Left lower extremity deep venous thrombosis involving a peroneal vein.   (below the knee DVT)    < end of copied text >  < from: CT Abdomen and Pelvis No Cont (04.14.19 @ 14:08) >  IMPRESSION:     No acute pathology.  Numerous bilateral nonobstructing kidney stones.

## 2019-04-14 NOTE — PROGRESS NOTE ADULT - ASSESSMENT
73 yo female admitted to rehab following prolonged hospitalization for rupture of AComm aneurysm s/p coiling c/b C. diff infection, E. coli UTIs, urinary retention requiring youssef, was  admitted to hospital(ICU)   from rehab with encephalopathy, severe sepsis likely secondary to UTI with shock and TEREZA. + klebsiella, recultre results from 4/12 negative so far, intermittent fever, while on antibiotics, renal sono- Bilateral renal calculi present which appears to be within the left renal pelvis with associated pelvocaliectasis. A CT can be obtained for better delineation- order -  ctap. and  will repeat le doppler, am labs.  ID following  on Rocephin     urine retention  youssef in place , flomax, TOV per urology  urology is following  continue youssef.     TEREZA - Likely related to sepsis, resolved, Monitor renal function and electrolytes    PE- c/w coumadin f/u inr goal 2-3    H/O spont subarachnoid intracranial hemorrhage d/t cerebral aneurysm- PT, OOB  pt looks good, on appropiat ABX   medically stable for transfer to [place], rehab resident noified      Clostridium difficile diarrhea.  Completed extended course of PO vanco due to abx for UTI  started on empiric PO vanco for C. diff ppx while on Abx for recurrent UTI/bacteremia     htn- enalapril    gerd- ppi    hld- statin 73 yo female admitted to rehab following prolonged hospitalization for rupture of AComm aneurysm s/p coiling c/b C. diff infection, E. coli UTIs, urinary retention requiring youssef, was  admitted to hospital(ICU)   from rehab with encephalopathy, severe sepsis likely secondary to UTI with shock and TEREZA. + klebsiella, recultre results from 4/12 negative so far, intermittent fever, while on antibiotics, renal sono- Bilateral renal calculi present which appears to be within the left renal pelvis with associated pelvocaliectasis. A CT can be obtained for better delineation- order -  ctap. and  will repeat le doppler, am labs.  ID following, d/w dr. brief, d/w family at length at bedside, very demanding, and upset about patient spiking fever, will also get urology consult.  on Rocephin     urine retention  youssef in place , flomax, TOV per urology  urology is following  continue youssef.     TEREZA - Likely related to sepsis, resolved, Monitor renal function and electrolytes    PE- c/w coumadin f/u inr goal 2-3    H/O spont subarachnoid intracranial hemorrhage d/t cerebral aneurysm- PT, OOB  pt looks good, on appropiat ABX   medically stable for transfer to [facility], rehab resident noified      Clostridium difficile diarrhea.  Completed extended course of PO vanco due to abx for UTI  started on empiric PO vanco for C. diff ppx while on Abx for recurrent UTI/bacteremia     htn- enalapril    gerd- ppi    hld- statin

## 2019-04-14 NOTE — PROGRESS NOTE ADULT - ASSESSMENT
74y female, s/p coiling for SAH a few weeks ago, with hospital course was complicated by PE, C diff and E coli cystitis with neurogenic bladder.   Sent to rehab with a youssef, that was removed a few days PTA.  Developed fever with high bladder volumes and is now admitted with klebsiella bacteremia. Youssef reinserted & started on Zosyn + prophylactic vancomycin.  Initial fever and leukocytosis had moderated  Urine cx with klebsiella and eneterobacter  Blood isolate is klebsiella,   Youssef for retention  She now has intermittent fever, non focal exam  Renal sono was without hydro or obstructing stones, b/l calculi seen  I would include non infectious fever in differential  Repeat 4/12 blood cultures are negative  Ct of A/P negative for source of fever, no evidence of obstruction or perinephric abscess  Recommendations:  Continue CTX - day 7/10  Continue po vanco for cdif relapse prophylaxis , until antibiotic course completed  Follow repeat blood cultures, still pending  Follow low grade temps  DVT addressed by coumadin  From ID viewpoint drainage of bladder is critical, I do not think youssef is accounting for fever   and daughter's questions answered at bedside.  No ID contraindications to transfer to rehab

## 2019-04-14 NOTE — PROGRESS NOTE ADULT - SUBJECTIVE AND OBJECTIVE BOX
Patient is a 74y old  Female who presents with a chief complaint of Sepsis, recultured 4/12- negative so far, no new complaints ,She now has intermittent fever, temp of 101 this am, non focal exam  Renal sono was without hydro or obstructing stones, b/l calculi seen  no new events overnight, no n/v, no sob, wants to go back to rehab    Vital Signs Last 24 Hrs  T(C): 37.6 (14 Apr 2019 09:40), Max: 37.6 (14 Apr 2019 05:32)  T(F): 99.6 (14 Apr 2019 09:40), Max: 101 f rectally  HR: 101 (14 Apr 2019 09:40) (92 - 101)  BP: 138/87 (14 Apr 2019 09:40) (101/66 - 142/85)  BP(mean): --  RR: 15 (14 Apr 2019 09:40) (14 - 15)  SpO2: 98% (14 Apr 2019 09:40) (97% - 99%)    NAD, PRISCILLA,MMM,NCAT  SUPPLE  CLEAR  S1S2  SOFT NT BS Present  NO PEDAL EDEMA  AAO X 3  NO GROSS NEURO DEFICITS  youssef present                  9.6                  136  | 24   | 15           12.9  >-----------< 494     ------------------------< 100                   30.0                 4.1  | 102  | 0.87                                         Ca 8.9   Mg x     Ph x                      8.9                  137  | 26   | 20           11.8  >-----------< 439     ------------------------< 88                    28.7                 3.5  | 102  | 0.97                                         Ca 8.7   Mg x     Ph x        INR: 1.91 ratio (04.13.19 @ 07:08)  INR: 1.95 ratio (04.14.19 @ 06:15)      Culture Results:   Growth in aerobic and anaerobic bottles: Klebsiella pneumoniae  See previous culture [id number] (04.07.19 @ 12:52)    Organism: Klebsiella pneumoniae (04.07.19 @ 12:52)    Culture - Blood (04.12.19 @ 10:35)    Specimen Source: .Blood Blood    Culture Results:   No growth to date.    Culture - Urine (04.08.19 @ 10:45)    Specimen Source: .Urine Catheterized    Culture Results:   No growth    doppler negative for dvt on 3/18/19    < from: CT Angio Chest w/ IV Cont (03.18.19 @ 15:51) >  IMPRESSION: Right upper lobe pulmonary embolus.  Findings discussed with ARANZA Castro with read back on 3/18/2019 5:10 PM    < from: US Renal (04.08.19 @ 10:55) >  IMPRESSION:     Bilateral renal calculi present which appears to be within the left renal   pelvis with associated pelvocaliectasis. A CT can be obtained for better   delineation.

## 2019-04-15 ENCOUNTER — APPOINTMENT (OUTPATIENT)
Dept: UROLOGY | Facility: CLINIC | Age: 74
End: 2019-04-15

## 2019-04-15 LAB
APTT BLD: 30.3 SEC — SIGNIFICANT CHANGE UP (ref 27.5–36.3)
BASOPHILS # BLD AUTO: 0.2 K/UL — SIGNIFICANT CHANGE UP (ref 0–0.2)
BASOPHILS NFR BLD AUTO: 1.4 % — SIGNIFICANT CHANGE UP (ref 0–2)
EOSINOPHIL # BLD AUTO: 0.2 K/UL — SIGNIFICANT CHANGE UP (ref 0–0.5)
EOSINOPHIL NFR BLD AUTO: 1.3 % — SIGNIFICANT CHANGE UP (ref 0–6)
HCT VFR BLD CALC: 27.6 % — LOW (ref 34.5–45)
HGB BLD-MCNC: 8.9 G/DL — LOW (ref 11.5–15.5)
INR BLD: 2.15 RATIO — HIGH (ref 0.88–1.16)
LYMPHOCYTES # BLD AUTO: 1.7 K/UL — SIGNIFICANT CHANGE UP (ref 1–3.3)
LYMPHOCYTES # BLD AUTO: 14 % — SIGNIFICANT CHANGE UP (ref 13–44)
MCHC RBC-ENTMCNC: 29.2 PG — SIGNIFICANT CHANGE UP (ref 27–34)
MCHC RBC-ENTMCNC: 32.2 GM/DL — SIGNIFICANT CHANGE UP (ref 32–36)
MCV RBC AUTO: 90.7 FL — SIGNIFICANT CHANGE UP (ref 80–100)
MONOCYTES # BLD AUTO: 0.7 K/UL — SIGNIFICANT CHANGE UP (ref 0–0.9)
MONOCYTES NFR BLD AUTO: 5.7 % — SIGNIFICANT CHANGE UP (ref 1–9)
NEUTROPHILS # BLD AUTO: 9.2 K/UL — HIGH (ref 1.8–7.4)
NEUTROPHILS NFR BLD AUTO: 77.6 % — HIGH (ref 43–77)
PLATELET # BLD AUTO: 479 K/UL — HIGH (ref 150–400)
PROTHROM AB SERPL-ACNC: 24.7 SEC — HIGH (ref 10–12.9)
RBC # BLD: 3.04 M/UL — LOW (ref 3.8–5.2)
RBC # FLD: 13.8 % — SIGNIFICANT CHANGE UP (ref 10.3–14.5)
WBC # BLD: 11.9 K/UL — HIGH (ref 3.8–10.5)
WBC # FLD AUTO: 11.9 K/UL — HIGH (ref 3.8–10.5)

## 2019-04-15 PROCEDURE — 99232 SBSQ HOSP IP/OBS MODERATE 35: CPT

## 2019-04-15 RX ORDER — WARFARIN SODIUM 2.5 MG/1
4 TABLET ORAL ONCE
Qty: 0 | Refills: 0 | Status: COMPLETED | OUTPATIENT
Start: 2019-04-15 | End: 2019-04-15

## 2019-04-15 RX ORDER — TAMSULOSIN HYDROCHLORIDE 0.4 MG/1
0.4 CAPSULE ORAL AT BEDTIME
Qty: 0 | Refills: 0 | Status: DISCONTINUED | OUTPATIENT
Start: 2019-04-15 | End: 2019-04-15

## 2019-04-15 RX ADMIN — TAMSULOSIN HYDROCHLORIDE 0.4 MILLIGRAM(S): 0.4 CAPSULE ORAL at 22:31

## 2019-04-15 RX ADMIN — Medication 125 MILLIGRAM(S): at 11:26

## 2019-04-15 RX ADMIN — Medication 5 MILLIGRAM(S): at 05:38

## 2019-04-15 RX ADMIN — SODIUM CHLORIDE 70 MILLILITER(S): 9 INJECTION, SOLUTION INTRAVENOUS at 22:31

## 2019-04-15 RX ADMIN — Medication 125 MILLIGRAM(S): at 05:38

## 2019-04-15 RX ADMIN — ATORVASTATIN CALCIUM 20 MILLIGRAM(S): 80 TABLET, FILM COATED ORAL at 22:31

## 2019-04-15 RX ADMIN — Medication 1 TABLET(S): at 14:43

## 2019-04-15 RX ADMIN — Medication 1 TABLET(S): at 22:31

## 2019-04-15 RX ADMIN — Medication 125 MILLIGRAM(S): at 23:19

## 2019-04-15 RX ADMIN — PANTOPRAZOLE SODIUM 40 MILLIGRAM(S): 20 TABLET, DELAYED RELEASE ORAL at 06:34

## 2019-04-15 RX ADMIN — WARFARIN SODIUM 4 MILLIGRAM(S): 2.5 TABLET ORAL at 22:31

## 2019-04-15 RX ADMIN — Medication 1 TABLET(S): at 05:38

## 2019-04-15 RX ADMIN — CEFTRIAXONE 100 GRAM(S): 500 INJECTION, POWDER, FOR SOLUTION INTRAMUSCULAR; INTRAVENOUS at 05:38

## 2019-04-15 RX ADMIN — Medication 125 MILLIGRAM(S): at 17:48

## 2019-04-15 NOTE — PROGRESS NOTE ADULT - ASSESSMENT
74y female, s/p coiling for SAH a few weeks ago, with hospital course was complicated by PE, C diff and E coli cystitis with neurogenic bladder.   Sent to rehab with a youssef, that was removed a few days PTA.  Developed fever with high bladder volumes and is now admitted with klebsiella bacteremia. Youssef reinserted & started on Zosyn + prophylactic vancomycin.  Initial fever and leukocytosis had moderated  Urine cx with klebsiella and enterobacter  Blood isolate is klebsiella,   Youssef for retention  She now has intermittent fever, non focal exam  Renal sono was without hydro or obstructing stones, b/l calculi seen  I would include non infectious fever in differential  Repeat 4/12 blood cultures are negative  Ct of A/P negative for source of fever, no evidence of obstruction or perinephric abscess  Recommendations:  Continue CTX - day 8/10  Continue po vanco for cdif relapse prophylaxis , until antibiotic course completed  Follow low grade temps  DVT addressed by coumadin  From ID viewpoint drainage of bladder is critical, I do not think youssef is accounting for fever   and daughter's questions answered at bedside.  No ID contraindications to transfer to rehab

## 2019-04-15 NOTE — CONSULT NOTE ADULT - REASON FOR ADMISSION
Sepsis
Area H Indication Text: Tumors in this location are included in Area H (eyelids, eyebrows, nose, lips, chin, ear, pre-auricular, post-auricular, temple, genitalia, hands, feet, ankles and areola).  Tissue conservation is critical in these anatomic locations.

## 2019-04-15 NOTE — PROGRESS NOTE ADULT - SUBJECTIVE AND OBJECTIVE BOX
Subjective  Comfortable  No complaints overnight  Objective  Vitals reviewed  Gen NAD  Pulm CTA  CVS RRR  Abdo Soft   Ext No Edema  Assessment and plan  74F from rehab, SP SAH, PE, C diff and E coli cystitis with neurogenic bladder.   Admit for Klebsiella Bacteremia. Severe Sepsis  Intermittent fevers    1)Septecemia  Per ID, Continue Rocephin for 2 more day (17th of April)  2)Fevers  Multifactorial, however patient appears comfortable, no signs of systemic  infection. Has DVT, PE and recent SAH. Non infectious fever possible. ID concurs  3)Urolithiasis  1.6cm and 0.9cm stones. According to patient, had urologist as outpatient and has past  a uric acid stone. Urology consulted. On Flomax for the time being  4)DVT  Already on coumadin in regards to PE  Continue Anticoagulation.     May be followed on rehab

## 2019-04-15 NOTE — PROGRESS NOTE ADULT - SUBJECTIVE AND OBJECTIVE BOX
CC: f/u for Klebsiella bactermia    Patient reports: no complaints    REVIEW OF SYSTEMS:  All other review of systems negative (Comprehensive ROS)    Antimicrobials Day #  :day 8/10  cefTRIAXone   IVPB 1 Gram(s) IV Intermittent every 24 hours  vancomycin    Solution 125 milliGRAM(s) Oral every 6 hours    Other Medications Reviewed    T(F): 98.9 (04-15-19 @ 15:58), Max: 99.5 (04-15-19 @ 05:17)  HR: 77 (04-15-19 @ 15:58)  BP: 119/63 (04-15-19 @ 15:58)  RR: 15 (04-15-19 @ 15:58)  SpO2: 97% (04-15-19 @ 15:58)  Wt(kg): --    PHYSICAL EXAM:  General: alert, no acute distress  Eyes:  anicteric, no conjunctival injection, no discharge  Oropharynx: no lesions or injection 	  Neck: supple, without adenopathy  Lungs: clear to auscultation  Heart: regular rate and rhythm; no murmur, rubs or gallops  Abdomen: soft, nondistended, nontender, without mass or organomegaly  Skin: no lesions  Extremities: no clubbing, cyanosis, or edema  Neurologic: alert, oriented, moves all extremities  : youssef  LAB RESULTS:                        8.9    11.9  )-----------( 479      ( 15 Apr 2019 06:20 )             27.6     04-14    136  |  102  |  15  ----------------------------<  100<H>  4.1   |  24  |  0.87    Ca    8.9      14 Apr 2019 06:15          MICROBIOLOGY:  RECENT CULTURES:  04-12 @ 10:35 .Blood Blood     No growth to date.      04-12 @ 10:25 .Blood Blood     No growth to date.          RADIOLOGY REVIEWED:  < from: CT Abdomen and Pelvis No Cont (04.14.19 @ 14:08) >  IMPRESSION:     No acute pathology.  Numerous bilateral nonobstructing kidney stones.    < end of copied text >

## 2019-04-15 NOTE — CONSULT NOTE ADULT - SUBJECTIVE AND OBJECTIVE BOX
HPI:   Patient is a 74y female who had a sah, s/p coiling a few weeks ago. Her course was complicated by PE, cdif and ecoli cystitis with neurogenic bladder. She required youssef. She was sent to rehab last week from [facility] and she had her youssef removed a few days ago. She then developed fever with high bladder volumes and is now known to have klebsiella bacteremia. She had the youssef reinserted and is on zosyn feeling much better. She has no n,v,d,cp but did have abdomen pain this am with 1000 cc in the bladder.     REVIEW OF SYSTEMS:  All other review of systems negative (Comprehensive ROS)    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  Dyslipidemia  HTN (Hypertension)  Status post coil embolization of cerebral aneurysm  Status Post Total Knee Replace: left      Allergies    No Known Allergies    Intolerances        Antimicrobials Day #  :  amiKACIN  IVPB 500 milliGRAM(s) IV Intermittent once  piperacillin/tazobactam IVPB. 3.375 Gram(s) IV Intermittent every 8 hours  vancomycin    Solution 125 milliGRAM(s) Oral every 6 hours    Other Medications:  acetaminophen   Tablet .. 975 milliGRAM(s) Oral every 6 hours PRN  atorvastatin 20 milliGRAM(s) Oral at bedtime  enalapril 5 milliGRAM(s) Oral every 12 hours  lactated ringers. 1000 milliLiter(s) IV Continuous <Continuous>  lactobacillus acidophilus 1 Tablet(s) Oral three times a day  pantoprazole    Tablet 40 milliGRAM(s) Oral before breakfast  tamsulosin 0.4 milliGRAM(s) Oral at bedtime  warfarin 2 milliGRAM(s) Oral once      FAMILY HISTORY:      SOCIAL HISTORY:  Smoking: [ ]Yes [ x]No  ETOH: [ ]Yes [x ]No  Drug Use: [ ]Yes [ ]xNo   [x ] Single[ ]    T(F): 98.8 (19 @ 17:00), Max: 102.7 (19 @ 19:40)  HR: 103 (19 @ 17:00)  BP: 110/64 (19 @ 17:00)  RR: 18 (19 @ 17:00)  SpO2: 97% (19 @ 17:00)  Wt(kg): --    PHYSICAL EXAM:  General: alert, no acute distress  Eyes:  anicteric, no conjunctival injection, no discharge  Oropharynx: no lesions or injection 	  Neck: supple, without adenopathy  Lungs: clear to auscultation  Heart: regular rate and rhythm; no murmur, rubs or gallops  Abdomen: soft, nondistended, nontender, without mass or organomegaly  Skin: no lesions  Extremities: no clubbing, cyanosis, or edema  Neurologic: alert, oriented, moves all extremities    LAB RESULTS:                        9.9    13.4  )-----------( 297      ( 2019 05:00 )             30.1     04-08    139  |  102  |  20  ----------------------------<  111<H>  4.0   |  28  |  1.10    Ca    8.6      2019 05:00  Phos  3.7     -  Mg     1.8         TPro  7.8  /  Alb  3.2<L>  /  TBili  0.6  /  DBili  x   /  AST  22  /  ALT  33  /  AlkPhos  88      LIVER FUNCTIONS - ( 2019 12:52 )  Alb: 3.2 g/dL / Pro: 7.8 g/dL / ALK PHOS: 88 U/L / ALT: 33 U/L DA / AST: 22 U/L / GGT: x           Urinalysis Basic - ( 2019 10:45 )    Color: Yellow / Appearance: Clear / S.010 / pH: x  Gluc: x / Ketone: Negative  / Bili: Negative / Urobili: Negative   Blood: x / Protein: 15 / Nitrite: Negative   Leuk Esterase: Moderate / RBC: 0-4 /HPF / WBC 3-5 /HPF   Sq Epi: x / Non Sq Epi: Neg.-Few / Bacteria: Trace /HPF        MICROBIOLOGY:  RECENT CULTURES:   @ 12:52 .Blood Blood-Peripheral Blood Culture PCR    Growth in anaerobic bottle: Gram Negative Rods  "Due to technical problems, Proteus sp. will Not be reported as part of  the BCID panel until further notice"  ***Blood Panel PCR results on this specimen are available  approximately 3 hours after the Gram stain result.***  Gram stain, PCR, and/or culture results may not always  correspond due to difference in methodologies.  ************************************************************  This PCR assay was performed using [employer].  The following targets are tested for: Enterococcus,  vancomycin resistant enterococci, Listeria monocytogenes,  coagulase negative staphylococci, S. aureus,  methicillin resistant S. aureus, Streptococcus agalactiae  (Group B), S. pneumoniae, S. pyogenes (Group A),  Acinetobacter baumannii, Enterobacter cloacae, E. coli,  Klebsiella oxytoca, K. pneumoniae, Proteus sp.,  Serratia marcescens, Haemophilus influenzae,  Neisseria meningitidis, Pseudomonas aeruginosa, Candida  albicans, C. glabrata, C krusei, C parapsilosis,  C. tropicalis and the KPC resistance gene.  Growth in aerobic bottle: Gram Negative Rods    Growth in anaerobic bottle: Gram Negative Rods  Growth in aerobic bottle: Gram Negative Rods    RADIOLOGY REVIEWED:    < from: US Renal (19 @ 10:55) >  IMPRESSION:     Bilateral renal calculi present which appears to be within the left renal   pelvis with associated pelvocaliectasis. A CT can be obtained for better   delineation.    < end of copied text >  < from: CT Angio Chest w/ IV Cont (19 @ 15:51) >    IMPRESSION: Right upper lobe pulmonary embolus.    < end of copied text >    Impression:  Patient with recent sah from aneurysm rupture s/p coil, post op cdif, urinary retention from neurogenic bladder, cystitis, PE went to rehab, youssef pulled and within a day or 2 developed severe sepsis from klebsiella bacteremia of gu source with ongoing urinary retention. She is now doing better. I would not remove her youssef at this time.   Recommendations:  continue zosyn, give amikacin for a dose   await organism sensitivity  continue youssef  will start po vanco for cdif relapse prophylaxis attempt  supportive care per ccu  repeat blood cutlures
History of Present Illness:  74y.o. Female s/p recent coil embolization of a cerebral aneurysm  had a PE 3/19 and was started on Coumadin at that time. She was transferred to the rehab unit and developed fevers  and was admitted to ICU for urinary sepsis.  Because of ongoing fevers  she had a venous doppler that showed a small DVT in the left peroneal vein. She denies LE pain or swelling.    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  Dyslipidemia  HTN (Hypertension)  Status post coil embolization of cerebral aneurysm  Status Post Total Knee Replace: left      Allergies    No Known Allergies    Intolerances        MEDICATIONS  (STANDING):  atorvastatin 20 milliGRAM(s) Oral at bedtime  cefTRIAXone   IVPB 1 Gram(s) IV Intermittent every 24 hours  enalapril 5 milliGRAM(s) Oral every 12 hours  lactobacillus acidophilus 1 Tablet(s) Oral three times a day  pantoprazole    Tablet 40 milliGRAM(s) Oral before breakfast  sodium chloride 0.45%. 1000 milliLiter(s) (70 mL/Hr) IV Continuous <Continuous>  sodium chloride 0.9% Bolus 500 milliLiter(s) IV Bolus once  tamsulosin 0.4 milliGRAM(s) Oral at bedtime  vancomycin    Solution 125 milliGRAM(s) Oral every 6 hours    MEDICATIONS  (PRN):  acetaminophen   Tablet .. 975 milliGRAM(s) Oral every 6 hours PRN Temp greater or equal to 38C (100.4F), Mild Pain (1 - 3), Moderate Pain (4 - 6)      Social History:  Smoking History:denies  Alcohol Use: denies    REVIEW OF SYSTEMS:  CONSTITUTIONAL: No weakness. ++ fevers.   EYES/ENT: No visual changes;  No vertigo or throat pain   NECK: No pain or stiffness  RESPIRATORY: No cough, wheezing, hemoptysis; No shortness of breath  CARDIOVASCULAR: No chest pain or palpitations  GASTROINTESTINAL: No abdominal or epigastric pain. No nausea, vomiting, or hematemesis; No diarrhea or constipation. No melena or hematochezia.  GENITOURINARY: No dysuria, frequency or hematuria.++ renal stones  NEUROLOGICAL: No numbness or weakness  SKIN: No itching, burning, rashes, or lesions   Vascular:  No lower extremity claudication, pedal rest pain or digital ulcers. No calf tenderness or swelling  All other review of systems is negative unless indicated above.    PHYSICAL EXAM:  General:  On exam, the patient is alert nontoxic appearing Female in no acute distress.  Vital Signs Last 24 Hrs  T(C): 37.5 (15 Apr 2019 05:17), Max: 38.8 (14 Apr 2019 12:40)  T(F): 99.5 (15 Apr 2019 05:17), Max: 101.8 (14 Apr 2019 12:40)  HR: 96 (15 Apr 2019 05:17) (86 - 104)  BP: 149/93 (15 Apr 2019 05:17) (98/65 - 149/93)  BP(mean): --  RR: 15 (15 Apr 2019 05:17) (14 - 15)  SpO2: 95% (15 Apr 2019 05:17) (95% - 96%)    Neck:  4+/4+ bilateral carotid pulses; no carotid bruit, no palpable cervical masses.  Heart:  Regular, no murmurs, rubs or gallops.    Lungs:  Clear to auscultation.    Chest:  No chest wall deformities  Symmetrical chest expansion.   Abdomen: Soft and nontender.  No palpable masses.  No rebound, guarding or rigidity.  Extremities:  Feet are warm, pink with normal capillary refill times.  There are no digital ulcers or heel decubiti.  The calf and thigh muscles are soft and nontender.  There are no palpable cords or limb cellulitis.  Rohan's sign is negative bilaterally.  There is no lower extremity edema, cyanosis, or rubor.  On examination of the peripheral pulses:  Left leg:  femoral pulse is 4/4  , popliteal pulse is 4/4   ,PT Pulse is 4/4   , DP Pulse is 4/4   Right leg : femoral pulse is 4/4   ,popliteal pulse is  4/4  , PT Pulse is 4/4  , DP Pulse is 4/4   Neurological:  There are no motor or sensory deficits in either lower extremity.                          8.9    11.9  )-----------( 479      ( 15 Apr 2019 06:20 )             27.6     04-14    136  |  102  |  15  ----------------------------<  100<H>  4.1   |  24  |  0.87    Ca    8.9      14 Apr 2019 06:15          PT/INR - ( 15 Apr 2019 06:20 )   PT: 24.7 sec;   INR: 2.15 ratio         PTT - ( 15 Apr 2019 06:20 )  PTT:30.3 sec    Radiology:
Reason for Admission: s/p SAH/Acomm aneurysm coiling now with functional deficits	  History of Present Illness: 	  74 yr old Female c PMHx of HTN on ASA (? indication) presents to ED s/p syncope and 3 day hx of neck pain on 3/4/19. Admitted to [facility] where admission workup revealed large SAH on CT brain.  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 Hospital course notable for cdiff diarrhea evaluated by ID and treated with course of Flagyl and Vancomycin; also urinary retention (youssef was reinserted on 3/26/19), failed multiple TOVs, concurrent E coli UTI was discovered on 3/27/19 and treated with fosfomycin. On 3/18/19 patient developed dyspnea. CTA chest revealed RUL PE. Started on heparin gtt 3/18/19 and bridged to coumadin.     *Will need urodynamics and outpatient urology follow up   Admitted to [facility]'s acute IPR on 3/29/19.    On 4/7, RRT called for fever 102.7'F and hypoxia with SpO2 80's. Patient was started on empiric antibiotics Zosyn, Vanco IV x 1 dose, and PO vanco was restarted per ID consultation recommendations. Labs were sent, found to have new leukocytosis WBC 21, normal lactate 1.8, procalcitonin 2.23, and TEREZA. Indwelling youssef was discontinued, straight cath revealed +UA. Chest xray negative. During the course of the day, patient had received 1.5L bolus and maintenance fluids at 75cc/hr. Patient subsequently became hypotensive overnight with BP 80/50. Patient was given 500cc bolus and BP improved to 109/68. Patient was admitted to ICU for further management of sepsis.  ID diagnoses her with klebsiella bacteremia. Youssef reinserted & started on Zosyn + prophylactic vancomycin (for cdiff). ID recommends to follow repeat blood cultures.    Patient has been seen by PT. She is requiring min A for transfers and ambulation about 40ft with RW.   PM&R consulted to weigh in on disposition planning.         Review of Systems: REVIEW OF SYSTEMS:  CONSTITUTIONAL: No fever, weight loss. + fatigue  EYES: No eye pain, visual disturbances, or discharge  ENMT:  No difficulty hearing, tinnitus, vertigo; No sinus or throat pain  NECK: No pain or stiffness  BREASTS: No pain, masses, or nipple discharge  RESPIRATORY: No cough, wheezing, chills or hemoptysis; No shortness of breath  CARDIOVASCULAR: No chest pain, palpitations, dizziness, or leg swelling  GASTROINTESTINAL: No abdominal or epigastric pain. No nausea, vomiting, or hematemesis; No diarrhea or constipation. No melena or hematochezia.  GENITOURINARY: No dysuria, frequency, hematuria, +youssef  NEUROLOGICAL: No headaches, memory loss, loss of strength, numbness, or tremors  SKIN: No itching, burning, rashes, or lesions   LYMPH NODES: No enlarged glands  ENDOCRINE: No heat or cold intolerance; No hair loss  MUSCULOSKELETAL: No joint pain or swelling; No muscle, back, or extremity pain  PSYCHIATRIC: No depression, anxiety, mood swings, or difficulty sleeping  HEME/LYMPH: No easy bruising, or bleeding gums ALLERY AND IMMUNOLOGIC: No hives or eczema	      Allergies and Intolerances:   	No Known Allergies         Past Medical, Past Surgical, and Family History:  PAST MEDICAL HISTORY:  Dyslipidemia     HTN (Hypertension).     PAST SURGICAL HISTORY:  Status Post Total Knee Replace left.       Social History:   No tobacco, alcohol or drug use   Functional History  Lives with  in co-op, 3 steps to enter with handrails, then 12 steps up to living quarters +HR  Independent prior, no assistive device,  retired able to help, daughter lives nearby   Current Functional Status  Min assist with bed mobility and transfers Supervision level with gait RW 25ft x2	         Physical Exam:  Vital Signs Last 24 Hrs T(C): 36.8 (10 Apr 2019 11:05), Max: 37.4 (09 Apr 2019 15:51) T(F): 98.2 (10 Apr 2019 11:05), Max: 99.4 (09 Apr 2019 15:51) HR: 92 (10 Apr 2019 11:05) (92 - 99) BP: 132/95 (10 Apr 2019 11:05) (122/75 - 152/87) RR: 16 (10 Apr 2019 11:05) (15 - 18) SpO2: 96% (10 Apr 2019 11:05) (94% - 96%)   ----------------------------------------------------------------------------------------  PHYSICAL EXAM  Constitutional - NAD, Comfortable  HEENT - NCAT, EOMI  Neck - Supple, No limited ROM  Chest - CTA  Cardiovascular - S1S2   Abdomen - Soft   Extremities - No C/C/E, No calf tenderness   Neurologic Exam -                    Cognitive - Awake, Alert, AAO to self, place, date     Communication - Fluent, No dysarthria, naming and repetition intact     Memory - 0/3 word recall      Attention- able to name days of week backwards     Cranial Nerves - CN 2-12 intact     Motor - No focal deficits                    LEFT    UE - ShAB 5/5, EF 5/5, EE 5/5, WE 4/5,  5/5                    RIGHT UE - ShAB 5/5, EF 5/5, EE 5/5, WE 4/5,  5/5                    LEFT    LE - HF 5/5, KE 5/5, DF 5/5, PF 5/5                    RIGHT LE - HF 5/5, KE 5/5, DF 5/5, PF 5/5        Sensory - Intact to LT     Reflexes - DTR Intact, No primitive reflexive     Coordination - FTN intact  Psychiatric - Mood stable, Affect WNL 	       Labs and Results:  MICROBIOLOGY: RECENT CULTURES: 04-08 @ 10:45 .Urine Catheterized    No growth   04-07 @ 19:10 .Urine Catheterized    50,000 - 99,000 CFU/mL Gram Negative Rods   04-07 @ 12:52 .Blood Blood-Peripheral Blood Culture PCR   Growth in aerobic and anaerobic bottles: Gram Negative Rods Identification and susceptibility to follow.  Growth in anaerobic bottle: Gram Negative Rods Growth in aerobic bottle: Gram Negative Rods  04-10  136  |  100  |  9  ----------------------------<  99 3.9   |  25  |  0.90  Ca    8.6      10 Apr 2019 06:30 Phos  3.1     04-09 Mg     1.7     04-09                       9.3   8.2   )-----------( 350      ( 10 Apr 2019 06:30 )            27.6

## 2019-04-15 NOTE — CONSULT NOTE ADULT - ASSESSMENT
74 y.o. female s/p coil embolization of a cerebral aneurysm with a post procedure PE  has a  small DVT of the left peroneal vein. I do not believe this is causing her fevers. I would recommend AC therapy with Coumadin for 3-6 months and keep the INR around 2.0. I would recommend a repeat venous doppler in 3 months. The patient was given informed consent.

## 2019-04-16 ENCOUNTER — TRANSCRIPTION ENCOUNTER (OUTPATIENT)
Age: 74
End: 2019-04-16

## 2019-04-16 LAB
INR BLD: 1.92 RATIO — HIGH (ref 0.88–1.16)
PROTHROM AB SERPL-ACNC: 21.9 SEC — HIGH (ref 10–12.9)

## 2019-04-16 PROCEDURE — 99232 SBSQ HOSP IP/OBS MODERATE 35: CPT

## 2019-04-16 RX ORDER — WARFARIN SODIUM 2.5 MG/1
5 TABLET ORAL ONCE
Qty: 0 | Refills: 0 | Status: COMPLETED | OUTPATIENT
Start: 2019-04-16 | End: 2019-04-16

## 2019-04-16 RX ORDER — ATORVASTATIN CALCIUM 80 MG/1
20 TABLET, FILM COATED ORAL AT BEDTIME
Qty: 0 | Refills: 0 | Status: DISCONTINUED | OUTPATIENT
Start: 2019-04-17 | End: 2019-04-26

## 2019-04-16 RX ORDER — LISINOPRIL 2.5 MG/1
1 TABLET ORAL
Qty: 0 | Refills: 0 | COMMUNITY
Start: 2019-04-16

## 2019-04-16 RX ORDER — DOCUSATE SODIUM 100 MG
100 CAPSULE ORAL
Qty: 0 | Refills: 0 | Status: DISCONTINUED | OUTPATIENT
Start: 2019-04-17 | End: 2019-04-26

## 2019-04-16 RX ORDER — LISINOPRIL 2.5 MG/1
5 TABLET ORAL DAILY
Qty: 0 | Refills: 0 | Status: DISCONTINUED | OUTPATIENT
Start: 2019-04-16 | End: 2019-04-17

## 2019-04-16 RX ORDER — CEFTRIAXONE 500 MG/1
1 INJECTION, POWDER, FOR SOLUTION INTRAMUSCULAR; INTRAVENOUS
Qty: 0 | Refills: 0 | COMMUNITY
Start: 2019-04-16

## 2019-04-16 RX ORDER — PANTOPRAZOLE SODIUM 20 MG/1
40 TABLET, DELAYED RELEASE ORAL
Qty: 0 | Refills: 0 | Status: DISCONTINUED | OUTPATIENT
Start: 2019-04-17 | End: 2019-04-26

## 2019-04-16 RX ORDER — TAMSULOSIN HYDROCHLORIDE 0.4 MG/1
0.4 CAPSULE ORAL AT BEDTIME
Qty: 0 | Refills: 0 | Status: DISCONTINUED | OUTPATIENT
Start: 2019-04-17 | End: 2019-04-26

## 2019-04-16 RX ADMIN — Medication 1 TABLET(S): at 21:24

## 2019-04-16 RX ADMIN — PANTOPRAZOLE SODIUM 40 MILLIGRAM(S): 20 TABLET, DELAYED RELEASE ORAL at 06:11

## 2019-04-16 RX ADMIN — Medication 125 MILLIGRAM(S): at 12:35

## 2019-04-16 RX ADMIN — Medication 1 TABLET(S): at 06:11

## 2019-04-16 RX ADMIN — ATORVASTATIN CALCIUM 20 MILLIGRAM(S): 80 TABLET, FILM COATED ORAL at 21:24

## 2019-04-16 RX ADMIN — TAMSULOSIN HYDROCHLORIDE 0.4 MILLIGRAM(S): 0.4 CAPSULE ORAL at 21:24

## 2019-04-16 RX ADMIN — WARFARIN SODIUM 5 MILLIGRAM(S): 2.5 TABLET ORAL at 21:24

## 2019-04-16 RX ADMIN — Medication 1 TABLET(S): at 14:40

## 2019-04-16 RX ADMIN — LISINOPRIL 5 MILLIGRAM(S): 2.5 TABLET ORAL at 12:35

## 2019-04-16 RX ADMIN — Medication 125 MILLIGRAM(S): at 06:11

## 2019-04-16 RX ADMIN — Medication 125 MILLIGRAM(S): at 17:54

## 2019-04-16 RX ADMIN — Medication 5 MILLIGRAM(S): at 06:11

## 2019-04-16 RX ADMIN — CEFTRIAXONE 100 GRAM(S): 500 INJECTION, POWDER, FOR SOLUTION INTRAMUSCULAR; INTRAVENOUS at 06:11

## 2019-04-16 NOTE — PROGRESS NOTE ADULT - ASSESSMENT
Retention / neurogenic bladder. UTI treated. Going back to rehab.     PLAN:  - continue youssef  - delayed void trial

## 2019-04-16 NOTE — PROGRESS NOTE ADULT - ASSESSMENT
74y female, s/p coiling for SAH a few weeks ago, with hospital course was complicated by PE, C diff and E coli cystitis with neurogenic bladder.   Sent to rehab with a youssef, that was removed a few days PTA.  Developed fever with high bladder volumes and is now admitted with klebsiella bacteremia. Youssef reinserted & started on Zosyn + prophylactic vancomycin.  Initial fever and leukocytosis had moderated  Urine cx with klebsiella and enterobacter  Blood isolate is klebsiella,   Youssef for retention  She now has intermittent fever, non focal exam  Renal sono was without hydro or obstructing stones, b/l calculi seen  I would include non infectious fever in differential  Repeat 4/12 blood cultures are negative  Ct of A/P negative for source of fever, no evidence of obstruction or perinephric abscess    Recommendations:  Continue CTX - day 9 of 10 as planned   Continue po vanco for cdif relapse prophylaxis , until antibiotic course completed as planned   From ID viewpoint drainage of bladder is most important   continue supportive care as per medicine and urology

## 2019-04-16 NOTE — PROGRESS NOTE ADULT - SUBJECTIVE AND OBJECTIVE BOX
CC: f/u for Klebsiella bactermia, bacteremia has resolved     Patient reports feeling OK she remains with a youssef catheter in place     REVIEW OF SYSTEMS:  All other review of systems negative (Comprehensive ROS)    Antimicrobials Day #  :day 9 of 10  cefTRIAXone   IVPB 1 Gram(s) IV Intermittent every 24 hours  vancomycin    Solution 125 milliGRAM(s) Oral every 6 hours    Other Medications Reviewed    Vital Signs Last 24 Hrs  T(C): 36.7 (16 Apr 2019 05:29), Max: 37.2 (15 Apr 2019 15:58)  T(F): 98.1 (16 Apr 2019 05:29), Max: 98.9 (15 Apr 2019 15:58)  HR: 87 (16 Apr 2019 05:29) (77 - 87)  BP: 154/98 (16 Apr 2019 05:29) (104/61 - 154/98)  BP(mean): --  RR: 15 (16 Apr 2019 05:29) (14 - 15)  SpO2: 98% (16 Apr 2019 05:29) (97% - 98%)    PHYSICAL EXAM:  General: alert, no acute distress  Eyes:  anicteric, no conjunctival injection, no discharge  Oropharynx: no lesions or injection 	  Neck: supple, without adenopathy  Lungs: clear to auscultation  Heart: regular rate and rhythm; no murmur, rubs or gallops  Abdomen: soft, nondistended, nontender, without mass or organomegaly  Skin: no lesions  Extremities: no clubbing, cyanosis, or edema  Neurologic: alert, oriented, moves all extremities  : youssef  LAB RESULTS:                                   8.9    11.9  )-----------( 479      ( 15 Apr 2019 06:20 )             27.6       no new chem today labs         MICROBIOLOGY:  RECENT CULTURES:  04-12 @ 10:35 .Blood Blood     No growth to date.      04-12 @ 10:25 .Blood Blood     No growth to date.          RADIOLOGY REVIEWED:  < from: CT Abdomen and Pelvis No Cont (04.14.19 @ 14:08) >  IMPRESSION:     No acute pathology.  Numerous bilateral nonobstructing kidney stones.    < end of copied text >

## 2019-04-16 NOTE — DISCHARGE NOTE PROVIDER - NSDCCPCAREPLAN_GEN_ALL_CORE_FT
PRINCIPAL DISCHARGE DIAGNOSIS  Diagnosis: Septic shock  Assessment and Plan of Treatment:       SECONDARY DISCHARGE DIAGNOSES  Diagnosis: Urolithiasis  Assessment and Plan of Treatment:

## 2019-04-16 NOTE — DISCHARGE NOTE PROVIDER - HOSPITAL COURSE
74F from [facility], HTN, HLD     Recent admit for SAH requiring coil, CDiff Collitis, Urinary retention requiring youssef, ESBL UTI    PE on coumadin    Admit for high fever, hypotension, sepsis    Exam showed elderly female    With youssef catheter    2+ edema lower extremity BL    Labs showed elevated WBC and lactate    Admitted to ICU    Started on ABX    Blood cultures grew klebsiella    ABX broadened to vanco/zosyn    Vasoplegia resolved    Downgraded to floor    Patient comfortable, but occasional fevers    ABX downgraded    ID decided fever not related to infection.     Lower extremity DVT. possible present before    Also, BL renal calculi 1.6cm and 0.9mm. Per patient, this was determained to be uric acid stones    Will defer to urology in regards to starting urate lowering tehrapy    Rocephin to be discontinued on 17th of april

## 2019-04-16 NOTE — PROGRESS NOTE ADULT - SUBJECTIVE AND OBJECTIVE BOX
Retention/UTI.    Feels well. Low draining well. Urine clear. +DVT +PE    Going back to rehab today.      ROS  General: no fever or chills      VITAL SIGNS  Vital Signs Last 24 Hrs  T(C): 36.7 (16 Apr 2019 05:29), Max: 37.2 (15 Apr 2019 15:58)  T(F): 98.1 (16 Apr 2019 05:29), Max: 98.9 (15 Apr 2019 15:58)  HR: 87 (16 Apr 2019 05:29) (77 - 87)  BP: 154/98         PHYSICAL EXAM:  General: awake and alert  : urine clear      LABS:                        8.9    11.9  )-----------( 479      ( 15 Apr 2019 06:20 )             27.6                 Blood Culture:   Radiology:    Prior notes/chart reviewed.

## 2019-04-16 NOTE — H&P ADULT - ATTENDING COMMENTS
75 yo female s/p Acomm aneurysm coiling, recent Klebsiella bacteriemia/ urosepsis, urinary retention , Low cath placement, history of Cdiff colitis while on ABx,  acute LE DVT on AC/Coumadin, now with functional and cognitive deficits.    Patient examined and chart reviewed  Admit to [facility]  Resume all medications  AC/Coumadin, goal INT 2-3.  Fall precautions  Low cath, anticipate voiding trial before discharge 75 yo female s/p Acomm aneurysm coiling, recent Klebsiella bacteriemia/ urosepsis, urinary retention , Low cath placement, history of Cdiff colitis while on ABx,  acute Left LE DVT on AC/Coumadin, now with functional and cognitive deficits.    Patient examined and chart reviewed  Admit to BIU  Resume all medications  AC/Coumadin, goal INT 2-3.  Fall precautions  Low cath, anticipate voiding trial before discharge

## 2019-04-16 NOTE — H&P ADULT - NSHPREVIEWOFSYSTEMS_GEN_ALL_CORE
REVIEW OF SYSTEMS:  CONSTITUTIONAL: No fever, weight loss, or fatigue  EYES: No eye pain, visual disturbances, or discharge  ENMT:  No difficulty hearing, tinnitus, vertigo; No sinus or throat pain  NECK: No pain or stiffness  BREASTS: No pain, masses, or nipple discharge  RESPIRATORY: No cough, wheezing, chills or hemoptysis; No shortness of breath  CARDIOVASCULAR: No chest pain, palpitations, dizziness, or leg swelling  GASTROINTESTINAL: No abdominal or epigastric pain. No nausea, vomiting, or hematemesis; No diarrhea or constipation. No melena or hematochezia.  GENITOURINARY: No dysuria, frequency, hematuria, or incontinence  NEUROLOGICAL: No headaches, memory loss, loss of strength, numbness, or tremors  SKIN: No itching, burning, rashes, or lesions   LYMPH NODES: No enlarged glands  ENDOCRINE: No heat or cold intolerance; No hair loss  MUSCULOSKELETAL: No joint pain or swelling; No muscle, back, or extremity pain  PSYCHIATRIC: No depression, anxiety, mood swings, or difficulty sleeping  HEME/LYMPH: No easy bruising, or bleeding gums  ALLERY AND IMMUNOLOGIC: No hives or eczema REVIEW OF SYSTEMS:  CONSTITUTIONAL: No fever, weight loss, or fatigue  EYES: No eye pain, visual disturbances, or discharge  ENMT:  No difficulty hearing, tinnitus, vertigo; No sinus or throat pain  NECK: No pain or stiffness  BREASTS: No pain, masses, or nipple discharge  RESPIRATORY: No cough, wheezing, chills or hemoptysis; No shortness of breath  CARDIOVASCULAR: No chest pain, palpitations, dizziness, or leg swelling  GASTROINTESTINAL: No abdominal or epigastric pain. No nausea, vomiting, or hematemesis; No diarrhea or constipation. No melena or hematochezia.  GENITOURINARY: No dysuria, frequency, hematuria, or incontinence. ++retention.  NEUROLOGICAL: No headaches, ++memory loss, ++loss of strength, numbness, or tremors  SKIN: No itching, burning, rashes, or lesions   LYMPH NODES: No enlarged glands  ENDOCRINE: No heat or cold intolerance; No hair loss  MUSCULOSKELETAL: No joint pain or swelling; No muscle, back, or extremity pain  PSYCHIATRIC: No depression, anxiety, mood swings, or difficulty sleeping  HEME/LYMPH: No easy bruising, or bleeding gums  ALLERY AND IMMUNOLOGIC: No hives or eczema

## 2019-04-16 NOTE — H&P ADULT - HISTORY OF PRESENT ILLNESS
74 yr old Female c PMHx of HTN on ASA (? indication) presents to ED s/p syncope and 3 day hx of neck pain on 3/4/19. Admitted to [facility] where admission workup revealed large SAH on CT brain.  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 Hospital course notable for cdiff diarrhea evaluated by ID and treated with course of Flagyl and Vancomycin; also urinary retention (youssef was reinserted on 3/26/19), failed multiple TOVs, concurrent E coli UTI was discovered on 3/27/19 and treated with fosfomycin. On 3/18/19 patient developed dyspnea. CTA chest revealed RUL PE. Started on heparin gtt 3/18/19 and bridged to coumadin. Pt was deemed stable for acute rehab on 3/29. 74 yr old Female c PMHx of HTN on ASA (? indication) presents to ED s/p syncope and 3 day hx of neck pain on 3/4/19. Admitted to [facility] where admission workup revealed large SAH on CT brain.  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 Hospital course notable for cdiff diarrhea evaluated by ID and treated with course of Flagyl and Vancomycin; also urinary retention (youssef was reinserted on 3/26/19), failed multiple TOVs, concurrent E coli UTI was discovered on 3/27/19 and treated with fosfomycin. On 3/18/19 patient developed dyspnea. CTA chest revealed RUL PE. Started on heparin gtt 3/18/19 and bridged to coumadin. Pt was deemed stable for acute rehab on 3/29.  At [facility] rehab patient with 74 yr old Female c PMHx of HTN on ASA (? indication) presents to ED s/p syncope and 3 day hx of neck pain on 3/4/19. Admitted to [facility] where admission workup revealed large SAH on CT brain.  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 Hospital course notable for cdiff diarrhea evaluated by ID and treated with course of Flagyl and Vancomycin; also urinary retention (youssef was reinserted on 3/26/19), failed multiple TOVs, concurrent E coli UTI was discovered on 3/27/19 and treated with fosfomycin. On 3/18/19 patient developed dyspnea. CTA chest revealed RUL PE. Started on heparin gtt 3/18/19 and bridged to coumadin. Pt was deemed stable for acute rehab on 3/29.  On 4/7, RRT called for fever 102.7'F and hypoxia with SpO2 80's. Patient was started on empiric antibiotics Zosyn, Vanco IV x 1 dose, and PO vanco was restarted per ID consultation recommendations. Labs were sent, found to have new leukocytosis WBC 21, TEREZA, +UA. Chest xray negative. Hypotensive with BP 80/50. Received NS boluses. Patient was admitted to ICU for management of sepsis. ID in, treated for klebsiella bacteremia. Youssef reinserted & started on Zosyn + prophylactic vancomycin (for cdiff). ID recommends to follow repeat blood cultures. Intermittent tachycardia and fevers-US LEs +new DVT. Seen by vascular. On Coumadin.   On IV antibiotics, youssef re-inserted.

## 2019-04-16 NOTE — H&P ADULT - ASSESSMENT
73 yo female s/p Acomm aneurysm clipping now with functional and cognitive deficits.     Precautions:              fall, seizure                                                                    Diet: dash    DVT Prophylaxis:         coumadin                                                                 Medical Prognosis: good    Prescreen Comparison: I have reviewed the prescreen information and I found no relevant changes between the preadmission  screening and my post admission evaluation.     Expected Therapy:   P.T.     1   hrs/day           O. T.  1    hrs/day           S.L.P.   1     hrs/day                    P&O     eval                                              Excpected Frequency: 5 days/7 day period    Rehab Potential:       good                                    Estimated Disposition:       home                   ELOS:       10       days      Rationale For Inpatient Rehab Admission- Patient demonstrates the following:     [X] Medically appropriate for rehabilitation admission   [X] Has attainable rehab goals with an approrpriate discharge plan  [X] Has rehabilitation potential (expected to make significant improvement within a reasonable period of time)  [X] Requires close medical management by a rehab physician, rehab nursing care and comprehensive interdisciplinary team (including         PT, OT, SLP and/or prosthetics and orthotics) 75 yo female s/p Acomm aneurysm coiling, recent Klebsiella bacteriemia/ urosepsis, urinary retention , Low cath placement, history of Cdiff colitis while on ABx,  acute LE DVT on AC/Coumadin, now with functional and cognitive deficits.      # s/p Acom aneurysm coiling    PT/OT/ST pogram    Fall precautions    # Urosepsis, s/p Klebsiella bacteremia   Continue IV ABX   Will ask ID and Medicine to comment on duration of therapy  Bacid, h/o C diff      # Urinary retention   Low cath   Flomax  Consider voiding trial in few days    # LE DVT    AC/Coumadin with goal INR 2-3        Precautions:              fall, seizure                                                                    Diet: dash    DVT Prophylaxis:         coumadin                                                                 Medical Prognosis: good    Prescreen Comparison: I have reviewed the prescreen information and I found no relevant changes between the preadmission  screening and my post admission evaluation.     Expected Therapy:   P.T.     1   hrs/day           O. T.  1    hrs/day           S.L.P.   1     hrs/day                    P&O     eval                                              Excpected Frequency: 5 days/7 day period    Rehab Potential:       good                                    Estimated Disposition:       home                   ELOS:       10       days      Rationale For Inpatient Rehab Admission- Patient demonstrates the following:     [X] Medically appropriate for rehabilitation admission   [X] Has attainable rehab goals with an approrpriate discharge plan  [X] Has rehabilitation potential (expected to make significant improvement within a reasonable period of time)  [X] Requires close medical management by a rehab physician, rehab nursing care and comprehensive interdisciplinary team (including         PT, OT, SLP and/or prosthetics and orthotics) 73 yo female s/p Acomm aneurysm coiling, recent Klebsiella bacteriemia/ urosepsis, urinary retention , Low cath placement, history of Cdiff colitis while on ABx,  acute LE DVT on AC/Coumadin, now with functional and cognitive deficits.      # s/p Acom aneurysm coiling    PT/OT/ST pogram    Fall precautions    # Urosepsis, s/p Klebsiella bacteremia   Competed  IV ABX     Continue Bacid, h/o C diff      # Urinary retention   Low cath   Flomax  Consider voiding trial in few days    # LE DVT    AC/Coumadin with goal INR 2-3        Precautions:              fall, seizure                                                                    Diet: dash    DVT Prophylaxis:         coumadin                                                                 Medical Prognosis: good    Prescreen Comparison: I have reviewed the prescreen information and I found no relevant changes between the preadmission  screening and my post admission evaluation.     Expected Therapy:   P.T.     1   hrs/day           O. T.  1    hrs/day           S.L.P.   1     hrs/day                    P&O     eval                                              Excpected Frequency: 5 days/7 day period    Rehab Potential:       good                                    Estimated Disposition:       home                   ELOS:       10       days      Rationale For Inpatient Rehab Admission- Patient demonstrates the following:     [X] Medically appropriate for rehabilitation admission   [X] Has attainable rehab goals with an approrpriate discharge plan  [X] Has rehabilitation potential (expected to make significant improvement within a reasonable period of time)  [X] Requires close medical management by a rehab physician, rehab nursing care and comprehensive interdisciplinary team (including         PT, OT, SLP and/or prosthetics and orthotics)

## 2019-04-16 NOTE — H&P ADULT - NSICDXPASTMEDICALHX_GEN_ALL_CORE_FT
PAST MEDICAL HISTORY:  Clostridium difficile diarrhea     Dyslipidemia     H/O spont subarachnoid intracranial hemorrhage d/t cerebral aneurysm     HTN (Hypertension)     Urinary retention     Urinary tract infection

## 2019-04-16 NOTE — H&P ADULT - NSHPPHYSICALEXAM_GEN_ALL_CORE
PHYSICAL EXAM  Constitutional - NAD, Comfortable  HEENT - NCAT, EOMI  Neck - Supple, No limited ROM  Chest - CTA bilaterally, No wheeze, No rhonchi, No crackles  Cardiovascular - RRR, S1S2, No murmurs  Abdomen - BS+, Soft, NTND  Extremities - No C/C/E, No calf tenderness   Skin-no rash  Woumds-      Neurologic Exam -                   HIF  - Awake, Alert, AAO to self, place, date, year, situation      No aphasia, normal attention, normal concentration, no apraxia, agnosia     Cranial Nerves - CN 2-12 intact     Motor - No focal deficits                            Sensory - Intact to LT,PP,Temprature,JPS, vibration                      Cortical sensory modalities intact     Reflexes - DTR Intact     Toes are flexor     Coordination/dysmetria - FTN intact             Balance - WNL Static and dynamic     Psychiatric - Mood stable, Affect WNL PHYSICAL EXAM  Constitutional - NAD, Comfortable  HEENT - NCAT, EOMI  Neck - Supple, No limited ROM  Chest - CTA bilaterally, No wheeze, No rhonchi, No crackles  Cardiovascular - RRR, S1S2, No murmurs  Abdomen - BS+, Soft, NTND  Extremities - No C/C/E, No calf tenderness   Skin-no rash  Wounds-right groin-healed    Neurologic Exam -                 	   Cognitive - Awake, Alert, AAO to self, place, date, month with choices,  	   Communication - Fluent, No dysarthria, naming and repetition intact  	   Memory - 0/3 word recall even with cues;  	   Attention- able to name days of week backwards, unable to spell WORLD backwards  	   Cranial Nerves - CN 2-12 intact  	   Motor - No focal deficits  	                  LEFT    UE - ShAB 5/5, EF 5/5, EE 5/5, WE 4/5,  5/5  	                  RIGHT UE - ShAB 5/5, EF 5/5, EE 5/5, WE 4/5,  5/5  	                  LEFT    LE - HF 5/5, KE 5/5, DF 5/5, PF 5/5  	                  RIGHT LE - HF 5/5, KE 5/5, DF 5/5, PF 5/5     	   Sensory - Intact to LT  	   Reflexes - DTR Intact, No primitive reflexive  	   Coordination - FTN intact  	Psychiatric - Mood stable, Affect WNL PHYSICAL EXAM  Constitutional - NAD, Comfortable  HEENT - NCAT, EOMI  Neck - Supple, No limited ROM  Chest - CTA bilaterally, No wheeze, No rhonchi, No crackles  Cardiovascular - RRR, S1S2, No murmurs  Abdomen - BS+, Soft, NTND  Extremities - No C/C/E, No calf tenderness   Skin-no rash  Wounds-right groin-healed    Neurologic Exam -                 	   Cognitive - Awake, Alert, AAO to self, place, day of the week, year and month.   	   Communication - Fluent, No dysarthria, naming and repetition intact  	   Memory - 1/3 word recall with cues  	   Attention- able to name days of week backwards, unable to spell WORLD backwards  	   Cranial Nerves - CN 2-12 intact  	   Motor - 5/5 throughout all extremities.  	   Sensory - Intact to LT  	   Reflexes - DTR Intact, No primitive reflexes  	   Coordination - FTN intact  	Psychiatric - Mood stable, Affect WNL PHYSICAL EXAM  Constitutional - NAD, Comfortable  HEENT - NCAT, EOMI  Neck - Supple, No limited ROM  Chest - CTA bilaterally,   Cardiovascular - RRR, S1S2, No murmurs  Abdomen - BS+, Soft, NTND  Extremities - No C/C/E, No calf tenderness   Skin-no rash  Neurologic Exam -                 	   Cognitive - Awake, Alert, AAO to self, place, day of the week, year and month.   	   Communication - Fluent, No dysarthria, naming and repetition intact  	   Memory -  2/3 word recall with cues in 2 min  	   Attention- able to name days of week backwards, unable to spell WORLD backwards  	   Cranial Nerves - CN 2-12 intact  	   Motor - 5/5 throughout all extremities.  	   Sensory - Intact to LT  	   Reflexes - DTR Intact, No primitive reflexes  	   Coordination - FTN intact  	Psychiatric - Mood stable, Affect WNL                    Gait is impaired, poor balance

## 2019-04-17 ENCOUNTER — INPATIENT (INPATIENT)
Facility: HOSPITAL | Age: 74
LOS: 8 days | Discharge: HOME | DRG: 950 | End: 2019-04-26
Attending: PSYCHIATRY & NEUROLOGY | Admitting: PSYCHIATRY & NEUROLOGY
Payer: MEDICARE

## 2019-04-17 ENCOUNTER — TRANSCRIPTION ENCOUNTER (OUTPATIENT)
Age: 74
End: 2019-04-17

## 2019-04-17 VITALS
RESPIRATION RATE: 15 BRPM | HEART RATE: 85 BPM | OXYGEN SATURATION: 98 % | DIASTOLIC BLOOD PRESSURE: 82 MMHG | TEMPERATURE: 98 F | SYSTOLIC BLOOD PRESSURE: 135 MMHG | WEIGHT: 185.41 LBS

## 2019-04-17 VITALS
HEART RATE: 96 BPM | HEIGHT: 65 IN | RESPIRATION RATE: 14 BRPM | WEIGHT: 187.61 LBS | SYSTOLIC BLOOD PRESSURE: 126 MMHG | TEMPERATURE: 98 F | OXYGEN SATURATION: 99 % | DIASTOLIC BLOOD PRESSURE: 85 MMHG

## 2019-04-17 DIAGNOSIS — I63.9 CEREBRAL INFARCTION, UNSPECIFIED: ICD-10-CM

## 2019-04-17 DIAGNOSIS — I26.99 OTHER PULMONARY EMBOLISM WITHOUT ACUTE COR PULMONALE: ICD-10-CM

## 2019-04-17 DIAGNOSIS — Z98.890 OTHER SPECIFIED POSTPROCEDURAL STATES: Chronic | ICD-10-CM

## 2019-04-17 DIAGNOSIS — I80.229 PHLEBITIS AND THROMBOPHLEBITIS OF UNSPECIFIED POPLITEAL VEIN: ICD-10-CM

## 2019-04-17 LAB
ANION GAP SERPL CALC-SCNC: 9 MMOL/L — SIGNIFICANT CHANGE UP (ref 5–17)
BUN SERPL-MCNC: 19 MG/DL — SIGNIFICANT CHANGE UP (ref 7–23)
CALCIUM SERPL-MCNC: 9.1 MG/DL — SIGNIFICANT CHANGE UP (ref 8.4–10.5)
CHLORIDE SERPL-SCNC: 101 MMOL/L — SIGNIFICANT CHANGE UP (ref 96–108)
CO2 SERPL-SCNC: 26 MMOL/L — SIGNIFICANT CHANGE UP (ref 22–31)
CREAT SERPL-MCNC: 0.91 MG/DL — SIGNIFICANT CHANGE UP (ref 0.5–1.3)
CULTURE RESULTS: SIGNIFICANT CHANGE UP
CULTURE RESULTS: SIGNIFICANT CHANGE UP
GLUCOSE SERPL-MCNC: 154 MG/DL — HIGH (ref 70–99)
HCT VFR BLD CALC: 29 % — LOW (ref 34.5–45)
HGB BLD-MCNC: 9.6 G/DL — LOW (ref 11.5–15.5)
INR BLD: 1.72 RATIO — HIGH (ref 0.88–1.16)
MCHC RBC-ENTMCNC: 29.8 PG — SIGNIFICANT CHANGE UP (ref 27–34)
MCHC RBC-ENTMCNC: 33 GM/DL — SIGNIFICANT CHANGE UP (ref 32–36)
MCV RBC AUTO: 90.2 FL — SIGNIFICANT CHANGE UP (ref 80–100)
PLATELET # BLD AUTO: 532 K/UL — HIGH (ref 150–400)
POTASSIUM SERPL-MCNC: 4.5 MMOL/L — SIGNIFICANT CHANGE UP (ref 3.5–5.3)
POTASSIUM SERPL-SCNC: 4.5 MMOL/L — SIGNIFICANT CHANGE UP (ref 3.5–5.3)
PROTHROM AB SERPL-ACNC: 19.6 SEC — HIGH (ref 10–12.9)
RBC # BLD: 3.21 M/UL — LOW (ref 3.8–5.2)
RBC # FLD: 13.4 % — SIGNIFICANT CHANGE UP (ref 10.3–14.5)
SODIUM SERPL-SCNC: 136 MMOL/L — SIGNIFICANT CHANGE UP (ref 135–145)
SPECIMEN SOURCE: SIGNIFICANT CHANGE UP
SPECIMEN SOURCE: SIGNIFICANT CHANGE UP
URATE SERPL-MCNC: 4 MG/DL — SIGNIFICANT CHANGE UP (ref 2.5–7)
WBC # BLD: 10.3 K/UL — SIGNIFICANT CHANGE UP (ref 3.8–10.5)
WBC # FLD AUTO: 10.3 K/UL — SIGNIFICANT CHANGE UP (ref 3.8–10.5)

## 2019-04-17 PROCEDURE — 87086 URINE CULTURE/COLONY COUNT: CPT

## 2019-04-17 PROCEDURE — 99223 1ST HOSP IP/OBS HIGH 75: CPT

## 2019-04-17 PROCEDURE — 36415 COLL VENOUS BLD VENIPUNCTURE: CPT

## 2019-04-17 PROCEDURE — 76775 US EXAM ABDO BACK WALL LIM: CPT

## 2019-04-17 PROCEDURE — 71045 X-RAY EXAM CHEST 1 VIEW: CPT

## 2019-04-17 PROCEDURE — 85027 COMPLETE CBC AUTOMATED: CPT

## 2019-04-17 PROCEDURE — 87040 BLOOD CULTURE FOR BACTERIA: CPT

## 2019-04-17 PROCEDURE — 74176 CT ABD & PELVIS W/O CONTRAST: CPT

## 2019-04-17 PROCEDURE — 85610 PROTHROMBIN TIME: CPT

## 2019-04-17 PROCEDURE — 84550 ASSAY OF BLOOD/URIC ACID: CPT

## 2019-04-17 PROCEDURE — 80048 BASIC METABOLIC PNL TOTAL CA: CPT

## 2019-04-17 PROCEDURE — 93970 EXTREMITY STUDY: CPT

## 2019-04-17 PROCEDURE — 84100 ASSAY OF PHOSPHORUS: CPT

## 2019-04-17 PROCEDURE — 97162 PT EVAL MOD COMPLEX 30 MIN: CPT

## 2019-04-17 PROCEDURE — 85730 THROMBOPLASTIN TIME PARTIAL: CPT

## 2019-04-17 PROCEDURE — 81001 URINALYSIS AUTO W/SCOPE: CPT

## 2019-04-17 PROCEDURE — 84145 PROCALCITONIN (PCT): CPT

## 2019-04-17 PROCEDURE — 83735 ASSAY OF MAGNESIUM: CPT

## 2019-04-17 PROCEDURE — 97116 GAIT TRAINING THERAPY: CPT

## 2019-04-17 RX ORDER — WARFARIN SODIUM 2.5 MG/1
5 TABLET ORAL ONCE
Qty: 0 | Refills: 0 | Status: DISCONTINUED | OUTPATIENT
Start: 2019-04-17 | End: 2019-04-17

## 2019-04-17 RX ORDER — WARFARIN SODIUM 2.5 MG/1
7 TABLET ORAL ONCE
Qty: 0 | Refills: 0 | Status: COMPLETED | OUTPATIENT
Start: 2019-04-17 | End: 2019-04-17

## 2019-04-17 RX ADMIN — Medication 125 MILLIGRAM(S): at 12:14

## 2019-04-17 RX ADMIN — Medication 1 TABLET(S): at 14:07

## 2019-04-17 RX ADMIN — WARFARIN SODIUM 7 MILLIGRAM(S): 2.5 TABLET ORAL at 21:18

## 2019-04-17 RX ADMIN — ATORVASTATIN CALCIUM 20 MILLIGRAM(S): 80 TABLET, FILM COATED ORAL at 21:14

## 2019-04-17 RX ADMIN — Medication 125 MILLIGRAM(S): at 00:42

## 2019-04-17 RX ADMIN — PANTOPRAZOLE SODIUM 40 MILLIGRAM(S): 20 TABLET, DELAYED RELEASE ORAL at 06:34

## 2019-04-17 RX ADMIN — LISINOPRIL 5 MILLIGRAM(S): 2.5 TABLET ORAL at 05:18

## 2019-04-17 RX ADMIN — Medication 1 TABLET(S): at 05:17

## 2019-04-17 RX ADMIN — Medication 125 MILLIGRAM(S): at 05:17

## 2019-04-17 RX ADMIN — TAMSULOSIN HYDROCHLORIDE 0.4 MILLIGRAM(S): 0.4 CAPSULE ORAL at 21:14

## 2019-04-17 RX ADMIN — CEFTRIAXONE 100 GRAM(S): 500 INJECTION, POWDER, FOR SOLUTION INTRAMUSCULAR; INTRAVENOUS at 05:17

## 2019-04-17 NOTE — PROGRESS NOTE ADULT - SUBJECTIVE AND OBJECTIVE BOX
Patient is a 74y old  Female who presents with a chief complaint of Sepsis (16 Apr 2019 13:44)      Patient seen and examined at bedside.    ALLERGIES:  No Known Allergies    MEDICATIONS:  acetaminophen   Tablet .. 975 milliGRAM(s) Oral every 6 hours PRN  atorvastatin 20 milliGRAM(s) Oral at bedtime  lactobacillus acidophilus 1 Tablet(s) Oral three times a day  lisinopril 5 milliGRAM(s) Oral daily  pantoprazole    Tablet 40 milliGRAM(s) Oral before breakfast  sodium chloride 0.9% Bolus 500 milliLiter(s) IV Bolus once  tamsulosin 0.4 milliGRAM(s) Oral at bedtime  vancomycin    Solution 125 milliGRAM(s) Oral every 6 hours  warfarin 5 milliGRAM(s) Oral once    Vital Signs Last 24 Hrs  T(C): 36.8 (17 Apr 2019 05:25), Max: 36.9 (16 Apr 2019 20:49)  T(F): 98.3 (17 Apr 2019 05:25), Max: 98.5 (16 Apr 2019 20:49)  HR: 85 (17 Apr 2019 05:25) (78 - 86)  BP: 135/82 (17 Apr 2019 05:25) (108/64 - 135/82)  BP(mean): --  RR: 15 (17 Apr 2019 05:25) (14 - 17)  SpO2: 98% (17 Apr 2019 05:25) (96% - 99%)  I&O's Summary    16 Apr 2019 07:01  -  17 Apr 2019 07:00  --------------------------------------------------------  IN: 1140 mL / OUT: 1900 mL / NET: -760 mL          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  Dyslipidemia  HTN (Hypertension)  Status post coil embolization of cerebral aneurysm  Status Post Total Knee Replace: left      Home Medications:  acetaminophen 160 mg/5 mL oral suspension: 25.47 milliliter(s) orally every 6 hours, As needed, Mild Pain (1 - 3) (29 Mar 2019 14:40)  atorvastatin 20 mg oral tablet: 1 tab(s) orally once a day (at bedtime) (07 Apr 2019 23:34)  cefTRIAXone 1 g intravenous injection: 1 gram(s) intravenous every 24 hours (16 Apr 2019 10:35)  Coumadin 2 mg oral tablet: 3.5 tab(s) (7mg total) orally x 1 tonight with daily dosing based on INR, goal INR 2-3 for PE  (29 Mar 2019 14:40)  lactobacillus acidophilus oral capsule: 1 tab(s) orally 3 times a day (07 Apr 2019 23:34)  lisinopril 5 mg oral tablet: 1 tab(s) orally once a day (16 Apr 2019 10:35)  pantoprazole 40 mg oral delayed release tablet: 1 tab(s) orally once a day (before a meal) (07 Apr 2019 23:34)  tamsulosin 0.4 mg oral capsule: 1 cap(s) orally once a day (at bedtime) (07 Apr 2019 23:34)      PHYSICAL EXAM:  General: NAD, A/O x3  ENT: MMM  Neck: Supple, No JVD  Lungs: Clear to percussion bilaterally  Cardio: RRR, S1/S2, No murmurs  Abdomen: Soft, Nontender, Nondistended; Bowel sounds present  Extremities: No clubbing, cyanosis, or edema    LABS:                        9.6    10.3  )-----------( 532      ( 17 Apr 2019 09:10 )             29.0     04-17    136  |  101  |  19  ----------------------------<  154  4.5   |  26  |  0.91    Ca    9.1      17 Apr 2019 09:10      PT/INR - ( 17 Apr 2019 06:40 )   PT: 19.6 sec;   INR: 1.72 ratio         PTT - ( 15 Apr 2019 06:20 )  PTT:30.3 sec    03-04 Chol 184 mg/dL  mg/dL HDL 49 mg/dL Trig 158 mg/dL    03-04 [id number] 5.6    RADIOLOGY & ADDITIONAL TESTS: no new tests     Care Discussed with Consultants/Other Providers: Hospitalist Patient is a 74y old  Female who presents with a chief complaint of Sepsis (16 Apr 2019 13:44)      Patient seen and examined at bedside, no events overnight, in no acute distress.     ALLERGIES:  No Known Allergies    MEDICATIONS:  acetaminophen   Tablet .. 975 milliGRAM(s) Oral every 6 hours PRN  atorvastatin 20 milliGRAM(s) Oral at bedtime  lactobacillus acidophilus 1 Tablet(s) Oral three times a day  lisinopril 5 milliGRAM(s) Oral daily  pantoprazole    Tablet 40 milliGRAM(s) Oral before breakfast  sodium chloride 0.9% Bolus 500 milliLiter(s) IV Bolus once  tamsulosin 0.4 milliGRAM(s) Oral at bedtime  vancomycin    Solution 125 milliGRAM(s) Oral every 6 hours  warfarin 5 milliGRAM(s) Oral once    Vital Signs Last 24 Hrs  T(C): 36.8 (17 Apr 2019 05:25), Max: 36.9 (16 Apr 2019 20:49)  T(F): 98.3 (17 Apr 2019 05:25), Max: 98.5 (16 Apr 2019 20:49)  HR: 85 (17 Apr 2019 05:25) (78 - 86)  BP: 135/82 (17 Apr 2019 05:25) (108/64 - 135/82)  BP(mean): --  RR: 15 (17 Apr 2019 05:25) (14 - 17)  SpO2: 98% (17 Apr 2019 05:25) (96% - 99%)  I&O's Summary    16 Apr 2019 07:01  -  17 Apr 2019 07:00  --------------------------------------------------------  IN: 1140 mL / OUT: 1900 mL / NET: -760 mL          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  Dyslipidemia  HTN (Hypertension)  Status post coil embolization of cerebral aneurysm  Status Post Total Knee Replace: left      Home Medications:  acetaminophen 160 mg/5 mL oral suspension: 25.47 milliliter(s) orally every 6 hours, As needed, Mild Pain (1 - 3) (29 Mar 2019 14:40)  atorvastatin 20 mg oral tablet: 1 tab(s) orally once a day (at bedtime) (07 Apr 2019 23:34)  cefTRIAXone 1 g intravenous injection: 1 gram(s) intravenous every 24 hours (16 Apr 2019 10:35)  Coumadin 2 mg oral tablet: 3.5 tab(s) (7mg total) orally x 1 tonight with daily dosing based on INR, goal INR 2-3 for PE  (29 Mar 2019 14:40)  lactobacillus acidophilus oral capsule: 1 tab(s) orally 3 times a day (07 Apr 2019 23:34)  lisinopril 5 mg oral tablet: 1 tab(s) orally once a day (16 Apr 2019 10:35)  pantoprazole 40 mg oral delayed release tablet: 1 tab(s) orally once a day (before a meal) (07 Apr 2019 23:34)  tamsulosin 0.4 mg oral capsule: 1 cap(s) orally once a day (at bedtime) (07 Apr 2019 23:34)      PHYSICAL EXAM:  General: NAD, A/O x3  ENT: MMM  Neck: Supple, No JVD  Lungs: Clear to percussion bilaterally  Cardio: RRR, S1/S2, No murmurs  Abdomen: Soft, Nontender, Nondistended; Bowel sounds present  Neuro: no gross deficits   Extremities: No clubbing, cyanosis, or edema      LABS:                        9.6    10.3  )-----------( 532      ( 17 Apr 2019 09:10 )             29.0     04-17    136  |  101  |  19  ----------------------------<  154  4.5   |  26  |  0.91    Ca    9.1      17 Apr 2019 09:10      PT/INR - ( 17 Apr 2019 06:40 )   PT: 19.6 sec;   INR: 1.72 ratio         PTT - ( 15 Apr 2019 06:20 )  PTT:30.3 sec    03-04 Chol 184 mg/dL  mg/dL HDL 49 mg/dL Trig 158 mg/dL    03-04 [id number] 5.6    RADIOLOGY & ADDITIONAL TESTS: no new tests     Care Discussed with Consultants/Other Providers: Hospitalist

## 2019-04-17 NOTE — DISCHARGE NOTE NURSING/CASE MANAGEMENT/SOCIAL WORK - NSDCDPATPORTLINK_GEN_ALL_CORE
You can access the [facility] Patient Portal, offered by [facility], by registering with the following website: [email or web address]

## 2019-04-17 NOTE — PROGRESS NOTE ADULT - SUBJECTIVE AND OBJECTIVE BOX
CC: f/u for klebsiella bacteremia    Patient reports: no specific complaints    REVIEW OF SYSTEMS:  All other review of systems negative (Comprehensive ROS)    Antimicrobials Day #  :s/p 10  days of CTX  vancomycin    Solution 125 milliGRAM(s) Oral every 6 hours    Other Medications Reviewed    T(F): 98.3 (04-17-19 @ 05:25), Max: 98.5 (04-16-19 @ 20:49)  HR: 85 (04-17-19 @ 05:25)  BP: 135/82 (04-17-19 @ 05:25)  RR: 15 (04-17-19 @ 05:25)  SpO2: 98% (04-17-19 @ 05:25)  Wt(kg): --    PHYSICAL EXAM:  General: alert, no acute distress  Eyes:  anicteric, no conjunctival injection, no discharge  Oropharynx: no lesions or injection 	  Neck: supple, without adenopathy  Lungs: clear to auscultation  Heart: regular rate and rhythm; no murmur, rubs or gallops  Abdomen: soft, nondistended, nontender, without mass or organomegaly  Skin: no lesions  Extremities: no clubbing, cyanosis, or edema  Neurologic: alert, oriented, moves all extremities  : youssef  LAB RESULTS:                        9.6    10.3  )-----------( 532      ( 17 Apr 2019 09:10 )             29.0     04-17    136  |  101  |  19  ----------------------------<  154<H>  4.5   |  26  |  0.91    Ca    9.1      17 Apr 2019 09:10          MICROBIOLOGY:  RECENT CULTURES:      RADIOLOGY REVIEWED:    < from: CT Abdomen and Pelvis No Cont (04.14.19 @ 14:08) >  IMPRESSION:     No acute pathology.  Numerous bilateral nonobstructing kidney stones    < end of copied text >

## 2019-04-17 NOTE — PROGRESS NOTE ADULT - REASON FOR ADMISSION
Sepsis

## 2019-04-17 NOTE — PROGRESS NOTE ADULT - PROVIDER SPECIALTY LIST ADULT
Critical Care
Hospitalist
Infectious Disease
Urology
Infectious Disease
Infectious Disease
Critical Care

## 2019-04-17 NOTE — PROGRESS NOTE ADULT - ASSESSMENT
74y female, s/p coiling for SAH a few weeks ago, with hospital course was complicated by PE, C diff and E coli cystitis with neurogenic bladder.   Sent to rehab with a youssef, that was removed a few days PTA.  Developed fever with high bladder volumes and is now admitted with klebsiella bacteremia. Youssef reinserted & started on Zosyn + prophylactic vancomycin.  Initial fever and leukocytosis had moderated  Urine cx with klebsiella and enterobacter  Blood isolate is klebsiella,   Youssef for retention  She now has intermittent fever, non focal exam  Renal sono was without hydro or obstructing stones, b/l calculi seen  I would include non infectious fever in differential  Repeat 4/12 blood cultures are negative  Ct of A/P negative for source of fever, no evidence of obstruction or perinephric abscess  Recommendations:  Continue CTX - day 10/10  Continue po vanco for cdif relapse prophylaxis , until antibiotic course completed, will extend for 3 days  Follow low grade temps, they seem to have resolved  DVT addressed by coumadin  From ID viewpoint drainage of bladder is critical, I do not think youssef is accounting for fever  No ID contraindications to transfer to rehab

## 2019-04-17 NOTE — PROGRESS NOTE ADULT - ASSESSMENT
74y female, s/p coiling for SAH a few weeks ago, with hospital course was complicated by PE, C diff and E colicystitis with neurogenic bladder.   Sent to rehab with a youssef, that was removed a few days PTA. Developed fever with high bladder volumes and is was admitted with klebsiella bacteremia. Youssef reinserted & started on Zosyn + prophylactic vancomycin. Repeat 4/12 blood cultures are negative Ct of abdomen and pelvis negative for source of fever, no evidence of obstruction or perinephric abscess. Pt. finished course of Zosyn, currently afebrile, repeat will continue Vanco and Youssef as per ID, stable for discharge to acute rehab.     # Klebsiella bacteremia - resolved  ID followingl; finished course of IV Zosyn today 4/17/19    # Urine retention  youssef in place , flomax, TOV per urology  urology is following  continue youssef.     # TEREZA - Likely related to sepsis, resolved, Monitor renal function and electrolytes    # PE- c/w coumadin f/u inr goal 2-3    # H/O spont subarachnoid intracranial hemorrhage d/t cerebral aneurysm  -  PT, OOB  - medically stable for transfer to acute rehab     # Clostridium difficile diarrhea.  Completed extended course of PO vanco due to abx for UTI  started on empiric PO vanco for C. diff ppx while on Abx for recurrent UTI/bacteremia   Will continue PO vanco as per ID     # HTN - controlled on enalapril    # GERD -  ppi    # HLD - statin

## 2019-04-18 DIAGNOSIS — D47.3 ESSENTIAL (HEMORRHAGIC) THROMBOCYTHEMIA: ICD-10-CM

## 2019-04-18 DIAGNOSIS — I69.219 UNSPECIFIED SYMPTOMS AND SIGNS INVOLVING COGNITIVE FUNCTIONS FOLLOWING OTHER NONTRAUMATIC INTRACRANIAL HEMORRHAGE: ICD-10-CM

## 2019-04-18 DIAGNOSIS — I60.2 NONTRAUMATIC SUBARACHNOID HEMORRHAGE FROM ANTERIOR COMMUNICATING ARTERY: ICD-10-CM

## 2019-04-18 DIAGNOSIS — I82.409 ACUTE EMBOLISM AND THROMBOSIS OF UNSPECIFIED DEEP VEINS OF UNSPECIFIED LOWER EXTREMITY: ICD-10-CM

## 2019-04-18 DIAGNOSIS — I10 ESSENTIAL (PRIMARY) HYPERTENSION: ICD-10-CM

## 2019-04-18 DIAGNOSIS — D63.8 ANEMIA IN OTHER CHRONIC DISEASES CLASSIFIED ELSEWHERE: ICD-10-CM

## 2019-04-18 DIAGNOSIS — I67.1 CEREBRAL ANEURYSM, NONRUPTURED: ICD-10-CM

## 2019-04-18 DIAGNOSIS — Z86.79 PERSONAL HISTORY OF OTHER DISEASES OF THE CIRCULATORY SYSTEM: ICD-10-CM

## 2019-04-18 LAB
ALBUMIN SERPL ELPH-MCNC: 2.4 G/DL — LOW (ref 3.3–5)
ALP SERPL-CCNC: 90 U/L — SIGNIFICANT CHANGE UP (ref 40–120)
ALT FLD-CCNC: 50 U/L DA — HIGH (ref 10–45)
ANION GAP SERPL CALC-SCNC: 9 MMOL/L — SIGNIFICANT CHANGE UP (ref 5–17)
AST SERPL-CCNC: 34 U/L — SIGNIFICANT CHANGE UP (ref 10–40)
BASOPHILS # BLD AUTO: 0.2 K/UL — SIGNIFICANT CHANGE UP (ref 0–0.2)
BASOPHILS NFR BLD AUTO: 1.4 % — SIGNIFICANT CHANGE UP (ref 0–2)
BILIRUB SERPL-MCNC: 0.2 MG/DL — SIGNIFICANT CHANGE UP (ref 0.2–1.2)
BUN SERPL-MCNC: 25 MG/DL — HIGH (ref 7–23)
CALCIUM SERPL-MCNC: 9 MG/DL — SIGNIFICANT CHANGE UP (ref 8.4–10.5)
CHLORIDE SERPL-SCNC: 104 MMOL/L — SIGNIFICANT CHANGE UP (ref 96–108)
CO2 SERPL-SCNC: 25 MMOL/L — SIGNIFICANT CHANGE UP (ref 22–31)
CREAT SERPL-MCNC: 0.83 MG/DL — SIGNIFICANT CHANGE UP (ref 0.5–1.3)
EOSINOPHIL # BLD AUTO: 0.2 K/UL — SIGNIFICANT CHANGE UP (ref 0–0.5)
EOSINOPHIL NFR BLD AUTO: 2.1 % — SIGNIFICANT CHANGE UP (ref 0–6)
GLUCOSE SERPL-MCNC: 101 MG/DL — HIGH (ref 70–99)
HCT VFR BLD CALC: 28.6 % — LOW (ref 34.5–45)
HGB BLD-MCNC: 9.1 G/DL — LOW (ref 11.5–15.5)
INR BLD: 1.81 RATIO — HIGH (ref 0.88–1.16)
LYMPHOCYTES # BLD AUTO: 2.3 K/UL — SIGNIFICANT CHANGE UP (ref 1–3.3)
LYMPHOCYTES # BLD AUTO: 20 % — SIGNIFICANT CHANGE UP (ref 13–44)
MCHC RBC-ENTMCNC: 28.4 PG — SIGNIFICANT CHANGE UP (ref 27–34)
MCHC RBC-ENTMCNC: 31.8 GM/DL — LOW (ref 32–36)
MCV RBC AUTO: 89.2 FL — SIGNIFICANT CHANGE UP (ref 80–100)
MONOCYTES # BLD AUTO: 0.8 K/UL — SIGNIFICANT CHANGE UP (ref 0–0.9)
MONOCYTES NFR BLD AUTO: 7.3 % — SIGNIFICANT CHANGE UP (ref 1–9)
NEUTROPHILS # BLD AUTO: 7.8 K/UL — HIGH (ref 1.8–7.4)
NEUTROPHILS NFR BLD AUTO: 69.2 % — SIGNIFICANT CHANGE UP (ref 43–77)
PLATELET # BLD AUTO: 497 K/UL — HIGH (ref 150–400)
POTASSIUM SERPL-MCNC: 4.4 MMOL/L — SIGNIFICANT CHANGE UP (ref 3.5–5.3)
POTASSIUM SERPL-SCNC: 4.4 MMOL/L — SIGNIFICANT CHANGE UP (ref 3.5–5.3)
PROT SERPL-MCNC: 6.8 G/DL — SIGNIFICANT CHANGE UP (ref 6–8.3)
PROTHROM AB SERPL-ACNC: 20.6 SEC — HIGH (ref 10–12.9)
RBC # BLD: 3.21 M/UL — LOW (ref 3.8–5.2)
RBC # FLD: 13.6 % — SIGNIFICANT CHANGE UP (ref 10.3–14.5)
SODIUM SERPL-SCNC: 138 MMOL/L — SIGNIFICANT CHANGE UP (ref 135–145)
WBC # BLD: 11.3 K/UL — HIGH (ref 3.8–10.5)
WBC # FLD AUTO: 11.3 K/UL — HIGH (ref 3.8–10.5)

## 2019-04-18 PROCEDURE — 99223 1ST HOSP IP/OBS HIGH 75: CPT

## 2019-04-18 PROCEDURE — 99232 SBSQ HOSP IP/OBS MODERATE 35: CPT

## 2019-04-18 RX ORDER — WARFARIN SODIUM 2.5 MG/1
7 TABLET ORAL ONCE
Qty: 0 | Refills: 0 | Status: COMPLETED | OUTPATIENT
Start: 2019-04-18 | End: 2019-04-18

## 2019-04-18 RX ADMIN — TAMSULOSIN HYDROCHLORIDE 0.4 MILLIGRAM(S): 0.4 CAPSULE ORAL at 21:14

## 2019-04-18 RX ADMIN — ATORVASTATIN CALCIUM 20 MILLIGRAM(S): 80 TABLET, FILM COATED ORAL at 21:14

## 2019-04-18 RX ADMIN — WARFARIN SODIUM 7 MILLIGRAM(S): 2.5 TABLET ORAL at 21:14

## 2019-04-18 RX ADMIN — Medication 100 MILLIGRAM(S): at 06:36

## 2019-04-18 RX ADMIN — PANTOPRAZOLE SODIUM 40 MILLIGRAM(S): 20 TABLET, DELAYED RELEASE ORAL at 06:36

## 2019-04-18 NOTE — CONSULT NOTE ADULT - SUBJECTIVE AND OBJECTIVE BOX
Patient is a 74y old  Female who presents with a chief complaint of s/p aneurysm clipping c functional and cognitive deficits. (16 Apr 2019 10:58)    HPI:  74 yr old Female c PMHx of HTN on ASA (? indication) presents to ED s/p syncope and 3 day hx of neck pain on 3/4/19. Admitted to [facility] where admission workup revealed large SAH on CT brain.  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 Hospital course notable for cdiff diarrhea evaluated by ID and treated with course of Flagyl and Vancomycin; also urinary retention (youssef was reinserted on 3/26/19), failed multiple TOVs, concurrent E coli UTI was discovered on 3/27/19 and treated with fosfomycin. On 3/18/19 patient developed dyspnea. CTA chest revealed RUL PE. Started on heparin gtt 3/18/19 and bridged to coumadin. Pt was deemed stable for acute rehab on 3/29.  On 4/7, RRT called for fever 102.7'F and hypoxia with SpO2 80's. Patient was started on empiric antibiotics Zosyn, Vanco IV x 1 dose, and PO vanco was restarted per ID consultation recommendations. Labs were sent, found to have new leukocytosis WBC 21, TEREZA, +UA. Chest xray negative. Hypotensive with BP 80/50. Received NS boluses. Patient was admitted to ICU for management of sepsis. ID in, treated for klebsiella bacteremia. Youssef reinserted & started on Zosyn + prophylactic vancomycin (for cdiff). ID recommends to follow repeat blood cultures. Intermittent tachycardia and fevers-US LEs +new DVT. Seen by vascular. On Coumadin.   On IV antibiotics, youssef re-inserted. (16 Apr 2019 10:58)    Summary: 74W PMH HTN presented initially for syncope and neck pain, found to have SAH on CT brain.  CTA revealed an anterior communicating aneurysm rupture.  She underwent successful coiling of the anterior communicating aneurysm.  Patient's hospital course complicated by C. Difficile, urinary retention/UTI with failed TOVs.  Patient stabilized and discharged to rehab.  4/7/19 RRT called on rehab and found to have sepsis secondary to UTI with klebsiella bacteremia.  Initially admitted to ICU, youssef reinserted, given zosyn and prophylactic vancomycin for C. Diff.    Patient stabilized and downgraded to medical bed and subsequently transferred to acute rehab on 4/17/19.      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  Dyslipidemia  HTN (Hypertension)  Status post coil embolization of cerebral aneurysm  Status Post Total Knee Replace: left    Father: - at age - with history of  Mother: - at age - with history of    Substance Use (street drugs): (  ) never used  (  ) other:  Tobacco Usage:  (   ) never smoked   (   ) former smoker   (   ) current smoker  (     ) pack year  Alcohol Usage:  Sexual History:  Recent Travel:    Allergies    No Known Allergies    Intolerances    REVIEW OF SYSTEMS:  CONSTITUTIONAL: No fever, weight loss, or fatigue  EYES: No eye pain, visual disturbances, or discharge  ENMT:  No difficulty hearing, tinnitus, vertigo; No sinus or throat pain  NECK: No pain or stiffness  BREASTS: No pain, masses, or nipple discharge  RESPIRATORY: No cough, wheezing, chills or hemoptysis; No shortness of breath  CARDIOVASCULAR: No chest pain, palpitations, dizziness, or leg swelling  GASTROINTESTINAL: No abdominal or epigastric pain. No nausea, vomiting, or hematemesis; No diarrhea or constipation. No melena or hematochezia.  GENITOURINARY: No dysuria, frequency, hematuria, or incontinence  NEUROLOGICAL: No headaches, memory loss, loss of strength, numbness, or tremors  SKIN: No itching, burning, rashes, or lesions  LYMPH NODES: No enlarged glands  ENDOCRINE: No heat or cold intolerance; No hair loss  MUSCULOSKELETAL: No joint pain or swelling; No muscle, back, or extremity pain  PSYCHIATRIC: No depression, anxiety, mood swings, or difficulty sleeping  HEME/LYMPH: No easy bruising, or bleeding gums  ALLERGY AND IMMUNOLOGIC: No hives or eczema    ALL ROS REVIEWED AND NORMAL EXCEPT AS STATED ABOVE    T(C): 36.4 (04-18-19 @ 06:35), Max: 36.9 (04-17-19 @ 19:42)  HR: 86 (04-18-19 @ 06:35) (86 - 96)  BP: 138/85 (04-18-19 @ 06:35) (126/85 - 138/85)  RR: 14 (04-18-19 @ 06:35) (14 - 14)  SpO2: 100% (04-18-19 @ 06:35) (95% - 100%)  Wt(kg): --Vital Signs Last 24 Hrs  T(C): 36.4 (18 Apr 2019 06:35), Max: 36.9 (17 Apr 2019 19:42)  T(F): 97.6 (18 Apr 2019 06:35), Max: 98.4 (17 Apr 2019 19:42)  HR: 86 (18 Apr 2019 06:35) (86 - 96)  BP: 138/85 (18 Apr 2019 06:35) (126/85 - 138/85)  BP(mean): --  RR: 14 (18 Apr 2019 06:35) (14 - 14)  SpO2: 100% (18 Apr 2019 06:35) (95% - 100%)    PHYSICAL EXAM:  GENERAL: NAD, well-groomed, well-developed  HEAD:  Atraumatic, Normocephalic  EYES: EOMI, PERRLA, conjunctiva and sclera clear  ENMT: No tonsillar erythema, exudates, or enlargement; Moist mucous membranes, Good dentition, No lesions  NECK: Supple, No JVD, Normal thyroid  NERVOUS SYSTEM:  Alert & Oriented X3, Good concentration; Motor Strength 5/5 B/L upper and lower extremities; DTRs 2+ intact and symmetric  CHEST/LUNG: Clear to percussion bilaterally; No rales, rhonchi, wheezing, or rubs  HEART: Regular rate and rhythm; No murmurs, rubs, or gallops  ABDOMEN: Soft, Nontender, Nondistended; Bowel sounds present  EXTREMITIES:  2+ Peripheral Pulses, No clubbing, cyanosis, or edema  LYMPH: No lymphadenopathy noted  SKIN: No rashes or lesions    LABS:                        9.1    11.3  )-----------( 497      ( 18 Apr 2019 05:30 )             28.6     04-18    138  |  104  |  25<H>  ----------------------------<  101<H>  4.4   |  25  |  0.83    Ca    9.0      18 Apr 2019 05:30    TPro  6.8  /  Alb  2.4<L>  /  TBili  0.2  /  DBili  x   /  AST  34  /  ALT  50<H>  /  AlkPhos  90  04-18    PT/INR - ( 18 Apr 2019 05:30 )   PT: 20.6 sec;   INR: 1.81 ratio      CAPILLARY BLOOD GLUCOSE    RADIOLOGY & ADDITIONAL TESTS:    Consultant(s) Notes Reviewed:  [x ] YES  [ ] NO  Care Discussed with Consultants/Other Providers [ x] YES  [ ] NO  Imaging Personally Reviewed:  [ ] YES  [ ] NO

## 2019-04-18 NOTE — PROGRESS NOTE ADULT - ASSESSMENT
75 yo female s/p Acomm aneurysm coiling, recent Klebsiella bacteriemia/ urosepsis, urinary retention , Low cath placement, history of Cdiff colitis while on ABx,  acute LE DVT on AC/Coumadin, now with functional and cognitive deficits.      # s/p Acom aneurysm coiling    PT/OT/ST pogram    Fall precautions    #HTN   off lisinopril, monitor BP.     # Urosepsis, s/p Klebsiella bacteremia   Completed  IV ABX     Continue Bacid, h/o C diff      # Urinary retention   Low cath   Flomax  Consider voiding trial in few days    # LE DVT  AC/Coumadin with goal INR 2-3        Precautions:              fall, seizure                                                                    Diet: dash    DVT Prophylaxis:         coumadin                                                                 Medical Prognosis: good    Prescreen Comparison: I have reviewed the prescreen information and I found no relevant changes between the preadmission  screening and my post admission evaluation.     Expected Therapy:   P.T.     1   hrs/day           O. T.  1    hrs/day           S.L.P.   1     hrs/day                    P&O     eval                                              Excpected Frequency: 5 days/7 day period    Rehab Potential:       good                                    Estimated Disposition:       home                   ELOS:       10       days      Rationale For Inpatient Rehab Admission- Patient demonstrates the following:     [X] Medically appropriate for rehabilitation admission   [X] Has attainable rehab goals with an approrpriate discharge plan  [X] Has rehabilitation potential (expected to make significant improvement within a reasonable period of time)  [X] Requires close medical management by a rehab physician, rehab nursing care and comprehensive interdisciplinary team (including         PT, OT, SLP and/or prosthetics and orthotics)

## 2019-04-18 NOTE — CONSULT NOTE ADULT - ASSESSMENT
74W PMH HTN presented for anterior communicating aneurysm s/p clipping, hospital and rehab course complicated by C. Diff, UTI with sepsis, klebsiella bacteremia.    Transferred back to acute rehab on 4/17/19.

## 2019-04-18 NOTE — PROGRESS NOTE ADULT - ASSESSMENT
75 yo female with SAH and aneurysm coiling.  urinary retention/neurogenic bladder  S/P klebsiella bacteremia after youssef removed  S/P 10 days of CTX, stopped 4/17  C.Diff, s/p extended vanco course, stopped 4/17.  Recent CT with no hydronephrosis.  Fevers seem to have moderated.  PE/DVT on coumadin  No signs of active infection  Suggest:  1.monitor off antibiotics  2.If she develops a fever we should try to evaluate in a non empiric fashion if possible as she had multiple fevers while in hospital which appear to be self limited.  3.observe for relapse of C.Diff

## 2019-04-18 NOTE — PROGRESS NOTE ADULT - SUBJECTIVE AND OBJECTIVE BOX
CHIEF COMPLAINT: No acute events overnight, NAD.       HISTORY OF PRESENT ILLNESS  74 yr old Female c PMHx of HTN on ASA (? indication) presents to ED s/p syncope and 3 day hx of neck pain on 3/4/19. Admitted to [facility] where admission workup revealed large SAH on CT brain.  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 Hospital course notable for cdiff diarrhea evaluated by ID and treated with course of Flagyl and Vancomycin; also urinary retention (youssef was reinserted on 3/26/19), failed multiple TOVs, concurrent E coli UTI was discovered on 3/27/19 and treated with fosfomycin. On 3/18/19 patient developed dyspnea. CTA chest revealed RUL PE. Started on heparin gtt 3/18/19 and bridged to coumadin. Pt was deemed stable for acute rehab on 3/29.  On 4/7, RRT called for fever 102.7'F and hypoxia with SpO2 80's. Patient was started on empiric antibiotics Zosyn, Vanco IV x 1 dose, and PO vanco was restarted per ID consultation recommendations. Labs were sent, found to have new leukocytosis WBC 21, TEREZA, +UA. Chest xray negative. Hypotensive with BP 80/50. Received NS boluses. Patient was admitted to ICU for management of sepsis. ID in, treated for klebsiella bacteremia. Youssef reinserted & started on Zosyn + prophylactic vancomycin (for cdiff). ID recommends to follow repeat blood cultures. Intermittent tachycardia and fevers-US LEs +new DVT. Seen by vascular. On Coumadin.   On IV antibiotics, youssef re-inserted. (16 Apr 2019 10:58)      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  Dyslipidemia  HTN (Hypertension)  Status post coil embolization of cerebral aneurysm  Status Post Total Knee Replace: left       REVIEW OF SYMPTOMS  [X] Constitutional WNL     [X] Cardio WNL         [X] GI WNL                            [X] Endo WNL                     [X] Skin WNL                 [X] MSK WNL                 [X] Psych WNL      VITALS  Vital Signs Last 24 Hrs  T(C): 36.7 (18 Apr 2019 07:35), Max: 36.9 (17 Apr 2019 19:42)  T(F): 98 (18 Apr 2019 07:35), Max: 98.4 (17 Apr 2019 19:42)  HR: 105 (18 Apr 2019 07:35) (86 - 105)  BP: 133/80 (18 Apr 2019 07:35) (126/85 - 138/85)  BP(mean): --  RR: 14 (18 Apr 2019 07:35) (14 - 14)  SpO2: 99% (18 Apr 2019 07:35) (95% - 100%)      PHYSICAL EXAM  Constitutional - NAD, Comfortable  HEENT - NCAT, EOMI  Neck - Supple, No limited ROM  Chest - CTA bilaterally, No wheeze, No rhonchi, No crackles  Cardiovascular - RRR, S1S2, No murmurs  Abdomen - BS+, Soft, NTND  Extremities - No C/C/E, No calf tenderness   Skin-no rash  Wounds-right groin      Neurologic Exam - alert and awake, follows commands, no new weakness                                                 Balance - impaired     Psychiatric - Mood stable, Affect WNL        FUNCTIONAL PROGRESS  Gait - eval  ADLs - eval  Transfers -eval   Functional transfer - eval    RECENT LABS                        9.1    11.3  )-----------( 497      ( 18 Apr 2019 05:30 )             28.6     04-18    138  |  104  |  25<H>  ----------------------------<  101<H>  4.4   |  25  |  0.83    Ca    9.0      18 Apr 2019 05:30    TPro  6.8  /  Alb  2.4<L>  /  TBili  0.2  /  DBili  x   /  AST  34  /  ALT  50<H>  /  AlkPhos  90  04-18    PT/INR - ( 18 Apr 2019 05:30 )   PT: 20.6 sec;   INR: 1.81 ratio           LIVER FUNCTIONS - ( 18 Apr 2019 05:30 )  Alb: 2.4 g/dL / Pro: 6.8 g/dL / ALK PHOS: 90 U/L / ALT: 50 U/L DA / AST: 34 U/L / GGT: x             Direct LDL: 103 mg/dL (03-04-19 @ 23:32)    Hemoglobin A1C, Whole Blood: 5.6 % (03-04-19 @ 22:39)              RADIOLOGY/OTHER RESULTS      CURRENT MEDICATIONS  MEDICATIONS  (STANDING):  atorvastatin 20 milliGRAM(s) Oral at bedtime  docusate sodium 100 milliGRAM(s) Oral two times a day  pantoprazole    Tablet 40 milliGRAM(s) Oral before breakfast  tamsulosin Oral Tab/Cap - Peds 0.4 milliGRAM(s) Oral at bedtime  warfarin 7 milliGRAM(s) Oral once    MEDICATIONS  (PRN):      ASSESSMENT & PLAN      GI/Bowel Management - Psyllium   Management - Toilet Q2  Skin - Turn Q2  Pain - Tylenol PRN  DVT PPX - Coumadin  Diet - reg c thins      Continue comprehensive acute rehab program consisting of 3hrs/day of OT/PT and SLP.

## 2019-04-18 NOTE — PROGRESS NOTE ADULT - SUBJECTIVE AND OBJECTIVE BOX
CC: f/u for klebsiella bacteremia and C.Diff    Patient reports: no problems overnight    REVIEW OF SYSTEMS:  All other review of systems negative (Comprehensive ROS)    Antimicrobials Day #  :off    Other Medications Reviewed    T(F): 98 (04-18-19 @ 07:35), Max: 98.4 (04-17-19 @ 19:42)  HR: 105 (04-18-19 @ 07:35)  BP: 133/80 (04-18-19 @ 07:35)  RR: 14 (04-18-19 @ 07:35)  SpO2: 99% (04-18-19 @ 07:35)  Wt(kg): --    PHYSICAL EXAM:  General: alert, no acute distress  Eyes:  anicteric, no conjunctival injection, no discharge  Oropharynx: no lesions or injection 	  Neck: supple, without adenopathy  Lungs: clear to auscultation  Heart: regular rate and rhythm; no murmur, rubs or gallops  Abdomen: soft, nondistended, nontender, without mass or organomegaly  Skin: no lesions  Extremities: no clubbing, cyanosis, or edema  Neurologic: alert, oriented, moves all extremities   youssef  LAB RESULTS:                        9.1    11.3  )-----------( 497      ( 18 Apr 2019 05:30 )             28.6     04-18    138  |  104  |  25<H>  ----------------------------<  101<H>  4.4   |  25  |  0.83    Ca    9.0      18 Apr 2019 05:30    TPro  6.8  /  Alb  2.4<L>  /  TBili  0.2  /  DBili  x   /  AST  34  /  ALT  50<H>  /  AlkPhos  90  04-18    LIVER FUNCTIONS - ( 18 Apr 2019 05:30 )  Alb: 2.4 g/dL / Pro: 6.8 g/dL / ALK PHOS: 90 U/L / ALT: 50 U/L DA / AST: 34 U/L / GGT: x             MICROBIOLOGY:  RECENT CULTURES:      RADIOLOGY REVIEWED:

## 2019-04-18 NOTE — DIETITIAN INITIAL EVALUATION ADULT. - OTHER INFO
74yr Old Female - Dx: CVA - Initial Assessment - Regular Diet w/ Thin Liquids (Recommend DASH-TLC Diet), Denies Food Allergy, Tolerates Diet, No Chewing/Swallowing Complications (Per Patient), Consumes 100% of Meals(as Per Documentation), No Pressure Ulcers, No Edema, No Recent Nausea/Vomiting/Diarrhea/Constipation

## 2019-04-19 DIAGNOSIS — D64.9 ANEMIA, UNSPECIFIED: ICD-10-CM

## 2019-04-19 LAB
ALBUMIN SERPL ELPH-MCNC: 2.4 G/DL — LOW (ref 3.3–5)
ALP SERPL-CCNC: 91 U/L — SIGNIFICANT CHANGE UP (ref 40–120)
ALT FLD-CCNC: 43 U/L DA — SIGNIFICANT CHANGE UP (ref 10–45)
ANION GAP SERPL CALC-SCNC: 11 MMOL/L — SIGNIFICANT CHANGE UP (ref 5–17)
AST SERPL-CCNC: 35 U/L — SIGNIFICANT CHANGE UP (ref 10–40)
BILIRUB SERPL-MCNC: 0.2 MG/DL — SIGNIFICANT CHANGE UP (ref 0.2–1.2)
BUN SERPL-MCNC: 20 MG/DL — SIGNIFICANT CHANGE UP (ref 7–23)
CALCIUM SERPL-MCNC: 9 MG/DL — SIGNIFICANT CHANGE UP (ref 8.4–10.5)
CHLORIDE SERPL-SCNC: 101 MMOL/L — SIGNIFICANT CHANGE UP (ref 96–108)
CO2 SERPL-SCNC: 23 MMOL/L — SIGNIFICANT CHANGE UP (ref 22–31)
CREAT SERPL-MCNC: 0.84 MG/DL — SIGNIFICANT CHANGE UP (ref 0.5–1.3)
GLUCOSE SERPL-MCNC: 96 MG/DL — SIGNIFICANT CHANGE UP (ref 70–99)
HCT VFR BLD CALC: 27.6 % — LOW (ref 34.5–45)
HGB BLD-MCNC: 8.8 G/DL — LOW (ref 11.5–15.5)
INR BLD: 2.02 RATIO — HIGH (ref 0.88–1.16)
MCHC RBC-ENTMCNC: 28.9 PG — SIGNIFICANT CHANGE UP (ref 27–34)
MCHC RBC-ENTMCNC: 32.1 GM/DL — SIGNIFICANT CHANGE UP (ref 32–36)
MCV RBC AUTO: 90.3 FL — SIGNIFICANT CHANGE UP (ref 80–100)
OB PNL STL: NEGATIVE — SIGNIFICANT CHANGE UP
PLATELET # BLD AUTO: 504 K/UL — HIGH (ref 150–400)
POTASSIUM SERPL-MCNC: 4.6 MMOL/L — SIGNIFICANT CHANGE UP (ref 3.5–5.3)
POTASSIUM SERPL-SCNC: 4.6 MMOL/L — SIGNIFICANT CHANGE UP (ref 3.5–5.3)
PROT SERPL-MCNC: 6.8 G/DL — SIGNIFICANT CHANGE UP (ref 6–8.3)
PROTHROM AB SERPL-ACNC: 23.1 SEC — HIGH (ref 10–12.9)
RBC # BLD: 3.06 M/UL — LOW (ref 3.8–5.2)
RBC # FLD: 14 % — SIGNIFICANT CHANGE UP (ref 10.3–14.5)
SODIUM SERPL-SCNC: 135 MMOL/L — SIGNIFICANT CHANGE UP (ref 135–145)
WBC # BLD: 10.9 K/UL — HIGH (ref 3.8–10.5)
WBC # FLD AUTO: 10.9 K/UL — HIGH (ref 3.8–10.5)

## 2019-04-19 PROCEDURE — 99223 1ST HOSP IP/OBS HIGH 75: CPT

## 2019-04-19 PROCEDURE — 99233 SBSQ HOSP IP/OBS HIGH 50: CPT

## 2019-04-19 RX ORDER — WARFARIN SODIUM 2.5 MG/1
7 TABLET ORAL ONCE
Qty: 0 | Refills: 0 | Status: COMPLETED | OUTPATIENT
Start: 2019-04-19 | End: 2019-04-19

## 2019-04-19 RX ADMIN — Medication 100 MILLIGRAM(S): at 05:51

## 2019-04-19 RX ADMIN — TAMSULOSIN HYDROCHLORIDE 0.4 MILLIGRAM(S): 0.4 CAPSULE ORAL at 21:56

## 2019-04-19 RX ADMIN — Medication 100 MILLIGRAM(S): at 18:07

## 2019-04-19 RX ADMIN — PANTOPRAZOLE SODIUM 40 MILLIGRAM(S): 20 TABLET, DELAYED RELEASE ORAL at 05:51

## 2019-04-19 RX ADMIN — WARFARIN SODIUM 7 MILLIGRAM(S): 2.5 TABLET ORAL at 21:56

## 2019-04-19 RX ADMIN — ATORVASTATIN CALCIUM 20 MILLIGRAM(S): 80 TABLET, FILM COATED ORAL at 21:56

## 2019-04-19 NOTE — CONSULT NOTE ADULT - SUBJECTIVE AND OBJECTIVE BOX
LEONARD DUPREE  74y  Female  Admitting: AMOS Byrd    HPI:  74-year-old female with history of hypertension admitted to [facility] with subarachnoid hemorrhage. Status post coiling of aneurysm. Hospital course, complicated by C. difficile colitis, E. Coli UTI, pulmonary embolism, now maintained on Coumadin.  She was transferred to acute rehabilitation 3/29/19. However, on 4/7/19, she was transferred to acute medical care when she was found to be hypotensive and febrile. She is being treated for Klebsiella bacteremia, and found to have a LLE DVT.   Patient is now on the rehabilitation unit. Hematology consultation for anemia. She denies any prior known history of anemia. Denies bleeding.    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  Dyslipidemia  HTN (Hypertension)  Status post coil embolization of cerebral aneurysm  Status Post Total Knee Replace: left    HEALTH ISSUES - PROBLEM Dx:  Thrombocytosis: Thrombocytosis  Anemia, chronic disease: Anemia, chronic disease  Deep vein thrombosis (DVT) of lower extremity, unspecified chronicity, unspecified laterality, unspecified vein: Deep vein thrombosis (DVT) of lower extremity, unspecified chronicity, unspecified laterality, unspecified vein  Hypertension, unspecified type: Hypertension, unspecified type  H/O spont subarachnoid intracranial hemorrhage d/t cerebral aneurysm: H/O spont subarachnoid intracranial hemorrhage d/t cerebral aneurysm  Cognitive deficits following other nontraumatic intracranial hemorrhage: Cognitive deficits following other nontraumatic intracranial hemorrhage  Anterior communicating artery aneurysm: Anterior communicating artery aneurysm  Pulmonary thromboembolism  Thrombophlebitis of popliteal vein    MEDICATIONS  (STANDING):  atorvastatin 20 milliGRAM(s) Oral at bedtime  docusate sodium 100 milliGRAM(s) Oral two times a day  pantoprazole    Tablet 40 milliGRAM(s) Oral before breakfast  tamsulosin Oral Tab/Cap - Peds 0.4 milliGRAM(s) Oral at bedtime  warfarin 7 milliGRAM(s) Oral once    MEDICATIONS  (PRN):    Allergies    No Known Allergies    FAMILY HISTORY: No pertinent FH in first degree relatives.    SOCIAL HISTORY: No EtOH, no tobacco    REVIEW OF SYSTEMS:    CONSTITUTIONAL: No c/o chills  EYES/ENT: No throat pain   NECK: No pain   RESPIRATORY: No cough, wheezing, hemoptysis; No shortness of breath  CARDIOVASCULAR: No chest pain or palpitations  GASTROINTESTINAL: No abdominal or epigastric pain. No nausea, vomiting, or hematemesis; No melena or hematochezia.  GENITOURINARY: No hematuria  NEUROLOGICAL: No numbness  SKIN: No itching, burning, rashes, or lesions   All other review of systems is negative unless indicated above.    T(F): 97.8 (04-19-19 @ 07:18), Max: 98.1 (04-18-19 @ 19:55)  HR: 81 (04-19-19 @ 07:18)  BP: 135/75 (04-19-19 @ 07:18)  RR: 14 (04-19-19 @ 07:18)  SpO2: 97% (04-19-19 @ 07:18)    GENERAL: NAD, well-developed  EYES: EOMI, PERRLA, conjunctiva and sclera clear  NECK: Supple, No JVD  CHEST/LUNG: Clear to auscultation bilaterally; No wheeze  HEART: Regular rate and rhythm; No murmurs, rubs, or gallops  ABDOMEN: Soft, Nontender, Nondistended; Bowel sounds present; unable to appreciate hepatosplenomegaly  EXTREMITIES:  no calf tenderness  NEUROLOGY: awake, alert  SKIN: No rashes or lesions    Labs:             8.8    10.9  )-----------( 504      ( 04-19 @ 06:22 )             27.6                9.1    11.3  )-----------( 497      ( 04-18 @ 05:30 )             28.6                9.6    10.3  )-----------( 532      ( 04-17 @ 09:10 )             29.0       04-19    135  |  101  |  20  ----------------------------<  96  4.6   |  23  |  0.84    Ca    9.0      19 Apr 2019 06:22    TPro  6.8  /  Alb  2.4<L>  /  TBili  0.2  /  DBili  x   /  AST  35  /  ALT  43  /  AlkPhos  91  04-19      PT/INR - ( 19 Apr 2019 05:22 )   PT: 23.1 sec;   INR: 2.02 ratio      Radiology and additional tests:  < from: US Duplex Venous Lower Ext Complete, Bilateral (04.14.19 @ 14:51) >  EXAM:  US DPLX LWR EXT VEINS COMPL BI      PROCEDURE DATE:  04/14/2019        INTERPRETATION:  CLINICAL INFORMATION: Pulmonary embolus. Fever. Evaluate   for DVT.    COMPARISON: None available.    TECHNIQUE: Duplex sonography of the BILATERAL LOWERextremities with   color and spectral Doppler, with and without compression.      FINDINGS:    There is normal compressibility of the bilateral common femoral, femoral   and popliteal veins.     No calf vein thrombosis is detected in the right lower extremity.    There is non-compressible thrombus in one of the paired left peroneal   veins.    Doppler examination shows normal spontaneous and phasic flow.    Discussed with Dr. Tran on 4/14/2019 at 3:10 p.m.    IMPRESSION:     No evidence of right lower extremity deep venous thrombosis.    Left lower extremity deep venous thrombosis involving a peroneal vein.   (below the knee DVT)    < from: US Duplex Venous Lower Ext Complete, Bilateral (04.14.19 @ 14:51) >  EXAM:  US DPLX LWR EXT VEINS COMPL BI      PROCEDURE DATE:  04/14/2019        INTERPRETATION:  CLINICAL INFORMATION: Pulmonary embolus. Fever. Evaluate   for DVT.    COMPARISON: None available.    TECHNIQUE: Duplex sonography of the BILATERAL LOWERextremities with   color and spectral Doppler, with and without compression.      FINDINGS:    There is normal compressibility of the bilateral common femoral, femoral   and popliteal veins.     No calf vein thrombosis is detected in the right lower extremity.    There is non-compressible thrombus in one of the paired left peroneal   veins.    Doppler examination shows normal spontaneous and phasic flow.    Discussed with Dr. Tran on 4/14/2019 at 3:10 p.m.    IMPRESSION:     No evidence of right lower extremity deep venous thrombosis.    Left lower extremity deep venous thrombosis involving a peroneal vein.   (below the knee DVT)      ROLY REY M.D., ATTENDING RADIOLOGIST  This document has been electronically signed. Apr 142019  3:11PM    < end of copied text >  ROLY REY M.D., ATTENDING RADIOLOGIST  This document has been electronically signed. Apr 142019  3:11PM    < end of copied text >  < from: Xray Chest 1 View-PORTABLE IMMEDIATE (04.14.19 @ 13:07) >  EXAM:  XR CHEST PORTABLE IMMED 1V      PROCEDURE DATE:  04/14/2019        INTERPRETATION:  History: Fever    Chest:  one view.      Comparison: 4/7/2019    AP radiograph of the chest demonstrates subsegmental atelectasis at the   lung bases. The cardiac silhouette is normal in size. Osseous structures   are intact.    Impression:Subsegmental atelectasis is seen at the lung bases.    JORDAN YOUNG M.D., ATTENDING RADIOLOGIST  This document has been electronically signed. Apr 14 2019  2:29PM    < end of copied text >

## 2019-04-19 NOTE — PROGRESS NOTE ADULT - SUBJECTIVE AND OBJECTIVE BOX
CHIEF COMPLAINT: Urinary retention.       HISTORY OF PRESENT ILLNESS  74 yr old Female c PMHx of HTN on ASA (? indication) presents to ED s/p syncope and 3 day hx of neck pain on 3/4/19. Admitted to [facility] where admission workup revealed large SAH on CT brain.  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 Hospital course notable for cdiff diarrhea evaluated by ID and treated with course of Flagyl and Vancomycin; also urinary retention (youssef was reinserted on 3/26/19), failed multiple TOVs, concurrent E coli UTI was discovered on 3/27/19 and treated with fosfomycin. On 3/18/19 patient developed dyspnea. CTA chest revealed RUL PE. Started on heparin gtt 3/18/19 and bridged to coumadin. Pt was deemed stable for acute rehab on 3/29.  On 4/7, RRT called for fever 102.7'F and hypoxia with SpO2 80's. Patient was started on empiric antibiotics Zosyn, Vanco IV x 1 dose, and PO vanco was restarted per ID consultation recommendations. Labs were sent, found to have new leukocytosis WBC 21, TEREZA, +UA. Chest xray negative. Hypotensive with BP 80/50. Received NS boluses. Patient was admitted to ICU for management of sepsis. ID in, treated for klebsiella bacteremia. Youssef reinserted & started on Zosyn + prophylactic vancomycin (for cdiff). ID recommends to follow repeat blood cultures. Intermittent tachycardia and fevers-US LEs +new DVT. Seen by vascular. On Coumadin.   On IV antibiotics, youssef re-inserted. (16 Apr 2019 10:58)      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  Dyslipidemia  HTN (Hypertension)  Status post coil embolization of cerebral aneurysm  Status Post Total Knee Replace: left       REVIEW OF SYMPTOMS  [X] Constitutional WNL     [X] Cardio WNL            [X] GI WNL                                [X] Endo WNL                     [X] Skin WNL                 [X] MSK WNL                  [X] Psych WNL      VITALS  Vital Signs Last 24 Hrs  T(C): 36.6 (19 Apr 2019 07:18), Max: 36.7 (18 Apr 2019 19:55)  T(F): 97.8 (19 Apr 2019 07:18), Max: 98.1 (18 Apr 2019 19:55)  HR: 81 (19 Apr 2019 07:18) (81 - 100)  BP: 135/75 (19 Apr 2019 07:18) (122/77 - 135/75)  BP(mean): --  RR: 14 (19 Apr 2019 07:18) (14 - 14)  SpO2: 97% (19 Apr 2019 07:18) (97% - 99%)    PHYSICAL EXAM  Constitutional - NAD, Comfortable  HEENT - NCAT, EOMI  Neck - Supple, No limited ROM  Chest - CTA bilaterally, No wheeze, No rhonchi, No crackles  Cardiovascular - RRR, S1S2, No murmurs  Abdomen - BS+, Soft, NTND  Extremities - No C/C/E, No calf tenderness   Skin-no rash  Wounds-right groin      Neurologic Exam - alert and awake, follows commands, no new weakness                                                 Balance - impaired     Psychiatric - Mood stable, Affect WNL         FUNCTIONAL PROGRESS  Gait - CG  ADLs - CG  Transfers -CG  Functional transfer - CG    RECENT LABS                        8.8    10.9  )-----------( 504      ( 19 Apr 2019 06:22 )             27.6     04-19    135  |  101  |  20  ----------------------------<  96  4.6   |  23  |  0.84    Ca    9.0      19 Apr 2019 06:22    TPro  6.8  /  Alb  2.4<L>  /  TBili  0.2  /  DBili  x   /  AST  35  /  ALT  43  /  AlkPhos  91  04-19    PT/INR - ( 19 Apr 2019 05:22 )   PT: 23.1 sec;   INR: 2.02 ratio           LIVER FUNCTIONS - ( 19 Apr 2019 06:22 )  Alb: 2.4 g/dL / Pro: 6.8 g/dL / ALK PHOS: 91 U/L / ALT: 43 U/L DA / AST: 35 U/L / GGT: x             Direct LDL: 103 mg/dL (03-04-19 @ 23:32)    Hemoglobin A1C, Whole Blood: 5.6 % (03-04-19 @ 22:39)              RADIOLOGY/OTHER RESULTS      CURRENT MEDICATIONS  MEDICATIONS  (STANDING):  atorvastatin 20 milliGRAM(s) Oral at bedtime  docusate sodium 100 milliGRAM(s) Oral two times a day  pantoprazole    Tablet 40 milliGRAM(s) Oral before breakfast  tamsulosin Oral Tab/Cap - Peds 0.4 milliGRAM(s) Oral at bedtime  warfarin 7 milliGRAM(s) Oral once    MEDICATIONS  (PRN):      ASSESSMENT & PLAN    GI/Bowel Management - Psyllium   Management - Toilet Q2  Skin - Turn Q2  Pain - Tylenol PRN  DVT PPX - Coumadin  Diet - reg c thins      Continue comprehensive acute rehab program consisting of 3hrs/day of OT/PT and SLP.

## 2019-04-19 NOTE — PROGRESS NOTE ADULT - SUBJECTIVE AND OBJECTIVE BOX
CC: f/u for C diff and klebsiella bacteremia    Patient reports: she is alert, no complaints, using a leg bag    REVIEW OF SYSTEMS:  All other review of systems negative (Comprehensive ROS)    Antimicrobials Day #  :off    Other Medications Reviewed    T(F): 98.1 (04-18-19 @ 19:55), Max: 98.1 (04-18-19 @ 19:55)  HR: 100 (04-18-19 @ 19:55)  BP: 122/77 (04-18-19 @ 19:55)  RR: 14 (04-18-19 @ 19:55)  SpO2: 99% (04-18-19 @ 19:55)  Wt(kg): --    PHYSICAL EXAM:  General: alert, no acute distress  Eyes:  anicteric, no conjunctival injection, no discharge  Oropharynx: no lesions or injection 	  Neck: supple, without adenopathy  Lungs: clear to auscultation  Heart: regular rate and rhythm; no murmur, rubs or gallops  Abdomen: soft, nondistended, nontender, without mass or organomegaly  Skin: no lesions  Extremities: no clubbing, cyanosis, or edema  Neurologic: alert, oriented, moves all extremities    LAB RESULTS:                        8.8    10.9  )-----------( 504      ( 19 Apr 2019 06:22 )             27.6     04-18    138  |  104  |  25<H>  ----------------------------<  101<H>  4.4   |  25  |  0.83    Ca    9.0      18 Apr 2019 05:30    TPro  6.8  /  Alb  2.4<L>  /  TBili  0.2  /  DBili  x   /  AST  34  /  ALT  50<H>  /  AlkPhos  90  04-18    LIVER FUNCTIONS - ( 18 Apr 2019 05:30 )  Alb: 2.4 g/dL / Pro: 6.8 g/dL / ALK PHOS: 90 U/L / ALT: 50 U/L DA / AST: 34 U/L / GGT: x             MICROBIOLOGY:  RECENT CULTURES:      RADIOLOGY REVIEWED:  < from: CT Abdomen and Pelvis No Cont (04.14.19 @ 14:08) >    EXAM:  CT ABDOMEN AND PELVIS      PROCEDURE DATE:  04/14/2019        INTERPRETATION:  Clinical information: Pulmonary embolism. Fever.    COMPARISON: October 03, 2017    PROCEDURE:   CT of the abdomen and pelvis was performed.  IV contrast:  None  Oral contrast:  None  Coronal and sagittal reformatted images were obtained    FINDINGS:    LOWER CHEST: Coronary artery calcifications. Scattered areas of   subsegmental atelectasis at both lung bases.    LIVER: Within normal limits.  SPLEEN: Within normal limits.  PANCREAS: Within normal limits.  GALLBLADDER: Within normal limits.  BILE DUCTS: Normal caliber.  ADRENALS: Within normal limits.  KIDNEYS/URETERS: No hydronephrosis or mass seen within the limitations of   the study. Numerous bilateral nonobstructing kidney stones, including a   1.6 cm stone in the left renal pelvis and a 9 mm right lower pole kidney   stone.    RETROPERITONEUM: No lymphadenopathy.    VESSELS:  Atherosclerotic arterial calcifications.  Normal caliber aorta.    BOWEL: No bowel obstruction, wall thickening or inflammatory change.   Small hiatal hernia. Periampullary duodenal diverticulum. Normal   appendix. Colonic diverticulosis without diverticulitis. PERITONEUM: No   ascites or pneumoperitoneum.    REPRODUCTIVE ORGANS: The uterus and adnexa are within normal limits.  BLADDER: Collapsed around a Low catheter balloon.    ABDOMINAL WALL: Right gluteal intramuscular lipoma.  BONES: No acute bony abnormality.    IMPRESSION:     No acute pathology.  Numerous bilateral nonobstructing kidney stones.    < end of copied text >

## 2019-04-19 NOTE — PROGRESS NOTE ADULT - ASSESSMENT
75 yo female s/p Acomm aneurysm coiling, recent Klebsiella bacteriemia/ urosepsis, urinary retention , Low cath placement, history of Cdiff colitis while on ABx,  acute LE DVT on AC/Coumadin, now with functional and cognitive deficits.      # s/p Acom aneurysm coiling    PT/OT/ST pogram    Fall precautions    #Anemia   Continue coumadin, monitor Hg-labs am, Guaiac.      #HTN   off lisinopril, monitor BP.     # Urosepsis, s/p Klebsiella bacteremia   Completed  IV ABX     Continue Bacid, h/o C diff      # Urinary retention   Low cath   Flomax  Consider voiding trial in few days,  in.     # LE DVT  AC/Coumadin with goal INR 2-3, therapeutic now.         Precautions:              fall, seizure                                                                    Diet: dash    DVT Prophylaxis:         coumadin                                                                 Medical Prognosis: good    Prescreen Comparison: I have reviewed the prescreen information and I found no relevant changes between the preadmission  screening and my post admission evaluation.     Expected Therapy:   P.T.     1   hrs/day           O. T.  1    hrs/day           S.L.P.   1     hrs/day                    P&O     eval                                              Excpected Frequency: 5 days/7 day period    Rehab Potential:       good                                    Estimated Disposition:       home                   ELOS:       10       days      Rationale For Inpatient Rehab Admission- Patient demonstrates the following:     [X] Medically appropriate for rehabilitation admission   [X] Has attainable rehab goals with an approrpriate discharge plan  [X] Has rehabilitation potential (expected to make significant improvement within a reasonable period of time)  [X] Requires close medical management by a rehab physician, rehab nursing care and comprehensive interdisciplinary team (including         PT, OT, SLP and/or prosthetics and orthotics)

## 2019-04-19 NOTE — CONSULT NOTE ADULT - ASSESSMENT
74-year-old female with history of hypertension admitted to [facility] with subarachnoid hemorrhage. Status post coiling of aneurysm. Hospital course, complicated by C. difficile colitis, E. Coli UTI, pulmonary embolism, now maintained on Coumadin.  She was transferred to acute rehabilitation 3/29/19. However, on 4/7/19, she was transferred to acute medical care when she was found to be hypotensive and febrile. She is being treated for Klebsiella bacteremia, and found to have a LLE DVT.   Patient is now on the rehabilitation unit. Hematology consultation for anemia. She denies any prior known history of anemia. Denies bleeding.

## 2019-04-19 NOTE — CONSULT NOTE ADULT - REASON FOR ADMISSION
s/p aneurysm clipping c functional and cognitive deficits.
s/p aneurysm clipping c functional and cognitive deficits.

## 2019-04-19 NOTE — CONSULT NOTE ADULT - PROBLEM SELECTOR RECOMMENDATION 9
Suspect chronic disease contributing. Agree with ruling out occult blood loss. Will check baseline iron studies, reticulocyte, vitamin B12/folate.  Discussed hematologic status and plans of care with patient's daughter/ at bedside-their questions answered to their satisfaction at this time.

## 2019-04-19 NOTE — PROGRESS NOTE ADULT - ASSESSMENT
75 yo female with SAH and aneurysm coiling.  urinary retention/neurogenic bladder  S/P klebsiella bacteremia after youssef removed  S/P 10 days of CTX, stopped 4/17  C.Diff, s/p extended vanco course, stopped 4/17.  Recent CT with no hydronephrosis.She does have B/l non obstructing stones  Fevers seem to have moderated.  PE/DVT on coumadin  No signs of active infection  Suggest:  1.monitor off antibiotics  2.If she develops a fever we should try to evaluate in a non empiric fashion if possible as she had multiple fevers while in hospital which appear to be self limited.  3.observe for relapse of C.Diff, no clinical signs so far.  4.Youssef for neurogenic bladder

## 2019-04-20 LAB
FERRITIN SERPL-MCNC: 186 NG/ML — HIGH (ref 15–150)
FOLATE SERPL-MCNC: 6.5 NG/ML — SIGNIFICANT CHANGE UP
HAPTOGLOB SERPL-MCNC: 409 MG/DL — HIGH (ref 34–200)
HCT VFR BLD CALC: 27.8 % — LOW (ref 34.5–45)
HGB BLD-MCNC: 9.2 G/DL — LOW (ref 11.5–15.5)
INR BLD: 2.34 RATIO — HIGH (ref 0.88–1.16)
IRON SATN MFR SERPL: 22 UG/DL — LOW (ref 30–160)
IRON SATN MFR SERPL: 9 % — LOW (ref 14–50)
MCHC RBC-ENTMCNC: 29.4 PG — SIGNIFICANT CHANGE UP (ref 27–34)
MCHC RBC-ENTMCNC: 33.1 GM/DL — SIGNIFICANT CHANGE UP (ref 32–36)
MCV RBC AUTO: 88.8 FL — SIGNIFICANT CHANGE UP (ref 80–100)
PLATELET # BLD AUTO: 510 K/UL — HIGH (ref 150–400)
PROTHROM AB SERPL-ACNC: 26.9 SEC — HIGH (ref 10–12.9)
RBC # BLD: 3.13 M/UL — LOW (ref 3.8–5.2)
RBC # BLD: 3.18 M/UL — LOW (ref 3.8–5.2)
RBC # FLD: 13.6 % — SIGNIFICANT CHANGE UP (ref 10.3–14.5)
RETICS #: 70.6 K/UL — SIGNIFICANT CHANGE UP (ref 25–125)
RETICS/RBC NFR: 2.2 % — SIGNIFICANT CHANGE UP (ref 0.5–2.5)
TIBC SERPL-MCNC: 258 UG/DL — SIGNIFICANT CHANGE UP (ref 220–430)
UIBC SERPL-MCNC: 236 UG/DL — SIGNIFICANT CHANGE UP (ref 110–370)
VIT B12 SERPL-MCNC: 461 PG/ML — SIGNIFICANT CHANGE UP (ref 232–1245)
WBC # BLD: 9.9 K/UL — SIGNIFICANT CHANGE UP (ref 3.8–10.5)
WBC # FLD AUTO: 9.9 K/UL — SIGNIFICANT CHANGE UP (ref 3.8–10.5)

## 2019-04-20 PROCEDURE — 99233 SBSQ HOSP IP/OBS HIGH 50: CPT

## 2019-04-20 RX ORDER — WARFARIN SODIUM 2.5 MG/1
7 TABLET ORAL ONCE
Qty: 0 | Refills: 0 | Status: COMPLETED | OUTPATIENT
Start: 2019-04-20 | End: 2019-04-20

## 2019-04-20 RX ADMIN — ATORVASTATIN CALCIUM 20 MILLIGRAM(S): 80 TABLET, FILM COATED ORAL at 22:09

## 2019-04-20 RX ADMIN — TAMSULOSIN HYDROCHLORIDE 0.4 MILLIGRAM(S): 0.4 CAPSULE ORAL at 22:09

## 2019-04-20 RX ADMIN — Medication 100 MILLIGRAM(S): at 06:16

## 2019-04-20 RX ADMIN — WARFARIN SODIUM 7 MILLIGRAM(S): 2.5 TABLET ORAL at 22:16

## 2019-04-20 RX ADMIN — PANTOPRAZOLE SODIUM 40 MILLIGRAM(S): 20 TABLET, DELAYED RELEASE ORAL at 06:16

## 2019-04-20 RX ADMIN — Medication 100 MILLIGRAM(S): at 18:17

## 2019-04-20 NOTE — PROGRESS NOTE ADULT - ASSESSMENT
74W PMH HTN presented for anterior communicating aneurysm s/p clipping, hospital and rehab course complicated by C. Diff, UTI with sepsis, klebsiella bacteremia.    Transferred back to acute rehab on 4/17/19.    1. Aneurysm: now back in acute rehab. Management per PMR/Neuro team  2. Sepsis, resolved. Attribute to Klebs. UTI. completed ABX  3. anemia. Dates back to March 12, normocytic profile. Would suggest chronic (newly chronic) disease based on MCV, but FOBT pending. SHe also has thrombocytosis. Acute reactant?  4. DVT. INR therapuetic today

## 2019-04-20 NOTE — PROGRESS NOTE ADULT - SUBJECTIVE AND OBJECTIVE BOX
CC: f/u for  uti and cdif  Patient reports she is feeling great, walking    REVIEW OF SYSTEMS:  All other review of systems negative (Comprehensive ROS)    Antimicrobials Day #  :    Other Medications Reviewed    T(F): 98.4 (04-19-19 @ 20:31), Max: 98.4 (04-19-19 @ 20:31)  HR: 95 (04-19-19 @ 20:31)  BP: 128/85 (04-19-19 @ 20:31)  RR: 15 (04-19-19 @ 20:31)  SpO2: 100% (04-19-19 @ 20:31)  Wt(kg): --    PHYSICAL EXAM:  General: alert, no acute distress  Eyes:  anicteric, no conjunctival injection, no discharge  Oropharynx: no lesions or injection 	  Neck: supple, without adenopathy  Lungs: clear to auscultation  Heart: regular rate and rhythm; no murmur, rubs or gallops  Abdomen: soft, nondistended, nontender, without mass or organomegaly  Skin: no lesions  Extremities: no clubbing, cyanosis, or edema  Neurologic: alert, oriented, moves all extremities    LAB RESULTS:                        9.2    9.9   )-----------( 510      ( 20 Apr 2019 06:11 )             27.8     04-19    135  |  101  |  20  ----------------------------<  96  4.6   |  23  |  0.84    Ca    9.0      19 Apr 2019 06:22    TPro  6.8  /  Alb  2.4<L>  /  TBili  0.2  /  DBili  x   /  AST  35  /  ALT  43  /  AlkPhos  91  04-19    LIVER FUNCTIONS - ( 19 Apr 2019 06:22 )  Alb: 2.4 g/dL / Pro: 6.8 g/dL / ALK PHOS: 91 U/L / ALT: 43 U/L DA / AST: 35 U/L / GGT: x             MICROBIOLOGY:  RECENT CULTURES:      RADIOLOGY REVIEWED:    < from: CT Abdomen and Pelvis No Cont (04.14.19 @ 14:08) >  IMPRESSION:     No acute pathology.  Numerous bilateral nonobstructing kidney stones.    < end of copied text >      Assessment:  Patient with sah, coil, urinary retention, cdif, sepsis from uti with bacteremia when youssef pulled, s/p course of abx and youssef back in place, doing well at present  Plan:  monitor off further antibiotics

## 2019-04-20 NOTE — PROGRESS NOTE ADULT - SUBJECTIVE AND OBJECTIVE BOX
No overnight events.  She feels well.  Slept well.  Reports no pain.   Workup for anemia pending.    REVIEW OF SYSTEMS  Constitutional - No fever,  No fatigue  HEENT - No vertigo, No neck pain  Neurological - No headaches, No loss of strength  Musculoskeletal - No joint pain, No joint swelling, No muscle pain    VITALS  T(C): 36.9 (04-19-19 @ 20:31), Max: 36.9 (04-19-19 @ 20:31)  HR: 95 (04-19-19 @ 20:31) (95 - 95)  BP: 128/85 (04-19-19 @ 20:31) (128/85 - 128/85)  RR: 15 (04-19-19 @ 20:31) (15 - 15)  SpO2: 100% (04-19-19 @ 20:31) (100% - 100%)  Wt(kg): --       MEDICATIONS   atorvastatin 20 milliGRAM(s) at bedtime  docusate sodium 100 milliGRAM(s) two times a day  pantoprazole    Tablet 40 milliGRAM(s) before breakfast  tamsulosin Oral Tab/Cap - Peds 0.4 milliGRAM(s) at bedtime      RECENT LABS/IMAGING                        9.2    9.9   )-----------( 510      ( 20 Apr 2019 06:11 )             27.8     04-19    135  |  101  |  20  ----------------------------<  96  4.6   |  23  |  0.84    Ca    9.0      19 Apr 2019 06:22    TPro  6.8  /  Alb  2.4<L>  /  TBili  0.2  /  DBili  x   /  AST  35  /  ALT  43  /  AlkPhos  91  04-19    PT/INR - ( 20 Apr 2019 06:11 )   PT: 26.9 sec;   INR: 2.34 ratio                       ---------  PHYSICAL EXAM  Constitutional - NAD, Comfortable  Pulm - Breathing comfortably, No wheezing  Abd - Soft, NTND  Extremities - No edema, No calf tenderness  Neurologic Exam -                    Cognitive - Awake, Alert     Communication - Fluent     Sensory - Intact to LT  Psychiatric - Mood WNL, Affect WNL    ASSESSMENT/PLAN  74y Female with functional deficits after ACOMM aneurysm coiling  Anemia - Hg/Hct improved, no FOB in stool, Coumadin dosed, Heme following  Thrombocytosis - Likely reactive, Heme following	  CAD - Lipitor  Constipation - Colace  Urinary rentention - Flomax, Low  Pain - Tylenol PRN  ++DVT - Coumadin    Continue 3hrs a day of comprehensive rehab program.

## 2019-04-20 NOTE — PROGRESS NOTE ADULT - SUBJECTIVE AND OBJECTIVE BOX
Patient is a 74y old  Female who presents with a chief complaint of s/p aneurysm clipping c functional and cognitive deficits. (16 Apr 2019 10:58)    HPI:  74 yr old Female c PMHx of HTN on ASA (? indication) presents to ED s/p syncope and 3 day hx of neck pain on 3/4/19. Admitted to [facility] where admission workup revealed large SAH on CT brain.  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 Hospital course notable for cdiff diarrhea evaluated by ID and treated with course of Flagyl and Vancomycin; also urinary retention (youssef was reinserted on 3/26/19), failed multiple TOVs, concurrent E coli UTI was discovered on 3/27/19 and treated with fosfomycin. On 3/18/19 patient developed dyspnea. CTA chest revealed RUL PE. Started on heparin gtt 3/18/19 and bridged to coumadin. Pt was deemed stable for acute rehab on 3/29.  On 4/7, RRT called for fever 102.7'F and hypoxia with SpO2 80's. Patient was started on empiric antibiotics Zosyn, Vanco IV x 1 dose, and PO vanco was restarted per ID consultation recommendations. Labs were sent, found to have new leukocytosis WBC 21, TEREZA, +UA. Chest xray negative. Hypotensive with BP 80/50. Received NS boluses. Patient was admitted to ICU for management of sepsis. ID in, treated for klebsiella bacteremia. Youssef reinserted & started on Zosyn + prophylactic vancomycin (for cdiff). ID recommends to follow repeat blood cultures. Intermittent tachycardia and fevers-US LEs +new DVT. Seen by vascular. On Coumadin.   On IV antibiotics, youssef re-inserted. (16 Apr 2019 10:58)    Summary: 74W PMH HTN presented initially for syncope and neck pain, found to have SAH on CT brain.  CTA revealed an anterior communicating aneurysm rupture.  She underwent successful coiling of the anterior communicating aneurysm.  Patient's hospital course complicated by C. Difficile, urinary retention/UTI with failed TOVs.  Patient stabilized and discharged to rehab.  4/7/19 RRT called on rehab and found to have sepsis secondary to UTI with klebsiella bacteremia.  Initially admitted to ICU, youssef reinserted, given zosyn and prophylactic vancomycin for C. Diff.    Patient stabilized and downgraded to medical bed and subsequently transferred to acute rehab on 4/17/19.     Interval events: feels well, denies fever chills NVD ABD pain    O: Vital Signs Last 24 Hrs  T(C): 36.8 (20 Apr 2019 13:20), Max: 36.9 (19 Apr 2019 20:31)  T(F): 98.2 (20 Apr 2019 13:20), Max: 98.4 (19 Apr 2019 20:31)  HR: 102 (20 Apr 2019 13:20) (95 - 102)  BP: 109/78 (20 Apr 2019 13:20) (109/78 - 128/85)  BP(mean): --  RR: 15 (20 Apr 2019 13:20) (15 - 15)  SpO2: 98% (20 Apr 2019 13:20) (98% - 100%)    PHYSICAL EXAM:  GENERAL: NAD, well-groomed, well-developed  HEAD:  Atraumatic, Normocephalic  EYES: EOMI, PERRLA, conjunctiva and sclera clear  ENMT: No tonsillar erythema, exudates, or enlargement; Moist mucous membranes, Good dentition, No lesions  NECK: Supple, No JVD, Normal thyroid  NERVOUS SYSTEM:  Alert & Oriented X3, Good concentration; Motor Strength 5/5 B/L upper and lower extremities; DTRs 2+ intact and symmetric  CHEST/LUNG: Clear to percussion bilaterally; No rales, rhonchi, wheezing, or rubs  HEART: Regular rate and rhythm; No murmurs, rubs, or gallops  ABDOMEN: Soft, Nontender, Nondistended; Bowel sounds present  EXTREMITIES:  2+ Peripheral Pulses, No clubbing, cyanosis, or edema  LYMPH: No lymphadenopathy noted  SKIN: No rashes or lesions    MEDICATIONS  (STANDING):  atorvastatin 20 milliGRAM(s) Oral at bedtime  docusate sodium 100 milliGRAM(s) Oral two times a day  pantoprazole    Tablet 40 milliGRAM(s) Oral before breakfast  tamsulosin Oral Tab/Cap - Peds 0.4 milliGRAM(s) Oral at bedtime  warfarin 7 milliGRAM(s) Oral once    MEDICATIONS  (PRN):                          9.2    9.9   )-----------( 510      ( 20 Apr 2019 06:11 )             27.8     19 Apr 2019 06:22    135    |  101    |  20     ----------------------------<  96     4.6     |  23     |  0.84     Ca    9.0        19 Apr 2019 06:22    TPro  6.8    /  Alb  2.4    /  TBili  0.2    /  DBili  x      /  AST  35     /  ALT  43     /  AlkPhos  91     19 Apr 2019 06:22    LIVER FUNCTIONS - ( 19 Apr 2019 06:22 )  Alb: 2.4 g/dL / Pro: 6.8 g/dL / ALK PHOS: 91 U/L / ALT: 43 U/L DA / AST: 35 U/L / GGT: x           PT/INR - ( 20 Apr 2019 06:11 )   PT: 26.9 sec;   INR: 2.34 ratio           CAPILLARY BLOOD GLUCOSE

## 2019-04-21 LAB
INR BLD: 2.36 RATIO — HIGH (ref 0.88–1.16)
PROTHROM AB SERPL-ACNC: 27.2 SEC — HIGH (ref 10–12.9)

## 2019-04-21 PROCEDURE — 99232 SBSQ HOSP IP/OBS MODERATE 35: CPT

## 2019-04-21 RX ORDER — WARFARIN SODIUM 2.5 MG/1
7 TABLET ORAL ONCE
Qty: 0 | Refills: 0 | Status: COMPLETED | OUTPATIENT
Start: 2019-04-21 | End: 2019-04-21

## 2019-04-21 RX ADMIN — PANTOPRAZOLE SODIUM 40 MILLIGRAM(S): 20 TABLET, DELAYED RELEASE ORAL at 06:01

## 2019-04-21 RX ADMIN — Medication 100 MILLIGRAM(S): at 06:01

## 2019-04-21 RX ADMIN — TAMSULOSIN HYDROCHLORIDE 0.4 MILLIGRAM(S): 0.4 CAPSULE ORAL at 21:33

## 2019-04-21 RX ADMIN — ATORVASTATIN CALCIUM 20 MILLIGRAM(S): 80 TABLET, FILM COATED ORAL at 21:33

## 2019-04-21 RX ADMIN — Medication 100 MILLIGRAM(S): at 17:54

## 2019-04-21 RX ADMIN — WARFARIN SODIUM 7 MILLIGRAM(S): 2.5 TABLET ORAL at 21:33

## 2019-04-21 NOTE — PROGRESS NOTE ADULT - SUBJECTIVE AND OBJECTIVE BOX
No overnight events.  She feels good and is happy. Slept well.  Reports no pain.     REVIEW OF SYSTEMS  Constitutional - No fever,  No fatigue  HEENT - No vertigo, No neck pain  Neurological - No headaches, No loss of strength  Musculoskeletal - No joint pain, No joint swelling, No muscle pain      VITALS  T(C): 37 (04-20-19 @ 21:17), Max: 37 (04-20-19 @ 21:17)  HR: 99 (04-20-19 @ 21:17) (99 - 102)  BP: 116/77 (04-20-19 @ 21:17) (109/78 - 116/77)  RR: 14 (04-20-19 @ 21:17) (14 - 15)  SpO2: 97% (04-20-19 @ 21:17) (97% - 98%)  Wt(kg): --        MEDICATIONS   atorvastatin 20 milliGRAM(s) at bedtime  docusate sodium 100 milliGRAM(s) two times a day  pantoprazole    Tablet 40 milliGRAM(s) before breakfast  tamsulosin Oral Tab/Cap - Peds 0.4 milliGRAM(s) at bedtime      RECENT LABS/IMAGING                        9.2    9.9   )-----------( 510      ( 20 Apr 2019 06:11 )             27.8           PT/INR - ( 21 Apr 2019 06:16 )   PT: 27.2 sec;   INR: 2.36 ratio                     ---------  PHYSICAL EXAM  Constitutional - NAD, Comfortable  Pulm - Breathing comfortably, No wheezing  Abd - Soft, NTND  Extremities - No edema, No calf tenderness  Neurologic Exam -                    Cognitive - Awake, Alert     Communication - Fluent     Sensory - Intact to LT  Psychiatric - Mood positive    ASSESSMENT/PLAN  74y Female with functional deficits after ACOMM aneurysm coiling  Anemia - Hg/Hct improved, no FOB in stool, Coumadin dosed, Heme following  Thrombocytosis - Likely reactive, Heme following	  CAD - Lipitor  Constipation - Colace  Urinary rentention - Flomax, Low  Pain - Tylenol PRN  ++DVT - Coumadin    Continue 3hrs a day of comprehensive rehab program.

## 2019-04-22 LAB
ALBUMIN SERPL ELPH-MCNC: 2.6 G/DL — LOW (ref 3.3–5)
ALP SERPL-CCNC: 90 U/L — SIGNIFICANT CHANGE UP (ref 40–120)
ALT FLD-CCNC: 36 U/L DA — SIGNIFICANT CHANGE UP (ref 10–45)
ANION GAP SERPL CALC-SCNC: 10 MMOL/L — SIGNIFICANT CHANGE UP (ref 5–17)
AST SERPL-CCNC: 24 U/L — SIGNIFICANT CHANGE UP (ref 10–40)
BILIRUB SERPL-MCNC: 0.3 MG/DL — SIGNIFICANT CHANGE UP (ref 0.2–1.2)
BUN SERPL-MCNC: 22 MG/DL — SIGNIFICANT CHANGE UP (ref 7–23)
CALCIUM SERPL-MCNC: 9.3 MG/DL — SIGNIFICANT CHANGE UP (ref 8.4–10.5)
CHLORIDE SERPL-SCNC: 103 MMOL/L — SIGNIFICANT CHANGE UP (ref 96–108)
CO2 SERPL-SCNC: 26 MMOL/L — SIGNIFICANT CHANGE UP (ref 22–31)
CREAT SERPL-MCNC: 0.94 MG/DL — SIGNIFICANT CHANGE UP (ref 0.5–1.3)
GLUCOSE SERPL-MCNC: 96 MG/DL — SIGNIFICANT CHANGE UP (ref 70–99)
HCT VFR BLD CALC: 28.9 % — LOW (ref 34.5–45)
HGB BLD-MCNC: 9.3 G/DL — LOW (ref 11.5–15.5)
INR BLD: 2.48 RATIO — HIGH (ref 0.88–1.16)
MCHC RBC-ENTMCNC: 28.9 PG — SIGNIFICANT CHANGE UP (ref 27–34)
MCHC RBC-ENTMCNC: 32.3 GM/DL — SIGNIFICANT CHANGE UP (ref 32–36)
MCV RBC AUTO: 89.4 FL — SIGNIFICANT CHANGE UP (ref 80–100)
PLATELET # BLD AUTO: 502 K/UL — HIGH (ref 150–400)
POTASSIUM SERPL-MCNC: 4.4 MMOL/L — SIGNIFICANT CHANGE UP (ref 3.5–5.3)
POTASSIUM SERPL-SCNC: 4.4 MMOL/L — SIGNIFICANT CHANGE UP (ref 3.5–5.3)
PROT SERPL-MCNC: 7.2 G/DL — SIGNIFICANT CHANGE UP (ref 6–8.3)
PROTHROM AB SERPL-ACNC: 28.6 SEC — HIGH (ref 10–12.9)
RBC # BLD: 3.23 M/UL — LOW (ref 3.8–5.2)
RBC # FLD: 13.6 % — SIGNIFICANT CHANGE UP (ref 10.3–14.5)
SODIUM SERPL-SCNC: 139 MMOL/L — SIGNIFICANT CHANGE UP (ref 135–145)
WBC # BLD: 9 K/UL — SIGNIFICANT CHANGE UP (ref 3.8–10.5)
WBC # FLD AUTO: 9 K/UL — SIGNIFICANT CHANGE UP (ref 3.8–10.5)

## 2019-04-22 PROCEDURE — 99233 SBSQ HOSP IP/OBS HIGH 50: CPT

## 2019-04-22 PROCEDURE — 93010 ELECTROCARDIOGRAM REPORT: CPT

## 2019-04-22 RX ORDER — WARFARIN SODIUM 2.5 MG/1
7 TABLET ORAL DAILY
Qty: 0 | Refills: 0 | Status: COMPLETED | OUTPATIENT
Start: 2019-04-22 | End: 2019-04-24

## 2019-04-22 RX ADMIN — WARFARIN SODIUM 7 MILLIGRAM(S): 2.5 TABLET ORAL at 21:17

## 2019-04-22 RX ADMIN — PANTOPRAZOLE SODIUM 40 MILLIGRAM(S): 20 TABLET, DELAYED RELEASE ORAL at 06:08

## 2019-04-22 RX ADMIN — Medication 100 MILLIGRAM(S): at 06:08

## 2019-04-22 RX ADMIN — Medication 100 MILLIGRAM(S): at 17:27

## 2019-04-22 RX ADMIN — ATORVASTATIN CALCIUM 20 MILLIGRAM(S): 80 TABLET, FILM COATED ORAL at 21:17

## 2019-04-22 RX ADMIN — TAMSULOSIN HYDROCHLORIDE 0.4 MILLIGRAM(S): 0.4 CAPSULE ORAL at 21:17

## 2019-04-22 NOTE — PROGRESS NOTE ADULT - ASSESSMENT
74W PMH HTN presented for anterior communicating aneurysm s/p clipping, hospital and rehab course complicated by C. Diff, UTI with sepsis, klebsiella bacteremia.

## 2019-04-22 NOTE — PROGRESS NOTE ADULT - SUBJECTIVE AND OBJECTIVE BOX
CHIEF COMPLAINT: good spirits, tolerates therapy well      HISTORY OF PRESENT ILLNESS    74 yr old Female c PMHx of HTN on ASA (? indication) presents to ED s/p syncope and 3 day hx of neck pain on 3/4/19. Admitted to [facility] where admission workup revealed large SAH on CT brain.  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 Hospital course notable for cdiff diarrhea evaluated by ID and treated with course of Flagyl and Vancomycin; also urinary retention (youssef was reinserted on 3/26/19), failed multiple TOVs, concurrent E coli UTI was discovered on 3/27/19 and treated with fosfomycin. On 3/18/19 patient developed dyspnea. CTA chest revealed RUL PE. Started on heparin gtt 3/18/19 and bridged to coumadin. Pt was deemed stable for acute rehab on 3/29.  On 4/7, RRT called for fever 102.7'F and hypoxia with SpO2 80's. Patient was started on empiric antibiotics Zosyn, Vanco IV x 1 dose, and PO vanco was restarted per ID consultation recommendations. Labs were sent, found to have new leukocytosis WBC 21, TEREZA, +UA. Chest xray negative. Hypotensive with BP 80/50. Received NS boluses. Patient was admitted to ICU for management of sepsis. ID in, treated for klebsiella bacteremia. Youssef reinserted & started on Zosyn + prophylactic vancomycin (for cdiff). ID recommends to follow repeat blood cultures. Intermittent tachycardia and fevers-US LEs +new DVT. Seen by vascular. On Coumadin.   On IV antibiotics, youssef re-inserted. (16 Apr 2019 10:58)        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  Dyslipidemia  HTN (Hypertension)  Status post coil embolization of cerebral aneurysm  Status Post Total Knee Replace: left      VITALS  Vital Signs Last 24 Hrs  T(C): 36.8 (22 Apr 2019 07:40), Max: 36.9 (21 Apr 2019 20:08)  T(F): 98.2 (22 Apr 2019 07:40), Max: 98.4 (21 Apr 2019 20:08)  HR: 105 (22 Apr 2019 07:40) (98 - 105)  BP: 102/67 (22 Apr 2019 07:40) (102/67 - 110/76)  BP(mean): --  RR: 16 (22 Apr 2019 07:40) (15 - 16)  SpO2: 99% (22 Apr 2019 07:40) (99% - 100%)      PHYSICAL EXAM  Constitutional - NAD, Comfortable  HEENT - NCAT, EOMI  Neck - Supple, No limited ROM  Chest - CTA bilaterally,  Cardiovascular - RRR, S1S2, No murmurs  Abdomen - BS+, Soft, NTND  Extremities - No C/C/E, No calf tenderness   Skin-no rash  Neurologic Exam -  no new focal deficits                     RECENT LABS                        9.3    9.0   )-----------( 502      ( 22 Apr 2019 06:26 )             28.9     04-22    139  |  103  |  22  ----------------------------<  96  4.4   |  26  |  0.94    Ca    9.3      22 Apr 2019 06:26    TPro  7.2  /  Alb  2.6<L>  /  TBili  0.3  /  DBili  x   /  AST  24  /  ALT  36  /  AlkPhos  90  04-22    PT/INR - ( 22 Apr 2019 06:26 )   PT: 28.6 sec;   INR: 2.48 ratio           LIVER FUNCTIONS - ( 22 Apr 2019 06:26 )  Alb: 2.6 g/dL / Pro: 7.2 g/dL / ALK PHOS: 90 U/L / ALT: 36 U/L DA / AST: 24 U/L / GGT: x             Direct LDL: 103 mg/dL (03-04-19 @ 23:32)    Hemoglobin A1C, Whole Blood: 5.6 % (03-04-19 @ 22:39    CURRENT MEDICATIONS  MEDICATIONS  (STANDING):  atorvastatin 20 milliGRAM(s) Oral at bedtime  docusate sodium 100 milliGRAM(s) Oral two times a day  pantoprazole    Tablet 40 milliGRAM(s) Oral before breakfast  tamsulosin Oral Tab/Cap - Peds 0.4 milliGRAM(s) Oral at bedtime  warfarin 7 milliGRAM(s) Oral daily    MEDICATIONS  (PRN):

## 2019-04-22 NOTE — PROGRESS NOTE ADULT - SUBJECTIVE AND OBJECTIVE BOX
CC: f/u for Cdiff, Klebsiella bacteremia    Patient reports feeling well, no diarrhea; BMs normal, good appetite    REVIEW OF SYSTEMS:  All other review of systems negative (Comprehensive ROS)    Antimicrobials off  Medications Reviewed    T(F): 98.2 (04-22-19 @ 07:40), Max: 98.4 (04-21-19 @ 20:08)  HR: 105 (04-22-19 @ 07:40)  BP: 102/67 (04-22-19 @ 07:40)  RR: 16 (04-22-19 @ 07:40)  SpO2: 99% (04-22-19 @ 07:40)  Wt(kg): --    PHYSICAL EXAM:  General: alert, no acute distress  Eyes:  anicteric, no conjunctival injection, no discharge  Oropharynx: no lesions or injection 	  Neck: supple, without adenopathy  Lungs: clear to auscultation  Heart: regular rate and rhythm; no murmur, rubs or gallops  Abdomen: soft, nondistended, nontender, without mass or organomegaly  Low  Skin: no lesions  Extremities: no edema  Neurologic: alert, oriented, moves all extremities    LAB RESULTS:                        9.3    9.0   )-----------( 502      ( 22 Apr 2019 06:26 )             28.9     04-22    139  |  103  |  22  ----------------------------<  96  4.4   |  26  |  0.94    Ca    9.3      22 Apr 2019 06:26    TPro  7.2  /  Alb  2.6<L>  /  TBili  0.3  /  DBili  x   /  AST  24  /  ALT  36  /  AlkPhos  90  04-22    MICROBIOLOGY:  RECENT CULTURES:  Culture - Blood (04.12.19 @ 10:35)    Specimen Source: .Blood Blood    Culture Results:   No growth at 5 days.    RADIOLOGY REVIEWED:  US Duplex Venous Lower Ext Complete, Bilateral (04.14.19 @ 14:51) >  No evidence of right lower extremity deep venous thrombosis.    Left lower extremity deep venous thrombosis involving a peroneal vein.   (below the knee DVT)

## 2019-04-22 NOTE — PROGRESS NOTE ADULT - SUBJECTIVE AND OBJECTIVE BOX
Patient is a 74y old  Female who presents with a chief complaint of s/p aneurysm clipping c functional and cognitive deficits. (21 Apr 2019 08:30)    Patient seen and examined at bedside.    ALLERGIES:  No Known Allergies    MEDICATIONS  (STANDING):  atorvastatin 20 milliGRAM(s) Oral at bedtime  docusate sodium 100 milliGRAM(s) Oral two times a day  pantoprazole    Tablet 40 milliGRAM(s) Oral before breakfast  tamsulosin Oral Tab/Cap - Peds 0.4 milliGRAM(s) Oral at bedtime    MEDICATIONS  (PRN):    Vital Signs Last 24 Hrs  T(F): 98.2 (22 Apr 2019 07:40), Max: 98.4 (21 Apr 2019 20:08)  HR: 105 (22 Apr 2019 07:40) (98 - 109)  BP: 102/67 (22 Apr 2019 07:40) (102/67 - 110/76)  RR: 16 (22 Apr 2019 07:40) (15 - 16)  SpO2: 99% (22 Apr 2019 07:40) (98% - 100%)  I&O's Summary    21 Apr 2019 07:01  -  22 Apr 2019 07:00  --------------------------------------------------------  IN: 0 mL / OUT: 1650 mL / NET: -1650 mL    BMI (kg/m2): 31.2 (04-17-19 @ 18:52)  PHYSICAL EXAM:  General: NAD, A/O x 3  ENT: MMM  Neck: Supple, No JVD  Lungs: Clear to auscultation bilaterally  Cardio: RRR, S1/S2, No murmurs  Abdomen: Soft, Nontender, Nondistended; Bowel sounds present  Extremities: No calf tenderness, No pitting edema    LABS:                9.3    9.0   )-----------( 502      ( 22 Apr 2019 06:26 )             28.9       04-22    139  |  103  |  22  ----------------------------<  96  4.4   |  26  |  0.94    Ca    9.3      22 Apr 2019 06:26    TPro  7.2  /  Alb  2.6  /  TBili  0.3  /  DBili  x   /  AST  24  /  ALT  36  /  AlkPhos  90  04-22     eGFR if Non African American: 60 mL/min/1.73M2 (04-22-19 @ 06:26)  eGFR if African American: 70 mL/min/1.73M2 (04-22-19 @ 06:26)    PT/INR - ( 22 Apr 2019 06:26 )   PT: 28.6 sec;   INR: 2.48 ratio      03-04 Chol 184 mg/dL  mg/dL HDL 49 mg/dL Trig 158 mg/dL    CAPILLARY BLOOD GLUCOSE    03-04 [id number] 5.6    RADIOLOGY & ADDITIONAL TESTS:    Care Discussed with Consultants/Other Providers:

## 2019-04-22 NOTE — PROGRESS NOTE ADULT - ASSESSMENT
73 yo female s/p Acomm aneurysm coiling, recent Klebsiella bacteriemia/ urosepsis, urinary retention , Low cath placement, history of Cdiff colitis while on ABx,  acute LE DVT on AC/Coumadin, now with functional and cognitive deficits.      # s/p Acom aneurysm coiling    PT/OT/ST pogram    Fall precautions    #Anemia   Continue coumadin, monitor Hg-labs am, Guaiac are negative, Hematology is following      #HTN   off lisinopril, monitor BP.     # Urosepsis, s/p Klebsiella bacteremia   Completed  IV ABX     Continue Bacid, h/o C diff      # Urinary retention   Low cath   Flomax  Consider voiding trial in the morning    # LE DVT  AC/Coumadin with goal INR 2-3, therapeutic now.         Precautions:              fall, seizure                                                                    Diet: dash    DVT Prophylaxis:         coumadin                                                                 Medical Prognosis: good

## 2019-04-22 NOTE — PROGRESS NOTE ADULT - ASSESSMENT
75 yo female with SAH and aneurysm coiling, urinary retention/neurogenic bladder  S/p Klebsiella bacteremia after youssef removed  S/P 10 days of CTX, stopped 4/17  Cdiff, s/p extended vanco course, stopped 4/17.  Recent CT with no hydronephrosis   Fever resolved  WBC normal  No diarrhea  PE/DVT on coumadin  No signs of active infection    Suggest:  Monitor off antibiotics  Youssef for neurogenic bladder  Follow temps and CBC/diff

## 2019-04-23 LAB
INR BLD: 2.34 RATIO — HIGH (ref 0.88–1.16)
PROTHROM AB SERPL-ACNC: 26.9 SEC — HIGH (ref 10–12.9)

## 2019-04-23 PROCEDURE — 99232 SBSQ HOSP IP/OBS MODERATE 35: CPT

## 2019-04-23 PROCEDURE — 93010 ELECTROCARDIOGRAM REPORT: CPT

## 2019-04-23 PROCEDURE — 99233 SBSQ HOSP IP/OBS HIGH 50: CPT

## 2019-04-23 RX ADMIN — ATORVASTATIN CALCIUM 20 MILLIGRAM(S): 80 TABLET, FILM COATED ORAL at 22:12

## 2019-04-23 RX ADMIN — PANTOPRAZOLE SODIUM 40 MILLIGRAM(S): 20 TABLET, DELAYED RELEASE ORAL at 05:31

## 2019-04-23 RX ADMIN — Medication 100 MILLIGRAM(S): at 17:10

## 2019-04-23 RX ADMIN — Medication 100 MILLIGRAM(S): at 05:31

## 2019-04-23 RX ADMIN — WARFARIN SODIUM 7 MILLIGRAM(S): 2.5 TABLET ORAL at 22:12

## 2019-04-23 RX ADMIN — TAMSULOSIN HYDROCHLORIDE 0.4 MILLIGRAM(S): 0.4 CAPSULE ORAL at 22:12

## 2019-04-23 NOTE — PROGRESS NOTE ADULT - ASSESSMENT
73 yo female s/p Acomm aneurysm coiling, recent Klebsiella bacteriemia/ urosepsis, urinary retention , Low cath placement, history of Cdiff colitis while on ABx,  acute LE DVT on AC/Coumadin, now with functional and cognitive deficits.      # s/p Acom aneurysm coiling    PT/OT/ST pogram    Fall precautions    #Anemia   Continue coumadin, monitor Hg-labs am, Guaiac.      #HTN   off lisinopril, monitor BP.     # Urosepsis, s/p Klebsiella bacteremia   Completed  IV ABX    Continue Bacid, h/o C diff      # Urinary retention   Low cath-removed. TOV.    Flomax to continue.     # LE DVT  AC/Coumadin with goal INR 2-3, therapeutic now.         Precautions:              fall, seizure                                                                    Diet: dash    DVT Prophylaxis:         coumadin                                                                 Medical Prognosis: good    Prescreen Comparison: I have reviewed the prescreen information and I found no relevant changes between the preadmission  screening and my post admission evaluation.     Expected Therapy:   P.T.     1   hrs/day           O. T.  1    hrs/day           S.L.P.   1     hrs/day                    P&O     eval                                              Excpected Frequency: 5 days/7 day period    Rehab Potential:       good                                    Estimated Disposition:       home                   ELOS:       10       days      Rationale For Inpatient Rehab Admission- Patient demonstrates the following:     [X] Medically appropriate for rehabilitation admission   [X] Has attainable rehab goals with an approrpriate discharge plan  [X] Has rehabilitation potential (expected to make significant improvement within a reasonable period of time)  [X] Requires close medical management by a rehab physician, rehab nursing care and comprehensive interdisciplinary team (including         PT, OT, SLP and/or prosthetics and orthotics)

## 2019-04-23 NOTE — PROGRESS NOTE ADULT - SUBJECTIVE AND OBJECTIVE BOX
Patient is a 74 year old  Female who presents with a chief complaint of s/p aneurysm clipping c functional and cognitive deficits. (22 Apr 2019 17:42)    Patient seen and examined, reports she feels well, denies any complaints.    MEDICATIONS  (STANDING):  atorvastatin 20 milliGRAM(s) Oral at bedtime  docusate sodium 100 milliGRAM(s) Oral two times a day  pantoprazole    Tablet 40 milliGRAM(s) Oral before breakfast  tamsulosin Oral Tab/Cap - Peds 0.4 milliGRAM(s) Oral at bedtime  warfarin 7 milliGRAM(s) Oral daily        REVIEW OF SYSTEMS:  CONSTITUTIONAL: No fever, weight loss, or fatigue  EYES: No eye pain, visual disturbances, or discharge  ENMT:  No difficulty hearing, tinnitus, vertigo; No sinus or throat pain  NECK: No pain or stiffness  RESPIRATORY: No cough, wheezing, chills or hemoptysis; No shortness of breath  CARDIOVASCULAR: No chest pain, palpitations, dizziness, or leg swelling  GASTROINTESTINAL: No abdominal or epigastric pain. No nausea, vomiting, or hematemesis; No diarrhea or constipation. No melena or hematochezia.  GENITOURINARY: No dysuria, frequency, hematuria, or incontinence  NEUROLOGICAL: No headaches, memory loss, loss of strength, numbness, or tremors  SKIN: No itching, burning, rashes, or lesions   ENDOCRINE: No heat or cold intolerance; No hair loss  MUSCULOSKELETAL: No joint pain or swelling; No muscle, back, or extremity pain  PSYCHIATRIC: No depression, anxiety, mood swings, or difficulty sleeping  HEME/LYMPH: No easy bruising, or bleeding gums  ALLERGY AND IMMUNOLOGIC: No hives or eczema    PHYSICAL EXAM:  T(C): 36.7 (04-22-19 @ 20:46), Max: 36.7 (04-22-19 @ 20:46)  HR: 88 (04-23-19 @ 08:06) (88 - 90)  BP: 143/99 (04-23-19 @ 08:06) (126/80 - 143/99)  RR: 16 (04-23-19 @ 08:06) (15 - 16)  SpO2: 99% (04-23-19 @ 08:06) (96% - 99%)    I&O's Summary    22 Apr 2019 07:01  -  23 Apr 2019 07:00  --------------------------------------------------------  IN: 0 mL / OUT: 2348 mL / NET: -2348 mL        GENERAL: NAD, well-groomed, well-developed  HEAD:  Atraumatic, Normocephalic  EYES: EOMI, PERRL, conjunctiva and sclera clear  ENMT: No tonsillar erythema, exudates, or enlargement; Moist mucous membranes, Good dentition, No lesions  NECK: Supple, No JVD, Normal thyroid  NERVOUS SYSTEM:  Alert & Oriented X3, Good concentration; Motor Strength 5/5 B/L upper and lower extremities; DTRs 2+ intact and symmetric  CHEST/LUNG: Clear to ascultation bilaterally; No rales, rhonchi, wheezing, or rubs  HEART: Regular rate and rhythm; No murmurs, rubs, or gallops  ABDOMEN: Soft, Nontender, Nondistended; Bowel sounds present  EXTREMITIES:  2+ Peripheral Pulses, No clubbing, cyanosis, or edema  LYMPH: No lymphadenopathy noted  SKIN: No rashes or lesions    LABS:                        9.3    9.0   )-----------( 502      ( 22 Apr 2019 06:26 )             28.9     04-22    139  |  103  |  22  ----------------------------<  96  4.4   |  26  |  0.94    Ca    9.3      22 Apr 2019 06:26    TPro  7.2  /  Alb  2.6<L>  /  TBili  0.3  /  DBili  x   /  AST  24  /  ALT  36  /  AlkPhos  90  04-22    PT/INR - ( 23 Apr 2019 06:10 )   PT: 26.9 sec;   INR: 2.34 ratio           RADIOLOGY & ADDITIONAL TESTS:    Consultant(s) Notes Reviewed:  [x] YES  [ ] NO    Care Discussed with Consultants/Other Providers [x] YES  [ ] NO

## 2019-04-23 NOTE — PROGRESS NOTE ADULT - SUBJECTIVE AND OBJECTIVE BOX
CHIEF COMPLAINT: NAD      HISTORY OF PRESENT ILLNESS  74 yr old Female c PMHx of HTN on ASA (? indication) presents to ED s/p syncope and 3 day hx of neck pain on 3/4/19. Admitted to [facility] where admission workup revealed large SAH on CT brain.  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 Hospital course notable for cdiff diarrhea evaluated by ID and treated with course of Flagyl and Vancomycin; also urinary retention (youssef was reinserted on 3/26/19), failed multiple TOVs, concurrent E coli UTI was discovered on 3/27/19 and treated with fosfomycin. On 3/18/19 patient developed dyspnea. CTA chest revealed RUL PE. Started on heparin gtt 3/18/19 and bridged to coumadin. Pt was deemed stable for acute rehab on 3/29.  On 4/7, RRT called for fever 102.7'F and hypoxia with SpO2 80's. Patient was started on empiric antibiotics Zosyn, Vanco IV x 1 dose, and PO vanco was restarted per ID consultation recommendations. Labs were sent, found to have new leukocytosis WBC 21, TEREZA, +UA. Chest xray negative. Hypotensive with BP 80/50. Received NS boluses. Patient was admitted to ICU for management of sepsis. ID in, treated for klebsiella bacteremia. Youssef reinserted & started on Zosyn + prophylactic vancomycin (for cdiff). ID recommends to follow repeat blood cultures. Intermittent tachycardia and fevers-US LEs +new DVT. Seen by vascular. On Coumadin.   On IV antibiotics, youssef re-inserted. (16 Apr 2019 10:58)      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  Dyslipidemia  HTN (Hypertension)  Status post coil embolization of cerebral aneurysm  Status Post Total Knee Replace: left        REVIEW OF SYMPTOMS  [X] Constitutional WNL     [X] Cardio WNL                [X] GI WNL                            [X] Endo WNL                     [X] Skin WNL                 [X] MSK WNL               [X] Psych WNL      VITALS  Vital Signs Last 24 Hrs  T(C): 36.7 (22 Apr 2019 20:46), Max: 36.7 (22 Apr 2019 20:46)  T(F): 98 (22 Apr 2019 20:46), Max: 98 (22 Apr 2019 20:46)  HR: 88 (23 Apr 2019 08:06) (88 - 90)  BP: 143/99 (23 Apr 2019 08:06) (126/80 - 143/99)  BP(mean): --  RR: 16 (23 Apr 2019 08:06) (15 - 16)  SpO2: 99% (23 Apr 2019 08:06) (96% - 99%)      PHYSICAL EXAM  Constitutional - NAD  HEENT - NCAT, EOMI  Neck - Supple, No limited ROM  Chest - CTA bilaterally, No wheeze, No rhonchi, No crackles  Cardiovascular - RRR, S1S2, No murmurs  Abdomen - BS+, Soft, NTND  Extremities - No C/C/E, No calf tenderness   Skin-no rash      Neurologic Exam - no new deficits                      Psychiatric - Mood stable, Affect WNL         FUNCTIONAL PROGRESS  Gait - 150ft RW, 8steps CG  ADLs - min/CG A   Transfers - min/supervision  Functional transfer - min A    RECENT LABS                        9.3    9.0   )-----------( 502      ( 22 Apr 2019 06:26 )             28.9     04-22    139  |  103  |  22  ----------------------------<  96  4.4   |  26  |  0.94    Ca    9.3      22 Apr 2019 06:26    TPro  7.2  /  Alb  2.6<L>  /  TBili  0.3  /  DBili  x   /  AST  24  /  ALT  36  /  AlkPhos  90  04-22    PT/INR - ( 23 Apr 2019 06:10 )   PT: 26.9 sec;   INR: 2.34 ratio           LIVER FUNCTIONS - ( 22 Apr 2019 06:26 )  Alb: 2.6 g/dL / Pro: 7.2 g/dL / ALK PHOS: 90 U/L / ALT: 36 U/L DA / AST: 24 U/L / GGT: x             Direct LDL: 103 mg/dL (03-04-19 @ 23:32)    Hemoglobin A1C, Whole Blood: 5.6 % (03-04-19 @ 22:39)              RADIOLOGY/OTHER RESULTS      CURRENT MEDICATIONS  MEDICATIONS  (STANDING):  atorvastatin 20 milliGRAM(s) Oral at bedtime  docusate sodium 100 milliGRAM(s) Oral two times a day  pantoprazole    Tablet 40 milliGRAM(s) Oral before breakfast  tamsulosin Oral Tab/Cap - Peds 0.4 milliGRAM(s) Oral at bedtime  warfarin 7 milliGRAM(s) Oral daily    MEDICATIONS  (PRN):      ASSESSMENT & PLAN      GI/Bowel Management - Psyllium   Management - Toilet Q2, youssef removed PVRs/SCVI  Skin - Turn Q2  Pain - Tylenol PRN  DVT PPX - Coumadin-therapeutic  Diet - reg c thins    Continue comprehensive acute rehab program consisting of 3hrs/day of OT/PT and SLP.

## 2019-04-23 NOTE — CHART NOTE - NSCHARTNOTEFT_GEN_A_CORE
Nutrition Follow Up Note  Hospital Course (Per Electronic Medical Record):   Source: Medical Record [X] Patient [X]     Diet: Regular Diet w/ Thin Liquids  Tolerates Diet Well  No Chewing/Swallowing Difficulties  No Recent Nausea, Vomiting, Diarrhea or Constipation  Consumes % of Meals (as Per Documentation)     Enteral/Parenteral Nutrition: N/A    Current Weight: 151.4lb on 4/22  Obtain Weights Weekly  Obtain New Weight to Confirm Change     Pertinent Medications: MEDICATIONS  (STANDING):  atorvastatin 20 milliGRAM(s) Oral at bedtime  docusate sodium 100 milliGRAM(s) Oral two times a day  pantoprazole    Tablet 40 milliGRAM(s) Oral before breakfast  tamsulosin Oral Tab/Cap - Peds 0.4 milliGRAM(s) Oral at bedtime  warfarin 7 milliGRAM(s) Oral daily    MEDICATIONS  (PRN):    Pertinent Labs:  04-22 Na139 mmol/L Glu 96 mg/dL K+ 4.4 mmol/L Cr  0.94 mg/dL BUN 22 mg/dL 04-22 Alb 2.6 g/dL<L>    Skin: No Pressure Ulcers     Edema: None Noted     Last BM: on 4/22    Estimated Needs:   [X] No Change since Previous Assessment    Previous Nutrition Diagnosis:   Obese    Nutrition Diagnosis is [X] Ongoing     New Nutrition Diagnosis: [X] Not Applicable    Interventions:   1. Recommend Continue Nutrition Plan of Care     Monitoring & Evaluation:   [X] Weights   [X] PO Intake   [X] Follow Up (Per Protocol)  [X] Tolerance to Diet Prescription   [X] Other: Labs     RD Remains Available.  Arturo Flores RDN

## 2019-04-24 LAB
ALBUMIN SERPL ELPH-MCNC: 2.6 G/DL — LOW (ref 3.3–5)
ALP SERPL-CCNC: 83 U/L — SIGNIFICANT CHANGE UP (ref 40–120)
ALT FLD-CCNC: 28 U/L DA — SIGNIFICANT CHANGE UP (ref 10–45)
ANION GAP SERPL CALC-SCNC: 9 MMOL/L — SIGNIFICANT CHANGE UP (ref 5–17)
AST SERPL-CCNC: 26 U/L — SIGNIFICANT CHANGE UP (ref 10–40)
BILIRUB SERPL-MCNC: 0.3 MG/DL — SIGNIFICANT CHANGE UP (ref 0.2–1.2)
BUN SERPL-MCNC: 22 MG/DL — SIGNIFICANT CHANGE UP (ref 7–23)
CALCIUM SERPL-MCNC: 9.8 MG/DL — SIGNIFICANT CHANGE UP (ref 8.4–10.5)
CHLORIDE SERPL-SCNC: 103 MMOL/L — SIGNIFICANT CHANGE UP (ref 96–108)
CO2 SERPL-SCNC: 27 MMOL/L — SIGNIFICANT CHANGE UP (ref 22–31)
CREAT SERPL-MCNC: 0.99 MG/DL — SIGNIFICANT CHANGE UP (ref 0.5–1.3)
GLUCOSE SERPL-MCNC: 102 MG/DL — HIGH (ref 70–99)
HCT VFR BLD CALC: 27.9 % — LOW (ref 34.5–45)
HGB BLD-MCNC: 9.1 G/DL — LOW (ref 11.5–15.5)
INR BLD: 2.41 RATIO — HIGH (ref 0.88–1.16)
MCHC RBC-ENTMCNC: 28.8 PG — SIGNIFICANT CHANGE UP (ref 27–34)
MCHC RBC-ENTMCNC: 32.5 GM/DL — SIGNIFICANT CHANGE UP (ref 32–36)
MCV RBC AUTO: 88.7 FL — SIGNIFICANT CHANGE UP (ref 80–100)
PLATELET # BLD AUTO: 501 K/UL — HIGH (ref 150–400)
POTASSIUM SERPL-MCNC: 4.5 MMOL/L — SIGNIFICANT CHANGE UP (ref 3.5–5.3)
POTASSIUM SERPL-SCNC: 4.5 MMOL/L — SIGNIFICANT CHANGE UP (ref 3.5–5.3)
PROT SERPL-MCNC: 7.2 G/DL — SIGNIFICANT CHANGE UP (ref 6–8.3)
PROTHROM AB SERPL-ACNC: 27.7 SEC — HIGH (ref 10–12.9)
RBC # BLD: 3.15 M/UL — LOW (ref 3.8–5.2)
RBC # FLD: 14 % — SIGNIFICANT CHANGE UP (ref 10.3–14.5)
SODIUM SERPL-SCNC: 139 MMOL/L — SIGNIFICANT CHANGE UP (ref 135–145)
WBC # BLD: 8.7 K/UL — SIGNIFICANT CHANGE UP (ref 3.8–10.5)
WBC # FLD AUTO: 8.7 K/UL — SIGNIFICANT CHANGE UP (ref 3.8–10.5)

## 2019-04-24 PROCEDURE — 99232 SBSQ HOSP IP/OBS MODERATE 35: CPT

## 2019-04-24 RX ADMIN — ATORVASTATIN CALCIUM 20 MILLIGRAM(S): 80 TABLET, FILM COATED ORAL at 22:06

## 2019-04-24 RX ADMIN — WARFARIN SODIUM 7 MILLIGRAM(S): 2.5 TABLET ORAL at 22:06

## 2019-04-24 RX ADMIN — Medication 100 MILLIGRAM(S): at 17:24

## 2019-04-24 RX ADMIN — TAMSULOSIN HYDROCHLORIDE 0.4 MILLIGRAM(S): 0.4 CAPSULE ORAL at 22:06

## 2019-04-24 RX ADMIN — PANTOPRAZOLE SODIUM 40 MILLIGRAM(S): 20 TABLET, DELAYED RELEASE ORAL at 05:02

## 2019-04-24 RX ADMIN — Medication 100 MILLIGRAM(S): at 05:02

## 2019-04-24 NOTE — PROGRESS NOTE ADULT - SUBJECTIVE AND OBJECTIVE BOX
CHIEF COMPLAINT: Urinary retention.       HISTORY OF PRESENT ILLNESS  74 yr old Female c PMHx of HTN on ASA (? indication) presents to ED s/p syncope and 3 day hx of neck pain on 3/4/19. Admitted to [facility] where admission workup revealed large SAH on CT brain.  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 Hospital course notable for cdiff diarrhea evaluated by ID and treated with course of Flagyl and Vancomycin; also urinary retention (youssef was reinserted on 3/26/19), failed multiple TOVs, concurrent E coli UTI was discovered on 3/27/19 and treated with fosfomycin. On 3/18/19 patient developed dyspnea. CTA chest revealed RUL PE. Started on heparin gtt 3/18/19 and bridged to coumadin. Pt was deemed stable for acute rehab on 3/29.  On 4/7, RRT called for fever 102.7'F and hypoxia with SpO2 80's. Patient was started on empiric antibiotics Zosyn, Vanco IV x 1 dose, and PO vanco was restarted per ID consultation recommendations. Labs were sent, found to have new leukocytosis WBC 21, TEREZA, +UA. Chest xray negative. Hypotensive with BP 80/50. Received NS boluses. Patient was admitted to ICU for management of sepsis. ID in, treated for klebsiella bacteremia. Youssef reinserted & started on Zosyn + prophylactic vancomycin (for cdiff). ID recommends to follow repeat blood cultures. Intermittent tachycardia and fevers-US LEs +new DVT. Seen by vascular. On Coumadin.   On IV antibiotics, youssef re-inserted. (16 Apr 2019 10:58)      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  Dyslipidemia  HTN (Hypertension)  Status post coil embolization of cerebral aneurysm  Status Post Total Knee Replace: left       REVIEW OF SYMPTOMS  [X] Constitutional WNL     [X] Cardio WNL       [X] GI WNL                                          [X] Endo WNL                     [X] Skin WNL                 [X] MSK WNL                           [X] Cognitive WNL        [X] Psych WNL      VITALS  Vital Signs Last 24 Hrs  T(C): 36.6 (24 Apr 2019 07:39), Max: 36.9 (23 Apr 2019 22:10)  T(F): 97.8 (24 Apr 2019 07:39), Max: 98.5 (23 Apr 2019 22:10)  HR: 105 (24 Apr 2019 07:39) (85 - 105)  BP: 132/79 (24 Apr 2019 07:39) (121/85 - 132/79)  BP(mean): --  RR: 14 (24 Apr 2019 07:39) (14 - 14)  SpO2: 99% (24 Apr 2019 07:39) (98% - 99%)       PHYSICAL EXAM  Constitutional - NAD  HEENT - NCAT, EOMI  Neck - Supple, No limited ROM  Chest - CTA bilaterally, No wheeze, No rhonchi, No crackles  Cardiovascular - RRR, S1S2, No murmurs  Abdomen - BS+, Soft, NTND  Extremities - No C/C/E, No calf tenderness   Skin-no rash      Neurologic Exam - no new deficits                      Psychiatric - Mood stable, Affect WNL         FUNCTIONAL PROGRESS  Gait - 150ft RW, 8steps CG  ADLs - min/CG A   Transfers - min/supervision  Functional transfer - min/CG A    RECENT LABS                        9.1    8.7   )-----------( 501      ( 24 Apr 2019 06:30 )             27.9     04-24    139  |  103  |  22  ----------------------------<  102<H>  4.5   |  27  |  0.99    Ca    9.8      24 Apr 2019 06:30    TPro  7.2  /  Alb  2.6<L>  /  TBili  0.3  /  DBili  x   /  AST  26  /  ALT  28  /  AlkPhos  83  04-24    PT/INR - ( 24 Apr 2019 06:30 )   PT: 27.7 sec;   INR: 2.41 ratio           LIVER FUNCTIONS - ( 24 Apr 2019 06:30 )  Alb: 2.6 g/dL / Pro: 7.2 g/dL / ALK PHOS: 83 U/L / ALT: 28 U/L DA / AST: 26 U/L / GGT: x             Direct LDL: 103 mg/dL (03-04-19 @ 23:32)    Hemoglobin A1C, Whole Blood: 5.6 % (03-04-19 @ 22:39)              RADIOLOGY/OTHER RESULTS      CURRENT MEDICATIONS  MEDICATIONS  (STANDING):  atorvastatin 20 milliGRAM(s) Oral at bedtime  docusate sodium 100 milliGRAM(s) Oral two times a day  pantoprazole    Tablet 40 milliGRAM(s) Oral before breakfast  tamsulosin Oral Tab/Cap - Peds 0.4 milliGRAM(s) Oral at bedtime  warfarin 7 milliGRAM(s) Oral daily    MEDICATIONS  (PRN):      ASSESSMENT & PLAN      GI/Bowel Management - Psyllium   Management - Toilet Q2, youssef removed PVRs/SCVI  Skin - Turn Q2  Pain - Tylenol PRN  DVT PPX - Coumadin-therapeutic  Diet - reg c thins      Continue comprehensive acute rehab program consisting of 3hrs/day of OT/PT and SLP. CHIEF COMPLAINT: Urinary retention with large PVRs.      HISTORY OF PRESENT ILLNESS  74 yr old Female c PMHx of HTN on ASA (? indication) presents to ED s/p syncope and 3 day hx of neck pain on 3/4/19. Admitted to [facility] where admission workup revealed large SAH on CT brain.  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 Hospital course notable for cdiff diarrhea evaluated by ID and treated with course of Flagyl and Vancomycin; also urinary retention (youssef was reinserted on 3/26/19), failed multiple TOVs, concurrent E coli UTI was discovered on 3/27/19 and treated with fosfomycin. On 3/18/19 patient developed dyspnea. CTA chest revealed RUL PE. Started on heparin gtt 3/18/19 and bridged to coumadin. Pt was deemed stable for acute rehab on 3/29.  On 4/7, RRT called for fever 102.7'F and hypoxia with SpO2 80's. Patient was started on empiric antibiotics Zosyn, Vanco IV x 1 dose, and PO vanco was restarted per ID consultation recommendations. Labs were sent, found to have new leukocytosis WBC 21, TEREZA, +UA. Chest xray negative. Hypotensive with BP 80/50. Received NS boluses. Patient was admitted to ICU for management of sepsis. ID in, treated for klebsiella bacteremia. Youssef reinserted & started on Zosyn + prophylactic vancomycin (for cdiff). ID recommends to follow repeat blood cultures. Intermittent tachycardia and fevers-US LEs +new DVT. Seen by vascular. On Coumadin.   On IV antibiotics, youssef re-inserted. (16 Apr 2019 10:58)      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  Dyslipidemia  HTN (Hypertension)  Status post coil embolization of cerebral aneurysm  Status Post Total Knee Replace: left       REVIEW OF SYMPTOMS  [X] Constitutional WNL     [X] Cardio WNL       [X] GI WNL                                          [X] Endo WNL                     [X] Skin WNL                 [X] MSK WNL                           [X] Cognitive WNL        [X] Psych WNL      VITALS  Vital Signs Last 24 Hrs  T(C): 36.6 (24 Apr 2019 07:39), Max: 36.9 (23 Apr 2019 22:10)  T(F): 97.8 (24 Apr 2019 07:39), Max: 98.5 (23 Apr 2019 22:10)  HR: 105 (24 Apr 2019 07:39) (85 - 105)  BP: 132/79 (24 Apr 2019 07:39) (121/85 - 132/79)  BP(mean): --  RR: 14 (24 Apr 2019 07:39) (14 - 14)  SpO2: 99% (24 Apr 2019 07:39) (98% - 99%)       PHYSICAL EXAM  Constitutional - NAD  HEENT - NCAT, EOMI  Neck - Supple, No limited ROM  Chest - CTA bilaterally, No wheeze, No rhonchi, No crackles  Cardiovascular - RRR, S1S2, No murmurs  Abdomen - BS+, Soft, NTND  Extremities - No C/C/E, No calf tenderness   Skin-no rash      Neurologic Exam - no new deficits                      Psychiatric - Mood stable, Affect WNL         FUNCTIONAL PROGRESS  Gait - 150ft RW, 8steps CG  ADLs - min/CG A   Transfers - min/supervision  Functional transfer - min/CG A    RECENT LABS                        9.1    8.7   )-----------( 501      ( 24 Apr 2019 06:30 )             27.9     04-24    139  |  103  |  22  ----------------------------<  102<H>  4.5   |  27  |  0.99    Ca    9.8      24 Apr 2019 06:30    TPro  7.2  /  Alb  2.6<L>  /  TBili  0.3  /  DBili  x   /  AST  26  /  ALT  28  /  AlkPhos  83  04-24    PT/INR - ( 24 Apr 2019 06:30 )   PT: 27.7 sec;   INR: 2.41 ratio           LIVER FUNCTIONS - ( 24 Apr 2019 06:30 )  Alb: 2.6 g/dL / Pro: 7.2 g/dL / ALK PHOS: 83 U/L / ALT: 28 U/L DA / AST: 26 U/L / GGT: x             Direct LDL: 103 mg/dL (03-04-19 @ 23:32)    Hemoglobin A1C, Whole Blood: 5.6 % (03-04-19 @ 22:39)              RADIOLOGY/OTHER RESULTS      CURRENT MEDICATIONS  MEDICATIONS  (STANDING):  atorvastatin 20 milliGRAM(s) Oral at bedtime  docusate sodium 100 milliGRAM(s) Oral two times a day  pantoprazole    Tablet 40 milliGRAM(s) Oral before breakfast  tamsulosin Oral Tab/Cap - Peds 0.4 milliGRAM(s) Oral at bedtime  warfarin 7 milliGRAM(s) Oral daily    MEDICATIONS  (PRN):      ASSESSMENT & PLAN      GI/Bowel Management - Psyllium   Management - Toilet Q2, youssef removed PVRs/SCVI  Skin - Turn Q2  Pain - Tylenol PRN  DVT PPX - Coumadin-therapeutic  Diet - reg c thins      Continue comprehensive acute rehab program consisting of 3hrs/day of OT/PT and SLP.

## 2019-04-24 NOTE — PROGRESS NOTE ADULT - SUBJECTIVE AND OBJECTIVE BOX
Patient is a 74y old  Female who presents with a chief complaint of s/p aneurysm clipping c functional and cognitive deficits.  Pt doing very well today without complaints,  physical therapy going well.    Patient seen and examined at bedside.    ALLERGIES:  No Known Allergies    MEDICATIONS  (STANDING):  atorvastatin 20 milliGRAM(s) Oral at bedtime  docusate sodium 100 milliGRAM(s) Oral two times a day  pantoprazole    Tablet 40 milliGRAM(s) Oral before breakfast  tamsulosin Oral Tab/Cap - Peds 0.4 milliGRAM(s) Oral at bedtime  warfarin 7 milliGRAM(s) Oral daily    MEDICATIONS  (PRN):    Vital Signs Last 24 Hrs  T(F): 97.8 (24 Apr 2019 07:39), Max: 98.5 (23 Apr 2019 22:10)  HR: 105 (24 Apr 2019 07:39) (85 - 105)  BP: 132/79 (24 Apr 2019 07:39) (121/85 - 132/79)  RR: 14 (24 Apr 2019 07:39) (14 - 14)  SpO2: 99% (24 Apr 2019 07:39) (98% - 99%)  I&O's Summary    23 Apr 2019 07:01  -  24 Apr 2019 07:00  --------------------------------------------------------  IN: 400 mL / OUT: 2876 mL / NET: -2476 mL      PHYSICAL EXAM:  General: NAD, A/O x 3  ENT: MMM  Neck: Supple, No JVD  Lungs: Clear to auscultation bilaterally  Cardio: RRR, S1/S2, No murmurs  Abdomen: Soft, Nontender, Nondistended; Bowel sounds present  Extremities: No calf tenderness, No pitting edema    LABS:                        9.1    8.7   )-----------( 501      ( 24 Apr 2019 06:30 )             27.9     04-24    139  |  103  |  22  ----------------------------<  102  4.5   |  27  |  0.99    Ca    9.8      24 Apr 2019 06:30    TPro  7.2  /  Alb  2.6  /  TBili  0.3  /  DBili  x   /  AST  26  /  ALT  28  /  AlkPhos  83  04-24    eGFR if Non African American: 57 mL/min/1.73M2 (04-24-19 @ 06:30)  eGFR if African American: 66 mL/min/1.73M2 (04-24-19 @ 06:30)    PT/INR - ( 24 Apr 2019 06:30 )   PT: 27.7 sec;   INR: 2.41 ratio                 03-04 Chol 184 mg/dL  mg/dL HDL 49 mg/dL Trig 158 mg/dL        CAPILLARY BLOOD GLUCOSE        03-04 [id number] 5.6        RADIOLOGY & ADDITIONAL TESTS:    Care Discussed with Consultants/Other Providers:

## 2019-04-25 ENCOUNTER — TRANSCRIPTION ENCOUNTER (OUTPATIENT)
Age: 74
End: 2019-04-25

## 2019-04-25 LAB
INR BLD: 2.37 RATIO — HIGH (ref 0.88–1.16)
PROTHROM AB SERPL-ACNC: 27.3 SEC — HIGH (ref 10–12.9)

## 2019-04-25 PROCEDURE — 99233 SBSQ HOSP IP/OBS HIGH 50: CPT

## 2019-04-25 PROCEDURE — 99232 SBSQ HOSP IP/OBS MODERATE 35: CPT

## 2019-04-25 RX ORDER — WARFARIN SODIUM 2.5 MG/1
7 TABLET ORAL DAILY
Qty: 0 | Refills: 0 | Status: DISCONTINUED | OUTPATIENT
Start: 2019-04-25 | End: 2019-04-26

## 2019-04-25 RX ORDER — WARFARIN SODIUM 2.5 MG/1
3.5 TABLET ORAL
Qty: 0 | Refills: 0 | COMMUNITY

## 2019-04-25 RX ORDER — WARFARIN SODIUM 2.5 MG/1
7 TABLET ORAL
Qty: 0 | Refills: 0 | COMMUNITY
Start: 2019-04-25

## 2019-04-25 RX ADMIN — WARFARIN SODIUM 7 MILLIGRAM(S): 2.5 TABLET ORAL at 21:05

## 2019-04-25 RX ADMIN — PANTOPRAZOLE SODIUM 40 MILLIGRAM(S): 20 TABLET, DELAYED RELEASE ORAL at 05:07

## 2019-04-25 RX ADMIN — Medication 100 MILLIGRAM(S): at 05:07

## 2019-04-25 RX ADMIN — ATORVASTATIN CALCIUM 20 MILLIGRAM(S): 80 TABLET, FILM COATED ORAL at 21:05

## 2019-04-25 RX ADMIN — Medication 100 MILLIGRAM(S): at 17:03

## 2019-04-25 RX ADMIN — TAMSULOSIN HYDROCHLORIDE 0.4 MILLIGRAM(S): 0.4 CAPSULE ORAL at 21:05

## 2019-04-25 NOTE — PROGRESS NOTE ADULT - SUBJECTIVE AND OBJECTIVE BOX
CHIEF COMPLAINT: urinary retention      HISTORY OF PRESENT ILLNESS  74 yr old Female c PMHx of HTN on ASA (? indication) presents to ED s/p syncope and 3 day hx of neck pain on 3/4/19. Admitted to [facility] where admission workup revealed large SAH on CT brain.  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 Hospital course notable for cdiff diarrhea evaluated by ID and treated with course of Flagyl and Vancomycin; also urinary retention (youssef was reinserted on 3/26/19), failed multiple TOVs, concurrent E coli UTI was discovered on 3/27/19 and treated with fosfomycin. On 3/18/19 patient developed dyspnea. CTA chest revealed RUL PE. Started on heparin gtt 3/18/19 and bridged to coumadin. Pt was deemed stable for acute rehab on 3/29.  On 4/7, RRT called for fever 102.7'F and hypoxia with SpO2 80's. Patient was started on empiric antibiotics Zosyn, Vanco IV x 1 dose, and PO vanco was restarted per ID consultation recommendations. Labs were sent, found to have new leukocytosis WBC 21, TEREZA, +UA. Chest xray negative. Hypotensive with BP 80/50. Received NS boluses. Patient was admitted to ICU for management of sepsis. ID in, treated for klebsiella bacteremia. Youssef reinserted & started on Zosyn + prophylactic vancomycin (for cdiff). ID recommends to follow repeat blood cultures. Intermittent tachycardia and fevers-US LEs +new DVT. Seen by vascular. On Coumadin.   On IV antibiotics, youssef re-inserted. (16 Apr 2019 10:58)      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  Dyslipidemia  HTN (Hypertension)  Status post coil embolization of cerebral aneurysm  Status Post Total Knee Replace: left       REVIEW OF SYMPTOMS  [X] Constitutional WNL     [X] Cardio WNL                     [X] GI WNL                                               [X] Endo WNL                     [X] Skin WNL                 [X] MSK WNL                   [X] Psych WNL      VITALS  Vital Signs Last 24 Hrs  T(C): 36.7 (25 Apr 2019 07:49), Max: 36.9 (24 Apr 2019 22:08)  T(F): 98 (25 Apr 2019 07:49), Max: 98.4 (24 Apr 2019 22:08)  HR: 97 (25 Apr 2019 07:49) (94 - 97)  BP: 104/69 (25 Apr 2019 07:49) (104/69 - 133/87)  BP(mean): --  RR: 14 (25 Apr 2019 07:49) (14 - 14)  SpO2: 99% (25 Apr 2019 07:49) (96% - 99%)      PHYSICAL EXAM  Constitutional - NAD  HEENT - NCAT, EOMI  Neck - Supple, No limited ROM  Chest - CTA bilaterally, No wheeze, No rhonchi, No crackles  Cardiovascular - RRR, S1S2, No murmurs  Abdomen - BS+, Soft, NTND  Extremities - No C/C/E, No calf tenderness   Skin-no rash      Neurologic Exam - no new deficits                      Psychiatric - Mood stable, Affect WNL         FUNCTIONAL PROGRESS  Gait - 150ft RW, 8steps CG  ADLs - CG A   Transfers - supervision  Functional transfer - min/CG A      RECENT LABS                        9.1    8.7   )-----------( 501      ( 24 Apr 2019 06:30 )             27.9     04-24    139  |  103  |  22  ----------------------------<  102<H>  4.5   |  27  |  0.99    Ca    9.8      24 Apr 2019 06:30    TPro  7.2  /  Alb  2.6<L>  /  TBili  0.3  /  DBili  x   /  AST  26  /  ALT  28  /  AlkPhos  83  04-24    PT/INR - ( 25 Apr 2019 05:00 )   PT: 27.3 sec;   INR: 2.37 ratio           LIVER FUNCTIONS - ( 24 Apr 2019 06:30 )  Alb: 2.6 g/dL / Pro: 7.2 g/dL / ALK PHOS: 83 U/L / ALT: 28 U/L DA / AST: 26 U/L / GGT: x             Direct LDL: 103 mg/dL (03-04-19 @ 23:32)    Hemoglobin A1C, Whole Blood: 5.6 % (03-04-19 @ 22:39)              RADIOLOGY/OTHER RESULTS      CURRENT MEDICATIONS  MEDICATIONS  (STANDING):  atorvastatin 20 milliGRAM(s) Oral at bedtime  docusate sodium 100 milliGRAM(s) Oral two times a day  pantoprazole    Tablet 40 milliGRAM(s) Oral before breakfast  tamsulosin Oral Tab/Cap - Peds 0.4 milliGRAM(s) Oral at bedtime    MEDICATIONS  (PRN):      ASSESSMENT & PLAN      GI/Bowel Management - Psyllium   Management - Toilet Q2, youssef removed PVRs/SCVI  Skin - Turn Q2  Pain - Tylenol PRN  DVT PPX - Coumadin-therapeutic  Diet - reg c thins    Continue comprehensive acute rehab program consisting of 3hrs/day of OT/PT and SLP.

## 2019-04-25 NOTE — PROGRESS NOTE ADULT - ASSESSMENT
75 yo female s/p Acomm aneurysm coiling, recent Klebsiella bacteriemia/ urosepsis, urinary retention , Low cath placement, history of Cdiff colitis while on ABx,  acute LE DVT on AC/Coumadin, now with functional and cognitive deficits.      # s/p Acom aneurysm coiling    PT/OT/ST pogram.     Fall precautions.     #Anemia   Continue coumadin, monitor Hg.       #HTN   off lisinopril, monitor BP.     # h/o  Urosepsis, s/p Klebsiella bacteremia   Completed  IV ABX, monitor voiding, PVRs.       # Urinary retention   Low cath-removed. TOV ongoing. PVRs improved in last 24h. Mobilize/bowel care.     Flomax to continue.     # LE DVT  AC/Coumadin with goal INR 2-3, therapeutic now. Monitor asymptomatic tachycardia, given history of PE. EKG with sinus tachycardia intermittently.         Precautions:              fall, seizure                                                                    Diet: dash    DVT Prophylaxis:         coumadin                                                                 Medical Prognosis: good    Prescreen Comparison: I have reviewed the prescreen information and I found no relevant changes between the preadmission  screening and my post admission evaluation.     Expected Therapy:   P.T.     1   hrs/day           O. T.  1    hrs/day           S.L.P.   1     hrs/day                    P&O     eval                                              Excpected Frequency: 5 days/7 day period    Rehab Potential:       good                                    Estimated Disposition:       home                   ELOS:       10       days

## 2019-04-25 NOTE — DISCHARGE NOTE PROVIDER - HOSPITAL COURSE
74 yr old Female c PMHx of HTN on ASA (? indication) presents to ED s/p syncope and 3 day hx of neck pain on 3/4/19. Admitted to [facility] where admission workup revealed large SAH on CT brain.  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 Hospital course notable for cdiff diarrhea evaluated by ID and treated with course of Flagyl and Vancomycin; also urinary retention (youssef was reinserted on 3/26/19), failed multiple TOVs, concurrent E coli UTI was discovered on 3/27/19 and treated with fosfomycin. On 3/18/19 patient developed dyspnea. CTA chest revealed RUL PE. Started on heparin gtt 3/18/19 and bridged to coumadin. Pt was deemed stable for acute rehab on 3/29.    On 4/7, RRT called for fever 102.7'F and hypoxia with SpO2 80's. Patient was started on empiric antibiotics Zosyn, Vanco IV x 1 dose, and PO vanco was restarted per ID consultation recommendations. Labs were sent, found to have new leukocytosis WBC 21, TEREZA, +UA. Chest xray negative. Hypotensive with BP 80/50. Received NS boluses. Patient was admitted to ICU for management of sepsis. ID in, treated for klebsiella bacteremia. Youssef reinserted & started on Zosyn + prophylactic vancomycin (for cdiff). ID recommends to follow repeat blood cultures. Intermittent tachycardia and fevers-US LEs +new DVT. Seen by vascular. On Coumadin.     On IV antibiotics, youssef re-inserted.     At [facility] rehab patient completed comprehensive PT/OT/SLP program. Youssef removed, passed void trial. Evaluated by . Antibiotics completed. Afebrile. Cleared for d/c home c HC on. Coumadin for DVT with therapeutic INR on d/c. To follow up outpt. 74 yr old Female c PMHx of HTN on ASA (? indication) presents to ED s/p syncope and 3 day hx of neck pain on 3/4/19. Admitted to [facility] where admission workup revealed large SAH on CT brain.  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 Hospital course notable for cdiff diarrhea evaluated by ID and treated with course of Flagyl and Vancomycin; also urinary retention (youssef was reinserted on 3/26/19), failed multiple TOVs, concurrent E coli UTI was discovered on 3/27/19 and treated with fosfomycin. On 3/18/19 patient developed dyspnea. CTA chest revealed RUL PE. Started on heparin gtt 3/18/19 and bridged to coumadin. Pt was deemed stable for acute rehab on 3/29.    On 4/7, RRT called for fever 102.7'F and hypoxia with SpO2 80's. Patient was started on empiric antibiotics Zosyn, Vanco IV x 1 dose, and PO vanco was restarted per ID consultation recommendations. Labs were sent, found to have new leukocytosis WBC 21, TEREZA, +UA. Chest xray negative. Hypotensive with BP 80/50. Received NS boluses. Patient was admitted to ICU for management of sepsis. ID in, treated for klebsiella bacteremia. Youssef reinserted & started on Zosyn + prophylactic vancomycin (for cdiff). ID recommends to follow repeat blood cultures. Intermittent tachycardia and fevers-US LEs +new DVT. Seen by vascular. On Coumadin.     On IV antibiotics, youssef re-inserted.     At [facility] re[facility] patient completed comprehensive PT/OT/SLP program. Youssef removed, passed void trial. Evaluated by . Antibiotics completed per ID plan. Afebrile. Cleared for d/c home c HC on 4/26/19. Coumadin to continue at home, therapeutic INR on d/c.

## 2019-04-25 NOTE — DISCHARGE NOTE PROVIDER - PROVIDER TOKENS
PROVIDER:[TOKEN:[[id number]]],PROVIDER:[TOKEN:[[id number]]],FREE:[LAST:[Dr Hernandez],PHONE:[phone]],FAX:[(   [phone]    -],ADDRESS:[PCP/Cardiology]]

## 2019-04-25 NOTE — PROGRESS NOTE ADULT - SUBJECTIVE AND OBJECTIVE BOX
Patient is a 74 year old female who presents with a chief complaint of s/p aneurysm clipping c functional and cognitive deficits. (25 Apr 2019 09:52)    Patient seen and examined, reports she feels better, denies any complaints.      MEDICATIONS  (STANDING):  atorvastatin 20 milliGRAM(s) Oral at bedtime  docusate sodium 100 milliGRAM(s) Oral two times a day  pantoprazole    Tablet 40 milliGRAM(s) Oral before breakfast  tamsulosin Oral Tab/Cap - Peds 0.4 milliGRAM(s) Oral at bedtime      REVIEW OF SYSTEMS:  CONSTITUTIONAL: No fever, weight loss, or fatigue  EYES: No eye pain, visual disturbances, or discharge  ENMT:  No difficulty hearing, tinnitus, vertigo; No sinus or throat pain  NECK: No pain or stiffness  RESPIRATORY: No cough, wheezing, chills or hemoptysis; No shortness of breath  CARDIOVASCULAR: No chest pain, palpitations, dizziness, or leg swelling  GASTROINTESTINAL: No abdominal or epigastric pain. No nausea, vomiting, or hematemesis; No diarrhea or constipation. No melena or hematochezia.  GENITOURINARY: No dysuria, frequency, hematuria, or incontinence  NEUROLOGICAL: No headaches, memory loss, loss of strength, numbness, or tremors  SKIN: No itching, burning, rashes, or lesions   ENDOCRINE: No heat or cold intolerance; No hair loss  MUSCULOSKELETAL: No joint pain or swelling; No muscle, back, or extremity pain  PSYCHIATRIC: No depression, anxiety, mood swings, or difficulty sleeping  HEME/LYMPH: No easy bruising, or bleeding gums  ALLERGY AND IMMUNOLOGIC: No hives or eczema        PHYSICAL EXAM:  T(C): 36.7 (04-25-19 @ 07:49), Max: 36.9 (04-24-19 @ 22:08)  HR: 97 (04-25-19 @ 07:49) (94 - 97)  BP: 104/69 (04-25-19 @ 07:49) (104/69 - 133/87)  RR: 14 (04-25-19 @ 07:49) (14 - 14)  SpO2: 99% (04-25-19 @ 07:49) (96% - 99%)      GENERAL: NAD, well-groomed, well-developed  HEAD:  Atraumatic, Normocephalic  EYES: EOMI, PERRL, conjunctiva and sclera clear  ENMT: Moist mucous membranes  NECK: Supple, No JVD, Normal thyroid  NERVOUS SYSTEM:  Alert & Oriented X3, no focal deficit  CHEST/LUNG: Clear to ascultation bilaterally; No rales, rhonchi, wheezing, or rubs  HEART: Regular rate and rhythm; No murmurs, rubs, or gallops  ABDOMEN: Soft, Nontender, Nondistended; Bowel sounds present  EXTREMITIES:  2+ Peripheral Pulses, No clubbing, cyanosis, or edema  SKIN: No rash      LABS:                        9.1    8.7   )-----------( 501      ( 24 Apr 2019 06:30 )             27.9     04-24    139  |  103  |  22  ----------------------------<  102<H>  4.5   |  27  |  0.99    Ca    9.8      24 Apr 2019 06:30    TPro  7.2  /  Alb  2.6<L>  /  TBili  0.3  /  DBili  x   /  AST  26  /  ALT  28  /  AlkPhos  83  04-24    PT/INR - ( 25 Apr 2019 05:00 )   PT: 27.3 sec;   INR: 2.37 ratio         RADIOLOGY & ADDITIONAL TESTS:    Consultant(s) Notes Reviewed:  [x] YES  [ ] NO    Care Discussed with Consultants/Other Providers [x] YES  [ ] NO

## 2019-04-25 NOTE — DISCHARGE NOTE PROVIDER - CARE PROVIDERS DIRECT ADDRESSES
,[email or web address]

## 2019-04-25 NOTE — DISCHARGE NOTE PROVIDER - CARE PROVIDER_API CALL
Johnny Serrato)  Neurological Surgery  [facility], [address] [place] [address]  Phone: [phone]  Fax: [phone]  Follow Up Time:     Johnny Burrows)  Urology  [address] [facility], [place] [address]  Phone: [phone]  Fax: [phone]  Follow Up Time:     Dr Hernandez,   PCP/Cardiology  Phone: [phone]  Fax: (   )    -  Follow Up Time:

## 2019-04-25 NOTE — DISCHARGE NOTE PROVIDER - NSDCHHNEEDSERVICE_GEN_ALL_CORE
Observation and assessment/Rehabilitation services/Teaching and training/Medication teaching and assessment

## 2019-04-26 ENCOUNTER — TRANSCRIPTION ENCOUNTER (OUTPATIENT)
Age: 74
End: 2019-04-26

## 2019-04-26 VITALS
SYSTOLIC BLOOD PRESSURE: 119 MMHG | TEMPERATURE: 98 F | OXYGEN SATURATION: 98 % | RESPIRATION RATE: 15 BRPM | HEART RATE: 78 BPM | DIASTOLIC BLOOD PRESSURE: 88 MMHG

## 2019-04-26 LAB
ALBUMIN SERPL ELPH-MCNC: 2.8 G/DL — LOW (ref 3.3–5)
ALP SERPL-CCNC: 86 U/L — SIGNIFICANT CHANGE UP (ref 40–120)
ALT FLD-CCNC: 27 U/L DA — SIGNIFICANT CHANGE UP (ref 10–45)
ANION GAP SERPL CALC-SCNC: 11 MMOL/L — SIGNIFICANT CHANGE UP (ref 5–17)
AST SERPL-CCNC: 19 U/L — SIGNIFICANT CHANGE UP (ref 10–40)
BILIRUB SERPL-MCNC: 0.2 MG/DL — SIGNIFICANT CHANGE UP (ref 0.2–1.2)
BUN SERPL-MCNC: 22 MG/DL — SIGNIFICANT CHANGE UP (ref 7–23)
CALCIUM SERPL-MCNC: 9.2 MG/DL — SIGNIFICANT CHANGE UP (ref 8.4–10.5)
CHLORIDE SERPL-SCNC: 102 MMOL/L — SIGNIFICANT CHANGE UP (ref 96–108)
CO2 SERPL-SCNC: 26 MMOL/L — SIGNIFICANT CHANGE UP (ref 22–31)
CREAT SERPL-MCNC: 0.96 MG/DL — SIGNIFICANT CHANGE UP (ref 0.5–1.3)
GLUCOSE SERPL-MCNC: 101 MG/DL — HIGH (ref 70–99)
HCT VFR BLD CALC: 30.3 % — LOW (ref 34.5–45)
HGB BLD-MCNC: 10 G/DL — LOW (ref 11.5–15.5)
INR BLD: 2.37 RATIO — HIGH (ref 0.88–1.16)
MCHC RBC-ENTMCNC: 29.4 PG — SIGNIFICANT CHANGE UP (ref 27–34)
MCHC RBC-ENTMCNC: 33 GM/DL — SIGNIFICANT CHANGE UP (ref 32–36)
MCV RBC AUTO: 88.8 FL — SIGNIFICANT CHANGE UP (ref 80–100)
PLATELET # BLD AUTO: 517 K/UL — HIGH (ref 150–400)
POTASSIUM SERPL-MCNC: 4.1 MMOL/L — SIGNIFICANT CHANGE UP (ref 3.5–5.3)
POTASSIUM SERPL-SCNC: 4.1 MMOL/L — SIGNIFICANT CHANGE UP (ref 3.5–5.3)
PROT SERPL-MCNC: 7.6 G/DL — SIGNIFICANT CHANGE UP (ref 6–8.3)
PROTHROM AB SERPL-ACNC: 27.3 SEC — HIGH (ref 10–12.9)
RBC # BLD: 3.41 M/UL — LOW (ref 3.8–5.2)
RBC # FLD: 14 % — SIGNIFICANT CHANGE UP (ref 10.3–14.5)
SODIUM SERPL-SCNC: 139 MMOL/L — SIGNIFICANT CHANGE UP (ref 135–145)
WBC # BLD: 11.3 K/UL — HIGH (ref 3.8–10.5)
WBC # FLD AUTO: 11.3 K/UL — HIGH (ref 3.8–10.5)

## 2019-04-26 PROCEDURE — 99239 HOSP IP/OBS DSCHRG MGMT >30: CPT

## 2019-04-26 PROCEDURE — 99232 SBSQ HOSP IP/OBS MODERATE 35: CPT

## 2019-04-26 RX ORDER — TAMSULOSIN HYDROCHLORIDE 0.4 MG/1
1 CAPSULE ORAL
Qty: 30 | Refills: 0
Start: 2019-04-26 | End: 2019-05-25

## 2019-04-26 RX ORDER — ATORVASTATIN CALCIUM 80 MG/1
1 TABLET, FILM COATED ORAL
Qty: 30 | Refills: 0
Start: 2019-04-26 | End: 2019-05-25

## 2019-04-26 RX ORDER — PANTOPRAZOLE SODIUM 20 MG/1
1 TABLET, DELAYED RELEASE ORAL
Qty: 30 | Refills: 0
Start: 2019-04-26 | End: 2019-05-25

## 2019-04-26 RX ORDER — WARFARIN SODIUM 2.5 MG/1
7 TABLET ORAL
Qty: 210 | Refills: 0
Start: 2019-04-26 | End: 2019-05-25

## 2019-04-26 RX ADMIN — Medication 100 MILLIGRAM(S): at 06:19

## 2019-04-26 RX ADMIN — PANTOPRAZOLE SODIUM 40 MILLIGRAM(S): 20 TABLET, DELAYED RELEASE ORAL at 06:19

## 2019-04-26 NOTE — PROGRESS NOTE ADULT - SUBJECTIVE AND OBJECTIVE BOX
Patient is a 74 year old female who presents with a chief complaint of s/p aneurysm clipping c functional and cognitive deficits. (26 Apr 2019 08:11)    Patient seen and examined, reports she feels better, feels ready to go home.      MEDICATIONS  (STANDING):  atorvastatin 20 milliGRAM(s) Oral at bedtime  docusate sodium 100 milliGRAM(s) Oral two times a day  pantoprazole    Tablet 40 milliGRAM(s) Oral before breakfast  tamsulosin Oral Tab/Cap - Peds 0.4 milliGRAM(s) Oral at bedtime  warfarin 7 milliGRAM(s) Oral daily          REVIEW OF SYSTEMS:  CONSTITUTIONAL: No fever, weight loss, or fatigue  EYES: No eye pain, visual disturbances, or discharge  ENMT:  No difficulty hearing, tinnitus, vertigo; No sinus or throat pain  NECK: No pain or stiffness  RESPIRATORY: No cough, wheezing, chills or hemoptysis; No shortness of breath  CARDIOVASCULAR: No chest pain, palpitations, dizziness, or leg swelling  GASTROINTESTINAL: No abdominal or epigastric pain. No nausea, vomiting, or hematemesis; No diarrhea or constipation. No melena or hematochezia.  GENITOURINARY: No dysuria, frequency, hematuria, or incontinence  NEUROLOGICAL: No headaches, memory loss, loss of strength, numbness, or tremors  SKIN: No itching, burning, rashes, or lesions   ENDOCRINE: No heat or cold intolerance; No hair loss  MUSCULOSKELETAL: No joint pain or swelling; No muscle, back, or extremity pain  PSYCHIATRIC: No depression, anxiety, mood swings, or difficulty sleeping  HEME/LYMPH: No easy bruising, or bleeding gums  ALLERGY AND IMMUNOLOGIC: No hives or eczema      PHYSICAL EXAM:  T(C): 36.7 (04-26-19 @ 08:06), Max: 36.8 (04-25-19 @ 19:11)  HR: 78 (04-26-19 @ 08:06) (78 - 90)  BP: 119/88 (04-26-19 @ 08:06) (119/88 - 133/88)  RR: 15 (04-26-19 @ 08:06) (14 - 15)  SpO2: 98% (04-26-19 @ 08:06) (96% - 98%)      GENERAL: NAD, well-groomed, well-developed  HEAD:  Atraumatic, Normocephalic  EYES: EOMI, PERRL, conjunctiva and sclera clear  ENMT: Moist mucous membranes  NECK: Supple, No JVD, Normal thyroid  NERVOUS SYSTEM:  Alert & Oriented X3, no focal deficit  CHEST/LUNG: Clear to ascultation bilaterally; No rales, rhonchi, wheezing, or rubs  HEART: Regular rate and rhythm; No murmurs, rubs, or gallops  ABDOMEN: Soft, Nontender, Nondistended; Bowel sounds present  EXTREMITIES:  2+ Peripheral Pulses, No clubbing, cyanosis, or edema  SKIN: No rash        LABS:                        10.0   11.3  )-----------( 517      ( 26 Apr 2019 06:43 )             30.3     04-26    139  |  102  |  22  ----------------------------<  101<H>  4.1   |  26  |  0.96    Ca    9.2      26 Apr 2019 06:43    TPro  7.6  /  Alb  2.8<L>  /  TBili  0.2  /  DBili  x   /  AST  19  /  ALT  27  /  AlkPhos  86  04-26    PT/INR - ( 26 Apr 2019 06:43 )   PT: 27.3 sec;   INR: 2.37 ratio         RADIOLOGY & ADDITIONAL TESTS:    Consultant(s) Notes Reviewed:  [x] YES  [ ] NO    Care Discussed with Consultants/Other Providers [x] YES  [ ] NO

## 2019-04-26 NOTE — DISCHARGE NOTE NURSING/CASE MANAGEMENT/SOCIAL WORK - NSDCPEPTSTRK_GEN_ALL_CORE
Need for follow up after discharge/Stroke education booklet/Risk factors for stroke/Stroke warning signs and symptoms/Signs and symptoms of stroke/Stroke support groups for patients, families, and friends/Call 911 for stroke/Prescribed medications

## 2019-04-26 NOTE — PROGRESS NOTE ADULT - PROBLEM SELECTOR PLAN 6
on coumadin - INR at goal
on coumadin - INR theraputic

## 2019-04-26 NOTE — PROGRESS NOTE ADULT - ATTENDING COMMENTS
Multidisciplinary team meeting today:  patient's functional goals and needs, functional and clinical  progress were discussed, barriers to discharge were identified. Anticipate discharge home with home care, 24/7 supervision for safety  ELOS 2-3 days
Great spirits and energy, tolerates therapy well.  Therapeutic INR on Coumadin.  Anemia, will obtain serial stool Guaiacs and ask Hematology to evaluate. GI ppx  ID input noted  Case discussed with Medicine  Spoke with patient's family, detailed update given.
Patient is being discharged home with home care.  Discharge instructions were discussed with patient and family, all current medications were sent to the pharmacy. Patient and family were educated on importance of medication compliance,  continued  care with PMD and follow-up care with the specialists in the community. Safety and fall risk precautions  were discussed in detail, counseled on healthy life style modifications.  All questions were answered to their satisfaction.
Patient medically and neurologically  stable. Making progress towards rehab goals.   Continue rehab program.   Better bladder emptying  Goal INR, no evidence of Coumadin toxicity   Discharge plan discussed with patient and her , all questions answered, arrangements are confirmed with SW, team
Patient medically and neurologically stable. Making progress towards rehab goals.   Continue rehab program.     Coumadin dosed, will repeat labs in the morning, observed patient in therapy, treatment plan discussed with therapists.
Voiding trial is in progress, small PVRs  Goal INR on Coumadin  Stable neurological exam  Discharge plan is in progress
No events overnight  Stable exam  Goal INR  TOV is in progress
Patrica Stringer MD
Patient is medically stable for discharge. She is recommended to follow up with her doctors.  Patrica Stringer MD
Patrica Stringer MD

## 2019-04-26 NOTE — PROGRESS NOTE ADULT - PROBLEM SELECTOR PLAN 5
on coumadin - INR at goal
on coumadin - INR theraputic
on coumadin - at goal

## 2019-04-26 NOTE — PROGRESS NOTE ADULT - PROBLEM SELECTOR PROBLEM 2
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

## 2019-04-26 NOTE — PROGRESS NOTE ADULT - PROVIDER SPECIALTY LIST ADULT
Hospitalist
Infectious Disease
Neurology
Rehab Medicine
Rehab Medicine
Neurology
Hospitalist

## 2019-04-26 NOTE — PROGRESS NOTE ADULT - PROBLEM SELECTOR PLAN 4
normotensive, off BP meds
normotensive, off BP meds  continue to monitor

## 2019-04-26 NOTE — PROGRESS NOTE ADULT - ASSESSMENT
75 yo female with SAH and aneurysm coiling, urinary retention/neurogenic bladder  S/p Klebsiella bacteremia after youssef removed  S/P 10 days of CTX, stopped 4/17  Cdiff, s/p extended vanco course, stopped 4/17.  Recent CT with no hydronephrosis   Fever resolved  WBC normal  No diarrhea  PE/DVT on coumadin  No signs of active infection  Voiding without youssef catheter    Suggest:  Monitor off antibiotics  Discharge planning per rehab  urology f/u as outpatient  No additional ID w/u planned, we will stop actively following, please call if ID issues arise

## 2019-04-26 NOTE — PROGRESS NOTE ADULT - PROBLEM SELECTOR PROBLEM 5
Pulmonary thromboembolism

## 2019-04-26 NOTE — PROGRESS NOTE ADULT - PROBLEM SELECTOR PROBLEM 7
Anemia, chronic disease

## 2019-04-26 NOTE — PROGRESS NOTE ADULT - PROBLEM SELECTOR PLAN 3
secondary to above
secondary to above  Continue PT /OT

## 2019-04-26 NOTE — PROGRESS NOTE ADULT - SUBJECTIVE AND OBJECTIVE BOX
CC: f/u for fever and C.Diff    Patient reports: no recent fever, no dysuria, and no diarrhea    REVIEW OF SYSTEMS:  All other review of systems negative (Comprehensive ROS): she is voiding on her own    Antimicrobials Day #  :off    Other Medications Reviewed    T(F): 98 (04-26-19 @ 08:06), Max: 98.2 (04-25-19 @ 19:11)  HR: 78 (04-26-19 @ 08:06)  BP: 119/88 (04-26-19 @ 08:06)  RR: 15 (04-26-19 @ 08:06)  SpO2: 98% (04-26-19 @ 08:06)  Wt(kg): --    PHYSICAL EXAM:  General: alert, no acute distress  Eyes:  anicteric, no conjunctival injection, no discharge  Oropharynx: no lesions or injection 	  Neck: supple, without adenopathy  Lungs: clear to auscultation  Heart: regular rate and rhythm; no murmur, rubs or gallops  Abdomen: soft, nondistended, nontender, without mass or organomegaly  Skin: no lesions  Extremities: no clubbing, cyanosis, or edema  Neurologic: alert, oriented, moves all extremities    LAB RESULTS:                        10.0   11.3  )-----------( 517      ( 26 Apr 2019 06:43 )             30.3     04-26    139  |  102  |  22  ----------------------------<  101<H>  4.1   |  26  |  0.96    Ca    9.2      26 Apr 2019 06:43    TPro  7.6  /  Alb  2.8<L>  /  TBili  0.2  /  DBili  x   /  AST  19  /  ALT  27  /  AlkPhos  86  04-26    LIVER FUNCTIONS - ( 26 Apr 2019 06:43 )  Alb: 2.8 g/dL / Pro: 7.6 g/dL / ALK PHOS: 86 U/L / ALT: 27 U/L DA / AST: 19 U/L / GGT: x             MICROBIOLOGY:  RECENT CULTURES:      RADIOLOGY REVIEWED:

## 2019-04-26 NOTE — PROGRESS NOTE ADULT - SUBJECTIVE AND OBJECTIVE BOX
CHIEF COMPLAINT: Offers no complaints.       HISTORY OF PRESENT ILLNESS  74 yr old Female c PMHx of HTN on ASA (? indication) presents to ED s/p syncope and 3 day hx of neck pain on 3/4/19. Admitted to [facility] where admission workup revealed large SAH on CT brain.  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 Hospital course notable for cdiff diarrhea evaluated by ID and treated with course of Flagyl and Vancomycin; also urinary retention (youssef was reinserted on 3/26/19), failed multiple TOVs, concurrent E coli UTI was discovered on 3/27/19 and treated with fosfomycin. On 3/18/19 patient developed dyspnea. CTA chest revealed RUL PE. Started on heparin gtt 3/18/19 and bridged to coumadin. Pt was deemed stable for acute rehab on 3/29.  On 4/7, RRT called for fever 102.7'F and hypoxia with SpO2 80's. Patient was started on empiric antibiotics Zosyn, Vanco IV x 1 dose, and PO vanco was restarted per ID consultation recommendations. Labs were sent, found to have new leukocytosis WBC 21, TEREZA, +UA. Chest xray negative. Hypotensive with BP 80/50. Received NS boluses. Patient was admitted to ICU for management of sepsis. ID in, treated for klebsiella bacteremia. Youssef reinserted & started on Zosyn + prophylactic vancomycin (for cdiff). ID recommends to follow repeat blood cultures. Intermittent tachycardia and fevers-US LEs +new DVT. Seen by vascular. On Coumadin.   On IV antibiotics, youssef re-inserted. (16 Apr 2019 10:58)      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  Dyslipidemia  HTN (Hypertension)  Status post coil embolization of cerebral aneurysm  Status Post Total Knee Replace: left       REVIEW OF SYMPTOMS  [X] Constitutional WNL     [X] Cardio WNL            [X] Resp WNL           [X] GI WNL                           [X] Endo WNL                     [X] Skin WNL                 [X] MSK WNL                   [X] Psych WNL      VITALS  Vital Signs Last 24 Hrs  T(C): 36.7 (26 Apr 2019 08:06), Max: 36.8 (25 Apr 2019 19:11)  T(F): 98 (26 Apr 2019 08:06), Max: 98.2 (25 Apr 2019 19:11)  HR: 78 (26 Apr 2019 08:06) (78 - 90)  BP: 119/88 (26 Apr 2019 08:06) (119/88 - 133/88)  BP(mean): --  RR: 15 (26 Apr 2019 08:06) (14 - 15)  SpO2: 98% (26 Apr 2019 08:06) (96% - 98%)     PHYSICAL EXAM  Constitutional - NAD  HEENT - NCAT, EOMI  Neck - Supple, No limited ROM  Chest - CTA bilaterally, No wheeze, No rhonchi, No crackles  Cardiovascular - RRR, S1S2, No murmurs  Abdomen - BS+, Soft, NTND  Extremities - No C/C/E, No calf tenderness   Skin-no rash      Neurologic Exam - no new deficits                      Psychiatric - Mood stable, Affect WNL         FUNCTIONAL PROGRESS  Gait - 150ft RW, 8steps CG  ADLs - CG A   Transfers - supervision  Functional transfer - min/CG A      RECENT LABS                        10.0   11.3  )-----------( 517      ( 26 Apr 2019 06:43 )             30.3     04-26    139  |  102  |  22  ----------------------------<  101<H>  4.1   |  26  |  0.96    Ca    9.2      26 Apr 2019 06:43    TPro  7.6  /  Alb  2.8<L>  /  TBili  0.2  /  DBili  x   /  AST  19  /  ALT  27  /  AlkPhos  86  04-26    PT/INR - ( 26 Apr 2019 06:43 )   PT: 27.3 sec;   INR: 2.37 ratio           LIVER FUNCTIONS - ( 26 Apr 2019 06:43 )  Alb: 2.8 g/dL / Pro: 7.6 g/dL / ALK PHOS: 86 U/L / ALT: 27 U/L DA / AST: 19 U/L / GGT: x             Direct LDL: 103 mg/dL (03-04-19 @ 23:32)    Hemoglobin A1C, Whole Blood: 5.6 % (03-04-19 @ 22:39)              RADIOLOGY/OTHER RESULTS      CURRENT MEDICATIONS  MEDICATIONS  (STANDING):  atorvastatin 20 milliGRAM(s) Oral at bedtime  docusate sodium 100 milliGRAM(s) Oral two times a day  pantoprazole    Tablet 40 milliGRAM(s) Oral before breakfast  tamsulosin Oral Tab/Cap - Peds 0.4 milliGRAM(s) Oral at bedtime  warfarin 7 milliGRAM(s) Oral daily    MEDICATIONS  (PRN):      ASSESSMENT & PLAN      GI/Bowel Management - Psyllium   Management - Toilet Q2, youssef removed PVRs/SCVI, low PVRs.   Skin - Turn Q2  Pain - Tylenol PRN  DVT PPX - Coumadin-therapeutic  Diet - reg c thins      Completed comprehensive acute rehab program consisting of 3hrs/day of OT/PT and SLP.

## 2019-04-26 NOTE — PROGRESS NOTE ADULT - REASON FOR ADMISSION
s/p aneurysm clipping c functional and cognitive deficits.
s/p aneurysm clipping with functional and cognitive deficits.
s/p aneurysm clipping c functional and cognitive deficits.
s/p aneurysm clipping c functional and cognitive deficits.

## 2019-04-26 NOTE — PROGRESS NOTE ADULT - ASSESSMENT
73 yo female s/p Acomm aneurysm coiling, recent Klebsiella bacteriemia/ urosepsis, urinary retention , Low cath placement, history of Cdiff colitis while on ABx,  acute LE DVT on AC/Coumadin, now with functional and cognitive deficits.      # s/p Acom aneurysm coiling    PT/OT/ST pogram.     Fall precautions.     #Anemia   Continue coumadin, monitor Hg.       #HTN   off lisinopril, BP stable.     # h/o  Urosepsis, s/p Klebsiella bacteremia   Completed  IV ABX, PVRs. low, afebrile, Cleared by ID for discharge.        # Urinary retention   Low cath-removed. TOV ongoing. PVRs improved in last 24h. Mobilize/bowel care.     Flomax to continue.     # LE DVT  AC/Coumadin with goal INR 2-3, therapeutic now. Monitor asymptomatic tachycardia, given history of PE. EKG with sinus tachycardia intermittently.         Precautions:              fall, seizure                                                                    Diet: dash    DVT Prophylaxis:         coumadin                                                                 Medical Prognosis: good    Prescreen Comparison: I have reviewed the prescreen information and I found no relevant changes between the preadmission  screening and my post admission evaluation.     Expected Therapy:   P.T.     1   hrs/day           O. T.  1    hrs/day           S.L.P.   1     hrs/day                    P&O     eval                                              Excpected Frequency: 5 days/7 day period    Rehab Potential:       good                                    Estimated Disposition:       home                   ELOS:       10       days

## 2019-04-26 NOTE — PROGRESS NOTE ADULT - PROBLEM SELECTOR PLAN 2
secondary to above
secondary to above   continue PT/OT

## 2019-04-26 NOTE — DISCHARGE NOTE NURSING/CASE MANAGEMENT/SOCIAL WORK - NSDCDPATPORTLINK_GEN_ALL_CORE
You can access the [facility] Patient Portal, offered by [facility], by registering with the following website: [email or web address]

## 2019-04-26 NOTE — PROGRESS NOTE ADULT - PROBLEM SELECTOR PLAN 8
likely reactive, stable
resolved

## 2019-04-26 NOTE — PROGRESS NOTE ADULT - PROBLEM SELECTOR PROBLEM 1
Anterior communicating artery aneurysm

## 2019-04-26 NOTE — DISCHARGE NOTE NURSING/CASE MANAGEMENT/SOCIAL WORK - NSDCFUADDAPPT_GEN_ALL_CORE_FT
INR check on Monday 4/29/19 and follow with Dr Hernandez for dose recommendations.    [facility] - first appt for 5/1/19 at 1:00 pm INR check on Monday 4/29/19 and follow with Dr Hernandez for dose recommendations. Call Dr. Hernandez after you present at lab.    [facility]patient at [facility]- first appt for 5/1/19 at 1:00 pm for PT, 2:00 pm for Intake appt; 5/6/19 at 1:00 pm for Speech; 5/30/19 at 11:00 am

## 2019-04-30 DIAGNOSIS — Z86.718 PERSONAL HISTORY OF OTHER VENOUS THROMBOSIS AND EMBOLISM: ICD-10-CM

## 2019-04-30 DIAGNOSIS — Z86.711 PERSONAL HISTORY OF PULMONARY EMBOLISM: ICD-10-CM

## 2019-04-30 DIAGNOSIS — R33.9 RETENTION OF URINE, UNSPECIFIED: ICD-10-CM

## 2019-04-30 DIAGNOSIS — Z48.812 ENCOUNTER FOR SURGICAL AFTERCARE FOLLOWING SURGERY ON THE CIRCULATORY SYSTEM: ICD-10-CM

## 2019-04-30 DIAGNOSIS — E78.5 HYPERLIPIDEMIA, UNSPECIFIED: ICD-10-CM

## 2019-04-30 DIAGNOSIS — R00.0 TACHYCARDIA, UNSPECIFIED: ICD-10-CM

## 2019-04-30 DIAGNOSIS — Z86.19 PERSONAL HISTORY OF OTHER INFECTIOUS AND PARASITIC DISEASES: ICD-10-CM

## 2019-04-30 DIAGNOSIS — N31.9 NEUROMUSCULAR DYSFUNCTION OF BLADDER, UNSPECIFIED: ICD-10-CM

## 2019-04-30 DIAGNOSIS — D47.3 ESSENTIAL (HEMORRHAGIC) THROMBOCYTHEMIA: ICD-10-CM

## 2019-04-30 DIAGNOSIS — I10 ESSENTIAL (PRIMARY) HYPERTENSION: ICD-10-CM

## 2019-04-30 DIAGNOSIS — I25.10 ATHEROSCLEROTIC HEART DISEASE OF NATIVE CORONARY ARTERY WITHOUT ANGINA PECTORIS: ICD-10-CM

## 2019-04-30 DIAGNOSIS — Z79.01 LONG TERM (CURRENT) USE OF ANTICOAGULANTS: ICD-10-CM

## 2019-04-30 DIAGNOSIS — I69.119 UNSPECIFIED SYMPTOMS AND SIGNS INVOLVING COGNITIVE FUNCTIONS FOLLOWING NONTRAUMATIC INTRACEREBRAL HEMORRHAGE: ICD-10-CM

## 2019-04-30 DIAGNOSIS — Z51.89 ENCOUNTER FOR OTHER SPECIFIED AFTERCARE: ICD-10-CM

## 2019-04-30 DIAGNOSIS — D64.9 ANEMIA, UNSPECIFIED: ICD-10-CM

## 2019-04-30 DIAGNOSIS — K59.00 CONSTIPATION, UNSPECIFIED: ICD-10-CM

## 2019-04-30 PROCEDURE — 83540 ASSAY OF IRON: CPT

## 2019-04-30 PROCEDURE — 83550 IRON BINDING TEST: CPT

## 2019-04-30 PROCEDURE — 97116 GAIT TRAINING THERAPY: CPT

## 2019-04-30 PROCEDURE — 82607 VITAMIN B-12: CPT

## 2019-04-30 PROCEDURE — 80053 COMPREHEN METABOLIC PANEL: CPT

## 2019-04-30 PROCEDURE — 97530 THERAPEUTIC ACTIVITIES: CPT

## 2019-04-30 PROCEDURE — 82272 OCCULT BLD FECES 1-3 TESTS: CPT

## 2019-04-30 PROCEDURE — 93005 ELECTROCARDIOGRAM TRACING: CPT

## 2019-04-30 PROCEDURE — 85027 COMPLETE CBC AUTOMATED: CPT

## 2019-04-30 PROCEDURE — 97110 THERAPEUTIC EXERCISES: CPT

## 2019-04-30 PROCEDURE — 97535 SELF CARE MNGMENT TRAINING: CPT

## 2019-04-30 PROCEDURE — 85045 AUTOMATED RETICULOCYTE COUNT: CPT

## 2019-04-30 PROCEDURE — 92523 SPEECH SOUND LANG COMPREHEN: CPT

## 2019-04-30 PROCEDURE — 97163 PT EVAL HIGH COMPLEX 45 MIN: CPT

## 2019-04-30 PROCEDURE — 36415 COLL VENOUS BLD VENIPUNCTURE: CPT

## 2019-04-30 PROCEDURE — 83010 ASSAY OF HAPTOGLOBIN QUANT: CPT

## 2019-04-30 PROCEDURE — 82728 ASSAY OF FERRITIN: CPT

## 2019-04-30 PROCEDURE — 82746 ASSAY OF FOLIC ACID SERUM: CPT

## 2019-04-30 PROCEDURE — 85610 PROTHROMBIN TIME: CPT

## 2019-04-30 PROCEDURE — 92507 TX SP LANG VOICE COMM INDIV: CPT

## 2019-04-30 PROCEDURE — 92610 EVALUATE SWALLOWING FUNCTION: CPT

## 2019-05-06 ENCOUNTER — APPOINTMENT (OUTPATIENT)
Dept: UROLOGY | Facility: CLINIC | Age: 74
End: 2019-05-06
Payer: MEDICARE

## 2019-05-06 PROCEDURE — 51798 US URINE CAPACITY MEASURE: CPT

## 2019-05-06 PROCEDURE — 99214 OFFICE O/P EST MOD 30 MIN: CPT | Mod: 25

## 2019-05-06 NOTE — ADDENDUM
[FreeTextEntry1] : Entered by Diya Muir, acting as scribe for Dr. Burrows.\par  \par The documentation recorded by the scribe accurately reflects the service I personally performed and the decisions made by me.\par

## 2019-05-06 NOTE — LETTER BODY
[FreeTextEntry1] : Suhail Floyd MD\par [address] [facility]\[place] [address]\par [phone]\par \par Dear Dr. Floyd,\par \par Reason for visit: Abnormal imaging. Bilateral renal calculi. Left ureteral calculus.\par \par This is a 74 year old retired female with bilateral renal calculi and left ureteral calculus. Previous stone analysis in 2017 demonstrated uric acid stones. Patient presents today for follow up. Since her previous visit, the patient was admitted for an aneurysm in March 2019. Aneurysm was coiled, and patient developed urinary retention while hospitalized, . Patient was started on Flomax. She discontinued Potassium citrate several weeks ago. Patient denies any flank pain, hematuria, or urinary difficulties. Patient denies any urinary incontinence. The patient denies any aggravating or relieving factors. The patient denies any interference of function. The patient is entirely asymptomatic. All other review of systems are negative. Patient denies any changes in medications. Medication list was reconciled.\par  \par On examination, the patient is a healthy-appearing woman in no acute distress. She is alert and oriented follows commands. She has normal mood and affect. She is normocephalic. Neck is supple. Respirations are unlabored. Abdomen is soft and nontender. Bladder is nonpalpable. No CVA tenderness. Neurologically she is grossly intact. No peripheral edema. Skin without gross abnormality.\par \par I personally reviewed CT images with the patient today. Images demonstrate bilateral nonobstructing stones as previously visualized, no changes. \par \par Post-void residual on bladder scan today was 51 cc.\par \par Assessment: Abnormal urinary imaging. Bilateral renal calculi. Left renal calculus. \par \par I counseled the patient. Patient is much improved after starting Flomax due to urinary retention s/p aneurysm coiling. Patient's recent CT demonstrated bilateral nonobstructing uric acid stones as previously visualized. Given that she is asymptomatic, the patient wishes to continue monitoring her stones. I encouraged hydration, low-sodium, and low protein diet to prevent stone progression. I also renewed her prescription for potassium citrate today to help facilitate dissolution of the stones. I encouraged her to continue medication. She will obtain a renal ultrasound in six months to ensure stability of her symptoms. The risks and benefits were discussed. Alternatives were given. I answered the patient questions. The patient will follow-up as directed and will contact me with any questions or concerns. Thank you for the opportunity to participate in the care of this patient. I'll keep you updated on her progress.\par \par Plan: Continue Flomax. Resume Potassium citrate. Hydration. Ultrasound. Follow up 3 months.

## 2019-05-07 LAB
APPEARANCE: ABNORMAL
BACTERIA: ABNORMAL
BILIRUBIN URINE: NEGATIVE
BLOOD URINE: ABNORMAL
COLOR: YELLOW
GLUCOSE QUALITATIVE U: NEGATIVE
HYALINE CASTS: 0 /LPF
KETONES URINE: NEGATIVE
LEUKOCYTE ESTERASE URINE: ABNORMAL
MICROSCOPIC-UA: NORMAL
NITRITE URINE: POSITIVE
PH URINE: 6
PROTEIN URINE: ABNORMAL
RED BLOOD CELLS URINE: 4 /HPF
SPECIFIC GRAVITY URINE: 1.02
SQUAMOUS EPITHELIAL CELLS: 11 /HPF
URIC ACID CRYSTALS: ABNORMAL
UROBILINOGEN URINE: NORMAL
WHITE BLOOD CELLS URINE: 177 /HPF

## 2019-05-13 LAB — BACTERIA UR CULT: ABNORMAL

## 2019-05-14 ENCOUNTER — APPOINTMENT (OUTPATIENT)
Age: 74
End: 2019-05-14
Payer: MEDICARE

## 2019-05-14 VITALS
SYSTOLIC BLOOD PRESSURE: 148 MMHG | HEIGHT: 65 IN | OXYGEN SATURATION: 98 % | HEART RATE: 96 BPM | TEMPERATURE: 98.3 F | RESPIRATION RATE: 16 BRPM | WEIGHT: 180 LBS | DIASTOLIC BLOOD PRESSURE: 95 MMHG | BODY MASS INDEX: 29.99 KG/M2

## 2019-05-14 PROCEDURE — 99215 OFFICE O/P EST HI 40 MIN: CPT

## 2019-05-14 RX ORDER — POTASSIUM CITRATE 15 MEQ/1
15 MEQ TABLET, EXTENDED RELEASE ORAL
Qty: 180 | Refills: 3 | Status: DISCONTINUED | COMMUNITY
Start: 2017-12-14 | End: 2019-05-14

## 2019-05-14 RX ORDER — CEPHALEXIN 500 MG/1
500 CAPSULE ORAL
Qty: 20 | Refills: 0 | Status: DISCONTINUED | COMMUNITY
Start: 2017-10-03 | End: 2019-05-14

## 2019-05-14 RX ORDER — TAMSULOSIN HYDROCHLORIDE 0.4 MG/1
0.4 CAPSULE ORAL
Qty: 8 | Refills: 0 | Status: DISCONTINUED | COMMUNITY
Start: 2017-10-03 | End: 2019-05-14

## 2019-05-14 RX ORDER — POTASSIUM CITRATE AND CITRIC ACID 3.3; 1.002 G/1; G/1
3300-1002 GRANULE, FOR SOLUTION ORAL
Qty: 1 | Refills: 3 | Status: DISCONTINUED | COMMUNITY
Start: 2017-12-18 | End: 2019-05-14

## 2019-05-14 RX ORDER — OXYCODONE 5 MG/1
5 TABLET ORAL
Qty: 10 | Refills: 0 | Status: DISCONTINUED | COMMUNITY
Start: 2017-10-03 | End: 2019-05-14

## 2019-05-14 NOTE — HISTORY OF PRESENT ILLNESS
[FreeTextEntry1] : Mrs. Tran is a 74 year old woman who presented  to [facility] ED with syncope and 3 day history of neck pain on 3/4/2019. Patient was transferred to [facility] for further work up. CT showed a large SAH.  Following CTA revealed A Comm aneurysm rupture. Patient underwent a successful coiling of A COMM aneurysm. Hospital course notable for C. diff diarrhea and treated with course of Flagyl and Vancomycin. On 3/18/2019 patient developed dyspnea. CTA chest revealed RUL PE. Started on heparin gtt 3/18/19 and bridged to Coumadin with goal INR of 2.5-3.5. Patient discharged to [facility] on 03/29 and discharged home on 04/26. She comes in today for a follow up visit. She is doing well with no neurological deficits. She denies any Stroke/TIA symptoms.

## 2019-05-14 NOTE — REVIEW OF SYSTEMS
[Fever] : no fever [Chills] : no chills [Feeling Poorly] : not feeling poorly [Feeling Tired] : not feeling tired [Confused or Disoriented] : no confusion [Memory Lapses or Loss] : no memory loss [Decr. Concentrating Ability] : no decrease in concentrating ability [Difficulty with Language] : no ~M difficulty with language [Changed Thought Patterns] : no change in thought patterns [Repeating Questions] : no repeated questioning about recent events [Facial Weakness] : no facial weakness [Arm Weakness] : no arm weakness [Hand Weakness] : no hand weakness [Leg Weakness] : no leg weakness [Poor Coordination] : good coordination [Difficulty Writing] : no difficulty writing [Difficulties in Speech] : no speech difficulties [Numbness] : no numbness [Dizziness] : no dizziness [Tingling] : no tingling [Seizures] : no convulsions [Fainting] : no fainting [Vertigo] : no vertigo [Lightheadedness] : no lightheadedness [Difficulty Walking] : no difficulty walking [Tension Headache] : no tension-type headache [Inability to Walk] : able to walk [Frequent Falls] : not falling [Ataxia] : no ataxia [Suicidal] : not suicidal [Anxiety] : no anxiety [Depression] : no depression [Eye Pain] : no eye pain [Red Eyes] : eyes not red [Palpitations] : no palpitations [Eyesight Problems] : no eyesight problems [Chest Pain] : no chest pain [Wheezing] : no wheezing [Shortness Of Breath] : no shortness of breath [Cough] : no cough [Abdominal Pain] : no abdominal pain [Vomiting] : no vomiting [Constipation] : no constipation [Diarrhea] : no diarrhea [Incontinence] : no incontinence [Dysuria] : no dysuria [Skin Lesions] : no skin lesions [Joint Swelling] : no joint swelling [Joint Pain] : no joint pain [Easy Bleeding] : no tendency for easy bleeding [Skin Wound] : no skin wound [Easy Bruising] : no tendency for easy bruising

## 2019-05-14 NOTE — DISCUSSION/SUMMARY
[FreeTextEntry1] : Mrs. Tran s a 74 year old woman now 2.5 months s/p SAH due to ruptured ACOM aneurysm status post coil embolization to complete occlusion. She was discharged from [facility] on 03/29 to rehab and discharged home on 04/26. She is doing well, with no complaints. She is currently on Coumadin for PE and being followed by her cardiologist for INR checks. Plan is for repeat MRA head w/ and w/ out in June and repeat angiogram in 4 months. I will see her again in 3 months. All of their questions and concerns were addressed.

## 2019-05-14 NOTE — PHYSICAL EXAM
[General Appearance - Well Nourished] : well nourished [General Appearance - Alert] : alert [Affect] : the affect was normal [Mood] : the mood was normal [Oriented To Time, Place, And Person] : oriented to person, place, and time [General Appearance - Well Developed] : well developed [Person] : oriented to person [Place] : oriented to place [Short Term Intact] : short term memory intact [Time] : oriented to time [Concentration Intact] : normal concentrating ability [Naming Objects] : no difficulty naming common objects [Visual Intact] : visual attention was ~T not ~L decreased [Repeating Phrases] : no difficulty repeating a phrase [Writing A Sentence] : no difficulty writing a sentence [Reading] : reading intact [Fluency] : fluency intact [Comprehension] : comprehension intact [Past History] : adequate knowledge of personal past history [Cranial Nerves Optic (II)] : visual acuity intact bilaterally,  visual fields full to confrontation, pupils equal round and reactive to light [Cranial Nerves Oculomotor (III)] : extraocular motion intact [Cranial Nerves Vestibulocochlear (VIII)] : hearing was intact bilaterally [Cranial Nerves Trigeminal (V)] : facial sensation intact symmetrically [Cranial Nerves Glossopharyngeal (IX)] : tongue and palate midline [Cranial Nerves Facial (VII)] : face symmetrical [Motor Tone] : muscle tone was normal in all four extremities [Cranial Nerves Accessory (XI - Cranial And Spinal)] : head turning and shoulder shrug symmetric [Cranial Nerves Hypoglossal (XII)] : there was no tongue deviation with protrusion [No Muscle Atrophy] : normal bulk in all four extremities [Motor Strength] : muscle strength was normal in all four extremities [Motor Handedness Right-Handed] : the patient is right hand dominant [Balance] : balance was intact [Sensation Tactile Decrease] : light touch was intact [Extraocular Movements] : extraocular movements were intact [Full Visual Field] : full visual field [Optic Disc Abnormality] : the optic disc were normal in size and color [Hearing Threshold Finger Rub Not [place]] : hearing was normal [Neck Appearance] : the appearance of the neck was normal [] : no respiratory distress [Heart Rate And Rhythm] : heart rate was normal and rhythm regular [Edema] : there was no peripheral edema [Abdomen Soft] : soft [Abnormal Walk] : normal gait [Involuntary Movements] : no involuntary movements were seen [Skin Color & Pigmentation] : normal skin color and pigmentation [Musculoskeletal - Swelling] : no joint swelling seen [Skin Turgor] : normal skin turgor [Paresis Pronator Drift Right-Sided] : no pronator drift on the right [Paresis Pronator Drift Left-Sided] : no pronator drift on the left [Motor Strength Lower Extremities Bilaterally] : strength was normal in both lower extremities [Motor Strength Upper Extremities Bilaterally] : strength was normal in both upper extremities [Coordination - Dysmetria Impaired Finger-to-Nose Bilateral] : not present [Dysdiadochokinesia Bilaterally] : not present

## 2019-06-13 ENCOUNTER — APPOINTMENT (OUTPATIENT)
Dept: MRI IMAGING | Facility: CLINIC | Age: 74
End: 2019-06-13

## 2019-06-17 ENCOUNTER — OUTPATIENT (OUTPATIENT)
Dept: OUTPATIENT SERVICES | Facility: HOSPITAL | Age: 74
LOS: 1 days | End: 2019-06-17
Payer: MEDICARE

## 2019-06-17 ENCOUNTER — APPOINTMENT (OUTPATIENT)
Dept: MRI IMAGING | Facility: CLINIC | Age: 74
End: 2019-06-17
Payer: MEDICARE

## 2019-06-17 DIAGNOSIS — Z00.8 ENCOUNTER FOR OTHER GENERAL EXAMINATION: ICD-10-CM

## 2019-06-17 DIAGNOSIS — Z98.890 OTHER SPECIFIED POSTPROCEDURAL STATES: Chronic | ICD-10-CM

## 2019-06-17 PROCEDURE — 70546 MR ANGIOGRAPH HEAD W/O&W/DYE: CPT

## 2019-06-17 PROCEDURE — A9585: CPT

## 2019-06-17 PROCEDURE — 70546 MR ANGIOGRAPH HEAD W/O&W/DYE: CPT | Mod: 26

## 2019-08-08 ENCOUNTER — APPOINTMENT (OUTPATIENT)
Dept: UROLOGY | Facility: CLINIC | Age: 74
End: 2019-08-08
Payer: MEDICARE

## 2019-08-08 ENCOUNTER — OUTPATIENT (OUTPATIENT)
Dept: OUTPATIENT SERVICES | Facility: HOSPITAL | Age: 74
LOS: 1 days | End: 2019-08-08
Payer: MEDICARE

## 2019-08-08 DIAGNOSIS — R35.0 FREQUENCY OF MICTURITION: ICD-10-CM

## 2019-08-08 DIAGNOSIS — Z98.890 OTHER SPECIFIED POSTPROCEDURAL STATES: Chronic | ICD-10-CM

## 2019-08-08 PROCEDURE — 99213 OFFICE O/P EST LOW 20 MIN: CPT | Mod: 25

## 2019-08-08 PROCEDURE — 76775 US EXAM ABDO BACK WALL LIM: CPT | Mod: 26

## 2019-08-08 PROCEDURE — 76775 US EXAM ABDO BACK WALL LIM: CPT

## 2019-08-08 NOTE — ADDENDUM
[FreeTextEntry1] : Entered by Kerry Faith, acting as scribe for Dr. Johnny Burrows.\par \par The documentation recorded by the scribe accurately reflects the service I personally performed and the decisions made by me.

## 2019-08-08 NOTE — LETTER BODY
[FreeTextEntry1] : Suhail Floyd MD\par [address] [facility]\par [facility] [place] [address]\par [phone]\par \par Dear Dr. Floyd,\par \par Reason for visit: Abnormal imaging. Bilateral renal calculi. Left ureteral calculus.\par \par This is a 74 year old retired female with bilateral renal calculi and left ureteral calculus. Previous stone analysis in 2017 demonstrated uric acid stones. Patient presents today for follow up. Since her previous visit, the patient was admitted for an aneurysm in March 2019. Aneurysm was coiled, and patient developed urinary retention while hospitalized, . Patient was started on Flomax. She discontinued Potassium citrate several weeks ago. Patient denies any flank pain, hematuria, or urinary difficulties. Patient denies any urinary incontinence. The patient denies any aggravating or relieving factors. The patient denies any interference of function. The patient is entirely asymptomatic. All other review of systems are negative. Patient denies any changes in medications. Medication list was reconciled.\par  \par On examination, the patient is a healthy-appearing woman in no acute distress. She is alert and oriented follows commands. She has normal mood and affect. She is normocephalic. Neck is supple. Respirations are unlabored. Abdomen is soft and nontender. Bladder is nonpalpable. No CVA tenderness. Neurologically she is grossly intact. No peripheral edema. Skin without gross abnormality.\par \par I personally reviewed CT images with the patient today. Images demonstrate bilateral nonobstructing stones as previously visualized, no changes. \par ultrasound today reveals  a 1.1 x 0.44 cm echogenic focus with shadow and twinkle in the left upper pole. There are 2 echogenic foci in the right kidney, 5.2 mm in the right upper pole and 8.4 mm in the right lateral lower pole with twinkle. Both kidneys are normal in size and echogenicity without obvious hydronephrosis or solid masses visualized. \par \par Assessment: Abnormal urinary imaging. Bilateral renal calculi. Left renal calculus. \par \par I counseled the patient. Patient is much improved after starting Flomax due to urinary retention s/p aneurysm coiling. Patient's recent CT demonstrated bilateral nonobstructing uric acid stones as previously visualized. Given that she is asymptomatic, the patient wishes to continue monitoring her stones. I encouraged patient to maintain a low-protein low-sodium diet. I also encouraged hydration to prevent stone formation. I encouraged hydration, low-sodium, and low protein diet to prevent stone progression. I also renewed her prescription for potassium citrate today to help facilitate dissolution of the stones. I encouraged her to continue medication. She will obtain a renal ultrasound in six months to ensure stability of her symptoms. The risks and benefits were discussed. Alternatives were given. I answered the patient questions. The patient will follow-up as directed and will contact me with any questions or concerns. Thank you for the opportunity to participate in the care of this patient. I'll keep you updated on her progress.\par \par Plan: Continue Flomax. Resume Potassium citrate. Hydration. Ultrasound. Follow up 3 months.

## 2019-08-13 ENCOUNTER — APPOINTMENT (OUTPATIENT)
Dept: NEUROLOGY | Facility: CLINIC | Age: 74
End: 2019-08-13
Payer: MEDICARE

## 2019-08-13 VITALS
SYSTOLIC BLOOD PRESSURE: 151 MMHG | OXYGEN SATURATION: 95 % | TEMPERATURE: 97.9 F | HEIGHT: 65 IN | WEIGHT: 185 LBS | RESPIRATION RATE: 18 BRPM | DIASTOLIC BLOOD PRESSURE: 87 MMHG | HEART RATE: 92 BPM | BODY MASS INDEX: 30.82 KG/M2

## 2019-08-13 PROCEDURE — 99215 OFFICE O/P EST HI 40 MIN: CPT

## 2019-08-13 RX ORDER — CEFUROXIME AXETIL 500 MG/1
500 TABLET ORAL
Qty: 10 | Refills: 0 | Status: DISCONTINUED | COMMUNITY
Start: 2019-05-15 | End: 2019-08-13

## 2019-08-13 NOTE — PHYSICAL EXAM
[General Appearance - Alert] : alert [General Appearance - Well Nourished] : well nourished [General Appearance - Well Developed] : well developed [Oriented To Time, Place, And Person] : oriented to person, place, and time [Affect] : the affect was normal [Mood] : the mood was normal [Person] : oriented to person [Place] : oriented to place [Time] : oriented to time [Short Term Intact] : short term memory intact [Concentration Intact] : normal concentrating ability [Visual Intact] : visual attention was ~T not ~L decreased [Naming Objects] : no difficulty naming common objects [Repeating Phrases] : no difficulty repeating a phrase [Writing A Sentence] : no difficulty writing a sentence [Fluency] : fluency intact [Comprehension] : comprehension intact [Reading] : reading intact [Cranial Nerves Optic (II)] : visual acuity intact bilaterally,  visual fields full to confrontation, pupils equal round and reactive to light [Past History] : adequate knowledge of personal past history [Cranial Nerves Oculomotor (III)] : extraocular motion intact [Cranial Nerves Trigeminal (V)] : facial sensation intact symmetrically [Cranial Nerves Facial (VII)] : face symmetrical [Cranial Nerves Glossopharyngeal (IX)] : tongue and palate midline [Cranial Nerves Vestibulocochlear (VIII)] : hearing was intact bilaterally [Cranial Nerves Hypoglossal (XII)] : there was no tongue deviation with protrusion [Cranial Nerves Accessory (XI - Cranial And Spinal)] : head turning and shoulder shrug symmetric [Motor Tone] : muscle tone was normal in all four extremities [Motor Strength] : muscle strength was normal in all four extremities [No Muscle Atrophy] : normal bulk in all four extremities [Motor Handedness Right-Handed] : the patient is right hand dominant [Sensation Tactile Decrease] : light touch was intact [Balance] : balance was intact [Extraocular Movements] : extraocular movements were intact [Optic Disc Abnormality] : the optic disc were normal in size and color [Full Visual Field] : full visual field [Hearing Threshold Finger Rub Not [place]] : hearing was normal [Neck Appearance] : the appearance of the neck was normal [] : no respiratory distress [Heart Rate And Rhythm] : heart rate was normal and rhythm regular [Edema] : there was no peripheral edema [Abdomen Soft] : soft [Abnormal Walk] : normal gait [Involuntary Movements] : no involuntary movements were seen [Skin Color & Pigmentation] : normal skin color and pigmentation [Musculoskeletal - Swelling] : no joint swelling seen [Skin Turgor] : normal skin turgor [Paresis Pronator Drift Right-Sided] : no pronator drift on the right [Motor Strength Upper Extremities Bilaterally] : strength was normal in both upper extremities [Paresis Pronator Drift Left-Sided] : no pronator drift on the left [Motor Strength Lower Extremities Bilaterally] : strength was normal in both lower extremities [Dysdiadochokinesia Bilaterally] : not present [Coordination - Dysmetria Impaired Finger-to-Nose Bilateral] : not present

## 2019-08-13 NOTE — DISCUSSION/SUMMARY
[FreeTextEntry1] : Mrs. 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 She is doing well, with no complaints. She is currently on Coumadin for PE and being followed by her cardiologist for INR checks. Plan for repeat angiogram in September 2019. The procedure, risks and benefits were discussed with the patient and her . All of their questions and concerns were addressed.

## 2019-08-13 NOTE — REVIEW OF SYSTEMS
[Fever] : no fever [Chills] : no chills [Feeling Tired] : not feeling tired [Feeling Poorly] : not feeling poorly [Memory Lapses or Loss] : no memory loss [Confused or Disoriented] : no confusion [Difficulty with Language] : no ~M difficulty with language [Decr. Concentrating Ability] : no decrease in concentrating ability [Repeating Questions] : no repeated questioning about recent events [Changed Thought Patterns] : no change in thought patterns [Facial Weakness] : no facial weakness [Arm Weakness] : no arm weakness [Poor Coordination] : good coordination [Hand Weakness] : no hand weakness [Leg Weakness] : no leg weakness [Difficulty Writing] : no difficulty writing [Difficulties in Speech] : no speech difficulties [Numbness] : no numbness [Tingling] : no tingling [Seizures] : no convulsions [Dizziness] : no dizziness [Fainting] : no fainting [Lightheadedness] : no lightheadedness [Vertigo] : no vertigo [Tension Headache] : no tension-type headache [Difficulty Walking] : no difficulty walking [Inability to Walk] : able to walk [Frequent Falls] : not falling [Suicidal] : not suicidal [Ataxia] : no ataxia [Anxiety] : no anxiety [Depression] : no depression [Eye Pain] : no eye pain [Red Eyes] : eyes not red [Eyesight Problems] : no eyesight problems [Loss Of Hearing] : no hearing loss [Nosebleeds] : no nosebleeds [Chest Pain] : no chest pain [Palpitations] : no palpitations [Shortness Of Breath] : no shortness of breath [Wheezing] : no wheezing [Cough] : no cough [Constipation] : no constipation [Diarrhea] : no diarrhea [Dysuria] : no dysuria [Joint Pain] : no joint pain [Incontinence] : no incontinence [Joint Swelling] : no joint swelling [Skin Lesions] : no skin lesions [Skin Wound] : no skin wound [Easy Bruising] : no tendency for easy bruising [Easy Bleeding] : no tendency for easy bleeding

## 2019-08-13 NOTE — HISTORY OF PRESENT ILLNESS
[FreeTextEntry1] : Mrs. Tran is a 74 year old woman who presented to [facility] ED with syncope and 3 day history of neck pain on 3/4/2019. Patient was transferred to [facility] for further work up. CT showed a large SAH and CTA revealed an A-comm aneurysm. She then underwent a successful coiling of the A-comm aneurysm. Her hospital course was notable for C. diff diarrhea and treated with course of Flagyl and Vancomycin. On 3/18/2019 patient developed dyspnea. CTA chest revealed RUL PE. She was started on heparin gtt 3/18/19 and bridged to Coumadin with goal INR of 2.5-3.5. Patient discharged to [facility] on 03/29 and discharged home on 04/26. She comes in today for a follow up visit. She is doing well with no neurological deficits. Modified Yomaira is 0. Recent MRA in 06/19 shows stable aneurysm occlusion. She denies any Stroke/TIA symptoms.

## 2019-08-19 DIAGNOSIS — N20.0 CALCULUS OF KIDNEY: ICD-10-CM

## 2019-09-19 NOTE — ED PROVIDER NOTE - PHYSICAL EXAMINATION
[FreeTextEntry1] : We had a lengthy discussion regarding his hematospermia and that there is no cause for concern, as he doesn't have any pertinent positive findings, suggestive of any worrisome issue. In terms of his bowels and colon cancer, I suggested he get a PCP and/or talk to a GI doctor about when to screen, as it seems he should probably consider soon due to FH.\par \par We will check his urine. He was taught testicular self exam and also told to stop vaping etc.  He was also instructed to call me ASAP should he have any blood in urine. *Gen: NAD, AAO*3  *HEENT: NC/AT, MMM, airway patent, trachea midline  *CV: RRR, S1/S2 present, no murmurs/rubs/gallops  *Resp: no respiratory distress, LCTAB, no wheezing/rales/rhonchi  *Abd: non-distended, soft N/Tx4, no guarding or rigidity  *Neuro: CN II-XII intact, no focal neurological deficits, motor and sensory intact bilaterally, 5/5 strength in all extremities  *Extremities: no gross deformity  *Skin: no rashes, no wounds   ~ Alfredo Ramirez M.D.

## 2019-09-20 ENCOUNTER — OUTPATIENT (OUTPATIENT)
Dept: OUTPATIENT SERVICES | Facility: HOSPITAL | Age: 74
LOS: 1 days | End: 2019-09-20
Payer: MEDICARE

## 2019-09-20 VITALS
WEIGHT: 182.98 LBS | TEMPERATURE: 98 F | DIASTOLIC BLOOD PRESSURE: 87 MMHG | RESPIRATION RATE: 17 BRPM | HEART RATE: 77 BPM | HEIGHT: 65 IN | OXYGEN SATURATION: 97 % | SYSTOLIC BLOOD PRESSURE: 133 MMHG

## 2019-09-20 DIAGNOSIS — Z01.818 ENCOUNTER FOR OTHER PREPROCEDURAL EXAMINATION: ICD-10-CM

## 2019-09-20 DIAGNOSIS — Z98.890 OTHER SPECIFIED POSTPROCEDURAL STATES: Chronic | ICD-10-CM

## 2019-09-20 DIAGNOSIS — I60.8 OTHER NONTRAUMATIC SUBARACHNOID HEMORRHAGE: ICD-10-CM

## 2019-09-20 DIAGNOSIS — I82.409 ACUTE EMBOLISM AND THROMBOSIS OF UNSPECIFIED DEEP VEINS OF UNSPECIFIED LOWER EXTREMITY: ICD-10-CM

## 2019-09-20 PROCEDURE — 86901 BLOOD TYPING SEROLOGIC RH(D): CPT

## 2019-09-20 PROCEDURE — 85730 THROMBOPLASTIN TIME PARTIAL: CPT

## 2019-09-20 PROCEDURE — 85610 PROTHROMBIN TIME: CPT

## 2019-09-20 PROCEDURE — 80048 BASIC METABOLIC PNL TOTAL CA: CPT

## 2019-09-20 PROCEDURE — 86900 BLOOD TYPING SEROLOGIC ABO: CPT

## 2019-09-20 PROCEDURE — 86850 RBC ANTIBODY SCREEN: CPT

## 2019-09-20 PROCEDURE — G0463: CPT

## 2019-09-20 PROCEDURE — 85027 COMPLETE CBC AUTOMATED: CPT

## 2019-09-20 NOTE — H&P PST ADULT - NSANTHOSAYNRD_GEN_A_CORE
No. BULMARO screening performed.  STOP BANG Legend: 0-2 = LOW Risk; 3-4 = INTERMEDIATE Risk; 5-8 = HIGH Risk

## 2019-09-20 NOTE — H&P PST ADULT - HISTORY OF PRESENT ILLNESS
74 yr old female with hx of hypertension and high cholesterol. In March 2019 collapsed at home went to ER at [facility]. Work up dx with Nontraumatic subarachnoid hemorrhage due to cerebral aneurysm.   Had coil embolization. Now for reevaluation.   *** While in hospital in March dx with left leg DVT  placed on Warfarin. Per pt was instructed by Dr. Castro to  stop Warfarin one day prior to procedure.  Pt instructed to call confirm day/time and plan for Warfarin. 74 yr old female with hx of hypertension and high cholesterol. In March 2019 collapsed at home went to ER at [facility]. Work up dx with Nontraumatic subarachnoid hemorrhage due to cerebral aneurysm.   Had coil embolization. Now for reevaluation.   *** While in hospital in March dx with left leg DVT  placed on Warfarin. Per pt was instructed by Dr. Castro to  stop Warfarin one day prior to procedure.  Pt instructed to call confirm day/time and plan for Warfarin.   9/20 10 am received call from Dr. Castro office procedure changed to 9/26 will leave pt on Warfarin. They have called pt and instructed her about change of plan.

## 2019-09-20 NOTE — H&P PST ADULT - NSICDXPASTMEDICALHX_GEN_ALL_CORE_FT
PAST MEDICAL HISTORY:  Benign hypertension     Clostridium difficile diarrhea 9/2019 resolved    Dyslipidemia     H/O spont subarachnoid intracranial hemorrhage d/t cerebral aneurysm March 4. 2019 coil placed    Urinary retention resolved    Urinary tract infection resolved PAST MEDICAL HISTORY:  Benign hypertension     Clostridium difficile diarrhea 9/2019 resolved    DVT, lower extremity left leg  March 2019  on Warfarin    Dyslipidemia     H/O spont subarachnoid intracranial hemorrhage d/t cerebral aneurysm March 4. 2019 coil placed    [place] (hard of hearing)     Obese     Urinary retention resolved    Urinary tract infection resolved

## 2019-09-20 NOTE — H&P PST ADULT - NSICDXPROBLEM_GEN_ALL_CORE_FT
PROBLEM DIAGNOSES  Problem: DVT, lower extremity  Assessment and Plan:     Problem: Other nontraumatic subarachnoid hemorrhage  Assessment and Plan: Cerebral Angio

## 2019-09-20 NOTE — H&P PST ADULT - NSICDXPASTSURGICALHX_GEN_ALL_CORE_FT
PAST SURGICAL HISTORY:  S/P shoulder surgery right  repair of rotator cuff    Status post coil embolization of cerebral aneurysm March 2019    Status Post Total Knee Replace left

## 2019-09-25 ENCOUNTER — FORM ENCOUNTER (OUTPATIENT)
Age: 74
End: 2019-09-25

## 2019-09-26 ENCOUNTER — OUTPATIENT (OUTPATIENT)
Dept: OUTPATIENT SERVICES | Facility: HOSPITAL | Age: 74
LOS: 1 days | End: 2019-09-26
Payer: MEDICARE

## 2019-09-26 ENCOUNTER — APPOINTMENT (OUTPATIENT)
Dept: NEUROLOGY | Facility: CLINIC | Age: 74
End: 2019-09-26
Payer: MEDICARE

## 2019-09-26 DIAGNOSIS — I60.8 OTHER NONTRAUMATIC SUBARACHNOID HEMORRHAGE: ICD-10-CM

## 2019-09-26 DIAGNOSIS — Z98.890 OTHER SPECIFIED POSTPROCEDURAL STATES: Chronic | ICD-10-CM

## 2019-09-26 LAB
ANION GAP SERPL CALC-SCNC: 10 MMOL/L — SIGNIFICANT CHANGE UP (ref 5–17)
BASOPHILS # BLD AUTO: 0.1 K/UL — SIGNIFICANT CHANGE UP (ref 0–0.2)
BASOPHILS NFR BLD AUTO: 0.6 % — SIGNIFICANT CHANGE UP (ref 0–2)
BUN SERPL-MCNC: 22 MG/DL — SIGNIFICANT CHANGE UP (ref 7–23)
CALCIUM SERPL-MCNC: 9.2 MG/DL — SIGNIFICANT CHANGE UP (ref 8.4–10.5)
CHLORIDE SERPL-SCNC: 110 MMOL/L — HIGH (ref 96–108)
CO2 SERPL-SCNC: 26 MMOL/L — SIGNIFICANT CHANGE UP (ref 22–31)
CREAT SERPL-MCNC: 1.1 MG/DL — SIGNIFICANT CHANGE UP (ref 0.5–1.3)
EOSINOPHIL # BLD AUTO: 0.1 K/UL — SIGNIFICANT CHANGE UP (ref 0–0.5)
EOSINOPHIL NFR BLD AUTO: 0.6 % — SIGNIFICANT CHANGE UP (ref 0–6)
GLUCOSE SERPL-MCNC: 101 MG/DL — HIGH (ref 70–99)
HCT VFR BLD CALC: 37.1 % — SIGNIFICANT CHANGE UP (ref 34.5–45)
HGB BLD-MCNC: 12.4 G/DL — SIGNIFICANT CHANGE UP (ref 11.5–15.5)
LYMPHOCYTES # BLD AUTO: 2.3 K/UL — SIGNIFICANT CHANGE UP (ref 1–3.3)
LYMPHOCYTES # BLD AUTO: 22.9 % — SIGNIFICANT CHANGE UP (ref 13–44)
MCHC RBC-ENTMCNC: 28.5 PG — SIGNIFICANT CHANGE UP (ref 27–34)
MCHC RBC-ENTMCNC: 33.5 GM/DL — SIGNIFICANT CHANGE UP (ref 32–36)
MCV RBC AUTO: 85.1 FL — SIGNIFICANT CHANGE UP (ref 80–100)
MONOCYTES # BLD AUTO: 0.8 K/UL — SIGNIFICANT CHANGE UP (ref 0–0.9)
MONOCYTES NFR BLD AUTO: 8.1 % — SIGNIFICANT CHANGE UP (ref 2–14)
NEUTROPHILS # BLD AUTO: 6.8 K/UL — SIGNIFICANT CHANGE UP (ref 1.8–7.4)
NEUTROPHILS NFR BLD AUTO: 67.8 % — SIGNIFICANT CHANGE UP (ref 43–77)
PLATELET # BLD AUTO: 254 K/UL — SIGNIFICANT CHANGE UP (ref 150–400)
POTASSIUM SERPL-MCNC: 4.5 MMOL/L — SIGNIFICANT CHANGE UP (ref 3.5–5.3)
POTASSIUM SERPL-SCNC: 4.5 MMOL/L — SIGNIFICANT CHANGE UP (ref 3.5–5.3)
RBC # BLD: 4.36 M/UL — SIGNIFICANT CHANGE UP (ref 3.8–5.2)
RBC # FLD: 15.8 % — HIGH (ref 10.3–14.5)
SODIUM SERPL-SCNC: 146 MMOL/L — HIGH (ref 135–145)
WBC # BLD: 10 K/UL — SIGNIFICANT CHANGE UP (ref 3.8–10.5)
WBC # FLD AUTO: 10 K/UL — SIGNIFICANT CHANGE UP (ref 3.8–10.5)

## 2019-09-26 PROCEDURE — 85027 COMPLETE CBC AUTOMATED: CPT

## 2019-09-26 PROCEDURE — 36223 PLACE CATH CAROTID/INOM ART: CPT | Mod: XS

## 2019-09-26 PROCEDURE — C1894: CPT

## 2019-09-26 PROCEDURE — 76377 3D RENDER W/INTRP POSTPROCES: CPT

## 2019-09-26 PROCEDURE — 36224 PLACE CATH CAROTD ART: CPT

## 2019-09-26 PROCEDURE — 36223 PLACE CATH CAROTID/INOM ART: CPT | Mod: 59,RT

## 2019-09-26 PROCEDURE — 80048 BASIC METABOLIC PNL TOTAL CA: CPT

## 2019-09-26 PROCEDURE — C1887: CPT

## 2019-09-26 PROCEDURE — 76377 3D RENDER W/INTRP POSTPROCES: CPT | Mod: 26

## 2019-09-26 PROCEDURE — 36224 PLACE CATH CAROTD ART: CPT | Mod: LT

## 2019-09-26 PROCEDURE — C1769: CPT

## 2019-09-26 RX ORDER — SODIUM CHLORIDE 9 MG/ML
1000 INJECTION INTRAMUSCULAR; INTRAVENOUS; SUBCUTANEOUS
Refills: 0 | Status: DISCONTINUED | OUTPATIENT
Start: 2019-09-26 | End: 2019-10-19

## 2019-09-26 NOTE — CHART NOTE - NSCHARTNOTEFT_GEN_A_CORE
Interventional Neuro Radiology  Pre-Procedure Note PA-JAZMÍN    This is a 74y ____ hand dominant Female      HPI:      Allergies: No Known Allergies      PAST MEDICAL & SURGICAL HISTORY:  Obese  [place] (hard of hearing)  DVT, lower extremity: left leg  March 2019  on Warfarin  Benign hypertension  Clostridium difficile diarrhea: 9/2019 resolved  Urinary retention: resolved  Urinary tract infection: resolved  H/O spont subarachnoid intracranial hemorrhage d/t cerebral aneurysm: March 4. 2019 coil placed  Dyslipidemia  S/P shoulder surgery: right  repair of rotator cuff  Status post coil embolization of cerebral aneurysm: March 2019  Status Post Total Knee Replace: left      Social History:     FAMILY HISTORY:      Current Medications: sodium chloride 0.9%. 1000 milliLiter(s) IV Continuous <Continuous>      Complete Blood Count (09.20.19 @ 13:04)    WBC Count: 7.28 K/uL    Hemoglobin: 12.8 g/dL    Hematocrit: 40.2 %    Mean Cell Volume: 84.6 fl    Mean Cell Hemoglobin: 26.9 pg    Mean Cell Hemoglobin Conc: 31.8 gm/dL    Red Cell Distrib Width: 17.2 %    Platelet Count - Automated: 280 K/uL    Basic Metabolic Panel (09.20.19 @ 10:03)    Sodium, Serum: 143 mmol/L    Potassium, Serum: 4.4 mmol/L    Chloride, Serum: 107 mmol/L    Carbon Dioxide, Serum: 23 mmol/L    Anion Gap, Serum: 13 mmol/L    Blood Urea Nitrogen, Serum: 30 mg/dL    Creatinine, Serum: 1.29 mg/dL    Glucose, Serum: 92 mg/dL        Blood Bank:       Assessment/Plan:   This is a 74 year old right  hand dominant Female  presents with   Patient presents to neuro-IR for selective cerebral angiography.   Procedure, goals, risks, benefits and alternatives  were discussed with patient and patient's family.  All questions were answered.  Risks include but are not limited to stroke, vessel injury, hemorrhage, and or right  groin hematoma.  Patient demonstrates understanding  of all risks involved with this procedure and wishes to continue.   Appropriate  content was obtained from patient and consent is in the patient's chart. Interventional Neuro Radiology  Pre-Procedure Note PA-C    This is a 74 year old right hand dominant Female          Allergies: No Known Allergies  PMHX: Obese  [place] (hard of hearing)  DVT, lower extremity: left leg  March 2019  on Warfarin  Benign hypertension  Clostridium difficile diarrhea: 9/2019 resolved  Urinary retention: resolved  Urinary tract infection: resolved  H/O spont subarachnoid intracranial hemorrhage d/t cerebral aneurysm: March 4. 2019 coil placed  Dyslipidemia  S/P shoulder surgery: right  repair of rotator cuff  Status post coil embolization of cerebral aneurysm: March 2019  Status Post Total Knee Replace: left      Social History:     FAMILY HISTORY:      Current Medications: sodium chloride 0.9%. 1000 milliLiter(s) IV Continuous <Continuous>      Complete Blood Count (09.20.19 @ 13:04)    WBC Count: 7.28 K/uL    Hemoglobin: 12.8 g/dL    Hematocrit: 40.2 %    Mean Cell Volume: 84.6 fl    Mean Cell Hemoglobin: 26.9 pg    Mean Cell Hemoglobin Conc: 31.8 gm/dL    Red Cell Distrib Width: 17.2 %    Platelet Count - Automated: 280 K/uL    Basic Metabolic Panel (09.20.19 @ 10:03)    Sodium, Serum: 143 mmol/L    Potassium, Serum: 4.4 mmol/L    Chloride, Serum: 107 mmol/L    Carbon Dioxide, Serum: 23 mmol/L    Anion Gap, Serum: 13 mmol/L    Blood Urea Nitrogen, Serum: 30 mg/dL    Creatinine, Serum: 1.29 mg/dL    Glucose, Serum: 92 mg/dL        Blood Bank:       Assessment/Plan:   This is a 74 year old right  hand dominant Female  presents with   Patient presents to neuro-IR for selective cerebral angiography.   Procedure, goals, risks, benefits and alternatives  were discussed with patient and patient's family.  All questions were answered.  Risks include but are not limited to stroke, vessel injury, hemorrhage, and or right  groin hematoma.  Patient demonstrates understanding  of all risks involved with this procedure and wishes to continue.   Appropriate  content was obtained from patient and consent is in the patient's chart. Interventional Neuro Radiology  Pre-Procedure Note PA-C    This is a 74 year old right hand dominant female status post endovascular treatment of a ruptured aneurysm 3-4-2019, who returns to Neuro IR for a selective cerebral angiography to monitor aneurysm.    Upon exam patient is awake, alert and oriented x 3, speech is fluent, recent and remote memory intact, follows three step commands, moves all extremities and ambulates without assist.    Allergies: No Known Allergies  PMHX: ruptured ACOM artery aneurysm obese, hard of hearing, left lower extremity DVT, hypertension, Clostridium difficile diarrhea: 9/2019, urinary retention, urinary tract infection, dyslipidemia  PSHX: right shoulder surgery , coil embolization of cerebral aneurysm: March 2019, left total knee replacement   Social History: , non-smoker   FAMILY HISTORY: non-contributory   Current Medications: pantoprazole 40mg, atorvastatin 20mg, warfarin, lisinopril 10mg, vitamin D3     Complete Blood Count (09.20.19 )    WBC Count: 7.28 K/uL    Hemoglobin: 12.8 g/dL    Hematocrit: 40.2 %    Mean Cell Volume: 84.6 fl    Mean Cell Hemoglobin: 26.9 pg    Mean Cell Hemoglobin Conc: 31.8 gm/dL    Red Cell Distrib Width: 17.2 %    Platelet Count - Automated: 280 K/uL    Basic Metabolic Panel (09.20.19 )    Sodium, Serum: 143 mmol/L    Potassium, Serum: 4.4 mmol/L    Chloride, Serum: 107 mmol/L    Carbon Dioxide, Serum: 23 mmol/L    Anion Gap, Serum: 13 mmol/L    Blood Urea Nitrogen, Serum: 30 mg/dL    Creatinine, Serum: 1.29 mg/dL    Glucose, Serum: 92 mg/dL    Blood Bank: O positive available (09.20.19 )    Assessment/Plan:   This is a 74 year old right hand dominant female status post endovascular treatment of a ruptured ACOM artery aneurysm 3-4-2019, who presents to Neuro IR for a selective cerebral angiography to monitor aneurysm. Procedure, goals, risks, benefits and alternatives were discussed with patient and patient's . All questions were answered. Risks include but are not limited to stroke, vessel injury, hemorrhage, and or right groin hematoma. Patient demonstrates understanding of all risks involved with this procedure and wishes to continue. Appropriate content was obtained from patient and consent is in the patient's chart.

## 2019-09-26 NOTE — CHART NOTE - NSCHARTNOTEFT_GEN_A_CORE
Interventional Neuro- Radiology   Procedure Note JEANNIE    Procedure: Selective Cerebral Angiography   Pre- Procedure Diagnosis:  Post- Procedure Diagnosis:    : Dr Hector wong  Physician Assistant: Jeimy Taylor PA-C    RN:    Anesthesia: (MAC)   (general anesthesia)    Sheath:    I/Os:  Estimated blood loss less than 10cc  IV fluids:     cc     Urine output     cc        Contrast Omnipaque 240      cc         Antibiotics:    Vitals: BP         HR      Spo2      Preliminary Report:  Using a 4 Fr short/long sheath to the right groin under MAC sedation via   left vertebral artery,  left common carotid artery, left external carotid artey, right vertebral arter,  right common carotid artery, right external carotid artery  a selective cerebral angiography was performed and  demonstrates ________. ( Official note to follow).  Patient tolerated procedure well, hemodynamically stable, no change in neurological status compared to baseline.  Results discussed with neurosurgery, patient and patient's  family.  Groin sheath was removed,  manual compression held to hemostasis  for  21 minutes, no active bleeding, no hematoma, avitene applied,  quick clot and safeguard balloon dressing applied at _____h. Interventional Neuro- Radiology   Procedure Note PA-C    Procedure: Selective Cerebral Angiography   Pre- Procedure Diagnosis: ACOM   Post- Procedure Diagnosis:    : Dr Hector Castro  Physician Assistant: Jeimy Taylor PA-C    Nurse:                    Matt Lui RN                  Radiologic Tech:    Julio Cesar Doshi LRT    Anesthesiologist:    Dr Alexander Castellon   Sheath:                  4 Albanian short sheath     I/Os: EBL  than 10cc  IV fluids: 400cc Urine output due to void Contrast Omnipaque 240  83cc          Vitals: /70         HR 72     Spo2 100%    Preliminary Report:  Using a 4 Albanian short sheath to the right groin under MAC sedation via left internal carotid artery, right common carotid artery, a selective cerebral angiography was performed and demonstrated  ( Official note to follow).  Patient tolerated procedure well, hemodynamically stable, no change in neurological status compared to baseline. Results discussed with neurosurgery, patient and patient's family. Right groin sheath was removed, manual compression held to hemostasis for 20 minutes, no active bleeding, no hematoma, quick clot and safeguard balloon dressing applied at 0915am. 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   Post- Procedure Diagnosis: persistent aneurysm occlusion with a stable neck remnant     : Dr Hector Castro  Physician Assistant: Jeimy Taylor PA-C    Nurse:                    Matt Lui RN                  Radiologic Tech:    Julio Cesar Doshi LRT    Anesthesiologist:    Dr Alexander Castellon   Sheath:                  4 Icelandic short sheath     I/Os: EBL  than 10cc  IV fluids: 400cc Urine output due to void Contrast Omnipaque 240   83cc        Vitals: /70         HR 72     Spo2 100%    Preliminary Report:  Using a 4 Icelandic short sheath to the right groin under MAC sedation via left internal carotid artery, right common carotid artery, a selective cerebral angiography was performed and demonstrated a persistent ACOM artery aneurysm occlusion with a satble neck remnant. There is a significant coil mass configuration change with coil migration.  Official note to follow  Patient tolerated procedure well, hemodynamically stable, no change in neurological status compared to baseline. Results discussed with patient and patient's . Right groin sheath was removed, manual compression held to hemostasis for 20 minutes, no active bleeding, no hematoma, quick clot and safeguard balloon dressing applied at 09:15am. Disposition recovery room 1st floor. 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   Post- Procedure Diagnosis: persistent aneurysm occlusion with a stable neck remnant     : Dr Hector Castro  Physician Assistant: Jeimy Taylor PA-C    Nurse:                    Matt Lui RN                  Radiologic Tech:    Julio Cesar Doshi LRT    Anesthesiologist:    Dr Alexander Castellon   Sheath:                  4 Amharic short sheath     I/Os: EBL  than 10cc  IV fluids: 400cc Urine output due to void Contrast Omnipaque 240   83cc        Vitals: /70         HR 72     Spo2 100%    Preliminary Report:  Using a 4 Amharic short sheath to the right groin under MAC sedation via left internal carotid artery, right common carotid artery, a selective cerebral angiography was performed and demonstrated a persistent ACOM artery aneurysm occlusion with a stable neck remnant. There is a significant coil mass configuration change with coil migration.  Official note to follow  Patient tolerated procedure well, hemodynamically stable, no change in neurological status compared to baseline. Results discussed with patient and patient's . Right groin sheath was removed, manual compression held to hemostasis for 20 minutes, no active bleeding, no hematoma, quick clot and safeguard balloon dressing applied at 09:15am. Disposition recovery room 1st floor.

## 2019-09-27 DIAGNOSIS — Z09 ENCOUNTER FOR FOLLOW-UP EXAMINATION AFTER COMPLETED TREATMENT FOR CONDITIONS OTHER THAN MALIGNANT NEOPLASM: ICD-10-CM

## 2019-09-27 DIAGNOSIS — I65.21 OCCLUSION AND STENOSIS OF RIGHT CAROTID ARTERY: ICD-10-CM

## 2019-10-02 ENCOUNTER — RX RENEWAL (OUTPATIENT)
Age: 74
End: 2019-10-02

## 2019-10-02 PROBLEM — E66.9 OBESITY, UNSPECIFIED: Chronic | Status: ACTIVE | Noted: 2019-09-20

## 2019-10-02 PROBLEM — I10 ESSENTIAL (PRIMARY) HYPERTENSION: Chronic | Status: ACTIVE | Noted: 2019-09-20

## 2019-10-02 PROBLEM — H91.90 UNSPECIFIED HEARING LOSS, UNSPECIFIED EAR: Chronic | Status: ACTIVE | Noted: 2019-09-20

## 2019-10-02 PROBLEM — R33.9 RETENTION OF URINE, UNSPECIFIED: Chronic | Status: ACTIVE | Noted: 2019-04-08

## 2019-10-02 PROBLEM — A04.72 ENTEROCOLITIS DUE TO CLOSTRIDIUM DIFFICILE, NOT SPECIFIED AS RECURRENT: Chronic | Status: ACTIVE | Noted: 2019-04-08

## 2019-10-02 PROBLEM — N39.0 URINARY TRACT INFECTION, SITE NOT SPECIFIED: Chronic | Status: ACTIVE | Noted: 2019-04-08

## 2019-10-02 PROBLEM — Z86.79 PERSONAL HISTORY OF OTHER DISEASES OF THE CIRCULATORY SYSTEM: Chronic | Status: ACTIVE | Noted: 2019-04-08

## 2019-10-07 ENCOUNTER — FORM ENCOUNTER (OUTPATIENT)
Age: 74
End: 2019-10-07

## 2019-10-08 ENCOUNTER — APPOINTMENT (OUTPATIENT)
Dept: MRI IMAGING | Facility: CLINIC | Age: 74
End: 2019-10-08
Payer: MEDICARE

## 2019-10-08 ENCOUNTER — OUTPATIENT (OUTPATIENT)
Dept: OUTPATIENT SERVICES | Facility: HOSPITAL | Age: 74
LOS: 1 days | End: 2019-10-08
Payer: MEDICARE

## 2019-10-08 DIAGNOSIS — Z98.890 OTHER SPECIFIED POSTPROCEDURAL STATES: Chronic | ICD-10-CM

## 2019-10-08 DIAGNOSIS — Z00.8 ENCOUNTER FOR OTHER GENERAL EXAMINATION: ICD-10-CM

## 2019-10-08 PROCEDURE — 70546 MR ANGIOGRAPH HEAD W/O&W/DYE: CPT

## 2019-10-08 PROCEDURE — A9585: CPT

## 2019-10-08 PROCEDURE — 70546 MR ANGIOGRAPH HEAD W/O&W/DYE: CPT | Mod: 26

## 2019-10-24 ENCOUNTER — APPOINTMENT (OUTPATIENT)
Dept: UROLOGY | Facility: CLINIC | Age: 74
End: 2019-10-24
Payer: MEDICARE

## 2019-10-24 PROCEDURE — 99214 OFFICE O/P EST MOD 30 MIN: CPT

## 2019-10-24 RX ORDER — LISINOPRIL 30 MG/1
TABLET ORAL
Refills: 0 | Status: ACTIVE | COMMUNITY

## 2019-10-24 RX ORDER — WARFARIN SODIUM 6 MG/1
TABLET ORAL
Refills: 0 | Status: COMPLETED | COMMUNITY
End: 2019-10-24

## 2019-10-24 RX ORDER — RAMIPRIL 10 MG/1
10 CAPSULE ORAL
Qty: 90 | Refills: 0 | Status: COMPLETED | COMMUNITY
Start: 2017-06-13 | End: 2019-10-24

## 2019-10-24 NOTE — LETTER BODY
[FreeTextEntry1] : Suhail Floyd MD\par [address] [facility]\[place] [address]\par [phone]\par \par Dear Dr. Floyd,\par \par Reason for visit: Abnormal imaging. Bilateral renal calculi. Left ureteral calculus.\par \par This is a 74 year old retired female with bilateral renal calculi and left ureteral calculus. Previous stone analysis in 2017 demonstrated uric acid stones. She was admitted for an aneurysm in March 2019. Aneurysm was coiled, and patient developed urinary retention while hospitalized, . Patient returns today for follow up. Since her previous visit, the patient reports she was recently seen by you and taken off Coumadin. She reports she is doing well. Her lungs are clear and her clot has dissolved, but she obtained a urine culture that was positive for Klebsiella oxytoca. She complains of frequency, dysuria, and nocturia 3-4x per night. Patient denies any flank pain, hematuria, or urinary difficulties. Patient denies any urinary incontinence. She denies any fever or chills. The patient denies any aggravating or relieving factors. The patient denies any interference of function. The patient is entirely asymptomatic. All other review of systems are negative. Patient is no longer taking Hydrochlorothiazide or any anticoagulants. She is now taking Lisinopril. Medication list was reconciled.\par \par On examination, the patient is a healthy-appearing woman in no acute distress. She is alert and oriented follows commands. She has normal mood and affect. She is normocephalic. Neck is supple. Respirations are unlabored. Abdomen is soft and nontender. Bladder is nonpalpable. No CVA tenderness. Neurologically she is grossly intact. No peripheral edema. Skin without gross abnormality.\par \par Her recent urine culture was positive for bacterial UTI, K. oxytoca. Her urine culture in May was also positive for K. oxytoca.\par \par Assessment: Bilateral renal calculi. Left renal calculus. Bacterial UTI.\par \par I counseled the patient. Given that she is no longer on anticoagulants, I recommend she begin a course of Augmentin for her bacterial UTI. I discussed the potential side effects of the medication. I counseled the patient on its use and side effects. If the patient develops any side effects, the patient will discontinue the medication and contact me. She will obtain urinalysis and urine culture today to ensure stability. Risks and alternatives were discussed. I answered the patient questions. The patient will follow-up as directed and will contact me with any questions or concerns. Thank you for the opportunity to participate in the care of Ms. DUPREE. I will keep you updated on her progress. \par \par Plan: Course of Augmentin. Urinalysis. Culture. Follow up in 2 weeks.

## 2019-10-24 NOTE — ADDENDUM
[FreeTextEntry1] : Entered by Raphael Pearce, acting as scribe for Dr. Johnny Burrows.\par \par The documentation recorded by the scribe accurately reflects the service I personally performed and the decisions made by me.

## 2019-10-28 LAB
APPEARANCE: ABNORMAL
BACTERIA UR CULT: ABNORMAL
BACTERIA: NEGATIVE
BILIRUBIN URINE: NEGATIVE
BLOOD URINE: ABNORMAL
COLOR: NORMAL
GLUCOSE QUALITATIVE U: NEGATIVE
HYALINE CASTS: 2 /LPF
KETONES URINE: NEGATIVE
LEUKOCYTE ESTERASE URINE: ABNORMAL
MICROSCOPIC-UA: NORMAL
NITRITE URINE: NEGATIVE
PH URINE: 6
PROTEIN URINE: ABNORMAL
RED BLOOD CELLS URINE: 3 /HPF
SPECIFIC GRAVITY URINE: 1.02
SQUAMOUS EPITHELIAL CELLS: 11 /HPF
UROBILINOGEN URINE: NORMAL
WHITE BLOOD CELLS URINE: 430 /HPF

## 2019-11-07 ENCOUNTER — APPOINTMENT (OUTPATIENT)
Dept: UROLOGY | Facility: CLINIC | Age: 74
End: 2019-11-07
Payer: MEDICARE

## 2019-11-07 PROCEDURE — 99213 OFFICE O/P EST LOW 20 MIN: CPT

## 2019-11-07 NOTE — LETTER BODY
[FreeTextEntry1] : Suhail Floyd MD\par [address] [facility]\[place] [address]\par [phone] \par Dear Dr. Floyd,\par \par Reason for visit: Abnormal imaging. Bilateral renal calculi. Left ureteral calculus.\par \par This is a 74 year old retired female with bilateral renal calculi and left ureteral calculus. Previous stone analysis in 2017 demonstrated uric acid stones. She was admitted for an aneurysm in March 2019. Aneurysm was coiled, and patient developed urinary retention while hospitalized. Patient returns today for follow up. Since her previous visit, the patient reports she is doing well. She has completed her course of Augmentin and for her bacterial UTI and reports she no longer feels pressure during urination. She also reports of an improvement in her nocturia. Patient denies any flank pain, dysuria, hematuria, or urinary difficulties. Patient denies any urinary incontinence. She denies any fever or chills. The patient is entirely asymptomatic. All other review of systems are negative. Patient is no longer taking Hydrochlorothiazide, Coumadin, or any anticoagulants. She is now taking Lisinopril. Medication list was reconciled.\par \par On examination, the patient is a healthy-appearing woman in no acute distress. She is alert and oriented follows commands. She has normal mood and affect. She is normocephalic. Neck is supple. Respirations are unlabored. Abdomen is soft and nontender. Bladder is nonpalpable. No CVA tenderness. Neurologically she is grossly intact. No peripheral edema. Skin without gross abnormality.\par \par Her urinalysis in October demonstrated evidence of pyuria, 430 WBC/HPF, and proteinuria, 30 mg/dL. Her urine culture was positive for K. oxytoca.\par \par Assessment: Bilateral renal calculi. Left renal calculus. Bacterial UTI. Pyuria. Proteinuria.\par \par I counseled the patient. She is doing well. Following her course of Augmentin, she reports of an improvement in her urinary symptoms. She currently denies any dysuria or hematuria. In terms of her recent abnormal urinalysis demonstrating evidence of pyuria and proteinuria, I recommend she repeat urinalysis and urine culture today to ensure stability. I discussed the various etiologies of her symptoms and advised her to consult with her gynecologist for evaluation and consultation on vaginal Estrace cream that may treat her recurrent bacterial UTIs. Risks and alternatives were discussed. I answered the patient questions. The patient will follow-up as directed and will contact me with any questions or concerns. Thank you for the opportunity to participate in the care of Ms. DUPREE. I will keep you updated on her progress. \par \par Plan: Consult with GYN. Urinalysis. Culture. Follow up as directed.

## 2019-11-10 LAB
APPEARANCE: ABNORMAL
BACTERIA UR CULT: NORMAL
BACTERIA: ABNORMAL
BILIRUBIN URINE: NEGATIVE
BLOOD URINE: NEGATIVE
COLOR: YELLOW
GLUCOSE QUALITATIVE U: NEGATIVE
HYALINE CASTS: 0 /LPF
KETONES URINE: NEGATIVE
LEUKOCYTE ESTERASE URINE: ABNORMAL
MICROSCOPIC-UA: NORMAL
NITRITE URINE: NEGATIVE
PH URINE: 5.5
PROTEIN URINE: NORMAL
RED BLOOD CELLS URINE: 5 /HPF
SPECIFIC GRAVITY URINE: 1.02
SQUAMOUS EPITHELIAL CELLS: 24 /HPF
UROBILINOGEN URINE: NORMAL
WHITE BLOOD CELLS URINE: 55 /HPF

## 2019-11-13 LAB
APPEARANCE: ABNORMAL
BACTERIA: NEGATIVE
BILIRUBIN URINE: NEGATIVE
BLOOD URINE: ABNORMAL
COLOR: YELLOW
GLUCOSE QUALITATIVE U: NEGATIVE
HYALINE CASTS: 0 /LPF
KETONES URINE: NEGATIVE
LEUKOCYTE ESTERASE URINE: ABNORMAL
MICROSCOPIC-UA: NORMAL
NITRITE URINE: NEGATIVE
PH URINE: 6.5
PROTEIN URINE: ABNORMAL
RED BLOOD CELLS URINE: 17 /HPF
SPECIFIC GRAVITY URINE: 1.02
SQUAMOUS EPITHELIAL CELLS: 10 /HPF
UROBILINOGEN URINE: NORMAL
WHITE BLOOD CELLS URINE: >720 /HPF

## 2019-11-17 LAB — BACTERIA UR CULT: ABNORMAL

## 2019-11-18 ENCOUNTER — CLINICAL ADVICE (OUTPATIENT)
Age: 74
End: 2019-11-18

## 2020-02-10 ENCOUNTER — APPOINTMENT (OUTPATIENT)
Dept: UROLOGY | Facility: CLINIC | Age: 75
End: 2020-02-10
Payer: MEDICARE

## 2020-02-10 ENCOUNTER — OUTPATIENT (OUTPATIENT)
Dept: OUTPATIENT SERVICES | Facility: HOSPITAL | Age: 75
LOS: 1 days | End: 2020-02-10
Payer: MEDICARE

## 2020-02-10 DIAGNOSIS — R35.0 FREQUENCY OF MICTURITION: ICD-10-CM

## 2020-02-10 DIAGNOSIS — Z98.890 OTHER SPECIFIED POSTPROCEDURAL STATES: Chronic | ICD-10-CM

## 2020-02-10 PROCEDURE — 76775 US EXAM ABDO BACK WALL LIM: CPT | Mod: 26

## 2020-02-10 PROCEDURE — 76775 US EXAM ABDO BACK WALL LIM: CPT

## 2020-02-10 PROCEDURE — 99214 OFFICE O/P EST MOD 30 MIN: CPT | Mod: 25

## 2020-02-10 NOTE — LETTER BODY
[FreeTextEntry1] : Aquiles Pavon MD\par [address]\[place] [address]\par [phone]\par \par Dear Dr. Pavon,\par \par Reason for visit: Abnormal imaging. Bilateral renal calculi. Left ureteral calculus. Bacterial UTI.\par \par This is a 74 year old retired female with bilateral renal calculi and left ureteral calculus. Previous stone analysis in 2017 demonstrated uric acid stones. Patient previously underwent aneurysm coiling in March 2019, and developed urinary retention while hospitalized. Patient returns today for follow up. Since her previous visit, patient continues to take potassium citrate, and prophylactic Methenamine Hippurate and Vitamin C regularly without difficulties. She is overall well. Patient denies any flank pain, dysuria, hematuria, or urinary difficulties. Patient denies any urinary urgency or incontinence. The patient is entirely asymptomatic. She reports that she will see her gynecologist for Estrace cream. All other review of systems are negative. Patient is currently not taking any blood thinners. Medication list was reconciled.\par \par On examination, the patient is a healthy-appearing woman in no acute distress. She is alert and oriented follows commands. She has normal mood and affect. She is normocephalic. Neck is supple. Respirations are unlabored. Abdomen is soft and nontender. Bladder is nonpalpable. No CVA tenderness. Neurologically she is grossly intact. No peripheral edema. Skin without gross abnormality.\par \par I personally reviewed ultrasound images with the patient today. Again, images demonstrated visualized bilateral non obstructing stones the largest in the right kidney lateral lower pole 7.6 mm and in the left kidney 9.1 mm. Both kidneys are normal in size and echogenicity without hydronephrosis or solid masses visualized. \par \par Assessment: Bilateral renal calculi. Left renal calculus. Bacterial UTI. Pyuria. Proteinuria.\par \par I counseled the patient. She is overall well. In terms of her bilateral renal calculi, I recommend she continue taking potassium citrate as directed. Her ultrasound today demonstrates persistent and stable renal stones. I discussed that her intake of prophylactic Vitamin C and Methenamine may counteract with the efficacy of potassium citrate. Given no enlargement in stones and that she is asymptomatic, I recommend she follow up in 6 months for repeat ultrasound to ensure stability. In terms of her previous UTI, I recommend she continue taking prophylactic Vitamin C and Methenamine Hippurate as directed. I encouraged her to see her gynecologist for estrogen cream to prevent recurrent UTI. Patient will repeat urinalysis and culture today to ensure stability. Patient understands that if she develops gross hematuria or any urinary discomfort, she will contact me for further evaluation. Risks and alternatives were discussed. I answered the patient questions. The patient will follow-up as directed and will contact me with any questions or concerns. Thank you for the opportunity to participate in the care of Ms. DUPREE. I will keep you updated on her progress. \par \par Plan: Continue Methenamine Hippurate and Vitamin C. Urinalysis. Culture. Consult with GYN for Estrace cream. Follow up in 6 months for renal ultrasound.

## 2020-02-10 NOTE — ADDENDUM
[FreeTextEntry1] : Entered by Jt Castillo, acting as scribe for Dr. Johnny Burrows.\par \par The documentation recorded by the scribe accurately reflects the service I personally performed and the decisions made by me.

## 2020-02-11 LAB
APPEARANCE: CLEAR
BACTERIA UR CULT: NORMAL
BACTERIA: NEGATIVE
BILIRUBIN URINE: NEGATIVE
BLOOD URINE: NEGATIVE
COLOR: NORMAL
GLUCOSE QUALITATIVE U: NEGATIVE
HYALINE CASTS: 0 /LPF
KETONES URINE: NEGATIVE
LEUKOCYTE ESTERASE URINE: ABNORMAL
MICROSCOPIC-UA: NORMAL
NITRITE URINE: NEGATIVE
PH URINE: 5.5
PROTEIN URINE: NEGATIVE
RED BLOOD CELLS URINE: 2 /HPF
SPECIFIC GRAVITY URINE: 1.02
SQUAMOUS EPITHELIAL CELLS: 6 /HPF
UROBILINOGEN URINE: NORMAL
WHITE BLOOD CELLS URINE: 6 /HPF

## 2020-02-23 DIAGNOSIS — N39.0 URINARY TRACT INFECTION, SITE NOT SPECIFIED: ICD-10-CM

## 2020-02-23 DIAGNOSIS — N20.0 CALCULUS OF KIDNEY: ICD-10-CM

## 2020-02-23 DIAGNOSIS — R31.0 GROSS HEMATURIA: ICD-10-CM

## 2020-03-11 NOTE — PHYSICAL THERAPY INITIAL EVALUATION ADULT - LEVEL OF INDEPENDENCE: SIT/SUPINE, REHAB EVAL
[facility] NEPHROLOGY SERVICES, [facility]  NEPHROLOGY AND HYPERTENSION  [address]  [address]  [place] [address]  [phone]    MD YISEL WEEKS MD ANDREY GONCHARUK, MD MADHU KORRAPATI, MD YELENA ROSENBERG, MD BINNY KOSHY, MD CHRISTOPHER CAPUTO, MD MANJINDER GOMEZ MD          Patient feels well no complaints today.    MEDICATIONS  (STANDING):  apixaban 5 milliGRAM(s) Oral two times a day  buMETAnide 2 milliGRAM(s) Oral daily  dextrose 5%. 1000 milliLiter(s) (50 mL/Hr) IV Continuous <Continuous>  dextrose 50% Injectable 12.5 Gram(s) IV Push once  dextrose 50% Injectable 25 Gram(s) IV Push once  dextrose 50% Injectable 25 Gram(s) IV Push once  diltiazem    milliGRAM(s) Oral daily  hydrALAZINE 100 milliGRAM(s) Oral three times a day  hydrocortisone 1% Topical Cream - Peds 1 Application(s) Topical two times a day  insulin lispro (HumaLOG) corrective regimen sliding scale   SubCutaneous three times a day before meals  senna 2 Tablet(s) Oral at bedtime  simvastatin 20 milliGRAM(s) Oral at bedtime  tamsulosin 0.4 milliGRAM(s) Oral at bedtime    MEDICATIONS  (PRN):  acetaminophen   Tablet .. 650 milliGRAM(s) Oral every 6 hours PRN mild pain  dextrose 40% Gel 15 Gram(s) Oral once PRN Blood Glucose LESS THAN 70 milliGRAM(s)/deciliter  glucagon  Injectable 1 milliGRAM(s) IntraMuscular once PRN Glucose LESS THAN 70 milligrams/deciliter  sodium chloride 0.65% Nasal 1 Spray(s) Both Nostrils three times a day PRN Nasal Congestion      03-10-20 @ 07:01  -  03-11-20 @ 07:00  --------------------------------------------------------  IN: 0 mL / OUT: 950 mL / NET: -950 mL    03-11-20 @ 07:01  -  03-11-20 @ 18:04  --------------------------------------------------------  IN: 0 mL / OUT: 1450 mL / NET: -1450 mL      PHYSICAL EXAM:      T(C): 36.4 (03-11-20 @ 12:26), Max: 36.6 (03-11-20 @ 00:42)  HR: 80 (03-11-20 @ 17:37) (64 - 85)  BP: 167/72 (03-11-20 @ 17:37) (121/76 - 194/87)  RR: 18 (03-11-20 @ 09:59) (18 - 18)  SpO2: 96% (03-11-20 @ 09:59) (96% - 99%)  Wt(kg): --  Respiratory: clear anteriorly, decreased BS at bases  Cardiovascular: S1 S2  Gastrointestinal: soft NT ND +BS  Extremities:   2 edema  BKA                                    7.6    7.71  )-----------( 203      ( 11 Mar 2020 06:32 )             25.2     03-11    138  |  104  |  89<H>  ----------------------------<  99  4.7   |  25  |  3.29<H>    Ca    8.7      11 Mar 2020 06:32    TPro  6.4  /  Alb  2.9<L>  /  TBili  0.4  /  DBili  x   /  AST  10<L>  /  ALT  20  /  AlkPhos  59  03-11      LIVER FUNCTIONS - ( 11 Mar 2020 06:32 )  Alb: 2.9 g/dL / Pro: 6.4 gm/dL / ALK PHOS: 59 U/L / ALT: 20 U/L / AST: 10 U/L / GGT: x           Creatinine Trend: 3.29<--, 3.49<--, 3.72<--, 3.62<--, 3.76<--, 3.66<--      Assessment   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  Would recommend AV access once stable  Fe profile noted  Will follow with me in the office 1-2 weeks          Augusto Santoyo MD minimum assist (75% patients effort)

## 2020-05-05 ENCOUNTER — APPOINTMENT (OUTPATIENT)
Dept: NEUROLOGY | Facility: CLINIC | Age: 75
End: 2020-05-05
Payer: MEDICARE

## 2020-05-05 ENCOUNTER — TRANSCRIPTION ENCOUNTER (OUTPATIENT)
Age: 75
End: 2020-05-05

## 2020-05-05 ENCOUNTER — APPOINTMENT (OUTPATIENT)
Dept: NEUROSURGERY | Facility: CLINIC | Age: 75
End: 2020-05-05

## 2020-05-05 PROCEDURE — 99214 OFFICE O/P EST MOD 30 MIN: CPT | Mod: 95

## 2020-05-05 RX ORDER — CIPROFLOXACIN HYDROCHLORIDE 500 MG/1
500 TABLET, FILM COATED ORAL
Qty: 14 | Refills: 0 | Status: DISCONTINUED | COMMUNITY
Start: 2019-11-17 | End: 2020-05-05

## 2020-05-05 RX ORDER — TAMSULOSIN HYDROCHLORIDE 0.4 MG/1
0.4 CAPSULE ORAL
Qty: 90 | Refills: 3 | Status: DISCONTINUED | COMMUNITY
Start: 2019-05-06 | End: 2020-05-05

## 2020-05-05 RX ORDER — SULFAMETHOXAZOLE AND TRIMETHOPRIM 800; 160 MG/1; MG/1
800-160 TABLET ORAL
Qty: 10 | Refills: 0 | Status: DISCONTINUED | COMMUNITY
Start: 2019-10-28 | End: 2020-05-05

## 2020-05-05 RX ORDER — AMOXICILLIN AND CLAVULANATE POTASSIUM 875; 125 MG/1; MG/1
875-125 TABLET, COATED ORAL TWICE DAILY
Qty: 14 | Refills: 0 | Status: DISCONTINUED | COMMUNITY
Start: 2019-10-24 | End: 2020-05-05

## 2020-05-15 NOTE — DISCUSSION/SUMMARY
[FreeTextEntry1] : Mrs. Tran s a 75 year old woman now 1+ year  s/p SAH secondary to a ruptured ACOM aneurysm status post coil embolization to complete occlusion. She is doing well, with no complaints. She is no longer on the Coumadin for the PE. The angiogram in September showed the coil mass migrated into the thrombus, but the aneurysm remained mostly occluded. Plan now for follow up MRA head w/ contrast and if  there is a growing neck remnant we will plan for an angiogram with the possibility to retreat with a flow diverting stent. The procedure, risks and benefits were discussed with the patient. All of their questions and concerns were addressed.

## 2020-05-15 NOTE — REVIEW OF SYSTEMS
[Chills] : no chills [Fever] : no fever [Seizures] : no convulsions [Feeling Tired] : not feeling tired [Feeling Poorly] : not feeling poorly [Fainting] : no fainting [Vertigo] : no vertigo [Lightheadedness] : no lightheadedness [Depression] : no depression [Anxiety] : no anxiety [Chest Pain] : no chest pain [Palpitations] : no palpitations [Shortness Of Breath] : no shortness of breath [Wheezing] : no wheezing [Skin Wound] : no skin wound [Skin Lesions] : no skin lesions [Vomiting] : no vomiting

## 2020-05-15 NOTE — HISTORY OF PRESENT ILLNESS
[FreeTextEntry1] : Verbal consent given for telehealth visit by patient, Jackelin Tran.\par Call took place in her home. \par \par Mrs. Tran is a 75 year old woman who presented to [facility] ED with syncope and 3 day history of neck pain on 3/4/2019. Patient was transferred to [facility] for further work up. CT showed a large SAH and CTA revealed an A-comm aneurysm. She then underwent a successful coiling of the A-comm aneurysm. Her hospital course was notable for C. diff diarrhea and treated with course of Flagyl and Vancomycin. On 3/18/2019 patient developed dyspnea. CTA chest revealed RUL PE. She was started on heparin gtt 3/18/19 and bridged to Coumadin with goal INR of 2.5-3.5. Her follow up angiogram in September 2019 showed persistent complete aneurysm occlusion with stable small neck remnant. However, significant coil mass configuration change with coil migration likely into thrombus was noted. A new baseline MRA was done soon after. Today she denies any complaints or interval TIA/Stroke symptoms.

## 2020-05-29 ENCOUNTER — APPOINTMENT (OUTPATIENT)
Dept: MRI IMAGING | Facility: CLINIC | Age: 75
End: 2020-05-29
Payer: MEDICARE

## 2020-05-29 ENCOUNTER — OUTPATIENT (OUTPATIENT)
Dept: OUTPATIENT SERVICES | Facility: HOSPITAL | Age: 75
LOS: 1 days | End: 2020-05-29
Payer: MEDICARE

## 2020-05-29 ENCOUNTER — RESULT REVIEW (OUTPATIENT)
Age: 75
End: 2020-05-29

## 2020-05-29 DIAGNOSIS — Z98.890 OTHER SPECIFIED POSTPROCEDURAL STATES: Chronic | ICD-10-CM

## 2020-05-29 DIAGNOSIS — I60.8 OTHER NONTRAUMATIC SUBARACHNOID HEMORRHAGE: ICD-10-CM

## 2020-05-29 PROCEDURE — A9585: CPT

## 2020-05-29 PROCEDURE — 70546 MR ANGIOGRAPH HEAD W/O&W/DYE: CPT | Mod: 26

## 2020-05-29 PROCEDURE — 70546 MR ANGIOGRAPH HEAD W/O&W/DYE: CPT

## 2020-06-09 ENCOUNTER — APPOINTMENT (OUTPATIENT)
Dept: NEUROLOGY | Facility: CLINIC | Age: 75
End: 2020-06-09
Payer: MEDICARE

## 2020-06-09 PROCEDURE — 99214 OFFICE O/P EST MOD 30 MIN: CPT | Mod: 95

## 2020-06-09 NOTE — REVIEW OF SYSTEMS
[Fever] : no fever [Chills] : no chills [Feeling Poorly] : not feeling poorly [Feeling Tired] : not feeling tired [Confused or Disoriented] : no confusion [Memory Lapses or Loss] : no memory loss [Decr. Concentrating Ability] : no decrease in concentrating ability [Difficulty with Language] : no ~M difficulty with language [Changed Thought Patterns] : no change in thought patterns [Repeating Questions] : no repeated questioning about recent events [Facial Weakness] : no facial weakness [Arm Weakness] : no arm weakness [Hand Weakness] : no hand weakness [Leg Weakness] : no leg weakness [Poor Coordination] : good coordination [Difficulty Writing] : no difficulty writing [Difficulties in Speech] : no speech difficulties [Numbness] : no numbness [Tingling] : no tingling [Seizures] : no convulsions [Dizziness] : no dizziness [Fainting] : no fainting [Lightheadedness] : no lightheadedness [Vertigo] : no vertigo [Tension Headache] : no tension-type headache [Difficulty Walking] : no difficulty walking [Inability to Walk] : able to walk [Ataxia] : no ataxia [Anxiety] : no anxiety [Depression] : no depression [Eyesight Problems] : no eyesight problems [Cough] : no cough [Constipation] : no constipation [Diarrhea] : no diarrhea [Easy Bleeding] : no tendency for easy bleeding [Easy Bruising] : no tendency for easy bruising

## 2020-06-09 NOTE — H&P PST ADULT - ENERGY EXPENDITURE (METS)
Detail Level: Zone
Discussed we are limited with biologic options given pt’s h/o colitis - need to avoid IL17 inhibitors. Consider Stelara, but Stelara is a second-line biologic, so would likely need to try and fail Humira first. Will reach out to Humira rep to see if there are any other options for pt. Alternatively, we may wait to see what rheumatology recommends first/if there are better coverage options with a diagnosis of psoriatic arthritis (has appointment on 7/6). We may consider MTX as another option, but patient is not interested in regular labwork. \\n\\nPatient looked into formulary meds: Cimzia, Cosentyx (cannot take given colitis), Enbrel, Humira, Ilumya, Inflectra, Otezla, Remicade, Siliq, Stelara, Taltz, Tremfya, Xeljanz. Unfortunately, all of these have very high out of pocket costs for patient. Will await rheum evaluation and see if coverage is better for psoriatic arthritis.
7

## 2020-06-09 NOTE — DISCUSSION/SUMMARY
[FreeTextEntry1] : Mrs. Tran s a 75 year old woman now 15 months s/p SAH secondary to a ruptured ACOM aneurysm status post coil embolization to complete occlusion. She is doing well, with no complaints. She is no longer on the Coumadin for the PE. The angiogram in September showed the coil mass migrated into the thrombus, but the aneurysm remained mostly occluded. Recent MRA showed a stable neck remnant and as previously discussed we recommend an angiogram with the possibility to retreat with a flow diverting stent. The procedure, risks and benefits were discussed with the patient. However, she is hesitant about having the procedure because of the stroke risk, and she will call office if she changes her mind. If she does not undergo the procedure, she will be followed with MRA's every 6 months. All of their questions and concerns were addressed. I will see her again in 3 months.

## 2020-06-09 NOTE — HISTORY OF PRESENT ILLNESS
[Medical Office: ([facility])___] : at the medical office located in  [Home] : at home, [unfilled] , at the time of the visit. [Verbal consent obtained from patient] : the patient, [unfilled] [Spouse] : spouse [FreeTextEntry4] : Diya Garcia NP [FreeTextEntry1] : Mrs. Tran is a 75 year old woman who presented to [facility] ED with syncope and 3 day history of neck pain on 3/4/2019. Patient was transferred to [facility] for further work up. CT showed a large SAH and CTA revealed an A-comm aneurysm. She then underwent a successful coiling of the A-comm aneurysm. Her hospital course was notable for C. diff diarrhea and treated with course of Flagyl and Vancomycin. On 3/18/2019 patient developed dyspnea. CTA chest revealed RUL PE. She was started on heparin gtt 3/18/19 and bridged to Coumadin with goal INR of 2.5-3.5. Her follow up angiogram in September 2019 showed persistent complete aneurysm occlusion with stable small neck remnant. However, significant coil mass configuration change with coil migration likely into thrombus was noted. 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 Today she denies any complaints or interval TIA/Stroke symptoms.

## 2020-08-24 ENCOUNTER — APPOINTMENT (OUTPATIENT)
Dept: UROLOGY | Facility: CLINIC | Age: 75
End: 2020-08-24
Payer: MEDICARE

## 2020-08-24 ENCOUNTER — OUTPATIENT (OUTPATIENT)
Dept: OUTPATIENT SERVICES | Facility: HOSPITAL | Age: 75
LOS: 1 days | End: 2020-08-24
Payer: MEDICARE

## 2020-08-24 VITALS — TEMPERATURE: 97.9 F

## 2020-08-24 DIAGNOSIS — A49.9 URINARY TRACT INFECTION, SITE NOT SPECIFIED: ICD-10-CM

## 2020-08-24 DIAGNOSIS — Z98.890 OTHER SPECIFIED POSTPROCEDURAL STATES: Chronic | ICD-10-CM

## 2020-08-24 DIAGNOSIS — N39.0 URINARY TRACT INFECTION, SITE NOT SPECIFIED: ICD-10-CM

## 2020-08-24 DIAGNOSIS — R35.0 FREQUENCY OF MICTURITION: ICD-10-CM

## 2020-08-24 PROCEDURE — 76775 US EXAM ABDO BACK WALL LIM: CPT | Mod: 26

## 2020-08-24 PROCEDURE — 99214 OFFICE O/P EST MOD 30 MIN: CPT | Mod: 25

## 2020-08-24 PROCEDURE — 76775 US EXAM ABDO BACK WALL LIM: CPT

## 2020-08-24 RX ORDER — METHENAMINE HIPPURATE 1 G/1
1 TABLET ORAL
Qty: 180 | Refills: 3 | Status: COMPLETED | COMMUNITY
Start: 2019-11-17 | End: 2020-08-24

## 2020-08-24 NOTE — LETTER BODY
[FreeTextEntry1] : Aquiles Pavon MD\par [address]\[place] [address]\par [phone]\par \par Dear Dr. Pavon,\par \par Reason for visit: Abnormal imaging. Bilateral renal calculi. Bacterial UTI.\par \par This is a 74 year old retired female with history of bilateral renal calculi and recurrent UTI. Previous stone analysis in 2017 demonstrated uric acid stones. Her last ultrasound in February demonstrated stable bilateral nonobstructing renal stones. Patient returns today for follow up. Since her previous visit, patient has been well. She reports discontinuing potassium citrate due to her previous aneurysm. She continues to take methenamine hippurate without any difficulties. She denies any recent infection within the last 6 months. The patient is entirely asymptomatic. The past medical history, family history and social history are unchanged. All other review of systems are negative. Patient denies any changes in medications. Medication list was reconciled. \par \par On examination, the patient is a healthy-appearing woman in no acute distress. She is alert and oriented follows commands. She has normal mood and affect. She is normocephalic. Neck is supple. Respirations are unlabored. Abdomen is soft and nontender. Bladder is nonpalpable. No CVA tenderness. Neurologically she is grossly intact. No peripheral edema. Skin without gross abnormality.\par \par I personally reviewed ultrasound images with the patient today. Images demonstrated bilateral non obstructing stones the largest in the right kidney upper pole 0.68 x 0.85 cm and in the left kidney upper pole 0.94 cmm al stable in size. Both kidneys are normal in size and echogenicity without hydronephrosis or solid masses visualized. \par \par Assessment: Bilateral renal calculi. Bacterial UTI. \par \par I counseled the patient. She has been well. Given no recent infection in the last 6 months, she may discontinue Methenamine Hippurate. Patient will obtain a urinalysis and culture today to ensure stability.  Patient has history of breast cancer.  I encouraged her to speak with her gynecologist regarding the utility of intravaginal estrogen cream to prevent urinary tract infection.  In terms of her bilateral renal calculi, ultrasound today demonstrated stable and persistent bilateral stones with no evidence of hydronephrosis. I recommend she obtain a 24-hour urinalysis for a kidney stone risk assessment panel to determine the etiologies of her stone formation. Patient understands that if she develops gross hematuria or any urinary discomfort, she will contact me for further evaluation. Risks and alternatives were discussed. I answered the patient questions. The patient will follow-up as directed and will contact me with any questions or concerns. Thank you for the opportunity to participate in the care of Ms. DUPREE. I will keep you updated on her progress. \par \par Plan: DC Methenamine Hippurate and Vitamin C. Urinalysis. Culture. Kidney stone risk assessment panel. Follow up in 6 months for renal ultrasound.

## 2020-08-25 LAB
APPEARANCE: CLEAR
BACTERIA UR CULT: NORMAL
BACTERIA: NEGATIVE
BILIRUBIN URINE: NEGATIVE
BLOOD URINE: NEGATIVE
COLOR: NORMAL
GLUCOSE QUALITATIVE U: NEGATIVE
HYALINE CASTS: 1 /LPF
KETONES URINE: NEGATIVE
LEUKOCYTE ESTERASE URINE: ABNORMAL
MICROSCOPIC-UA: NORMAL
NITRITE URINE: NEGATIVE
PH URINE: 5.5
PROTEIN URINE: NEGATIVE
RED BLOOD CELLS URINE: 1 /HPF
SPECIFIC GRAVITY URINE: 1.02
SQUAMOUS EPITHELIAL CELLS: 5 /HPF
UROBILINOGEN URINE: NORMAL
WHITE BLOOD CELLS URINE: 6 /HPF

## 2020-08-27 DIAGNOSIS — R31.0 GROSS HEMATURIA: ICD-10-CM

## 2020-08-27 DIAGNOSIS — N39.0 URINARY TRACT INFECTION, SITE NOT SPECIFIED: ICD-10-CM

## 2020-08-27 DIAGNOSIS — N20.0 CALCULUS OF KIDNEY: ICD-10-CM

## 2020-09-15 ENCOUNTER — APPOINTMENT (OUTPATIENT)
Dept: NEUROLOGY | Facility: CLINIC | Age: 75
End: 2020-09-15
Payer: MEDICARE

## 2020-09-15 VITALS
RESPIRATION RATE: 16 BRPM | TEMPERATURE: 98.2 F | SYSTOLIC BLOOD PRESSURE: 200 MMHG | HEART RATE: 95 BPM | OXYGEN SATURATION: 97 % | BODY MASS INDEX: 30.82 KG/M2 | HEIGHT: 65 IN | DIASTOLIC BLOOD PRESSURE: 111 MMHG | WEIGHT: 185 LBS

## 2020-09-15 PROCEDURE — 99215 OFFICE O/P EST HI 40 MIN: CPT

## 2020-09-15 RX ORDER — HYDROCHLOROTHIAZIDE 12.5 MG/1
12.5 TABLET ORAL
Qty: 90 | Refills: 0 | Status: DISCONTINUED | COMMUNITY
Start: 2017-09-05 | End: 2020-09-15

## 2020-09-15 RX ORDER — PANTOPRAZOLE SODIUM 40 MG/1
40 TABLET, DELAYED RELEASE ORAL
Refills: 0 | Status: DISCONTINUED | COMMUNITY
End: 2020-09-15

## 2020-09-15 NOTE — PHYSICAL EXAM
[General Appearance - Well Nourished] : well nourished [General Appearance - Alert] : alert [General Appearance - Well Developed] : well developed [Affect] : the affect was normal [Oriented To Time, Place, And Person] : oriented to person, place, and time [Mood] : the mood was normal [Person] : oriented to person [Time] : oriented to time [Place] : oriented to place [Short Term Intact] : short term memory intact [Visual Intact] : visual attention was ~T not ~L decreased [Concentration Intact] : normal concentrating ability [Naming Objects] : no difficulty naming common objects [Writing A Sentence] : no difficulty writing a sentence [Repeating Phrases] : no difficulty repeating a phrase [Comprehension] : comprehension intact [Fluency] : fluency intact [Reading] : reading intact [Past History] : adequate knowledge of personal past history [Cranial Nerves Optic (II)] : visual acuity intact bilaterally,  visual fields full to confrontation, pupils equal round and reactive to light [Cranial Nerves Trigeminal (V)] : facial sensation intact symmetrically [Cranial Nerves Facial (VII)] : face symmetrical [Cranial Nerves Oculomotor (III)] : extraocular motion intact [Cranial Nerves Vestibulocochlear (VIII)] : hearing was intact bilaterally [Cranial Nerves Glossopharyngeal (IX)] : tongue and palate midline [Cranial Nerves Accessory (XI - Cranial And Spinal)] : head turning and shoulder shrug symmetric [Cranial Nerves Hypoglossal (XII)] : there was no tongue deviation with protrusion [Motor Strength] : muscle strength was normal in all four extremities [Motor Tone] : muscle tone was normal in all four extremities [No Muscle Atrophy] : normal bulk in all four extremities [Motor Handedness Right-Handed] : the patient is right hand dominant [Sensation Tactile Decrease] : light touch was intact [Balance] : balance was intact [Extraocular Movements] : extraocular movements were intact [Hearing Threshold Finger Rub Not Parmer] : hearing was normal [Full Visual Field] : full visual field [] : no respiratory distress [Neck Appearance] : the appearance of the neck was normal [Edema] : there was no peripheral edema [Heart Rate And Rhythm] : heart rate was normal and rhythm regular [Abdomen Soft] : soft [Abnormal Walk] : normal gait [Musculoskeletal - Swelling] : no joint swelling seen [Skin Color & Pigmentation] : normal skin color and pigmentation [Skin Turgor] : normal skin turgor [Paresis Pronator Drift Right-Sided] : no pronator drift on the right [Paresis Pronator Drift Left-Sided] : no pronator drift on the left [Motor Strength Lower Extremities Bilaterally] : strength was normal in both lower extremities [Motor Strength Upper Extremities Bilaterally] : strength was normal in both upper extremities [Dysdiadochokinesia Bilaterally] : not present [Coordination - Dysmetria Impaired Finger-to-Nose Bilateral] : not present

## 2020-09-15 NOTE — DISCUSSION/SUMMARY
[FreeTextEntry1] : Mrs. Tran s a 75 year old woman now 18 months s/p SAH secondary to a ruptured ACOM aneurysm status post coil embolization to complete occlusion. She is doing well, with no complaints. She is no longer on the Coumadin for the PE. The angiogram in September 2019 showed the coil mass migrated into the thrombus, but the aneurysm remained mostly occluded. Recent MRA in May 2020 showed a stable neck remnant. We previously discussed an angiogram with the possibility to retreat with a flow diverting stent, but she was hesitant about having the procedure because of the stroke risk. The procedure, risks and benefits were discussed with the patient and she now feels ready to undergo the procedure. Plan for angiogram on 10/08/20 and possible ABEBA Padgett Stent from left A1 to left A2, and we will start her on  mg and Plavix 75 mg on 10/02/20 and P2Y12 and ARU 2 days before. Plan for MRA head with lobito and NOVA now. All of their questions and concerns were addressed.

## 2020-09-15 NOTE — REVIEW OF SYSTEMS
[Fever] : no fever [Chills] : no chills [Feeling Poorly] : not feeling poorly [Confused or Disoriented] : no confusion [Feeling Tired] : not feeling tired [Memory Lapses or Loss] : no memory loss [Decr. Concentrating Ability] : no decrease in concentrating ability [Difficulty with Language] : no ~M difficulty with language [Changed Thought Patterns] : no change in thought patterns [Repeating Questions] : no repeated questioning about recent events [Facial Weakness] : no facial weakness [Hand Weakness] : no hand weakness [Arm Weakness] : no arm weakness [Poor Coordination] : good coordination [Leg Weakness] : no leg weakness

## 2020-09-15 NOTE — HISTORY OF PRESENT ILLNESS
[FreeTextEntry1] : Mrs. Tran is a 75 year old woman who presented to [facility] ED with syncope and 3 day history of neck pain on 3/4/2019. Patient was transferred to [facility] for further work up. CT showed a large SAH and CTA revealed an A-comm aneurysm. She then underwent a successful coiling of the A-comm aneurysm. Her hospital course was notable for C. diff diarrhea and treated with course of Flagyl and Vancomycin. On 3/18/2019 patient developed dyspnea. CTA chest revealed RUL PE. She was started on heparin gtt 3/18/19 and bridged to Coumadin with goal INR of 2.5-3.5. Her follow up angiogram in September 2019 showed persistent complete aneurysm occlusion with stable small neck remnant. However, significant coil mass configuration change with coil migration likely into thrombus was noted. 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 Today she denies any complaints or interval TIA/Stroke symptoms.

## 2020-10-02 ENCOUNTER — OUTPATIENT (OUTPATIENT)
Dept: OUTPATIENT SERVICES | Facility: HOSPITAL | Age: 75
LOS: 1 days | End: 2020-10-02
Payer: MEDICARE

## 2020-10-02 ENCOUNTER — APPOINTMENT (OUTPATIENT)
Dept: MRI IMAGING | Facility: HOSPITAL | Age: 75
End: 2020-10-02

## 2020-10-02 ENCOUNTER — RESULT REVIEW (OUTPATIENT)
Age: 75
End: 2020-10-02

## 2020-10-02 DIAGNOSIS — Z98.890 OTHER SPECIFIED POSTPROCEDURAL STATES: Chronic | ICD-10-CM

## 2020-10-02 DIAGNOSIS — I60.8 OTHER NONTRAUMATIC SUBARACHNOID HEMORRHAGE: ICD-10-CM

## 2020-10-02 PROCEDURE — 70544 MR ANGIOGRAPHY HEAD W/O DYE: CPT

## 2020-10-02 PROCEDURE — 70544 MR ANGIOGRAPHY HEAD W/O DYE: CPT | Mod: 26

## 2020-10-05 ENCOUNTER — OUTPATIENT (OUTPATIENT)
Dept: OUTPATIENT SERVICES | Facility: HOSPITAL | Age: 75
LOS: 1 days | End: 2020-10-05

## 2020-10-05 VITALS
TEMPERATURE: 99 F | SYSTOLIC BLOOD PRESSURE: 153 MMHG | WEIGHT: 197.98 LBS | HEART RATE: 100 BPM | OXYGEN SATURATION: 98 % | DIASTOLIC BLOOD PRESSURE: 94 MMHG | RESPIRATION RATE: 15 BRPM | HEIGHT: 65 IN

## 2020-10-05 DIAGNOSIS — Z01.818 ENCOUNTER FOR OTHER PREPROCEDURAL EXAMINATION: ICD-10-CM

## 2020-10-05 DIAGNOSIS — Z98.890 OTHER SPECIFIED POSTPROCEDURAL STATES: Chronic | ICD-10-CM

## 2020-10-05 DIAGNOSIS — I60.8 OTHER NONTRAUMATIC SUBARACHNOID HEMORRHAGE: ICD-10-CM

## 2020-10-05 DIAGNOSIS — Z86.79 PERSONAL HISTORY OF OTHER DISEASES OF THE CIRCULATORY SYSTEM: ICD-10-CM

## 2020-10-05 LAB
ANION GAP SERPL CALC-SCNC: 11 MMOL/L — SIGNIFICANT CHANGE UP (ref 5–17)
BLD GP AB SCN SERPL QL: NEGATIVE — SIGNIFICANT CHANGE UP
BUN SERPL-MCNC: 30 MG/DL — HIGH (ref 7–23)
CALCIUM SERPL-MCNC: 9.9 MG/DL — SIGNIFICANT CHANGE UP (ref 8.4–10.5)
CHLORIDE SERPL-SCNC: 109 MMOL/L — HIGH (ref 96–108)
CO2 SERPL-SCNC: 21 MMOL/L — LOW (ref 22–31)
CREAT SERPL-MCNC: 1.1 MG/DL — SIGNIFICANT CHANGE UP (ref 0.5–1.3)
GLUCOSE SERPL-MCNC: 102 MG/DL — HIGH (ref 70–99)
HCT VFR BLD CALC: 41 % — SIGNIFICANT CHANGE UP (ref 34.5–45)
HGB BLD-MCNC: 13.2 G/DL — SIGNIFICANT CHANGE UP (ref 11.5–15.5)
MCHC RBC-ENTMCNC: 29.7 PG — SIGNIFICANT CHANGE UP (ref 27–34)
MCHC RBC-ENTMCNC: 32.2 GM/DL — SIGNIFICANT CHANGE UP (ref 32–36)
MCV RBC AUTO: 92.3 FL — SIGNIFICANT CHANGE UP (ref 80–100)
NRBC # BLD: 0 /100 WBCS — SIGNIFICANT CHANGE UP (ref 0–0)
PA ADP PRP-ACNC: 56 PRU — LOW (ref 194–417)
PLATELET # BLD AUTO: 264 K/UL — SIGNIFICANT CHANGE UP (ref 150–400)
PLATELET RESPONSE ASPIRIN RESULT: 521 ARU — SIGNIFICANT CHANGE UP (ref 350–700)
POTASSIUM SERPL-MCNC: 4 MMOL/L — SIGNIFICANT CHANGE UP (ref 3.5–5.3)
POTASSIUM SERPL-SCNC: 4 MMOL/L — SIGNIFICANT CHANGE UP (ref 3.5–5.3)
RBC # BLD: 4.44 M/UL — SIGNIFICANT CHANGE UP (ref 3.8–5.2)
RBC # FLD: 13.8 % — SIGNIFICANT CHANGE UP (ref 10.3–14.5)
RH IG SCN BLD-IMP: POSITIVE — SIGNIFICANT CHANGE UP
SARS-COV-2 RNA SPEC QL NAA+PROBE: SIGNIFICANT CHANGE UP
SODIUM SERPL-SCNC: 141 MMOL/L — SIGNIFICANT CHANGE UP (ref 135–145)
WBC # BLD: 8.25 K/UL — SIGNIFICANT CHANGE UP (ref 3.8–10.5)
WBC # FLD AUTO: 8.25 K/UL — SIGNIFICANT CHANGE UP (ref 3.8–10.5)

## 2020-10-05 RX ORDER — WARFARIN SODIUM 2.5 MG/1
7 TABLET ORAL
Qty: 0 | Refills: 0 | DISCHARGE

## 2020-10-05 NOTE — H&P PST ADULT - NSICDXPASTMEDICALHX_GEN_ALL_CORE_FT
PAST MEDICAL HISTORY:  Benign hypertension     Clostridium difficile diarrhea 9/2019 resolved    DVT, lower extremity left leg  March 2019, was on Coumadin until 11/2019       Dyslipidemia     H/O spont subarachnoid intracranial hemorrhage d/t cerebral aneurysm March 4. 2019 coil placed    [place] (hard of hearing)     Obese     Urinary retention resolved    Urinary tract infection resolved

## 2020-10-05 NOTE — H&P PST ADULT - NSICDXPROBLEM_GEN_ALL_CORE_FT
PROBLEM DIAGNOSES  Problem: H/O spont subarachnoid intracranial hemorrhage d/t cerebral aneurysm  Assessment and Plan: Scheduled cerebral embolization  labs done   will take aspirin, plavix and lisinopril the DOS

## 2020-10-05 NOTE — H&P PST ADULT - NEGATIVE GASTROINTESTINAL SYMPTOMS
no change in bowel habits/no diarrhea/no nausea/no vomiting/no constipation/no abdominal pain/no melena/no jaundice

## 2020-10-05 NOTE — H&P PST ADULT - HISTORY OF PRESENT ILLNESS
74 y/o female 74 y/o female w/ h/o a large SAH r/t ruptured A-comm aneurysm in 3/2019 s/p coiling c/b RUL PE and treated with coumadin until 11/2019. She is asymptomatic and no residual. F/u MRA in 5/2020 with stable neck remnant which is now to be treated with flow diverting stent. She started Aspirin and Plavix on 10/2. Will have P2Y12 and platelet response today. Presents for cerebral embolization with stent. 76 y/o female w/ h/o a large SAH r/t ruptured A-comm aneurysm in 3/2019 s/p coiling c/b RUL PE and treated with coumadin until 11/2019. She is asymptomatic and no residual. F/u MRA in 5/2020 with stable neck remnant which is now to be treated with flow diverting stent. She started aspirin and Plavix on 10/2. Will have P2Y12 and platelet response today. Presents for cerebral embolization with stent.

## 2020-10-08 ENCOUNTER — INPATIENT (INPATIENT)
Facility: HOSPITAL | Age: 75
LOS: 0 days | Discharge: HOME | DRG: 26 | End: 2020-10-09
Attending: PSYCHIATRY & NEUROLOGY | Admitting: PSYCHIATRY & NEUROLOGY
Payer: MEDICARE

## 2020-10-08 ENCOUNTER — APPOINTMENT (OUTPATIENT)
Dept: NEUROLOGY | Facility: CLINIC | Age: 75
End: 2020-10-08
Payer: MEDICARE

## 2020-10-08 VITALS
OXYGEN SATURATION: 99 % | SYSTOLIC BLOOD PRESSURE: 173 MMHG | TEMPERATURE: 98 F | HEART RATE: 101 BPM | DIASTOLIC BLOOD PRESSURE: 98 MMHG

## 2020-10-08 DIAGNOSIS — Z98.890 OTHER SPECIFIED POSTPROCEDURAL STATES: Chronic | ICD-10-CM

## 2020-10-08 DIAGNOSIS — I60.8 OTHER NONTRAUMATIC SUBARACHNOID HEMORRHAGE: ICD-10-CM

## 2020-10-08 PROBLEM — I82.409 ACUTE EMBOLISM AND THROMBOSIS OF UNSPECIFIED DEEP VEINS OF UNSPECIFIED LOWER EXTREMITY: Chronic | Status: ACTIVE | Noted: 2019-09-20

## 2020-10-08 LAB
ANION GAP SERPL CALC-SCNC: 14 MMOL/L — SIGNIFICANT CHANGE UP (ref 5–17)
ANION GAP SERPL CALC-SCNC: 22 MMOL/L — HIGH (ref 5–17)
BASOPHILS # BLD AUTO: 0.05 K/UL — SIGNIFICANT CHANGE UP (ref 0–0.2)
BASOPHILS NFR BLD AUTO: 0.3 % — SIGNIFICANT CHANGE UP (ref 0–2)
BUN SERPL-MCNC: 14 MG/DL — SIGNIFICANT CHANGE UP (ref 7–23)
BUN SERPL-MCNC: 18 MG/DL — SIGNIFICANT CHANGE UP (ref 7–23)
CALCIUM SERPL-MCNC: 5.5 MG/DL — CRITICAL LOW (ref 8.4–10.5)
CALCIUM SERPL-MCNC: 7.9 MG/DL — LOW (ref 8.4–10.5)
CHLORIDE SERPL-SCNC: 110 MMOL/L — HIGH (ref 96–108)
CHLORIDE SERPL-SCNC: 121 MMOL/L — HIGH (ref 96–108)
CO2 SERPL-SCNC: 12 MMOL/L — LOW (ref 22–31)
CO2 SERPL-SCNC: 16 MMOL/L — LOW (ref 22–31)
CREAT SERPL-MCNC: 0.55 MG/DL — SIGNIFICANT CHANGE UP (ref 0.5–1.3)
CREAT SERPL-MCNC: 0.81 MG/DL — SIGNIFICANT CHANGE UP (ref 0.5–1.3)
EOSINOPHIL # BLD AUTO: 0.02 K/UL — SIGNIFICANT CHANGE UP (ref 0–0.5)
EOSINOPHIL NFR BLD AUTO: 0.1 % — SIGNIFICANT CHANGE UP (ref 0–6)
GLUCOSE SERPL-MCNC: 114 MG/DL — HIGH (ref 70–99)
GLUCOSE SERPL-MCNC: 83 MG/DL — SIGNIFICANT CHANGE UP (ref 70–99)
HCT VFR BLD CALC: 36.6 % — SIGNIFICANT CHANGE UP (ref 34.5–45)
HGB BLD-MCNC: 12.2 G/DL — SIGNIFICANT CHANGE UP (ref 11.5–15.5)
IMM GRANULOCYTES NFR BLD AUTO: 0.7 % — SIGNIFICANT CHANGE UP (ref 0–1.5)
LYMPHOCYTES # BLD AUTO: 1.46 K/UL — SIGNIFICANT CHANGE UP (ref 1–3.3)
LYMPHOCYTES # BLD AUTO: 9 % — LOW (ref 13–44)
MAGNESIUM SERPL-MCNC: 1.6 MG/DL — SIGNIFICANT CHANGE UP (ref 1.6–2.6)
MCHC RBC-ENTMCNC: 30.7 PG — SIGNIFICANT CHANGE UP (ref 27–34)
MCHC RBC-ENTMCNC: 33.3 GM/DL — SIGNIFICANT CHANGE UP (ref 32–36)
MCV RBC AUTO: 92.2 FL — SIGNIFICANT CHANGE UP (ref 80–100)
MONOCYTES # BLD AUTO: 0.97 K/UL — HIGH (ref 0–0.9)
MONOCYTES NFR BLD AUTO: 5.9 % — SIGNIFICANT CHANGE UP (ref 2–14)
NEUTROPHILS # BLD AUTO: 13.69 K/UL — HIGH (ref 1.8–7.4)
NEUTROPHILS NFR BLD AUTO: 84 % — HIGH (ref 43–77)
NRBC # BLD: 0 /100 WBCS — SIGNIFICANT CHANGE UP (ref 0–0)
PA ADP PRP-ACNC: 61 PRU — LOW (ref 194–417)
PHOSPHATE SERPL-MCNC: 3 MG/DL — SIGNIFICANT CHANGE UP (ref 2.5–4.5)
PLATELET # BLD AUTO: 226 K/UL — SIGNIFICANT CHANGE UP (ref 150–400)
PLATELET RESPONSE ASPIRIN RESULT: 560 ARU — SIGNIFICANT CHANGE UP (ref 350–700)
POTASSIUM SERPL-MCNC: 2.5 MMOL/L — CRITICAL LOW (ref 3.5–5.3)
POTASSIUM SERPL-MCNC: 4.4 MMOL/L — SIGNIFICANT CHANGE UP (ref 3.5–5.3)
POTASSIUM SERPL-SCNC: 2.5 MMOL/L — CRITICAL LOW (ref 3.5–5.3)
POTASSIUM SERPL-SCNC: 4.4 MMOL/L — SIGNIFICANT CHANGE UP (ref 3.5–5.3)
RBC # BLD: 3.97 M/UL — SIGNIFICANT CHANGE UP (ref 3.8–5.2)
RBC # FLD: 14.1 % — SIGNIFICANT CHANGE UP (ref 10.3–14.5)
SODIUM SERPL-SCNC: 140 MMOL/L — SIGNIFICANT CHANGE UP (ref 135–145)
SODIUM SERPL-SCNC: 155 MMOL/L — HIGH (ref 135–145)
WBC # BLD: 16.31 K/UL — HIGH (ref 3.8–10.5)
WBC # FLD AUTO: 16.31 K/UL — HIGH (ref 3.8–10.5)

## 2020-10-08 PROCEDURE — 99291 CRITICAL CARE FIRST HOUR: CPT

## 2020-10-08 PROCEDURE — 75894 X-RAYS TRANSCATH THERAPY: CPT | Mod: 26

## 2020-10-08 PROCEDURE — 36224 PLACE CATH CAROTD ART: CPT | Mod: LT

## 2020-10-08 PROCEDURE — 36228 PLACE CATH INTRACRANIAL ART: CPT | Mod: LT

## 2020-10-08 PROCEDURE — 61624 TCAT PERM OCCLS/EMBOLJ CNS: CPT

## 2020-10-08 PROCEDURE — 76377 3D RENDER W/INTRP POSTPROCES: CPT | Mod: 26

## 2020-10-08 PROCEDURE — 75898 FOLLOW-UP ANGIOGRAPHY: CPT | Mod: 26

## 2020-10-08 PROCEDURE — 70450 CT HEAD/BRAIN W/O DYE: CPT | Mod: 26

## 2020-10-08 RX ORDER — MAGNESIUM SULFATE 500 MG/ML
1 VIAL (ML) INJECTION ONCE
Refills: 0 | Status: COMPLETED | OUTPATIENT
Start: 2020-10-08 | End: 2020-10-08

## 2020-10-08 RX ORDER — ONDANSETRON 8 MG/1
4 TABLET, FILM COATED ORAL ONCE
Refills: 0 | Status: DISCONTINUED | OUTPATIENT
Start: 2020-10-08 | End: 2020-10-08

## 2020-10-08 RX ORDER — ATORVASTATIN CALCIUM 80 MG/1
40 TABLET, FILM COATED ORAL AT BEDTIME
Refills: 0 | Status: DISCONTINUED | OUTPATIENT
Start: 2020-10-08 | End: 2020-10-09

## 2020-10-08 RX ORDER — CLOPIDOGREL BISULFATE 75 MG/1
75 TABLET, FILM COATED ORAL
Refills: 0 | Status: DISCONTINUED | OUTPATIENT
Start: 2020-10-08 | End: 2020-10-09

## 2020-10-08 RX ORDER — SODIUM CHLORIDE 9 MG/ML
500 INJECTION INTRAMUSCULAR; INTRAVENOUS; SUBCUTANEOUS ONCE
Refills: 0 | Status: COMPLETED | OUTPATIENT
Start: 2020-10-08 | End: 2020-10-08

## 2020-10-08 RX ORDER — LISINOPRIL 2.5 MG/1
10 TABLET ORAL DAILY
Refills: 0 | Status: DISCONTINUED | OUTPATIENT
Start: 2020-10-08 | End: 2020-10-09

## 2020-10-08 RX ORDER — HYDROMORPHONE HYDROCHLORIDE 2 MG/ML
0.5 INJECTION INTRAMUSCULAR; INTRAVENOUS; SUBCUTANEOUS
Refills: 0 | Status: DISCONTINUED | OUTPATIENT
Start: 2020-10-08 | End: 2020-10-08

## 2020-10-08 RX ORDER — LISINOPRIL 2.5 MG/1
10 TABLET ORAL DAILY
Refills: 0 | Status: DISCONTINUED | OUTPATIENT
Start: 2020-10-08 | End: 2020-10-08

## 2020-10-08 RX ORDER — OXYCODONE HYDROCHLORIDE 5 MG/1
5 TABLET ORAL EVERY 4 HOURS
Refills: 0 | Status: DISCONTINUED | OUTPATIENT
Start: 2020-10-08 | End: 2020-10-09

## 2020-10-08 RX ORDER — ACETAMINOPHEN 500 MG
650 TABLET ORAL EVERY 6 HOURS
Refills: 0 | Status: DISCONTINUED | OUTPATIENT
Start: 2020-10-08 | End: 2020-10-09

## 2020-10-08 RX ORDER — PHENYLEPHRINE HYDROCHLORIDE 10 MG/ML
0.5 INJECTION INTRAVENOUS
Qty: 40 | Refills: 0 | Status: DISCONTINUED | OUTPATIENT
Start: 2020-10-08 | End: 2020-10-08

## 2020-10-08 RX ORDER — METOCLOPRAMIDE HCL 10 MG
10 TABLET ORAL ONCE
Refills: 0 | Status: DISCONTINUED | OUTPATIENT
Start: 2020-10-08 | End: 2020-10-08

## 2020-10-08 RX ORDER — SODIUM CHLORIDE 9 MG/ML
1000 INJECTION INTRAMUSCULAR; INTRAVENOUS; SUBCUTANEOUS
Refills: 0 | Status: DISCONTINUED | OUTPATIENT
Start: 2020-10-08 | End: 2020-10-09

## 2020-10-08 RX ORDER — ASPIRIN/CALCIUM CARB/MAGNESIUM 324 MG
325 TABLET ORAL
Refills: 0 | Status: DISCONTINUED | OUTPATIENT
Start: 2020-10-08 | End: 2020-10-09

## 2020-10-08 RX ORDER — CALCIUM GLUCONATE 100 MG/ML
1 VIAL (ML) INTRAVENOUS ONCE
Refills: 0 | Status: COMPLETED | OUTPATIENT
Start: 2020-10-08 | End: 2020-10-08

## 2020-10-08 RX ADMIN — Medication 100 GRAM(S): at 21:51

## 2020-10-08 RX ADMIN — SODIUM CHLORIDE 1000 MILLILITER(S): 9 INJECTION INTRAMUSCULAR; INTRAVENOUS; SUBCUTANEOUS at 16:00

## 2020-10-08 RX ADMIN — Medication 100 GRAM(S): at 21:04

## 2020-10-08 RX ADMIN — ATORVASTATIN CALCIUM 40 MILLIGRAM(S): 80 TABLET, FILM COATED ORAL at 21:04

## 2020-10-08 NOTE — PROGRESS NOTE ADULT - SUBJECTIVE AND OBJECTIVE BOX
Chief complaint:   Patient is a 75y old  Female who presents with a chief complaint of cerebral angio (05 Oct 2020 15:29)    HPI:  76 y/o female w/ h/o a large SAH r/t ruptured A-comm aneurysm in 3/2019 s/p coiling c/b RUL PE and treated with coumadin until 11/2019. She is asymptomatic and no residual. F/u MRA in 5/2020 with stable neck remnant which is now to be treated with flow diverting stent. She started aspirin and Plavix on 10/2. Will have P2Y12 and platelet response today. Presents for cerebral embolization with stent.  (05 Oct 2020 15:29)        24hr EVENTS:      ROS: [ ]  Unable to assess due to mental status   All other systems negative    -----------------------------------------------------------------------------------------------------------------------------------------------------------------------------------  ICU Vital Signs Last 24 Hrs  T(C): 37.1 (08 Oct 2020 20:00), Max: 37.1 (08 Oct 2020 20:00)  T(F): 98.8 (08 Oct 2020 20:00), Max: 98.8 (08 Oct 2020 20:00)  HR: 115 (08 Oct 2020 20:00) (96 - 117)  BP: 122/67 (08 Oct 2020 17:15) (107/62 - 173/98)  BP(mean): 89 (08 Oct 2020 17:15) (64 - 94)  ABP: 137/80 (08 Oct 2020 20:00) (91/51 - 154/78)  ABP(mean): 101 (08 Oct 2020 20:00) (39 - 105)  RR: 29 (08 Oct 2020 20:00) (12 - 29)  SpO2: 96% (08 Oct 2020 20:00) (93% - 99%)      I&O's Summary    08 Oct 2020 07:01  -  08 Oct 2020 20:27  --------------------------------------------------------  IN: 888.8 mL / OUT: 1105 mL / NET: -216.2 mL        MEDICATIONS  (STANDING):  aspirin 325 milliGRAM(s) Oral <User Schedule>  atorvastatin 40 milliGRAM(s) Oral at bedtime  clopidogrel Tablet 75 milliGRAM(s) Oral <User Schedule>  lisinopril 10 milliGRAM(s) Oral daily  sodium chloride 0.9%. 1000 milliLiter(s) (75 mL/Hr) IV Continuous <Continuous>      RESPIRATORY:        IMAGING:   Recent imaging studies were reviewed.    LAB RESULTS:                          12.2   16.31 )-----------( 226      ( 08 Oct 2020 18:46 )             36.6           10-08    155<H>  |  121<H>  |  14  ----------------------------<  83  2.5<LL>   |  12<L>  |  0.55    Ca    5.5<LL>      08 Oct 2020 18:46    -----------------------------------------------------------------------------------------------------------------------------------------------------------------------------------    PHYSICAL EXAM:  General: Calm, intubated  HEENT: MMM  Neuro:  -Mental status- No acute distress  -CN- PERRL 3mm, EOMI, tongue midline, face symmetric    CV: RRR  Pulm: clear to auscultation  Abd: Soft, nontender, nondistended  Ext: no noted edema in lower ext  Skin: warm, dry       Chief complaint:   Patient is a 75y old  Female who presents with a chief complaint of cerebral angio (05 Oct 2020 15:29)    24hr EVENTS:   POD 0 stent to ACOMM aneurysm for aneurysmal neck remnant      ROS: denies pain  All other systems negative    -----------------------------------------------------------------------------------------------------------------------------------------------------------------------------------  ICU Vital Signs Last 24 Hrs  T(C): 37.1 (08 Oct 2020 20:00), Max: 37.1 (08 Oct 2020 20:00)  T(F): 98.8 (08 Oct 2020 20:00), Max: 98.8 (08 Oct 2020 20:00)  HR: 115 (08 Oct 2020 20:00) (96 - 117)  BP: 122/67 (08 Oct 2020 17:15) (107/62 - 173/98)  BP(mean): 89 (08 Oct 2020 17:15) (64 - 94)  ABP: 137/80 (08 Oct 2020 20:00) (91/51 - 154/78)  ABP(mean): 101 (08 Oct 2020 20:00) (39 - 105)  RR: 29 (08 Oct 2020 20:00) (12 - 29)  SpO2: 96% (08 Oct 2020 20:00) (93% - 99%)      I&O's Summary    08 Oct 2020 07:01  -  08 Oct 2020 20:27  --------------------------------------------------------  IN: 888.8 mL / OUT: 1105 mL / NET: -216.2 mL        MEDICATIONS  (STANDING):  aspirin 325 milliGRAM(s) Oral <User Schedule>  atorvastatin 40 milliGRAM(s) Oral at bedtime  clopidogrel Tablet 75 milliGRAM(s) Oral <User Schedule>  lisinopril 10 milliGRAM(s) Oral daily  sodium chloride 0.9%. 1000 milliLiter(s) (75 mL/Hr) IV Continuous <Continuous>    IMAGING:   Recent imaging studies were reviewed.    LAB RESULTS:                          12.2   16.31 )-----------( 226      ( 08 Oct 2020 18:46 )             36.6           10-08    155<H>  |  121<H>  |  14  ----------------------------<  83  2.5<LL>   |  12<L>  |  0.55    Ca    5.5<LL>      08 Oct 2020 18:46    -----------------------------------------------------------------------------------------------------------------------------------------------------------------------------------    PHYSICAL EXAM:  General: Calm, laying in bed  HEENT: MMM  Neuro:  -Mental status- No acute distress, AOx3, conversational, following commands  -CN- PERRL 3mm, EOMI, tongue midline, face symmetric  -Motor- full strength in all ext  -Sensation- intact to LT   -Coordination- no dysmetria noted    CV: RRR  Pulm: Clear to auscultation  Abd: Soft, nontender, nondistended  Ext: No edema  Skin: warm, dry

## 2020-10-08 NOTE — CHART NOTE - NSCHARTNOTEFT_GEN_A_CORE
Interventional Neuro- Radiology   Procedure Note      Procedure: Selective Cerebral Angiography   Pre- Procedure Diagnosis: ACOMM aneurysm  Post- Procedure Diagnosis:    : Dr. Gloria MD  Fellow: Dr. Deb MD   Physician Assistant: Yolis Hanna PA-C    RN: Aurora/ Dickson Zambrano: Val     Anesthesia: Dr. Dionne MD   (general anesthesia)    I/Os:  Fluids:  Low:  Contrast:  Estimated Blood Loss: <10cc    Preliminary Report:  Under general anesthesia, using a ___Fr short/long sheath to the right groin examination of left vertebral artery/ left internal carotid artery/ left external carotid artery/ right vertebral artery/ right internal carotid artery/ right external carotid artery via selective cerebral angiography demonstrates ________. ( Official note to follow).    Patient tolerated procedure well, vital signs stable, hemodynamically stable, no change in neurological status compared to baseline. Results discussed with neurosurgery/ patient and their family. Groin sheath d/c'ed, manual compression held to hemostasis, no active bleeding, no hematoma, star-close device applied, quick clot and safeguard balloon dressing applied at _____h. Patient transferred to PACU/ NSCU/ IR recovery for further care/ monitoring. Interventional Neuro- Radiology   Procedure Note      Procedure: Selective Cerebral Angiography   Pre- Procedure Diagnosis: ACOMM aneurysm s/p previous coiling   Post- Procedure Diagnosis: s/p stent of ACOMM aneurysm     : Dr. Gloria MD  Fellow: Dr. Deb MD   Physician Assistant: Yolis Hanna PA-C    RN: Aurora/ Dickson   Tech: Val     Anesthesia: Dr. Dionne MD   (general anesthesia)    I/Os:  Fluids: 1L  Low: 500cc   Contrast: 164cc   Estimated Blood Loss: <10cc    Preliminary Report:  Under general anesthesia, using a 5Fr Fabuki sheath to the right groin examination ofleft internal carotid artery via selective cerebral angiography demonstrates ACOMM aneurysm with neck remanent, successful stent placement with ABEBA 2.5mm x 8mm stent, complicated by thrombus in left DIANE, 7.5mg of integrillin given, with improved flow. ( Official note to follow).    Patient tolerated procedure well, vital signs stable, hemodynamically stable, no change in neurological status compared to baseline. Results discussed with neurosurgery/ patient and their family. Groin sheath d/c'ed, manual compression held to hemostasis, no active bleeding, no hematoma, star-close device applied, quick clot and safeguard balloon dressing applied at 14:00h. Patient transferred to PACU for further care/ monitoring.    Yolis Hanna PA-C  [phone] Interventional Neuro- Radiology   Procedure Note      Procedure: Selective Cerebral Angiography   Pre- Procedure Diagnosis: ACOMM aneurysm s/p previous coiling   Post- Procedure Diagnosis: s/p stent to ACOMM aneurysm     : Dr. Gloria MD  Fellow: Dr. Deb MD   Physician Assistant: Yolis Hanna PA-C    RN: Aurora/ Dickson   Tech: Val     Anesthesia: Dr. Dionne MD   (general anesthesia)    I/Os:  Fluids: 1L  Low: 500cc   Contrast: 164cc   Estimated Blood Loss: <10cc    Preliminary Report:  Under general anesthesia, using a 5Fr Fabuki sheath to the right groin examination ofleft internal carotid artery via selective cerebral angiography demonstrates ACOMM aneurysm with neck remanent, successful stent placement with ABEBA 2.5mm x 8mm stent, complicated by thrombus in left DIANE, 7.5mg of integrillin given, with improved flow. ( Official note to follow).    Patient tolerated procedure well, vital signs stable, hemodynamically stable, no change in neurological status compared to baseline. Results discussed with neurosurgery/ patient and their family. Groin sheath d/c'ed, manual compression held to hemostasis, no active bleeding, no hematoma, star-close device applied, quick clot and safeguard balloon dressing applied at 14:00h. Patient transferred to PACU for further care/ monitoring.    Yolis Hanna PA-C  [phone]

## 2020-10-08 NOTE — PROGRESS NOTE ADULT - ASSESSMENT
ASSESSMENT/PLAN: s/p stent to ACOMM aneurysm for aneurysmal neck remnant    NEURO:  neuro q1  DAPT  MRNOVA  Pain: PRN analgesics  Activity: [x] OOB as tolerated [] Bedrest [x] PT [x] OT [] PMNR    PULM:  Incentive spirometry    CV:  SBP goal <160  aru/pru  c/w ACEi    RENAL:  normovolemia, normonatremia  IVF    GI:  Diet: full diet  GI prophylaxis [] not indicated [] PPI [] other:  Bowel regimen [] colace [] senna [] other:    ENDO:   Goal euglycemia (-180)  a1c    HEME/ONC:  VTE prophylaxis: [x] SCDs [x] chemoprophylaxis [] hold chemoprophylaxis due to: [] high risk of DVT/PE on admission due to:    ID: normothermia    MISC:    SOCIAL/FAMILY:  [] awaiting [] updated at bedside [] family meeting    CODE STATUS:  x[] Full Code [] DNR [] DNI [] Palliative/Comfort Care    DISPOSITION:  [x] ICU [] Stroke Unit [] Floor [] EMU [] RCU [] PCU    [] Patient is at high risk of neurologic deterioration/death due to: ich, stent thrombosis    Time seen:  Time spent: ___ [] critical care minutes ASSESSMENT/PLAN: s/p stent to ACOMM aneurysm for aneurysmal neck remnant    NEURO:  neuro q1  DAPT  MRNOVA  Pain: PRN analgesics  Activity: [x] OOB as tolerated [] Bedrest [x] PT [x] OT [] PMNR    PULM:  Incentive spirometry    CV:  SBP goal <160  aru/pru  c/w ACEi    RENAL:  normovolemia, normonatremia  IVF    GI:  Diet: full diet  Bowel regimen    ENDO:   Goal euglycemia (-180)    HEME/ONC:  VTE prophylaxis: [x] SCDs [x] chemoprophylaxis [] hold chemoprophylaxis due to: [x] high risk of DVT/PE on admission due to: history of VTE    ID: normothermia    SOCIAL/FAMILY:  [x] awaiting [] updated at bedside [] family meeting    CODE STATUS:  [x] Full Code [] DNR [] DNI [] Palliative/Comfort Care    DISPOSITION:  [x] ICU [] Stroke Unit [] Floor [] EMU [] RCU [] PCU    [x] Patient is at high risk of neurologic deterioration/death due to: ich, stent thrombosis

## 2020-10-08 NOTE — CHART NOTE - NSCHARTNOTEFT_GEN_A_CORE
Interventional Neuro Radiology  Pre-Procedure Note    This is a 76yo female with h/o a large SAH r/t ruptured A-comm aneurysm in 3/2019 s/p coiling c/b RUL PE and treated with coumadin until 11/2019. She is asymptomatic and no residual. F/u MRA in 5/2020 with stable neck remnant. Patient presents today to neuro IR for selective cerebral angiography and possible embolization with stent across aneurysm.     Neuro Exam: Awake and alert, oriented x3, fluent, normal naming and repetition, follows 3 step commands. Extraocular movements intact, no nystagmus, visual fields full, face symmetric, tongue midline. No drift, 5/5 power x 4 extremities. Normal sensation to LT. Normal finger-to-nose and rapid alternating movements.    PAST MEDICAL & SURGICAL HISTORY:  Obese  [place] (hard of hearing)  DVT, lower extremity  left leg  March 2019, was on Coumadin until 11/2019   Benign hypertension  Clostridium difficile diarrhea  9/2019 resolved  Urinary retention  resolved  Urinary tract infection  resolved  H/O spont subarachnoid intracranial hemorrhage d/t cerebral aneurysm  March 4. 2019 coil placed  Dyslipidemia  S/P shoulder surgery  right  repair of rotator cuff  Status post coil embolization of cerebral aneurysm  March 2019  Status Post Total Knee Replace  left    Social History:   Denies tobacco use    FAMILY HISTORY:  No pertinent family history    Allergies:   No Known Allergies      Current Medications:   · 	lisinopril 10 mg oral tablet: Last Dose Taken:  , 1 tab(s) orally once a day  · 	Vitamin D3 2000 intl units oral tablet: Last Dose Taken:  , 1 tab(s) orally once a day  · 	atorvastatin 40 mg oral tablet: Last Dose Taken:  , 1 tab(s) orally once a day (at bedtime)  · 	Plavix 75 mg oral tablet: Last Dose Taken:  , 1 tab(s) orally once a day  · 	aspirin 325 mg oral tablet: Last Dose Taken:  , 1 tab(s) orally once a day      Labs:                         13.2   8.25  )-----------( 264      ( 05 Oct 2020 16:14 )             41.0       10-05    141  |  109<H>  |  30<H>  ----------------------------<  102<H>  4.0   |  21<L>  |  1.10        Blood Bank: 10-05-20  O  --  Positive      Assessment/Plan:   This is a 76yo female  presents with previously coiled acomm aneurysm. Patient presents to neuro-IR for selective cerebral angiography and possible stent. Procedure/ risks/ benefits/ goals/ alternatives were explained. Risks include but are not limited to stroke/ vessel injury/ hemorrhage/ groin hematoma. All questions answered. Informed content obtained from patient. Consent placed in chart.    Yolis Hanna PA-C  [phone]

## 2020-10-08 NOTE — CHART NOTE - NSCHARTNOTEFT_GEN_A_CORE
CAPRINI SCORE [CLOT] Score on Admission for     AGE RELATED RISK FACTORS                                                       MOBILITY RELATED FACTORS  [ ] Age 41-60 years                                            (1 Point)                  [ ] Bed rest                                                        (1 Point)  [x ] Age: 61-74 years                                           (2 Points)                 [ ] Plaster cast                                                   (2 Points)  [ ] Age= 75 years                                              (3 Points)                 [ ] Bed bound for more than 72 hours                 (2 Points)    DISEASE RELATED RISK FACTORS                                               GENDER SPECIFIC FACTORS  [ ] Edema in the lower extremities                       (1 Point)                  [ ] Pregnancy                                                     (1 Point)  [ ] Varicose veins                                               (1 Point)                  [ ] Post-partum < 6 weeks                                   (1 Point)             [ ] BMI > 25 Kg/m2                                            (1 Point)                  [ ] Hormonal therapy  or oral contraception          (1 Point)                 [ ] Sepsis (in the previous month)                        (1 Point)                  [ ] History of pregnancy complications                 (1 point)  [ ] Pneumonia or serious lung disease                                               [ ] Unexplained or recurrent                     (1 Point)           (in the previous month)                               (1 Point)  [ ] Abnormal pulmonary function test                     (1 Point)                 SURGERY RELATED RISK FACTORS (include planned surgeries)  [ ] Acute myocardial infarction                              (1 Point)                 [ ]  Section                                             (1 Point)  [ ] Congestive heart failure (in the previous month)  (1 Point)               [ ] Minor surgery                                                  (1 Point)   [ ] Inflammatory bowel disease                             (1 Point)                 [ ] Arthroscopic surgery                                        (2 Points)  [ ] Central venous access                                      (2 Points)                [x ] General surgery lasting more than 45 minutes   (2 Points)       [ ] Stroke (in the previous month)                          (5 Points)               [ ] Elective arthroplasty                                         (5 Points)            [ ] Current or past malignancy                                (2 Points)                                                                                                     HEMATOLOGY RELATED FACTORS                                                 TRAUMA RELATED RISK FACTORS  [x ] Prior episodes of VTE                                     (3 Points)                [ ] Fracture of the hip, pelvis, or leg                       (5 Points)  [ ] Positive family history for VTE                         (3 Points)                 [ ] Acute spinal cord injury (in the previous month)  (5 Points)  [ ] Prothrombin 24019 A                                     (3 Points)                 [ ] Paralysis  (less than 1 month)                             (5 Points)  [ ] Factor V Leiden                                             (3 Points)                  [ ] Multiple Trauma within 1 month                        (5 Points)  [ ] Lupus anticoagulants                                     (3 Points)                                                           [ ] Anticardiolipin antibodies                               (3 Points)                                                       [ ] High homocysteine in the blood                      (3 Points)                                             [ ] Other congenital or acquired thrombophilia      (3 Points)                                                [ ] Heparin induced thrombocytopenia                  (3 Points)                                          Total Score [     7     ]    Risk:  Very low 0   Low 1 to 2   Moderate 3 to 4   High =5       VTE Prophylasix Recommednations:  [x ] mechanical pneumatic compression devices                                      [ ] contraindicated: _____________________  [ ] chemo prophylasix                                                                                   [ x] contraindicated _____________________    **** HIGH LIKELIHOOD DVT PRESENT ON ADMISSION  [ x] (please order LE dopplers within 24 hours of admission)

## 2020-10-08 NOTE — PROGRESS NOTE ADULT - SUBJECTIVE AND OBJECTIVE BOX
HISTORY OF PRESENT ILLNESS  76 y/o female w/ h/o a large SAH r/t ruptured A-comm aneurysm in 3/2019 s/p coiling c/b RUL PE and treated with coumadin until 11/2019. She is asymptomatic and no residual. F/u MRA in 5/2020 with stable neck remnant which is now to be treated with flow diverting stent. She started aspirin and Plavix on 10/2. Will have P2Y12 and platelet response today. Presents for cerebral embolization with stent.     Seen in PACU nonfocal s/p DSA AComm aneursymal ABEBA stent placement    PE:  resting comfortably in bed, communicative   s1, s2  CTAB  soft NT/ND  wwp, R groin safeguard cdi  AAox3, nonfocal    ICU Vital Signs Last 24 Hrs  T(C): 36.4 (08 Oct 2020 14:20), Max: 36.7 (08 Oct 2020 07:25)  T(F): 97.5 (08 Oct 2020 14:20), Max: 98.1 (08 Oct 2020 07:25)  HR: 103 (08 Oct 2020 15:30) (101 - 112)  BP: 111/60 (08 Oct 2020 15:30) (108/57 - 173/98)  BP(mean): 80 (08 Oct 2020 15:30) (74 - 94)  ABP: 123/61 (08 Oct 2020 15:30) (91/51 - 145/72)  ABP(mean): 81 (08 Oct 2020 15:30) (39 - 101)  RR: 12 (08 Oct 2020 15:30) (12 - 20)  SpO2: 93% (08 Oct 2020 15:30) (93% - 99%)            acetaminophen   Tablet .. 650 milliGRAM(s) Oral every 6 hours PRN  aspirin 325 milliGRAM(s) Oral <User Schedule>  atorvastatin 40 milliGRAM(s) Oral at bedtime  clopidogrel Tablet 75 milliGRAM(s) Oral <User Schedule>  lisinopril 10 milliGRAM(s) Oral daily  metoclopramide Injectable 10 milliGRAM(s) IV Push once PRN  ondansetron Injectable 4 milliGRAM(s) IV Push once PRN  oxyCODONE    IR 5 milliGRAM(s) Oral every 4 hours PRN  phenylephrine    Infusion 0.5 MICROgram(s)/kG/Min (16.1 mL/Hr) IV Continuous <Continuous>  sodium chloride 0.9% Bolus 500 milliLiter(s) IV Bolus once  sodium chloride 0.9%. 1000 milliLiter(s) (75 mL/Hr) IV Continuous <Continuous>                 HISTORY OF PRESENT ILLNESS  74 y/o female w/ h/o a large SAH r/t ruptured A-comm aneurysm in 3/2019 s/p coiling c/b RUL PE and treated with coumadin until 11/2019. She is asymptomatic and no residual. F/u MRA in 5/2020 with stable neck remnant which is now to be treated with flow diverting stent. She started aspirin and Plavix on 10/2. She returned on 10/8/20 for repeat DSA and ABEBA stent placement.     24 HOUR EVENTS:  Seen in PACU nonfocal s/p DSA AComm aneursymal ABEBA stent placement.    VITALS/DATA/ORDERS: [x] Reviewed    PE:  resting comfortably in bed, communicative   s1, s2  CTAB  soft NT/ND  wwp, R groin safeguard cdi  AAox3, PERRL, EOMI, face symmetric, no drift, full strength except only tested RLE proximally given groin puncture    ICU Vital Signs Last 24 Hrs  T(C): 36.4 (08 Oct 2020 14:20), Max: 36.7 (08 Oct 2020 07:25)  T(F): 97.5 (08 Oct 2020 14:20), Max: 98.1 (08 Oct 2020 07:25)  HR: 103 (08 Oct 2020 15:30) (101 - 112)  BP: 111/60 (08 Oct 2020 15:30) (108/57 - 173/98)  BP(mean): 80 (08 Oct 2020 15:30) (74 - 94)  ABP: 123/61 (08 Oct 2020 15:30) (91/51 - 145/72)  ABP(mean): 81 (08 Oct 2020 15:30) (39 - 101)  RR: 12 (08 Oct 2020 15:30) (12 - 20)  SpO2: 93% (08 Oct 2020 15:30) (93% - 99%)            acetaminophen   Tablet .. 650 milliGRAM(s) Oral every 6 hours PRN  aspirin 325 milliGRAM(s) Oral <User Schedule>  atorvastatin 40 milliGRAM(s) Oral at bedtime  clopidogrel Tablet 75 milliGRAM(s) Oral <User Schedule>  lisinopril 10 milliGRAM(s) Oral daily  metoclopramide Injectable 10 milliGRAM(s) IV Push once PRN  ondansetron Injectable 4 milliGRAM(s) IV Push once PRN  oxyCODONE    IR 5 milliGRAM(s) Oral every 4 hours PRN  phenylephrine    Infusion 0.5 MICROgram(s)/kG/Min (16.1 mL/Hr) IV Continuous <Continuous>  sodium chloride 0.9% Bolus 500 milliLiter(s) IV Bolus once  sodium chloride 0.9%. 1000 milliLiter(s) (75 mL/Hr) IV Continuous <Continuous>

## 2020-10-08 NOTE — PROGRESS NOTE ADULT - ASSESSMENT
75yoF s/p stent to ACOMM aneurysm for aneurysmal neck remnant  POD 0    neurochecks q1hr   DAPT  MR NOVA  Pain: PRN analgesics  SBP goal <160  aru/pru  c/w ACEi 75yoF s/p stent to ACOMM aneurysm for aneurysmal neck remnant  POD 0    neurochecks q1hr   DAPT  MR NOVA  Pain: PRN analgesics  SBP goal <160  ARU/PRU  cont lisinopril  advance diet as tolerated  IVF for hypernatremia  DC youssef

## 2020-10-09 ENCOUNTER — TRANSCRIPTION ENCOUNTER (OUTPATIENT)
Age: 75
End: 2020-10-09

## 2020-10-09 VITALS — HEART RATE: 116 BPM | SYSTOLIC BLOOD PRESSURE: 169 MMHG | OXYGEN SATURATION: 99 % | DIASTOLIC BLOOD PRESSURE: 98 MMHG

## 2020-10-09 LAB
PA ADP PRP-ACNC: 61 PRU — LOW (ref 194–417)
PLATELET RESPONSE ASPIRIN RESULT: 444 ARU — SIGNIFICANT CHANGE UP (ref 350–700)

## 2020-10-09 PROCEDURE — 70545 MR ANGIOGRAPHY HEAD W/DYE: CPT | Mod: 26,59

## 2020-10-09 PROCEDURE — 85027 COMPLETE CBC AUTOMATED: CPT

## 2020-10-09 PROCEDURE — 86901 BLOOD TYPING SEROLOGIC RH(D): CPT

## 2020-10-09 PROCEDURE — 36228 PLACE CATH INTRACRANIAL ART: CPT

## 2020-10-09 PROCEDURE — 93970 EXTREMITY STUDY: CPT

## 2020-10-09 PROCEDURE — 75894 X-RAYS TRANSCATH THERAPY: CPT

## 2020-10-09 PROCEDURE — 99239 HOSP IP/OBS DSCHRG MGMT >30: CPT

## 2020-10-09 PROCEDURE — 70553 MRI BRAIN STEM W/O & W/DYE: CPT

## 2020-10-09 PROCEDURE — 70553 MRI BRAIN STEM W/O & W/DYE: CPT | Mod: 26

## 2020-10-09 PROCEDURE — C1887: CPT

## 2020-10-09 PROCEDURE — C1876: CPT

## 2020-10-09 PROCEDURE — U0003: CPT

## 2020-10-09 PROCEDURE — 83735 ASSAY OF MAGNESIUM: CPT

## 2020-10-09 PROCEDURE — 97161 PT EVAL LOW COMPLEX 20 MIN: CPT

## 2020-10-09 PROCEDURE — 76380 CAT SCAN FOLLOW-UP STUDY: CPT

## 2020-10-09 PROCEDURE — 93970 EXTREMITY STUDY: CPT | Mod: 26

## 2020-10-09 PROCEDURE — 85576 BLOOD PLATELET AGGREGATION: CPT

## 2020-10-09 PROCEDURE — 36224 PLACE CATH CAROTD ART: CPT

## 2020-10-09 PROCEDURE — C1769: CPT

## 2020-10-09 PROCEDURE — 80048 BASIC METABOLIC PNL TOTAL CA: CPT

## 2020-10-09 PROCEDURE — 70545 MR ANGIOGRAPHY HEAD W/DYE: CPT

## 2020-10-09 PROCEDURE — G0463: CPT

## 2020-10-09 PROCEDURE — 61624 TCAT PERM OCCLS/EMBOLJ CNS: CPT

## 2020-10-09 PROCEDURE — 76377 3D RENDER W/INTRP POSTPROCES: CPT

## 2020-10-09 PROCEDURE — C1894: CPT

## 2020-10-09 PROCEDURE — 99233 SBSQ HOSP IP/OBS HIGH 50: CPT

## 2020-10-09 PROCEDURE — 85025 COMPLETE CBC W/AUTO DIFF WBC: CPT

## 2020-10-09 PROCEDURE — 86850 RBC ANTIBODY SCREEN: CPT

## 2020-10-09 PROCEDURE — 75898 FOLLOW-UP ANGIOGRAPHY: CPT

## 2020-10-09 PROCEDURE — 84100 ASSAY OF PHOSPHORUS: CPT

## 2020-10-09 PROCEDURE — 86900 BLOOD TYPING SEROLOGIC ABO: CPT

## 2020-10-09 RX ORDER — ACETAMINOPHEN 500 MG
2 TABLET ORAL
Qty: 0 | Refills: 0 | DISCHARGE
Start: 2020-10-09

## 2020-10-09 RX ORDER — CHLORHEXIDINE GLUCONATE 213 G/1000ML
1 SOLUTION TOPICAL
Refills: 0 | Status: DISCONTINUED | OUTPATIENT
Start: 2020-10-09 | End: 2020-10-09

## 2020-10-09 RX ORDER — ALPRAZOLAM 0.25 MG
0.25 TABLET ORAL ONCE
Refills: 0 | Status: DISCONTINUED | OUTPATIENT
Start: 2020-10-09 | End: 2020-10-09

## 2020-10-09 RX ADMIN — LISINOPRIL 10 MILLIGRAM(S): 2.5 TABLET ORAL at 05:28

## 2020-10-09 RX ADMIN — Medication 0.25 MILLIGRAM(S): at 11:10

## 2020-10-09 RX ADMIN — CLOPIDOGREL BISULFATE 75 MILLIGRAM(S): 75 TABLET, FILM COATED ORAL at 05:28

## 2020-10-09 RX ADMIN — Medication 325 MILLIGRAM(S): at 05:28

## 2020-10-09 NOTE — DISCHARGE NOTE PROVIDER - HOSPITAL COURSE
76 y/o female with history of prior SAH,  S/P Coiling of A-comm aneurysm in 3/2019.  Course complicated by RUL PE which was treated with coumadin until 11/2019.  Follow up MRA in 5/2020 showed stable neck remnant. She was started on aspirin and Plavix on 10/2. She returned on 10/8/20 for Elective cerebral angiogram for ABEBA stent placement. 74 y/o female with history of prior SAH,  S/P Coiling of A-comm aneurysm in 3/2019.  Course complicated by RUL PE which was treated with coumadin until 11/2019.  Follow up MRA in 5/2020 showed stable neck remnant. She was started on aspirin and Plavix on 10/2. She returned on 10/8/20 for Elective cerebral angiogram for ABEBA stent placement.  MRI Nova was done on 10/9 and reveiwed by Dr. Castro.  Patient was seen and cleared by physical therapy for discharge.  Will discharge patient home today.  Instructed to make follow up appointment with Dr. Castro. 74 y/o female with history of prior SAH,  S/P Coiling of A-comm aneurysm in 3/2019.  Course complicated by RUL PE which was treated with coumadin until 11/2019.  Follow up MRA in 5/2020 showed stable neck remnant. She was started on aspirin and Plavix on 10/2. She returned on 10/8/20 for Elective cerebral angiogram for ABEBA stent placement.  MRI [facility] was done on 10/9 and reveiwed by Dr. Castro.  Patient was seen and cleared by physical therapy for discharge.  Will discharge patient home today.  Instructed to make follow up appointment with Dr. Castro.    More than 30 minutes were spent educating the patient and family regarding condition, medications, follow up plans, signs and symptoms to be concerned with, preparing paperwork, and questions answered regarding discharge.

## 2020-10-09 NOTE — PHYSICAL THERAPY INITIAL EVALUATION ADULT - GENERAL OBSERVATIONS, REHAB EVAL
pt flagged for DC today. pt vic 40 min eval well. pt rec'd in chair, ICU monitoring, spouse at bedside, NAD. pt agreed to session. pt s/p cerebral embolization with stent placement.

## 2020-10-09 NOTE — PHYSICAL THERAPY INITIAL EVALUATION ADULT - DISCHARGE DISPOSITION, PT EVAL
no skilled PT needs. no device needs. pt cleared for DC from PT standpoint. pt and spouse aware and in agreement. VEENA Ricks aware. note left with BRENDA Delmi.

## 2020-10-09 NOTE — PHYSICAL THERAPY INITIAL EVALUATION ADULT - PERTINENT HX OF CURRENT PROBLEM, REHAB EVAL
74 y/o F with h/o a large SAH due to ruptured A-comm aneurysm s/p coiling c/b RUL PE and treated with coumadin. f/u MRA in 5/2020 with stable neck remnant. pt now presents s/p stent to ACOMM aneurysm. DUPLEX (-) DVTs.

## 2020-10-09 NOTE — PROGRESS NOTE ADULT - SUBJECTIVE AND OBJECTIVE BOX
Chief complaint:   Patient is a 75y old  Female who presents with a chief complaint of cerebral angio (05 Oct 2020 15:29)    24hr EVENTS:   POD 1 stent to ACOMM aneurysm for aneurysmal neck remnant  no acute over nigth events     ROS: denies pain  All other systems negative      ICU Vital Signs Last 24 Hrs  T(C): 36.9 (09 Oct 2020 08:00), Max: 37.1 (08 Oct 2020 20:00)  T(F): 98.4 (09 Oct 2020 08:00), Max: 98.8 (08 Oct 2020 20:00)  HR: 89 (09 Oct 2020 09:00) (72 - 117)  BP: 122/67 (08 Oct 2020 17:15) (107/62 - 173/98)  BP(mean): 89 (08 Oct 2020 17:15) (64 - 94)  ABP: 167/90 (09 Oct 2020 09:00) (91/51 - 167/90)  ABP(mean): 124 (09 Oct 2020 09:00) (39 - 124)  RR: 21 (09 Oct 2020 09:00) (12 - 29)  SpO2: 98% (09 Oct 2020 09:00) (93% - 99%)      10-08-20 @ 07:01  -  10-09-20 @ 07:00  --------------------------------------------------------  IN: 1863.8 mL / OUT: 1805 mL / NET: 58.8 mL    10-09-20 @ 07:01  -  10-09-20 @ 10:23  --------------------------------------------------------  IN: 150 mL / OUT: 0 mL / NET: 150 mL    acetaminophen   Tablet .. 650 milliGRAM(s) Oral every 6 hours PRN  ALPRAZolam 0.25 milliGRAM(s) Oral once  aspirin 325 milliGRAM(s) Oral <User Schedule>  atorvastatin 40 milliGRAM(s) Oral at bedtime  chlorhexidine 4% Liquid 1 Application(s) Topical <User Schedule>  clopidogrel Tablet 75 milliGRAM(s) Oral <User Schedule>  lisinopril 10 milliGRAM(s) Oral daily  oxyCODONE    IR 5 milliGRAM(s) Oral every 4 hours PRN      LABS:  Na: 140 (10-08 @ 19:53), 155 (10-08 @ 18:46)  K: 4.4 (10-08 @ 19:53), 2.5 (10-08 @ 18:46)  Cl: 110 (10-08 @ 19:53), 121 (10-08 @ 18:46)  CO2: 16 (10-08 @ 19:53), 12 (10-08 @ 18:46)  BUN: 18 (10-08 @ 19:53), 14 (10-08 @ 18:46)  Cr: 0.81 (10-08 @ 19:53), 0.55 (10-08 @ 18:46)  Glu: 114(10-08 @ 19:53), 83(10-08 @ 18:46)    Hgb: 12.2 (10-08 @ 18:46)  Hct: 36.6 (10-08 @ 18:46)  WBC: 16.31 (10-08 @ 18:46)  Plt: 226 (10-08 @ 18:46)      PHYSICAL EXAM:  General: Calm, laying in bed  HEENT: MMM  Neuro:  -Mental status- No acute distress, AOx3, conversational, following commands  -CN- PERRL 3mm, EOMI, tongue midline, face symmetric  -Motor- full strength in all ext  -Sensation- intact to LT   -Coordination- no dysmetria noted    CV: RRR  Pulm: Clear to auscultation  Abd: Soft, nontender, nondistended  Ext: No edema  Skin: warm, dry       Chief complaint:   Patient is a 75y old  Female who presents with a chief complaint of cerebral angio (05 Oct 2020 15:29)    24hr EVENTS:   POD 1 stent to ACOMM aneurysm for aneurysmal neck remnant    ROS: denies pain  All other systems negative      ICU Vital Signs Last 24 Hrs  T(C): 36.9 (09 Oct 2020 08:00), Max: 37.1 (08 Oct 2020 20:00)  T(F): 98.4 (09 Oct 2020 08:00), Max: 98.8 (08 Oct 2020 20:00)  HR: 89 (09 Oct 2020 09:00) (72 - 117)  BP: 122/67 (08 Oct 2020 17:15) (107/62 - 173/98)  BP(mean): 89 (08 Oct 2020 17:15) (64 - 94)  ABP: 167/90 (09 Oct 2020 09:00) (91/51 - 167/90)  ABP(mean): 124 (09 Oct 2020 09:00) (39 - 124)  RR: 21 (09 Oct 2020 09:00) (12 - 29)  SpO2: 98% (09 Oct 2020 09:00) (93% - 99%)      10-08-20 @ 07:01  -  10-09-20 @ 07:00  --------------------------------------------------------  IN: 1863.8 mL / OUT: 1805 mL / NET: 58.8 mL    10-09-20 @ 07:01  -  10-09-20 @ 10:23  --------------------------------------------------------  IN: 150 mL / OUT: 0 mL / NET: 150 mL    acetaminophen   Tablet .. 650 milliGRAM(s) Oral every 6 hours PRN  ALPRAZolam 0.25 milliGRAM(s) Oral once  aspirin 325 milliGRAM(s) Oral <User Schedule>  atorvastatin 40 milliGRAM(s) Oral at bedtime  chlorhexidine 4% Liquid 1 Application(s) Topical <User Schedule>  clopidogrel Tablet 75 milliGRAM(s) Oral <User Schedule>  lisinopril 10 milliGRAM(s) Oral daily  oxyCODONE    IR 5 milliGRAM(s) Oral every 4 hours PRN      LABS:  Na: 140 (10-08 @ 19:53), 155 (10-08 @ 18:46)  K: 4.4 (10-08 @ 19:53), 2.5 (10-08 @ 18:46)  Cl: 110 (10-08 @ 19:53), 121 (10-08 @ 18:46)  CO2: 16 (10-08 @ 19:53), 12 (10-08 @ 18:46)  BUN: 18 (10-08 @ 19:53), 14 (10-08 @ 18:46)  Cr: 0.81 (10-08 @ 19:53), 0.55 (10-08 @ 18:46)  Glu: 114(10-08 @ 19:53), 83(10-08 @ 18:46)    Hgb: 12.2 (10-08 @ 18:46)  Hct: 36.6 (10-08 @ 18:46)  WBC: 16.31 (10-08 @ 18:46)  Plt: 226 (10-08 @ 18:46)      PHYSICAL EXAM:  General: Calm, laying in bed  HEENT: MMM  Neuro:  -Mental status- No acute distress, AOx3, conversational, following commands  -CN- PERRL 3mm, EOMI, tongue midline, face symmetric  -Motor- full strength in all ext  -Sensation- intact to LT   -Coordination- no dysmetria noted    CV: RRR  Pulm: Clear to auscultation  Abd: Soft, nontender, nondistended  Ext: No edema, no groin hematoma  Skin: warm, dry

## 2020-10-09 NOTE — DISCHARGE NOTE PROVIDER - NSDCCPCAREPLAN_GEN_ALL_CORE_FT
PRINCIPAL DISCHARGE DIAGNOSIS  Diagnosis: Cerebral aneurysm  Assessment and Plan of Treatment: Please follow up with Dr. Castro in office      SECONDARY DISCHARGE DIAGNOSES  Diagnosis: H/O spont subarachnoid intracranial hemorrhage d/t cerebral aneurysm  Assessment and Plan of Treatment: March 4. 2019 coil placed

## 2020-10-09 NOTE — DISCHARGE NOTE PROVIDER - NSDCMRMEDTOKEN_GEN_ALL_CORE_FT
aspirin 325 mg oral tablet: 1 tab(s) orally once a day  atorvastatin 40 mg oral tablet: 1 tab(s) orally once a day (at bedtime)  lisinopril 10 mg oral tablet: 1 tab(s) orally once a day  Plavix 75 mg oral tablet: 1 tab(s) orally once a day  Vitamin D3 2000 intl units oral tablet: 1 tab(s) orally once a day   acetaminophen 325 mg oral tablet: 2 tab(s) orally every 6 hours, As needed, Temp greater or equal to 38C (100.4F), Mild Pain (1 - 3)  aspirin 325 mg oral tablet: 1 tab(s) orally once a day  atorvastatin 40 mg oral tablet: 1 tab(s) orally once a day (at bedtime)  lisinopril 10 mg oral tablet: 1 tab(s) orally once a day  Plavix 75 mg oral tablet: 1 tab(s) orally once a day  Vitamin D3 2000 intl units oral tablet: 1 tab(s) orally once a day

## 2020-10-09 NOTE — DISCHARGE NOTE PROVIDER - NSDCFUADDINST_GEN_ALL_CORE_FT
Please make an appointment for follow up with your primary care physician after discharge.    Return to ER immediately for any of the following: fever, bleeding, new onset numbness/tingling/weakness, nausea and/or vomiting, chest pain, shortness of breath, confusion, seizure, altered mental status, urinary and/or fecal incontinence or retention.    You had a stent placed for a cerebral aneurysm for which you are on aspirin and plavix, do not stop these medications for any reason without notifying Dr. [phone]    Ok to shower but please do not allow water to hit incision site directly. Gently pat dry after shower.    NO heavy lifting, strenuous activity, twisting, bending, driving, or working until cleared by your physician.

## 2020-10-09 NOTE — DISCHARGE NOTE PROVIDER - NSDCACTIVITY_GEN_ALL_CORE
Do not drive or operate machinery/Showering allowed/Walking - Indoors allowed/No heavy lifting/straining/Walking - Outdoors allowed/Do not make important decisions/Stairs allowed

## 2020-10-09 NOTE — PROGRESS NOTE ADULT - ASSESSMENT
75yoF s/p stent to ACOMM aneurysm for aneurysmal neck remnant  POD 1    Neuro:   neurochecks q4hr   DAPT  MR NOVA   f/u ARU/PRU   Pain: PRN analgesics    CV:  SBP goal 100 - 160  cont lisinopril    Pulm:   RA    GI : regular diet     Renal: IVL    Endo: euglycemia     Heme: stable H&H  SCDs , start chemo ppx if not discharged today     Dispo: home after MRI NOVA     Full code 75yoF s/p stent to ACOMM aneurysm for aneurysmal neck remnant  POD 1    Neuro:   neurochecks q4hr   DAPT  MR NOVA   f/u ARU/PRU   Pain: PRN analgesics    CV:  SBP goal 100 - 160mmHg  cont lisinopril    Pulm:   RA    GI : regular diet     Renal: IVL    Endo: euglycemia     Heme: stable H&H  SCDs , start chemo ppx if not discharged today     Dispo: home after MRI NOVA     Full code

## 2020-10-09 NOTE — DISCHARGE NOTE NURSING/CASE MANAGEMENT/SOCIAL WORK - PATIENT PORTAL LINK FT
You can access the FollowMyHealth Patient Portal offered by [facility] by registering at the following website: [email or web address]. By joining [facility]’s FollowMyHealth portal, you will also be able to view your health information using other applications (apps) compatible with our system.

## 2020-10-20 ENCOUNTER — APPOINTMENT (OUTPATIENT)
Dept: NEUROLOGY | Facility: CLINIC | Age: 75
End: 2020-10-20
Payer: MEDICARE

## 2020-10-20 VITALS
HEART RATE: 97 BPM | RESPIRATION RATE: 17 BRPM | BODY MASS INDEX: 30.82 KG/M2 | DIASTOLIC BLOOD PRESSURE: 91 MMHG | OXYGEN SATURATION: 69 % | TEMPERATURE: 98.3 F | WEIGHT: 185 LBS | SYSTOLIC BLOOD PRESSURE: 172 MMHG | HEIGHT: 65 IN

## 2020-10-20 PROCEDURE — 99215 OFFICE O/P EST HI 40 MIN: CPT

## 2020-10-20 NOTE — HISTORY OF PRESENT ILLNESS
[FreeTextEntry1] : Mrs. Tran is a 75 year old woman who presented to [facility] ED with syncope and 3 day history of neck pain on 3/4/2019. Patient was transferred to [facility] for further work up. CT showed a large SAH and CTA revealed an A-comm aneurysm. She then underwent a successful coiling of the A-comm aneurysm. Her hospital course was notable for C. diff diarrhea and treated with course of Flagyl and Vancomycin. On 3/18/2019 patient developed dyspnea. CTA chest revealed RUL PE. She was started on heparin gtt 3/18/19 and bridged to Coumadin with goal INR of 2.5-3.5. Her follow up angiogram in September 2019 showed persistent complete aneurysm occlusion with stable small neck remnant. However, significant coil mass configuration change with coil migration likely into thrombus was noted. 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 She underwent selective cerebral angiography and aneurysm retreatment embolization (ABEBA 21 stent) on 10/08/20. MRI/MRA post procedure: stent coiled left anterior communicating artery aneurysm with stent in the left A2 segment. Good intracranial flow using noninvasive flow MR angiography without significant change since 10/2/2020. No acute infarcts. She comes in today for a follow up visit, reports doing well. Minimal groin ecchymosis initially has resolved.\par \par

## 2020-10-20 NOTE — DISCUSSION/SUMMARY
[FreeTextEntry1] : Mrs. 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 The procedure went well and she is doing very well with no new complaints. Plan for follow up MRA Head w/ NOVA and contrast February 2021 and cerebral angiogram in July 2021. She will continue the ASA and Plavix and if angiogram looks good in July we will stop the Plavix. I will see her again in February after MRA. All of her questions and concerns were addressed.

## 2020-10-20 NOTE — REVIEW OF SYSTEMS
[Fever] : no fever [Chills] : no chills [Feeling Poorly] : not feeling poorly [Feeling Tired] : not feeling tired [Confused or Disoriented] : no confusion [Memory Lapses or Loss] : no memory loss [Decr. Concentrating Ability] : no decrease in concentrating ability [Difficulty with Language] : no ~M difficulty with language [Changed Thought Patterns] : no change in thought patterns [Repeating Questions] : no repeated questioning about recent events [Facial Weakness] : no facial weakness [Arm Weakness] : no arm weakness [Leg Weakness] : no leg weakness [Hand Weakness] : no hand weakness [Poor Coordination] : good coordination [Difficulty Writing] : no difficulty writing [Difficulties in Speech] : no speech difficulties [Numbness] : no numbness [Tingling] : no tingling [Seizures] : no convulsions [Dizziness] : no dizziness [Fainting] : no fainting [Lightheadedness] : no lightheadedness [Vertigo] : no vertigo [Tension Headache] : no tension-type headache [Difficulty Walking] : no difficulty walking [Inability to Walk] : able to walk [Anxiety] : no anxiety [Loss Of Hearing] : no hearing loss [Depression] : no depression [Chest Pain] : no chest pain [Palpitations] : no palpitations [Wheezing] : no wheezing [Cough] : no cough [Vomiting] : no vomiting [Skin Wound] : no skin wound [Joint Pain] : no joint pain [Easy Bleeding] : no tendency for easy bleeding [Easy Bruising] : no tendency for easy bruising

## 2020-12-15 NOTE — PATIENT PROFILE ADULT - BRADEN ACTIVITY
Birth Registry interview complete via phone call. Birth Certification Confirmation sheet to be signed by RN and paperwork to be collected by RN any time prior to discharge.  
(1) bedfast

## 2020-12-23 PROBLEM — N39.0 BACTERIAL UTI: Status: RESOLVED | Noted: 2019-05-13 | Resolved: 2020-12-23

## 2021-01-29 NOTE — PROGRESS NOTE ADULT - ASSESSMENT
Addended by: Rhea Aly on: 1/29/2021 02:53 PM     Modules accepted: Orders PLAN    TTE - P  [place] - P  TCDs in am

## 2021-02-22 ENCOUNTER — OUTPATIENT (OUTPATIENT)
Dept: OUTPATIENT SERVICES | Facility: HOSPITAL | Age: 76
LOS: 1 days | End: 2021-02-22
Payer: MEDICARE

## 2021-02-22 ENCOUNTER — APPOINTMENT (OUTPATIENT)
Dept: UROLOGY | Facility: CLINIC | Age: 76
End: 2021-02-22
Payer: MEDICARE

## 2021-02-22 DIAGNOSIS — R35.0 FREQUENCY OF MICTURITION: ICD-10-CM

## 2021-02-22 DIAGNOSIS — Z98.890 OTHER SPECIFIED POSTPROCEDURAL STATES: Chronic | ICD-10-CM

## 2021-02-22 PROCEDURE — 76775 US EXAM ABDO BACK WALL LIM: CPT | Mod: 26

## 2021-02-22 PROCEDURE — 76775 US EXAM ABDO BACK WALL LIM: CPT

## 2021-02-22 PROCEDURE — 99214 OFFICE O/P EST MOD 30 MIN: CPT | Mod: 25

## 2021-02-22 NOTE — ADDENDUM
[FreeTextEntry1] : Entered by Levi Salamanca, acting as scribe for Dr. Johnny Burrows.\par \par The documentation recorded by the scribe accurately reflects the service I personally performed and the decisions made by me.\par

## 2021-02-23 ENCOUNTER — OUTPATIENT (OUTPATIENT)
Dept: OUTPATIENT SERVICES | Facility: HOSPITAL | Age: 76
LOS: 1 days | End: 2021-02-23
Payer: MEDICARE

## 2021-02-23 ENCOUNTER — APPOINTMENT (OUTPATIENT)
Dept: NEUROLOGY | Facility: CLINIC | Age: 76
End: 2021-02-23
Payer: MEDICARE

## 2021-02-23 ENCOUNTER — RESULT REVIEW (OUTPATIENT)
Age: 76
End: 2021-02-23

## 2021-02-23 ENCOUNTER — APPOINTMENT (OUTPATIENT)
Dept: MRI IMAGING | Facility: HOSPITAL | Age: 76
End: 2021-02-23

## 2021-02-23 VITALS
OXYGEN SATURATION: 17 % | HEIGHT: 65 IN | HEART RATE: 84 BPM | RESPIRATION RATE: 18 BRPM | DIASTOLIC BLOOD PRESSURE: 97 MMHG | TEMPERATURE: 98.8 F | SYSTOLIC BLOOD PRESSURE: 174 MMHG | BODY MASS INDEX: 30.82 KG/M2 | WEIGHT: 185 LBS

## 2021-02-23 DIAGNOSIS — Z98.890 OTHER SPECIFIED POSTPROCEDURAL STATES: Chronic | ICD-10-CM

## 2021-02-23 DIAGNOSIS — N20.0 CALCULUS OF KIDNEY: ICD-10-CM

## 2021-02-23 DIAGNOSIS — I72.9 ANEURYSM OF UNSPECIFIED SITE: ICD-10-CM

## 2021-02-23 DIAGNOSIS — I67.1 CEREBRAL ANEURYSM, NONRUPTURED: ICD-10-CM

## 2021-02-23 DIAGNOSIS — N28.89 OTHER SPECIFIED DISORDERS OF KIDNEY AND URETER: ICD-10-CM

## 2021-02-23 DIAGNOSIS — R93.422 ABNORMAL RADIOLOGIC FINDINGS ON DIAGNOSTIC IMAGING OF LEFT KIDNEY: ICD-10-CM

## 2021-02-23 PROCEDURE — 70544 MR ANGIOGRAPHY HEAD W/O DYE: CPT | Mod: 26,ME

## 2021-02-23 PROCEDURE — 70544 MR ANGIOGRAPHY HEAD W/O DYE: CPT

## 2021-02-23 PROCEDURE — G1004: CPT

## 2021-02-23 PROCEDURE — 99214 OFFICE O/P EST MOD 30 MIN: CPT

## 2021-02-23 NOTE — HISTORY OF PRESENT ILLNESS
[FreeTextEntry1] : Mrs. Tran is a 76 year old woman who presented to [facility] ED with syncope and 3 day history of neck pain on 3/4/2019. Patient was transferred to [facility] for further work up. CT showed a large SAH and CTA revealed an A-comm aneurysm. She then underwent a successful coiling of the A-comm aneurysm. Her hospital course was notable for C. diff diarrhea and treated with course of Flagyl and Vancomycin. On 3/18/2019 patient developed dyspnea. CTA chest revealed RUL PE. She was started on heparin gtt 3/18/19 and bridged to Coumadin with goal INR of 2.5-3.5. Her follow up angiogram in September 2019 showed persistent complete aneurysm occlusion with stable small neck remnant. However, significant coil mass configuration change with coil migration likely into thrombus was noted. 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 She underwent selective cerebral angiography and aneurysm retreatment embolization (ABEBA 21 stent) on 10/08/20. MRI/MRA post procedure: stent coiled left anterior communicating artery aneurysm with stent in the left A2 segment. Good intracranial flow using noninvasive flow MR angiography without significant change since 10/2/2020. No acute infarcts. She comes in today for a follow up visit, reports doing well. MRA HEAD with NOVA reviewed today 02/23/21 by me: stable and no visible aneurysm remnant. She denies any interval TIA/Stroke symptoms.

## 2021-02-23 NOTE — PHYSICAL EXAM
[General Appearance - Alert] : alert [General Appearance - Well Nourished] : well nourished [General Appearance - Well Developed] : well developed [Oriented To Time, Place, And Person] : oriented to person, place, and time [Affect] : the affect was normal [Mood] : the mood was normal [Person] : oriented to person [Place] : oriented to place [Time] : oriented to time [Short Term Intact] : short term memory intact [Concentration Intact] : normal concentrating ability [Visual Intact] : visual attention was ~T not ~L decreased [Naming Objects] : no difficulty naming common objects [Repeating Phrases] : no difficulty repeating a phrase [Writing A Sentence] : no difficulty writing a sentence [Fluency] : fluency intact [Comprehension] : comprehension intact [Reading] : reading intact [Past History] : adequate knowledge of personal past history [Cranial Nerves Optic (II)] : visual acuity intact bilaterally,  visual fields full to confrontation, pupils equal round and reactive to light [Cranial Nerves Oculomotor (III)] : extraocular motion intact [Cranial Nerves Trigeminal (V)] : facial sensation intact symmetrically [Cranial Nerves Facial (VII)] : face symmetrical [Cranial Nerves Vestibulocochlear (VIII)] : hearing was intact bilaterally [Cranial Nerves Glossopharyngeal (IX)] : tongue and palate midline [Cranial Nerves Accessory (XI - Cranial And Spinal)] : head turning and shoulder shrug symmetric [Cranial Nerves Hypoglossal (XII)] : there was no tongue deviation with protrusion [Motor Tone] : muscle tone was normal in all four extremities [Motor Strength] : muscle strength was normal in all four extremities [No Muscle Atrophy] : normal bulk in all four extremities [Motor Handedness Right-Handed] : the patient is right hand dominant [Sensation Tactile Decrease] : light touch was intact [Balance] : balance was intact [Extraocular Movements] : extraocular movements were intact [Full Visual Field] : full visual field [Hearing Threshold Finger Rub Not [place]] : hearing was normal [Neck Appearance] : the appearance of the neck was normal [] : no respiratory distress [Heart Rate And Rhythm] : heart rate was normal and rhythm regular [Edema] : there was no peripheral edema [Abdomen Soft] : soft [Abnormal Walk] : normal gait [Involuntary Movements] : no involuntary movements were seen [Skin Color & Pigmentation] : normal skin color and pigmentation [Skin Turgor] : normal skin turgor [Paresis Pronator Drift Right-Sided] : no pronator drift on the right [Paresis Pronator Drift Left-Sided] : no pronator drift on the left [Motor Strength Upper Extremities Bilaterally] : strength was normal in both upper extremities [Motor Strength Lower Extremities Bilaterally] : strength was normal in both lower extremities [Dysdiadochokinesia Bilaterally] : not present [Coordination - Dysmetria Impaired Finger-to-Nose Bilateral] : not present

## 2021-02-23 NOTE — DISCUSSION/SUMMARY
[FreeTextEntry1] : Mrs. 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 The procedure went well and she is doing very well with no new complaints.  MRA Head w/ NOVA done today was stable, showed no neck remnant and no focally diminished blood flow in the ACAs. Plan for repeat MRA Head with NOVA in May 2021 and repeat cerebral angiogram in July 2021. If the NOVA in May is stable, we will stop Plavix at that point. All of their questions and concerns were addressed.

## 2021-02-23 NOTE — REVIEW OF SYSTEMS
[Fever] : no fever [Chills] : no chills [Feeling Poorly] : not feeling poorly [Feeling Tired] : not feeling tired [Confused or Disoriented] : no confusion [Memory Lapses or Loss] : no memory loss [Decr. Concentrating Ability] : no decrease in concentrating ability [Difficulty with Language] : no ~M difficulty with language [Changed Thought Patterns] : no change in thought patterns [Repeating Questions] : no repeated questioning about recent events [Facial Weakness] : no facial weakness [Arm Weakness] : no arm weakness [Hand Weakness] : no hand weakness [Leg Weakness] : no leg weakness [Poor Coordination] : good coordination [Difficulty Writing] : no difficulty writing [Difficulties in Speech] : no speech difficulties [Numbness] : no numbness [Tingling] : no tingling [Seizures] : no convulsions [Dizziness] : no dizziness [Fainting] : no fainting [Lightheadedness] : no lightheadedness [Vertigo] : no vertigo [Tension Headache] : no tension-type headache [Difficulty Walking] : no difficulty walking [Inability to Walk] : able to walk [Ataxia] : no ataxia [Anxiety] : no anxiety [Depression] : no depression [Eyesight Problems] : no eyesight problems [Loss Of Hearing] : no hearing loss [Chest Pain] : no chest pain [Palpitations] : no palpitations [Shortness Of Breath] : no shortness of breath [Wheezing] : no wheezing [Cough] : no cough [Constipation] : no constipation [Diarrhea] : no diarrhea [Incontinence] : no incontinence [Skin Lesions] : no skin lesions [Skin Wound] : no skin wound [Easy Bleeding] : no tendency for easy bleeding [Easy Bruising] : no tendency for easy bruising

## 2021-03-02 ENCOUNTER — RESULT REVIEW (OUTPATIENT)
Age: 76
End: 2021-03-02

## 2021-03-02 ENCOUNTER — APPOINTMENT (OUTPATIENT)
Dept: CT IMAGING | Facility: IMAGING CENTER | Age: 76
End: 2021-03-02
Payer: MEDICARE

## 2021-03-02 ENCOUNTER — OUTPATIENT (OUTPATIENT)
Dept: OUTPATIENT SERVICES | Facility: HOSPITAL | Age: 76
LOS: 1 days | End: 2021-03-02
Payer: MEDICARE

## 2021-03-02 DIAGNOSIS — Z98.890 OTHER SPECIFIED POSTPROCEDURAL STATES: Chronic | ICD-10-CM

## 2021-03-02 DIAGNOSIS — R93.422 ABNORMAL RADIOLOGIC FINDINGS ON DIAGNOSTIC IMAGING OF LEFT KIDNEY: ICD-10-CM

## 2021-03-02 PROCEDURE — 74170 CT ABD WO CNTRST FLWD CNTRST: CPT

## 2021-03-02 PROCEDURE — 82565 ASSAY OF CREATININE: CPT

## 2021-03-02 PROCEDURE — G1004: CPT

## 2021-03-02 PROCEDURE — 74170 CT ABD WO CNTRST FLWD CNTRST: CPT | Mod: 26,ME

## 2021-06-09 ENCOUNTER — APPOINTMENT (OUTPATIENT)
Dept: MRI IMAGING | Facility: HOSPITAL | Age: 76
End: 2021-06-09

## 2021-06-09 ENCOUNTER — OUTPATIENT (OUTPATIENT)
Dept: OUTPATIENT SERVICES | Facility: HOSPITAL | Age: 76
LOS: 1 days | End: 2021-06-09
Payer: MEDICARE

## 2021-06-09 DIAGNOSIS — Z98.890 OTHER SPECIFIED POSTPROCEDURAL STATES: Chronic | ICD-10-CM

## 2021-06-09 DIAGNOSIS — I60.8 OTHER NONTRAUMATIC SUBARACHNOID HEMORRHAGE: ICD-10-CM

## 2021-06-09 DIAGNOSIS — I67.1 CEREBRAL ANEURYSM, NONRUPTURED: ICD-10-CM

## 2021-06-09 PROCEDURE — 70544 MR ANGIOGRAPHY HEAD W/O DYE: CPT | Mod: 26,ME

## 2021-06-09 PROCEDURE — G1004: CPT

## 2021-06-09 PROCEDURE — 70544 MR ANGIOGRAPHY HEAD W/O DYE: CPT

## 2021-06-15 ENCOUNTER — APPOINTMENT (OUTPATIENT)
Dept: NEUROLOGY | Facility: CLINIC | Age: 76
End: 2021-06-15
Payer: MEDICARE

## 2021-06-15 VITALS
SYSTOLIC BLOOD PRESSURE: 190 MMHG | HEIGHT: 65 IN | WEIGHT: 196 LBS | HEART RATE: 108 BPM | DIASTOLIC BLOOD PRESSURE: 110 MMHG | BODY MASS INDEX: 32.65 KG/M2

## 2021-06-15 DIAGNOSIS — I26.99 OTHER PULMONARY EMBOLISM W/OUT ACUTE COR PULMONALE: ICD-10-CM

## 2021-06-15 PROCEDURE — 99215 OFFICE O/P EST HI 40 MIN: CPT

## 2021-06-15 NOTE — HISTORY OF PRESENT ILLNESS
[FreeTextEntry1] : Mrs. Tran is a 76 year old woman who presented to [facility] ED with syncope and 3 day history of neck pain on 3/4/2019. Patient was transferred to [facility] for further work up. CT showed a large SAH and CTA revealed an A-comm aneurysm. She then underwent a successful coiling of the A-comm aneurysm. Her hospital course was notable for C. diff diarrhea and treated with course of Flagyl and Vancomycin. On 3/18/2019 patient developed dyspnea. CTA chest revealed RUL PE. She was started on heparin gtt 3/18/19 and bridged to Coumadin. Her follow up angiogram in September 2019 showed persistent complete aneurysm occlusion with stable small neck remnant. However, significant coil mass configuration change with coil migration likely into thrombus was noted. 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 She underwent selective cerebral angiography and aneurysm retreatment embolization (ABEBA 21 stent) on 10/08/20. MRI/MRA post procedure: stent coiled left anterior communicating artery aneurysm with stent in the left A2 segment. Good intracranial flow using noninvasive flow MR angiography without significant change since 10/2/2020. No acute infarcts. MRA HEAD with NOVA reviewed today 02/23/21 by me: stable and no visible aneurysm remnant. She had a repeat MRA HEAD with NOVA 06/09/21: Coiled anterior communicating artery aneurysm with left A2 segment without significant change in intracranial flow compared with 2/23/2021. No acute infarcts. All neuroimaging reviewed personally by me. She comes in today for a follow up visit, reports doing well.  She denies any interval TIA/Stroke symptoms.

## 2021-06-15 NOTE — REVIEW OF SYSTEMS
[Fever] : no fever [Feeling Poorly] : not feeling poorly [Chills] : no chills [Feeling Tired] : not feeling tired [Confused or Disoriented] : no confusion [Memory Lapses or Loss] : no memory loss [Decr. Concentrating Ability] : no decrease in concentrating ability [Difficulty with Language] : no ~M difficulty with language [Changed Thought Patterns] : no change in thought patterns [Repeating Questions] : no repeated questioning about recent events [Facial Weakness] : no facial weakness [Arm Weakness] : no arm weakness [Hand Weakness] : no hand weakness [Leg Weakness] : no leg weakness [Poor Coordination] : good coordination [Difficulty Writing] : no difficulty writing [Difficulties in Speech] : no speech difficulties [Numbness] : no numbness [Tingling] : no tingling [Seizures] : no convulsions [Dizziness] : no dizziness [Fainting] : no fainting [Lightheadedness] : no lightheadedness [Vertigo] : no vertigo [Tension Headache] : no tension-type headache [Difficulty Walking] : no difficulty walking [Inability to Walk] : able to walk [Ataxia] : no ataxia [Anxiety] : no anxiety [Eyesight Problems] : no eyesight problems [Depression] : no depression [Chest Pain] : no chest pain [Nosebleeds] : no nosebleeds [Palpitations] : no palpitations

## 2021-06-15 NOTE — PHYSICAL EXAM
[General Appearance - Alert] : alert [General Appearance - Well Nourished] : well nourished [General Appearance - Well Developed] : well developed [Oriented To Time, Place, And Person] : oriented to person, place, and time [Affect] : the affect was normal [Mood] : the mood was normal [Person] : oriented to person [Place] : oriented to place [Time] : oriented to time [Concentration Intact] : normal concentrating ability [Visual Intact] : visual attention was ~T not ~L decreased [Naming Objects] : no difficulty naming common objects [Repeating Phrases] : no difficulty repeating a phrase [Writing A Sentence] : no difficulty writing a sentence [Fluency] : fluency intact [Comprehension] : comprehension intact [Reading] : reading intact [Past History] : adequate knowledge of personal past history [Cranial Nerves Optic (II)] : visual acuity intact bilaterally,  visual fields full to confrontation, pupils equal round and reactive to light [Cranial Nerves Oculomotor (III)] : extraocular motion intact [Cranial Nerves Trigeminal (V)] : facial sensation intact symmetrically [Cranial Nerves Facial (VII)] : face symmetrical [Cranial Nerves Vestibulocochlear (VIII)] : hearing was intact bilaterally [Cranial Nerves Glossopharyngeal (IX)] : tongue and palate midline [Cranial Nerves Accessory (XI - Cranial And Spinal)] : head turning and shoulder shrug symmetric [Cranial Nerves Hypoglossal (XII)] : there was no tongue deviation with protrusion [Motor Tone] : muscle tone was normal in all four extremities [Motor Strength] : muscle strength was normal in all four extremities [No Muscle Atrophy] : normal bulk in all four extremities [Motor Handedness Right-Handed] : the patient is right hand dominant [Sensation Tactile Decrease] : light touch was intact [Abnormal Walk] : normal gait [Balance] : balance was intact [Full Visual Field] : full visual field [Hearing Threshold Finger Rub Not [place]] : hearing was normal [] : no respiratory distress [Heart Rate And Rhythm] : heart rate was normal and rhythm regular [Edema] : there was no peripheral edema [Skin Color & Pigmentation] : normal skin color and pigmentation [Abdomen Soft] : soft [Paresis Pronator Drift Right-Sided] : no pronator drift on the right [Paresis Pronator Drift Left-Sided] : no pronator drift on the left [Motor Strength Lower Extremities Bilaterally] : strength was normal in both lower extremities [Motor Strength Upper Extremities Bilaterally] : strength was normal in both upper extremities

## 2021-06-15 NOTE — DISCUSSION/SUMMARY
[FreeTextEntry1] : Mrs. 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 The procedure went well and she is doing very well with no new complaints. MRA Head with NOVA 06/21 was stable. Plan for repeat cerebral angiogram in July 2021.  If stable we will stop the Plavix and continue ASA. All of their questions and concerns were addressed.

## 2021-07-01 ENCOUNTER — OUTPATIENT (OUTPATIENT)
Dept: OUTPATIENT SERVICES | Facility: HOSPITAL | Age: 76
LOS: 1 days | End: 2021-07-01
Payer: MEDICARE

## 2021-07-01 VITALS
RESPIRATION RATE: 16 BRPM | TEMPERATURE: 98 F | OXYGEN SATURATION: 98 % | SYSTOLIC BLOOD PRESSURE: 133 MMHG | DIASTOLIC BLOOD PRESSURE: 86 MMHG | HEIGHT: 65 IN | HEART RATE: 78 BPM

## 2021-07-01 DIAGNOSIS — Z98.890 OTHER SPECIFIED POSTPROCEDURAL STATES: Chronic | ICD-10-CM

## 2021-07-01 DIAGNOSIS — I72.9 ANEURYSM OF UNSPECIFIED SITE: ICD-10-CM

## 2021-07-01 DIAGNOSIS — Z01.818 ENCOUNTER FOR OTHER PREPROCEDURAL EXAMINATION: ICD-10-CM

## 2021-07-01 DIAGNOSIS — H11.419: ICD-10-CM

## 2021-07-01 LAB
ANION GAP SERPL CALC-SCNC: 14 MMOL/L — SIGNIFICANT CHANGE UP (ref 5–17)
APTT BLD: 28.8 SEC — SIGNIFICANT CHANGE UP (ref 27.5–35.5)
BLD GP AB SCN SERPL QL: NEGATIVE — SIGNIFICANT CHANGE UP
BUN SERPL-MCNC: 28 MG/DL — HIGH (ref 7–23)
CALCIUM SERPL-MCNC: 9.4 MG/DL — SIGNIFICANT CHANGE UP (ref 8.4–10.5)
CHLORIDE SERPL-SCNC: 105 MMOL/L — SIGNIFICANT CHANGE UP (ref 96–108)
CO2 SERPL-SCNC: 20 MMOL/L — LOW (ref 22–31)
CREAT SERPL-MCNC: 1.05 MG/DL — SIGNIFICANT CHANGE UP (ref 0.5–1.3)
GLUCOSE SERPL-MCNC: 95 MG/DL — SIGNIFICANT CHANGE UP (ref 70–99)
HCT VFR BLD CALC: 38.9 % — SIGNIFICANT CHANGE UP (ref 34.5–45)
HGB BLD-MCNC: 12.8 G/DL — SIGNIFICANT CHANGE UP (ref 11.5–15.5)
INR BLD: 1.01 RATIO — SIGNIFICANT CHANGE UP (ref 0.88–1.16)
MCHC RBC-ENTMCNC: 29.6 PG — SIGNIFICANT CHANGE UP (ref 27–34)
MCHC RBC-ENTMCNC: 32.9 GM/DL — SIGNIFICANT CHANGE UP (ref 32–36)
MCV RBC AUTO: 90 FL — SIGNIFICANT CHANGE UP (ref 80–100)
NRBC # BLD: 0 /100 WBCS — SIGNIFICANT CHANGE UP (ref 0–0)
PA ADP PRP-ACNC: 19 PRU — LOW (ref 194–417)
PLATELET # BLD AUTO: 276 K/UL — SIGNIFICANT CHANGE UP (ref 150–400)
PLATELET RESPONSE ASPIRIN RESULT: 377 ARU — SIGNIFICANT CHANGE UP (ref 350–700)
POTASSIUM SERPL-MCNC: 4.1 MMOL/L — SIGNIFICANT CHANGE UP (ref 3.5–5.3)
POTASSIUM SERPL-SCNC: 4.1 MMOL/L — SIGNIFICANT CHANGE UP (ref 3.5–5.3)
PROTHROM AB SERPL-ACNC: 12.1 SEC — SIGNIFICANT CHANGE UP (ref 10.6–13.6)
RBC # BLD: 4.32 M/UL — SIGNIFICANT CHANGE UP (ref 3.8–5.2)
RBC # FLD: 14.1 % — SIGNIFICANT CHANGE UP (ref 10.3–14.5)
RH IG SCN BLD-IMP: POSITIVE — SIGNIFICANT CHANGE UP
SODIUM SERPL-SCNC: 139 MMOL/L — SIGNIFICANT CHANGE UP (ref 135–145)
WBC # BLD: 8.12 K/UL — SIGNIFICANT CHANGE UP (ref 3.8–10.5)
WBC # FLD AUTO: 8.12 K/UL — SIGNIFICANT CHANGE UP (ref 3.8–10.5)

## 2021-07-01 PROCEDURE — 85610 PROTHROMBIN TIME: CPT

## 2021-07-01 PROCEDURE — 85730 THROMBOPLASTIN TIME PARTIAL: CPT

## 2021-07-01 PROCEDURE — 86850 RBC ANTIBODY SCREEN: CPT

## 2021-07-01 PROCEDURE — 86900 BLOOD TYPING SEROLOGIC ABO: CPT

## 2021-07-01 PROCEDURE — 85576 BLOOD PLATELET AGGREGATION: CPT

## 2021-07-01 PROCEDURE — G0463: CPT

## 2021-07-01 PROCEDURE — 86901 BLOOD TYPING SEROLOGIC RH(D): CPT

## 2021-07-01 PROCEDURE — 85027 COMPLETE CBC AUTOMATED: CPT

## 2021-07-01 PROCEDURE — 80048 BASIC METABOLIC PNL TOTAL CA: CPT

## 2021-07-01 NOTE — H&P PST ADULT - OTHER CARE PROVIDERS
Dr Rylan Hernandez ( cardiologist) 2021, Dr Rylan Hernandez ( cardiologist) last seen 2021, Dr Rylan Hernandez ( cardiologist) last seen 2021, [phone]

## 2021-07-01 NOTE — H&P PST ADULT - PRIMARY CARE PROVIDER
Aquiles Pavon   Aquiles Pavon  , last seen 1/01/2021 Dr Aquiles Pavon  [phone], last seen 1/01/2021

## 2021-07-01 NOTE — H&P PST ADULT - NSICDXPASTMEDICALHX_GEN_ALL_CORE_FT
PAST MEDICAL HISTORY:  Benign hypertension     Clostridium difficile diarrhea 9/2019 resolved    DVT, lower extremity left leg  March 2019, was on Coumadin until 11/2019       Dyslipidemia     H/O spont subarachnoid intracranial hemorrhage d/t cerebral aneurysm March 4. 2019 coil placed    [place] (hard of hearing)     Obese     Urinary retention resolved    Urinary tract infection resolved

## 2021-07-01 NOTE — H&P PST ADULT - HISTORY OF PRESENT ILLNESS
74 y/o female w/ h/o a large SAH r/t ruptured A-comm aneurysm in 3/2019 s/p coiling c/b RUL PE and treated with coumadin until 11/2019. She is asymptomatic and no residual. F/u MRA in 5/2020 with stable neck remnant which is now to be treated with flow diverting stent. She started aspirin and Plavix on 10/2. Will have P2Y12 and platelet response today. Presents for cerebral embolization with stent.  77 y/o female w/ h/o HTN, left Knee Replacement, R Shoulder Surgery, Cerebral endovascular embolization -- a large SAH r/t ruptured A-comm aneurysm in 3/2019  s/p coiling ,treated  with flow diverting stent x1 in the right side of brain.  c/b RUL PE and treated with coumadin until 11/2019.  Started  aspirin and Plavix on 10/2 . F/u MRA HEAD with NOVA 02/23/21 revealed  stable and no visible aneurysm remnant. Repeat MRA HEAD with NOVA 06/09/21: Coiled anterior communicating artery aneurysm with left A2 segment without significant change in intracranial flow compared with 2/23/2021.  . Is currently  asymptomatic , no residual aneurysm , no TIA/ Stroke symptoms reported by pt.     Scheduled for a follow up  cerebral angiogram on 07/08/2021    Denies Recent travel, Exposure or Covid symptoms  Covid vaccine proof copy in chart

## 2021-07-01 NOTE — H&P PST ADULT - HEALTH CARE MAINTENANCE
+ flu vaccine for current season + flu vaccine for current season  Covid vaccine current Covid vaccine current

## 2021-07-01 NOTE — H&P PST ADULT - NSICDXPROBLEM_GEN_ALL_CORE_FT
PROBLEM DIAGNOSES  Problem: Aneurysm of unspecified site  Assessment and Plan: Labs       PROBLEM DIAGNOSES  Problem: Aneurysm of unspecified site  Assessment and Plan: Scheduled for a follow up  cerebral angiogram on 07/08/2021  Labs  Pre op check list discussed and teach back performed.

## 2021-07-01 NOTE — H&P PST ADULT - ACTIVITY
able to walk up a hill, will occasionally feel SOB with strenuous activity which is not new moderate chores around the house, able to walk up a hill, will occasionally feel SOB with strenuous activity

## 2021-07-01 NOTE — H&P PST ADULT - NS MD HP INPLANTS MED DEV
Artificial joint/Vascular stents/Clips right side brain vascular stent x1/Artificial joint/Vascular stents/Clips [id number]-- stent x1, Brain coil/Artificial joint/Vascular stents/Clips

## 2021-07-08 ENCOUNTER — OUTPATIENT (OUTPATIENT)
Dept: OUTPATIENT SERVICES | Facility: HOSPITAL | Age: 76
LOS: 1 days | End: 2021-07-08
Payer: MEDICARE

## 2021-07-08 ENCOUNTER — RESULT REVIEW (OUTPATIENT)
Age: 76
End: 2021-07-08

## 2021-07-08 ENCOUNTER — APPOINTMENT (OUTPATIENT)
Dept: NEUROLOGY | Facility: CLINIC | Age: 76
End: 2021-07-08

## 2021-07-08 VITALS
SYSTOLIC BLOOD PRESSURE: 110 MMHG | HEART RATE: 88 BPM | RESPIRATION RATE: 16 BRPM | OXYGEN SATURATION: 99 % | DIASTOLIC BLOOD PRESSURE: 71 MMHG

## 2021-07-08 VITALS
TEMPERATURE: 98 F | WEIGHT: 190.04 LBS | DIASTOLIC BLOOD PRESSURE: 88 MMHG | HEIGHT: 65 IN | SYSTOLIC BLOOD PRESSURE: 154 MMHG | RESPIRATION RATE: 18 BRPM | HEART RATE: 85 BPM

## 2021-07-08 DIAGNOSIS — Z98.890 OTHER SPECIFIED POSTPROCEDURAL STATES: Chronic | ICD-10-CM

## 2021-07-08 DIAGNOSIS — I72.9 ANEURYSM OF UNSPECIFIED SITE: ICD-10-CM

## 2021-07-08 PROCEDURE — 76377 3D RENDER W/INTRP POSTPROCES: CPT | Mod: 26

## 2021-07-08 PROCEDURE — C1887: CPT

## 2021-07-08 PROCEDURE — C1894: CPT

## 2021-07-08 PROCEDURE — 76377 3D RENDER W/INTRP POSTPROCES: CPT

## 2021-07-08 PROCEDURE — 36224 PLACE CATH CAROTD ART: CPT

## 2021-07-08 PROCEDURE — C1769: CPT

## 2021-07-08 PROCEDURE — 36224 PLACE CATH CAROTD ART: CPT | Mod: LT

## 2021-07-08 RX ORDER — CLOPIDOGREL BISULFATE 75 MG/1
1 TABLET, FILM COATED ORAL
Qty: 0 | Refills: 0 | DISCHARGE

## 2021-07-08 RX ORDER — SODIUM CHLORIDE 9 MG/ML
1000 INJECTION INTRAMUSCULAR; INTRAVENOUS; SUBCUTANEOUS
Refills: 0 | Status: DISCONTINUED | OUTPATIENT
Start: 2021-07-08 | End: 2021-07-22

## 2021-07-08 RX ORDER — BACILLUS COAGULANS/INULIN 1B-250 MG
1 CAPSULE ORAL
Qty: 0 | Refills: 0 | DISCHARGE

## 2021-07-08 RX ORDER — ZINC SULFATE TAB 220 MG (50 MG ZINC EQUIVALENT) 220 (50 ZN) MG
1 TAB ORAL
Qty: 0 | Refills: 0 | DISCHARGE

## 2021-07-08 RX ORDER — ONDANSETRON 8 MG/1
4 TABLET, FILM COATED ORAL ONCE
Refills: 0 | Status: DISCONTINUED | OUTPATIENT
Start: 2021-07-08 | End: 2021-07-22

## 2021-07-08 RX ORDER — ATORVASTATIN CALCIUM 80 MG/1
1 TABLET, FILM COATED ORAL
Qty: 0 | Refills: 0 | DISCHARGE

## 2021-07-08 RX ORDER — ASPIRIN/CALCIUM CARB/MAGNESIUM 324 MG
1 TABLET ORAL
Qty: 0 | Refills: 0 | DISCHARGE

## 2021-07-08 RX ORDER — OMEGA-3 ACID ETHYL ESTERS 1 G
0 CAPSULE ORAL
Qty: 0 | Refills: 0 | DISCHARGE

## 2021-07-08 RX ORDER — CHOLECALCIFEROL (VITAMIN D3) 125 MCG
1 CAPSULE ORAL
Qty: 0 | Refills: 0 | DISCHARGE

## 2021-07-08 RX ORDER — ASCORBIC ACID 60 MG
1 TABLET,CHEWABLE ORAL
Qty: 0 | Refills: 0 | DISCHARGE

## 2021-07-08 RX ORDER — LISINOPRIL 2.5 MG/1
1 TABLET ORAL
Qty: 0 | Refills: 0 | DISCHARGE

## 2021-07-08 NOTE — CHART NOTE - NSCHARTNOTEFT_GEN_A_CORE
Interventional Neuro Radiology  Pre-Procedure Note PA-C    This is a 76 year old right hand dominant female            Allergies: No Known Allergies      PAST MEDICAL & SURGICAL HISTORY:  Dyslipidemia    H/O spont subarachnoid intracranial hemorrhage d/t cerebral aneurysm  March 4. 2019 coil placed    Urinary tract infection  resolved    Urinary retention  resolved    Clostridium difficile diarrhea  9/2019 resolved    Benign hypertension    DVT, lower extremity  left leg  March 2019, was on Coumadin until 11/2019       [place] (hard of hearing)    Obese    Status Post Total Knee Replace  left    Status post coil embolization of cerebral aneurysm  March 2019    S/P shoulder surgery  right  repair of rotator cuff        Social History:     FAMILY HISTORY:      Current Medications:     Labs:                   Blood Bank:       Assessment/Plan:     This is a 76y  year old right  hand dominant Female  presents with   Patient presents to neuro-IR for selective cerebral angiography.   Procedure, goals, risks, benefits and alternatives  were discussed with patient and patient's family.  All questions were answered.  Risks include but are not limited to stroke, vessel injury, hemorrhage, and or right  groin hematoma.  Patient demonstrates understanding  of all risks involved with this procedure and wishes to continue.   Appropriate  content was obtained from patient and consent is in the patient's chart. Interventional Neuro Radiology  Pre-Procedure Note PA-C    This is a 76 year old right hand dominant female with a past medical history significant for a ruptured ACOM artery aneurysm, s/post endovascular treatment of aneurysm, who presents to Neuro IR for a follow up selective cerebral angiography ton monitor aneurysm.         Allergies: No Known Allergies  PMHX; Dyslipidemia, subarachnoid intracranial hemorrhage, ACOM artery aneurysm, Clostridium difficile diarrhea, hypertension, left lower extremity DVT , hard of hearing  PSHX: left Total Knee Replacement, coil embolization of cerebral aneurysm 3-2019, right shoulder surgery   Social History:    FAMILY HISTORY: non-contributory   Current Medications: ASA 325mg, Plavix 75mg     CBC:        12.8  8.12 38.9  276     BMP:  139  105  28   4.1    20  1.05  95      Blood Bank: O positive available       Assessment/Plan:   This is a 76 year old right hand dominant female with a past medical history significant for a ruptured ACOM artery aneurysm, s/post endovascular treatment who presents to Neuro IR for a follow up selective cerebral angiography. Procedure, goals, risks, benefits and alternatives were discussed with patient and patient's . All questions were answered. Risks include but are not limited to stroke, vessel injury, hemorrhage, and or right groin hematoma. Patient demonstrates understanding of all risks involved with this procedure and wishes to continue. Appropriate content was obtained from patient and consent is in the patient's chart.

## 2021-07-08 NOTE — ASU PATIENT PROFILE, ADULT - PSH
S/P shoulder surgery  right  repair of rotator cuff  Status post coil embolization of cerebral aneurysm  March 2019  Status Post Total Knee Replace  left

## 2021-07-08 NOTE — ASU DISCHARGE PLAN (ADULT/PEDIATRIC) - CARE PROVIDER_API CALL
Hector Castro)  Neurology; Vascular Neurology  [address] [place] [address], [address] [place] [address]  Phone: [phone]  Fax: [phone]  Follow Up Time:

## 2021-07-08 NOTE — CHART NOTE - NSCHARTNOTEFT_GEN_A_CORE
Interventional Neuro- Radiology   Procedure Note PA-JAZMÍN    Procedure: Selective Cerebral Angiography   Pre- Procedure Diagnosis:  Post- Procedure Diagnosis:    : Dr Hector Castro  Physician Assistant: Jeimy Taylor PA-C    Nurse:  Radiologic Tech:  Anesthesiologist:  Sheath:      I/Os: EBL less than 10cc  IV fluids:     cc  Urine due to void Contrast Omnipaque 240                  Vitals: BP         HR      Spo2     %          Preliminary Report:  Using a 4 [place] short sheath to the right groin under MAC sedation via left vertebral artery,  left internal carotid artery, left external carotid artery, right vertebral artery, right internal carotid artery, right external carotid artery a selective cerebral angiography was performed and demonstrated                       Official note to follow.  Patient tolerated procedure well, hemodynamically stable, no change in neurological status compared to baseline. Results discussed with patient and patient's . 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, with a ABEAB stent   Post- Procedure Diagnosis: complete occlusion of ACOM artery aneurysm     : Dr Hector Castro  Physician Assistant: Jeimy Taylor PA-C    Nurse:                   Jasmina Toney RN   Radiologic Tech:   Jenna T   Anesthesiologist:  Dr Bob Bravo   Sheath:                 4 [place]ian short sheath       I/Os: EBL less than 10cc  IV fluids: 50cc  Urine due to void Contrast Omnipaque 240  40cc               Vitals: /85         HR 85     Spo2 100%          Preliminary Report:  Using a 4 [place] short sheath to the right groin under MAC sedation via left internal carotid artery a selective cerebral angiography was performed and demonstrated complete occlusion of aneurysm. Official note to follow.  Patient tolerated procedure well, hemodynamically stable, no change in neurological status compared to baseline. Results discussed with patient and patient's . Right groin sheath was removed, manual compression held to hemostasis for 20 minutes, no active bleeding, no hematoma, quick clot and safeguard balloon dressing applied at 9:15am. Disposition recovery room and then discharge home at 1:15pm.

## 2021-07-08 NOTE — ASU PATIENT PROFILE, ADULT - PMH
Benign hypertension    Clostridium difficile diarrhea  9/2019 resolved  DVT, lower extremity  left leg  March 2019, was on Coumadin until 11/2019     Dyslipidemia    H/O spont subarachnoid intracranial hemorrhage d/t cerebral aneurysm  March 4. 2019 coil placed  [place] (hard of hearing)    Obese    Urinary retention  resolved  Urinary tract infection  resolved

## 2021-07-09 DIAGNOSIS — Z09 ENCOUNTER FOR FOLLOW-UP EXAMINATION AFTER COMPLETED TREATMENT FOR CONDITIONS OTHER THAN MALIGNANT NEOPLASM: ICD-10-CM

## 2021-08-23 ENCOUNTER — APPOINTMENT (OUTPATIENT)
Dept: UROLOGY | Facility: CLINIC | Age: 76
End: 2021-08-23

## 2021-09-02 NOTE — PRE-ANESTHESIA EVALUATION ADULT - BP NONINVASIVE DIASTOLIC (MM HG)
88 Modified Advancement Flap Text: The defect edges were debeveled with a #15 scalpel blade.  Given the location of the defect, shape of the defect and the proximity to free margins a modified advancement flap was deemed most appropriate.  Using a sterile surgical marker, an appropriate advancement flap was drawn incorporating the defect and placing the expected incisions within the relaxed skin tension lines where possible.    The area thus outlined was incised deep to adipose tissue with a #15 scalpel blade.  The skin margins were undermined to an appropriate distance in all directions utilizing iris scissors.

## 2021-10-04 ENCOUNTER — APPOINTMENT (OUTPATIENT)
Dept: UROLOGY | Facility: CLINIC | Age: 76
End: 2021-10-04
Payer: MEDICARE

## 2021-10-04 ENCOUNTER — OUTPATIENT (OUTPATIENT)
Dept: OUTPATIENT SERVICES | Facility: HOSPITAL | Age: 76
LOS: 1 days | End: 2021-10-04
Payer: MEDICARE

## 2021-10-04 DIAGNOSIS — Z98.890 OTHER SPECIFIED POSTPROCEDURAL STATES: Chronic | ICD-10-CM

## 2021-10-04 DIAGNOSIS — R35.0 FREQUENCY OF MICTURITION: ICD-10-CM

## 2021-10-04 PROCEDURE — 99213 OFFICE O/P EST LOW 20 MIN: CPT

## 2021-10-04 PROCEDURE — 76775 US EXAM ABDO BACK WALL LIM: CPT

## 2021-10-04 PROCEDURE — 76775 US EXAM ABDO BACK WALL LIM: CPT | Mod: 26

## 2021-10-04 NOTE — ADDENDUM
[FreeTextEntry1] : Entered by Mag Dutta, acting as scribe for Dr. Johnny Burrows.\par \par The documentation recorded by the scribe accurately reflects the service I personally performed and the decisions made by me.

## 2021-10-04 NOTE — LETTER BODY
[FreeTextEntry1] : Aquiles Pavon MD\par [address]\[place] [address]\par [phone]\par \par Dear Dr. Pavon,\par \par Reason for visit: Bilateral renal calculi. Bacterial UTI.\par \par This is a 76 year-old retired female with history of breast cancer and uric acid stones, presenting with bilateral renal calculi and recurrent UTI. She has a history of brain aneurysms and has previously undergone stent placement. Her previous stone analysis in 2017 demonstrated uric acid stones. The patient's last renal ultrasound in February 2021 demonstrated a possible 3 cm left renal mass, not previously visualized, and stable bilateral renal stones. She returns today for follow-up and renal ultrasound. Since she was last seen, the patient underwent an unremarkable CT urogram. She denies any recent infection within the last 6 months. She continues to be on Plavix and aspirin. She is overall well. The patient is entirely asymptomatic. The past medical history, family history and social history are unchanged. All other review of systems are negative. Patient denies any changes in medications. Medication list was reconciled. She is on Plavix and Aspirin.\par \par On examination, the patient is a healthy-appearing woman in no acute distress. She is alert and oriented follows commands. She has normal mood and affect. She is normocephalic. Neck is supple. Respirations are unlabored. Abdomen is soft and nontender. Bladder is nonpalpable. No CVA tenderness. Neurologically she is grossly intact. No peripheral edema. Skin without gross abnormality.\par \par I personally reviewed CT images with the patient today and images demonstrated stable bilateral renal stones with no evidence of any renal mass. \par \par I personally reviewed ultrasound images with the patient today and images demonstrated  bilateral non obstructing stones the largest in the right kidney anterior mid/upper near parenchyma 1.0 x 0.73 cm and left kidney mid /upper 1.1 x 0.92 cm, previously measured 1.7 cm. Again visualized bilateral simple cysts the largest in the right kidney mid 1.1 cm and in the left kidney mid 0.87 cm and bilateral lobulated cortical borders and scarring. Both kidneys are normal in size and echogenicity without hydronephrosis or solid masses visualized\par \par Assessment: Bilateral renal calculi, stable. Bacterial UTI. \par \par I counseled the patient. Her renal ultrasound today demonstrated stable bilateral renal stones. I encouraged patient to maintain a low-protein low-sodium diet. I also encouraged hydration to prevent stone formation. I reassured the patient. She is doing well. Her CT urogram showed stable bilateral renal stones and no evidence of any renal mass. I recommended the patient follow up in 1 year for repeat renal ultrasound to ensure stability. In terms of her recurrent UTI, the patient denies any infection. Patient understands that if she develops gross hematuria or any urinary discomfort, she will contact me for further evaluation. Risks and alternatives were discussed. I answered the patient questions. The patient will follow-up as directed and will contact me with any questions or concerns. Thank you for the opportunity to participate in the care of Ms. DUPREE. I will keep you updated on her progress. \par \par Plan: Follow up in 1 year for repeat renal ultrasound.

## 2021-10-04 NOTE — HISTORY OF PRESENT ILLNESS
[FreeTextEntry1] : Please refer to URO Consult note \par \par FUA kidney stones \par US was stable \par follow up in 1 year

## 2021-10-06 DIAGNOSIS — N20.1 CALCULUS OF URETER: ICD-10-CM

## 2021-10-07 NOTE — PROGRESS NOTE ADULT - PROBLEM SELECTOR PLAN 4
Left message for patient to return phone call.    Form is completed, please review  policy with patient, form is by Dr. Winston's nurses desk.     monitoring ventricle size. pt going for CT head.

## 2021-11-02 ENCOUNTER — RESULT REVIEW (OUTPATIENT)
Age: 76
End: 2021-11-02

## 2022-02-08 NOTE — CONSULT NOTE ADULT - NSPROBSELRECBLANK_5_GEN
Attending Physician Statement    S:   Chief Complaint   Patient presents with    Other     belly button very sore bleeding and green pus drainage    Other     stomach pain around the belly button      33? F was shoveling snow 5 days ago and noticed discharge from umbilicus a few hours after. Denies systemic symptoms of infection. Applying Neosporin without significant improvement. O: Blood pressure 113/65, pulse 78, temperature 97.9 °F (36.6 °C), temperature source Temporal, resp. rate 18, height 5' 8\" (1.727 m), weight 300 lb (136.1 kg), SpO2 96 %, not currently breastfeeding. Exam:   Heart - RRR   Lungs - clear   Skin - Mildly erythematous fissure at 5 o'clock position under umbilicus. No evidence of induration. No active bleeding or purulent discharge. No obvious area of fluctuance. A: Cellulitis  P:  Doxycycline 100 mg BID for 10 days   Follow-up as ordered    I have discussed the case, including pertinent history and exam findings with the resident. I agree with the documented assessment and plan. DISPLAY PLAN FREE TEXT

## 2022-02-21 NOTE — ED ADULT NURSE NOTE - NSSISCREENINGQ5_ED_A_ED
Admission Medication History Completed by Pharmacy    See [facility] Admission Navigator for allergy information, preferred outpatient pharmacy, prior to admission medications and immunization status.     Medication History Sources:     Patient's parent/guardian    Changes made to PTA medication list (reason):    Added: Tylenol & Motrin PRN    Deleted: None    Changed: None    Additional Information:    None    Prior to Admission medications    Medication Sig Last Dose Taking? Auth Provider   acetaminophen (TYLENOL) 32 mg/mL liquid Take 6 mLs (192 mg) by mouth every 6 hours as needed for mild pain or fever  Yes Nba Angela MD   acetaminophen (TYLENOL) 32 mg/mL liquid Take 15 mg/kg by mouth every 6 hours as needed for fever or mild pain 2/21/2022 Yes Unknown, Entered By History   amoxicillin (AMOXIL) 400 MG/5ML suspension Take 7.5 mLs (600 mg) by mouth 2 times daily for 7 days  Yes Nba Angela MD   azithromycin (ZITHROMAX) 100 MG/5ML suspension Take 3 mLs (60 mg) by mouth daily for 4 days  Yes Nba Angela MD   ibuprofen (ADVIL/MOTRIN) 100 MG/5ML suspension Take 10 mg/kg by mouth every 6 hours as needed for fever or moderate pain 2/21/2022 Yes Unknown, Entered By History       Date completed: 02/21/22    Medication history completed by:   Rosie Saucedo, PharmD, BCPS  Clinical Pharmacist             No

## 2022-03-08 ENCOUNTER — APPOINTMENT (OUTPATIENT)
Dept: NEUROLOGY | Facility: CLINIC | Age: 77
End: 2022-03-08
Payer: MEDICARE

## 2022-03-08 ENCOUNTER — RESULT REVIEW (OUTPATIENT)
Age: 77
End: 2022-03-08

## 2022-03-08 VITALS
RESPIRATION RATE: 17 BRPM | HEIGHT: 65 IN | BODY MASS INDEX: 29.82 KG/M2 | SYSTOLIC BLOOD PRESSURE: 181 MMHG | HEART RATE: 100 BPM | WEIGHT: 179 LBS | DIASTOLIC BLOOD PRESSURE: 107 MMHG

## 2022-03-08 PROCEDURE — 99215 OFFICE O/P EST HI 40 MIN: CPT

## 2022-03-08 RX ORDER — ALPRAZOLAM 0.25 MG/1
0.25 TABLET ORAL
Qty: 2 | Refills: 0 | Status: DISCONTINUED | COMMUNITY
Start: 2020-05-15 | End: 2022-03-08

## 2022-03-08 RX ORDER — CLOPIDOGREL BISULFATE 75 MG/1
75 TABLET, FILM COATED ORAL DAILY
Qty: 90 | Refills: 3 | Status: DISCONTINUED | COMMUNITY
Start: 2020-09-15 | End: 2022-03-08

## 2022-03-08 NOTE — PHYSICAL EXAM

## 2022-03-08 NOTE — HISTORY OF PRESENT ILLNESS
[FreeTextEntry1] : Mrs. Tran is a 77 year old woman who presented to [facility] ED with syncope and 3 day history of neck pain on 3/4/2019. Patient was transferred to [facility] for further work up. CT showed a large SAH and CTA revealed an A-comm aneurysm. She then underwent a successful coiling of the A-comm aneurysm. Her hospital course was notable for C. diff diarrhea and treated with course of Flagyl and Vancomycin. On 3/18/2019 patient developed dyspnea. CTA chest revealed RUL PE. She was started on heparin gtt 3/18/19 and bridged to Coumadin. Her follow up angiogram in September 2019 showed persistent complete aneurysm occlusion with stable small neck remnant. However, significant coil mass configuration change with coil migration likely into thrombus was noted. 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 She underwent selective cerebral angiography and aneurysm retreatment embolization (ABEBA 21 stent) on 10/08/20. MRI/MRA post procedure: stent coiled left anterior communicating artery aneurysm with stent in the left A2 segment. Good intracranial flow using noninvasive flow MR angiography without significant change since 10/2/2020. No acute infarcts. MRA HEAD with NOVA reviewed today 02/23/21 by me: stable and no visible aneurysm remnant. She had a repeat MRA HEAD with NOVA 06/09/21: Coiled anterior communicating artery aneurysm with left A2 segment without significant change in intracranial flow compared with 2/23/2021. No acute infarcts. Follow up angiogram on 07/21:  showed Interval complete occlusion of the previously embolized ruptured A-comm aneurysm. She comes in today for a follow up visit, reports doing well. She denies any interval TIA/Stroke symptoms.

## 2022-03-08 NOTE — REVIEW OF SYSTEMS
[Fever] : no fever [Feeling Poorly] : not feeling poorly [Feeling Tired] : not feeling tired [Confused or Disoriented] : no confusion [Memory Lapses or Loss] : no memory loss [Decr. Concentrating Ability] : no decrease in concentrating ability [Difficulty with Language] : no ~M difficulty with language [Changed Thought Patterns] : no change in thought patterns [Repeating Questions] : no repeated questioning about recent events [Facial Weakness] : no facial weakness [Arm Weakness] : no arm weakness [Hand Weakness] : no hand weakness [Leg Weakness] : no leg weakness [Poor Coordination] : good coordination [Difficulty Writing] : no difficulty writing [Difficulties in Speech] : no speech difficulties [Numbness] : no numbness [Tingling] : no tingling [Seizures] : no convulsions [Dizziness] : no dizziness [Fainting] : no fainting [Lightheadedness] : no lightheadedness [Vertigo] : no vertigo [Tension Headache] : no tension-type headache [Difficulty Walking] : no difficulty walking [Inability to Walk] : able to walk [Anxiety] : no anxiety [Depression] : no depression [Eyesight Problems] : no eyesight problems [Loss Of Hearing] : no hearing loss [Chest Pain] : no chest pain [Shortness Of Breath] : no shortness of breath [Vomiting] : no vomiting [Incontinence] : no incontinence [Joint Pain] : no joint pain [Skin Wound] : no skin wound

## 2022-03-08 NOTE — DISCUSSION/SUMMARY
[FreeTextEntry1] : Mrs. Tran s a 77 year old woman now 3 years s/p SAH secondary to a ruptured ACOM aneurysm status post coil embolization to complete occlusion, with subsequent coil compaction/migration into thrombus with neck recanalization, now 1.5 years s/p successful retreatment embolization with a flow diverting stent from the left callosomarginal trunk into the left A1 segment, and now 8 months s/p repeat angiogram which showed Interval complete occlusion of the previously embolized ruptured A-comm aneurysm. We discussed her aneurysm was completely occluded on the last angiogram and there is no need for any other angiograms. She will switch to ASA 81 mg daily. Plan for follow-up MRA HEAD with contrast and NOVA  in 1 year, 03/23. All of their questions and concerns were addressed.

## 2022-03-15 NOTE — OCCUPATIONAL THERAPY INITIAL EVALUATION ADULT - PHYSICAL ASSIST/NONPHYSICAL ASSIST: SCOOT/BRIDGE, REHAB EVAL
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  First kidney failed in .  Pt went on dialysis for about 1 year then received a DDRT 2019 at [facility].  Early biopsies revealed some PTC and imflammation, he was given thymoglobulin and plasma exchange.  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  Pt follows with Dr. Archer.  Has been doing well. 2019 pt has labs, tacro was 4.8 and creatinine was 0.74mg/dl.  Pt may have had Covid when pandemic started.  Pt denies diabetes, has HTN.  Pt is currently on prograf and prednisone.  MMF was held due to BK virus.  BK peaked at almost 50,000 but trended down below 5000 recently.  He is receiving IVIg monthly at [facility].  Lives in [place].  \par \par For his first transplant he received thymoglobulin induction and had been maintained on prograf, cellcept and prednisone. 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  \par \par Lives with parents, single.  No smoking or alcohol abuse.  Currently working in a school as a .   [de-identified] : PT now getting IVIg at [facility].  Sometimes does not feel well after infusion.  Last BK pcr negative.  Works at [employer].  Last A1c was 5.7, creatinine was 1.25.  Tolerating MMF 250mgs BID.   Had Covid early January 2022 - resolved by itself.  verbal cues/1 person assist

## 2022-07-28 ENCOUNTER — APPOINTMENT (OUTPATIENT)
Dept: UROLOGY | Facility: CLINIC | Age: 77
End: 2022-07-28

## 2022-07-28 ENCOUNTER — OUTPATIENT (OUTPATIENT)
Dept: OUTPATIENT SERVICES | Facility: HOSPITAL | Age: 77
LOS: 1 days | End: 2022-07-28
Payer: MEDICARE

## 2022-07-28 DIAGNOSIS — N28.89 OTHER SPECIFIED DISORDERS OF KIDNEY AND URETER: ICD-10-CM

## 2022-07-28 DIAGNOSIS — Z98.890 OTHER SPECIFIED POSTPROCEDURAL STATES: Chronic | ICD-10-CM

## 2022-07-28 DIAGNOSIS — R35.0 FREQUENCY OF MICTURITION: ICD-10-CM

## 2022-07-28 PROCEDURE — 99213 OFFICE O/P EST LOW 20 MIN: CPT

## 2022-07-28 PROCEDURE — 76775 US EXAM ABDO BACK WALL LIM: CPT | Mod: 26

## 2022-07-28 PROCEDURE — 76775 US EXAM ABDO BACK WALL LIM: CPT

## 2022-08-03 NOTE — HISTORY OF PRESENT ILLNESS
[FreeTextEntry1] : Follow-up kidney stone.  Ultrasound shows persistent bilateral renal calculus.  Patient has a history of uric acid stone.  Encourage him patient to speak with neurologist about resuming potassium citrate.  Patient stated previously that her neurologist did not want her to take potassium citrate because of her aneurysm.\par \par Plan: Hydration.  I encouraged patient to maintain a low-protein low-sodium diet. I also encouraged hydration to prevent stone formation.  Follow-up 1 year.

## 2022-08-03 NOTE — LETTER BODY
[FreeTextEntry1] : Aquiles Pavon MD\par [address]\[place] [address]\par [phone]\par \par Dear Dr. Pavon,\par \par Reason for visit: Bilateral renal calculi. Bacterial UTI.\par \par This is a 77 year-old retired female with history of brain aneurysm, breast cancer and uric acid stones, presenting with bilateral renal calculi and recurrent UTI. She has a history of brain aneurysms and has previously undergone stent placement. Her previous stone analysis in 2017 demonstrated uric acid stones. The patient's last renal ultrasound in October 2021 demonstrated persistent bilateral renal stones. She returns today for follow-up and renal ultrasound. Since she was last seen,she denies any recent infection within the last 6 months. She continues to be on aspirin. She is overall well. The patient is entirely asymptomatic. The past medical history, family history and social history are unchanged. All other review of systems are negative. Patient denies any changes in medications. Medication list was reconciled. She is on Plavix and Aspirin.\par \par On examination, the patient is a older appearing woman in no acute distress. She is alert and oriented follows commands. She has normal mood and affect. She is normocephalic. Neck is supple. Respirations are unlabored. Abdomen is soft and nontender. Bladder is nonpalpable. No CVA tenderness. Neurologically she is grossly intact. No peripheral edema. Skin without gross abnormality.\par \par I personally reviewed ultrasound images with the patient today and images again visualized bilateral non obstructing stones near parenchyma the largest in the right kidney lateral lower 1.0 x 0.69 x 1.1 cm and in the left kidney upper 0.78 x 0.89 cm. Again visualized bilateral simple cysts the largest in the right ,kidney mid para pelvic cyst 1.0 x 1.1 x 0.83 cm and in the left exophytic lower 1.6 x 1.6 cm. Both kidneys are normal in size and echogenicity without hydronephrosis or solid masses visualized.\par \par Assessment: Bilateral renal calculi, stable. Bacterial UTI. \par \par I counseled the patient. Her renal ultrasound today demonstrated persistent bilateral renal stones. I encouraged the patient to speak with her neurologist about resuming potassium citrate. The patient stated her neurologist did not want her to take potassium citrate because of her previous aneurysm. I encouraged patient to maintain a low-protein low-sodium diet. I also encouraged hydration to prevent stone formation. I reassured the patient. She is doing well. I recommended the patient follow up in 1 year for repeat renal ultrasound to ensure stability. In terms of her recurrent UTI, the patient denies any infection. Patient understands that if she develops gross hematuria or any urinary discomfort, she will contact me for further evaluation. Risks and alternatives were discussed. I answered the patient questions. The patient will follow-up as directed and will contact me with any questions or concerns. Thank you for the opportunity to participate in the care of Ms. DUPREE. I will keep you updated on her progress. \par \par Plan: Encourage stone diet and hydration. Follow up in 1 year for repeat renal ultrasound.

## 2022-11-16 NOTE — ED ADULT NURSE NOTE - CAS EDP DISCH DISPOSITION ADMI
Emergency Department Trauma Note  Allison Burgos 81 y o  female MRN: [id number]  Unit/Bed#: S /S -01 Encounter: [id number]      Trauma Alert: Trauma Acuity: C  Model of Arrival: Mode of Arrival: ALS via    Trauma Team: Current Providers  Attending Provider: [place] Dennis MD  Attending Provider: Davion Gibbs MD  ED Technician: Mannie Aguilera  Resident: Anders Peterson DO  Registered Nurse: Sobeida Blanco RN  Registered Nurse: Carey Philip  Consultants:     None      History of Present Illness     Chief Complaint:   Chief Complaint   Patient presents with   • Syncope     Was shopping in [employer] when she had syncopal episode  No sign of head injury or complaint but did fall to floor  Is on baby aspirin     HPI:  Allison Burgos is a 81 y o  female who presents with Syncope and fall while grocery shopping  Mechanism:Details of Incident: was at [employer] when she was shopping when all of a sudden she felt hot and sweaty, had syncopal episode  fell to floor is on baby aspirin  no obvious head injury noted Injury Date: 11/16/22        HPI   Pt reports   Review of Systems   Constitutional: Negative for chills and fever  HENT: Negative for ear pain and sore throat  Eyes: Negative for pain and visual disturbance  Respiratory: Negative for cough and shortness of breath  Cardiovascular: Negative for chest pain and palpitations  Gastrointestinal: Negative for abdominal pain and vomiting  Genitourinary: Negative for dysuria and hematuria  Musculoskeletal: Negative for arthralgias and back pain  Skin: Negative for color change and rash  Neurological: Positive for dizziness and light-headedness  Negative for seizures and syncope  All other systems reviewed and are negative        Historical Information     Immunizations:   Immunization History   Administered Date(s) Administered   • COVID-19 PFIZER VACCINE 0 3 ML IM 01/22/2021, 02/10/2021       Past Medical History:   Diagnosis Date   • Coronary artery disease    • Diabetes mellitus ([facility] [address] )    • Hypertension    • Macular degeneration disease    • Spinal stenosis      No family history on file  Past Surgical History:   Procedure Laterality Date   • CARDIAC OTHER     • CARDIAC SURGERY       Social History     Tobacco Use   • Smoking status: Never   • Smokeless tobacco: Never   Vaping Use   • Vaping Use: Never used   Substance Use Topics   • Alcohol use: Never   • Drug use: Never     E-Cigarette/Vaping   • E-Cigarette Use Never User      E-Cigarette/Vaping Substances       Family History: non-contributory    Meds/Allergies   Prior to Admission Medications   Prescriptions Last Dose Informant Patient Reported?  Taking?   aspirin 81 mg chewable tablet 11/15/2022  Yes Yes   Sig: Chew 81 mg daily at bedtime   gabapentin (NEURONTIN) 300 mg capsule 11/15/2022  Yes Yes   Si mg daily at bedtime   glimepiride (AMARYL) 4 mg tablet 2022  Yes Yes   Sig: Take 4 mg by mouth in the morning   lisinopril (ZESTRIL) 5 mg tablet 11/15/2022  Yes Yes   Si mg daily at bedtime   metoprolol succinate (TOPROL-XL) 25 mg 24 hr tablet 11/15/2022  Yes Yes   Si mg daily at bedtime   nitrofurantoin (MACRODANTIN) 50 mg capsule 2022  Yes Yes   Sig: Take 1 capsule by mouth daily   potassium chloride (Klor-Con) 10 mEq tablet 2022  Yes Yes   Sig: 10 mEq in the morning   sitaGLIPtin (JANUVIA) 100 mg tablet 2022  Yes Yes   Sig: Take 100 mg by mouth daily with breakfast   triamterene-hydrochlorothiazide (DYAZIDE) 37 5-25 mg per capsule 2022  Yes Yes   Si capsule every morning      Facility-Administered Medications: None       Allergies   Allergen Reactions   • Iodinated Diagnostic Agents Hives       PHYSICAL EXAM    Objective   Vitals:   First set: Temperature: 97 7 °F (36 5 °C) (22 1116)  Pulse: 59 (22 1116)  Respirations: 20 (22 1116)  Blood Pressure: 136/64 (22 1116)  SpO2: 100 % (11/16/22 1116)    Primary Survey:   (A) Airway: Intact - patient conversational  (B) Breathing: Breath sounds equal bilaterally  (C) Circulation: Pulses:   normal  (D) Disabliity:  GCS Total:  15  (E) Expose:  Completed    Secondary Survey: (Click on Physical Exam tab above)  Physical Exam  Vitals and nursing note reviewed  Constitutional:       General: She is not in acute distress  Appearance: She is well-developed  HENT:      Head: Normocephalic and atraumatic  Eyes:      Conjunctiva/sclera: Conjunctivae normal    Cardiovascular:      Rate and Rhythm: Normal rate and regular rhythm  Heart sounds: No murmur heard  Pulmonary:      Effort: Pulmonary effort is normal  No respiratory distress  Breath sounds: Normal breath sounds  Abdominal:      Palpations: Abdomen is soft  Tenderness: There is abdominal tenderness  Comments: Mild tenderness to palpation of bilateral lower quadrants without guarding or rebound tenderness   Musculoskeletal:         General: Tenderness present  No swelling  Cervical back: Neck supple  Comments: Mild tenderness to cervical spine midline and left paraspinal musculature  Mild tenderness to palpation over middle thoracic spine, sacral area  Skin:     General: Skin is warm and dry  Capillary Refill: Capillary refill takes less than 2 seconds  Findings: No bruising  Neurological:      General: No focal deficit present  Mental Status: She is alert and oriented to person, place, and time  Psychiatric:         Mood and Affect: Mood normal          Cervical spine cleared by clinical criteria? Yes   Patient initially placed in C-collar with tenderness on exam, unknown head strike  Cleared after CT c-spine was unremarkable   Invasive Devices     Peripheral Intravenous Line  Duration           Peripheral IV 11/16/22 Dorsal (posterior); Right Hand <1 day                Lab Results:   Results Reviewed     Procedure Component Value Units Date/Time    HS Troponin I 2hr [[id number]]  (Normal) Collected: 11/16/22 1357    Lab Status: Final result Specimen: Blood from Hand, Right Updated: 11/16/22 1435     hs TnI 2hr 4 ng/L      Delta 2hr hsTnI 0 ng/L     D-dimer, quantitative [[id number]]  (Abnormal) Collected: 11/16/22 1149    Lab Status: Final result Specimen: Blood from Arm, Right Updated: 11/16/22 1416     D-Dimer, Quant 2 92 ug/ml FEU     Narrative: In the evaluation for possible pulmonary embolism, in the appropriate (Well's Score of 4 or less) patient, the age adjusted d-dimer cutoff for this patient can be calculated as:    Age x 0 01 (in ug/mL) for Age-adjusted D-dimer exclusion threshold for a patient over 50 years      Protime-INR [[id number]]  (Normal) Collected: 11/16/22 1149    Lab Status: Final result Specimen: Blood from Arm, Right Updated: 11/16/22 1232     Protime 12 9 seconds      INR 0 95    APTT [[id number]]  (Abnormal) Collected: 11/16/22 1149    Lab Status: Final result Specimen: Blood from Arm, Right Updated: 11/16/22 1232     PTT 21 seconds     HS Troponin 0hr (reflex protocol) [[id number]]  (Normal) Collected: 11/16/22 1149    Lab Status: Final result Specimen: Blood from Arm, Right Updated: 11/16/22 1232     hs TnI 0hr 4 ng/L     HS Troponin I 4hr [[id number]]     Lab Status: No result Specimen: Blood     Comprehensive metabolic panel [[id number]]  (Abnormal) Collected: 11/16/22 1149    Lab Status: Final result Specimen: Blood from Arm, Right Updated: 11/16/22 1227     Sodium 134 mmol/L      Potassium 3 8 mmol/L      Chloride 103 mmol/L      CO2 23 mmol/L      ANION GAP 8 mmol/L      BUN 31 mg/dL      Creatinine 1 41 mg/dL      Glucose 247 mg/dL      Calcium 8 4 mg/dL      Corrected Calcium 8 9 mg/dL      AST 16 U/L      ALT 18 U/L      Alkaline Phosphatase 35 U/L      Total Protein 5 9 g/dL      Albumin 3 4 g/dL      Total Bilirubin 0 60 mg/dL      eGFR 34 ml/min/1 73sq m     Narrative:      National Kidney Disease Foundation guidelines for Chronic Kidney Disease (CKD):   •  Stage 1 with normal or high GFR (GFR > 90 mL/min/1 73 square meters)  •  Stage 2 Mild CKD (GFR = 60-89 mL/min/1 73 square meters)  •  Stage 3A Moderate CKD (GFR = 45-59 mL/min/1 73 square meters)  •  Stage 3B Moderate CKD (GFR = 30-44 mL/min/1 73 square meters)  •  Stage 4 Severe CKD (GFR = 15-29 mL/min/1 73 square meters)  •  Stage 5 End Stage CKD (GFR <15 mL/min/1 73 square meters)  Note: GFR calculation is accurate only with a steady state creatinine    CBC and differential [[id number]] Collected: 11/16/22 1149    Lab Status: Final result Specimen: Blood from Arm, Right Updated: 11/16/22 1156     WBC 7 99 Thousand/uL      RBC 4 54 Million/uL      Hemoglobin 13 6 g/dL      Hematocrit 38 8 %      MCV 86 fL      MCH 30 0 pg      MCHC 35 1 g/dL      RDW 13 2 %      MPV 10 9 fL      Platelets 567 Thousands/uL      nRBC 0 /100 WBCs      Neutrophils Relative 60 %      Immat GRANS % 1 %      Lymphocytes Relative 31 %      Monocytes Relative 7 %      Eosinophils Relative 1 %      Basophils Relative 0 %      Neutrophils Absolute 4 81 Thousands/µL      Immature Grans Absolute 0 04 Thousand/uL      Lymphocytes Absolute 2 48 Thousands/µL      Monocytes Absolute 0 54 Thousand/µL      Eosinophils Absolute 0 09 Thousand/µL      Basophils Absolute 0 03 Thousands/µL     UA w Reflex to Microscopic w Reflex to Culture [[id number]]     Lab Status: No result Specimen: Urine     Fingerstick Glucose (POCT) [[id number]]  (Abnormal) Collected: 11/16/22 1120    Lab Status: Final result Updated: 11/16/22 1120     POC Glucose 265 mg/dl                  Imaging Studies:   Direct to CT: Yes  CT head without contrast   Final Result by Cesar Palmer MD (11/16 1232)      No acute intracranial abnormality  Chronic microangiopathic changes  The study was marked in [facility] for immediate notification              Workstation performed: [id number]         CT cervical spine without contrast   Final Result by Mikael Shah MD (11/16 1232)      No cervical spine fracture or traumatic malalignment  The study was marked in [facility] for immediate notification  Workstation performed: [id number]         CT chest abdomen pelvis wo contrast   Final Result by Mikael Shah MD (30/83 9690)         1  No acute posttraumatic abnormality identified in the chest, abdomen, or pelvis  2   Nonemergent findings as indicated  The study was marked in [facility] for immediate notification  Workstation performed: [id number]               Procedures  ECG 12 Lead Documentation Only    Date/Time: 11/16/2022 3:42 PM  Performed by: Paty Galvin DO  Authorized by: Paty Galvin DO     ECG reviewed by me, the ED Provider: yes    Patient location:  ED  Previous ECG:     Previous ECG:  Compared to current    Similarity:  No change  Interpretation:     Interpretation: normal    Rate:     ECG rate:  67    ECG rate assessment: normal    Rhythm:     Rhythm: sinus rhythm    Ectopy:     Ectopy: none    QRS:     QRS intervals:  Normal  ST segments:     ST segments:  Normal  T waves:     T waves: normal               ED Course           MDM  Number of Diagnoses or Management Options  Fall, initial encounter: new and requires workup  Syncope: new and requires workup  Diagnosis management comments: Patient is an 81-year-old female presenting as a trauma level C after syncope and a fall while grocery shopping  Patient takes a daily baby aspirin  Only symptoms prior to fall were a sensation of lightheadedness, dizziness, generalized weakness  Because of her poor recollection of the actual physical mechanism of fall, CT scans were ordered of the head, C-spine, chest, abdomen, pelvis given abdominal pain on exam   CT scans were largely unremarkable    Basic labs were also drawn to further evaluate for any organic cause of syncope including electrolyte imbalance, anemia, ACS  This was also unremarkable  One notable finding is that patient remains borderline bradycardic throughout her stay  It is still possible that there is a cardiogenic etiology to her syncope which needs further evaluation  Did not feel comfortable sending patient home given recurrent nature of her symptoms and her high risk if she has another fall at home  Patient was discussed with [facility] who accepted the patient for a period of observation and further testing  At time of transfer, patient was stable condition  Amount and/or Complexity of Data Reviewed  Clinical lab tests: ordered and reviewed  Tests in the radiology section of CPT®: ordered and reviewed  Tests in the medicine section of CPT®: reviewed and ordered    Risk of Complications, Morbidity, and/or Mortality  Presenting problems: low  Diagnostic procedures: minimal  Management options: minimal    Patient Progress  Patient progress: stable          Disposition  [facility] Transfer: No  Final diagnoses:   Syncope   Fall, initial encounter     Time reflects when diagnosis was documented in both MDM as applicable and the Disposition within this note     Time User Action Codes Description Comment    11/16/2022  1:58 PM Almira Boas Add [R55] Syncope     11/16/2022  1:59 PM Almira Boas Add [W19  Stephanienadir Vegalilian, initial encounter       ED Disposition     ED Disposition   Admit    Condition   Stable    Date/Time   Wed Nov 16, 2022  1:59 PM    Comment   Case was discussed with Dr Fam Dodd and the patient's admission status was agreed to be observation           Follow-up Information    None       Current Discharge Medication List      CONTINUE these medications which have NOT CHANGED    Details   aspirin 81 mg chewable tablet Chew 81 mg daily at bedtime      gabapentin (NEURONTIN) 300 mg capsule 300 mg daily at bedtime      glimepiride (AMARYL) 4 mg tablet Take 4 mg by mouth in the morning lisinopril (ZESTRIL) 5 mg tablet 5 mg daily at bedtime      metoprolol succinate (TOPROL-XL) 25 mg 24 hr tablet 25 mg daily at bedtime      nitrofurantoin (MACRODANTIN) 50 mg capsule Take 1 capsule by mouth daily      potassium chloride (Klor-Con) 10 mEq tablet 10 mEq in the morning      sitaGLIPtin (JANUVIA) 100 mg tablet Take 100 mg by mouth daily with breakfast      triamterene-hydrochlorothiazide (DYAZIDE) 37 5-25 mg per capsule 1 capsule every morning           No discharge procedures on file      PDMP Review       Value Time User    PDMP Reviewed  Yes 11/2/2022 12:11 PM Juan R Gregory MD          ED Provider  Electronically Signed by    Immanuel Morales DO     [address] [place] [address]  11/16/22 8212 ICU

## 2022-11-18 ENCOUNTER — RESULT REVIEW (OUTPATIENT)
Age: 77
End: 2022-11-18

## 2022-11-28 NOTE — PROGRESS NOTE ADULT - ASSESSMENT
Transitional Care Follow Up Visit  Subjective     David Comer Sr. is a 82 y.o. male who presents for a transitional care management visit.    Within 48 business hours after discharge our office contacted him via telephone to coordinate his care and needs.      I reviewed and discussed the details of that call along with the discharge summary, hospital problems, inpatient lab results, inpatient diagnostic studies, and consultation reports with David.     Current outpatient and discharge medications have been reconciled for the patient.  Reviewed by: Chacha Pineda MD      Date of TCM Phone Call 11/23/2022   [facility]   Date of Admission 11/21/2022   Date of Discharge 11/23/2022   Discharge Disposition Home or Self Care     Risk for Readmission (LACE) Score: 3 (11/23/2022  6:00 AM)      History of Present Illness   PT is here for hospital follow up.  He states he is doing well and has no CP or HA or SOB,    HTN- stable and No CP or HA  HLD- stable  A Fib stable  CAD- sees cardio in couple of weeks.    Discharge Dx:  Cpest pain  anticoagulated   CAD  Parox A Fib  HTN  HLD    Course During Hospital Stay:    Mr. Comer is a 82 y.o. male with a history of CAD, proximal atrial fibrillation, hypertension who presented to [facility] initially complaining of chest pain.  Please see the admitting history and physical for further details.  He was admitted to the hospital for further evaluation and treatment.  Cardiology consulted on admission for additional management recommendations.  He underwent echocardiography, results are below, and stress test.  Per cardiology, the stress test showed possible small inferior wall ischemia, he will follow-up as an outpatient in 2 weeks and if chest pain continues cardiology will consider performing heart catheterization for further work-up.  The patient has also been instructed to follow-up with his primary care physician in 1 week for posthospital  follow-up visit.     The following portions of the patient's history were reviewed and updated as appropriate: allergies, current medications, past family history, past medical history, past social history, past surgical history and problem list.    Review of Systems    Objective   Physical Exam  Vitals and nursing note reviewed.   Constitutional:       Appearance: Normal appearance. He is well-developed.   Cardiovascular:      Rate and Rhythm: Normal rate and regular rhythm.      Heart sounds: Normal heart sounds. No murmur heard.  Pulmonary:      Effort: Pulmonary effort is normal. No respiratory distress.      Breath sounds: Normal breath sounds. No stridor. No wheezing or rhonchi.   Neurological:      General: No focal deficit present.      Mental Status: He is alert and oriented to person, place, and time. He is not disoriented.   Psychiatric:         Mood and Affect: Mood normal.         Behavior: Behavior normal.         Assessment & Plan   Diagnoses and all orders for this visit:    1. Hospital discharge follow-up (Primary)    2. Essential hypertension  -     metoprolol succinate XL (Toprol XL) 50 MG 24 hr tablet; Take 1 tablet by mouth Daily.  Dispense: 90 tablet; Refill: 1    3. Mixed hyperlipidemia    4. Chronic coronary artery disease    5. Paroxysmal atrial fibrillation (HCC)        I requested and reviewed all records, labs, and diagnostics from the hospital with patient.  Patient is to follow-up with specialists as discussed and directed.       Refills on med.       73 yo female s/p Acomm aneurysm coiling, recent Klebsiella bacteriemia/ urosepsis, urinary retention , Low cath placement, history of Cdiff colitis while on ABx,  acute LE DVT on AC/Coumadin, now with functional and cognitive deficits.      # s/p Acom aneurysm coiling    PT/OT/ST pogram.     Fall precautions.     #Anemia   Continue coumadin, monitor Hg, Guaiac.      #HTN   off lisinopril, monitor BP.     # Urosepsis, s/p Klebsiella bacteremia   Completed  IV ABX, monitor voiding, PVRs.       # Urinary retention   Low cath-removed. TOV ongoing. Elevated PVRs overnight>700cc. SCV. Mobilize/bowel care.     Flomax to continue.     # LE DVT  AC/Coumadin with goal INR 2-3, therapeutic now. Monitor asymptomatic tachycardia. EKG sinus tachy. Afebrile.         Precautions:              fall, seizure                                                                    Diet: dash    DVT Prophylaxis:         coumadin                                                                 Medical Prognosis: good    Prescreen Comparison: I have reviewed the prescreen information and I found no relevant changes between the preadmission  screening and my post admission evaluation.     Expected Therapy:   P.T.     1   hrs/day           O. T.  1    hrs/day           S.L.P.   1     hrs/day                    P&O     eval                                              Excpected Frequency: 5 days/7 day period    Rehab Potential:       good                                    Estimated Disposition:       home                   ELOS:       10       days      Rationale For Inpatient Rehab Admission- Patient demonstrates the following:     [X] Medically appropriate for rehabilitation admission   [X] Has attainable rehab goals with an approrpriate discharge plan  [X] Has rehabilitation potential (expected to make significant improvement within a reasonable period of time)  [X] Requires close medical management by a rehab physician, rehab nursing care and comprehensive interdisciplinary team (including         PT, OT, SLP and/or prosthetics and orthotics) 75 yo female s/p Acomm aneurysm coiling, recent Klebsiella bacteriemia/ urosepsis, urinary retention , Low cath placement, history of Cdiff colitis while on ABx,  acute LE DVT on AC/Coumadin, now with functional and cognitive deficits.      # s/p Acom aneurysm coiling    PT/OT/ST pogram.     Fall precautions.     #Anemia   Continue coumadin, monitor Hg, Guaiac all stools      #HTN   off lisinopril, monitor BP.     # h/o  Urosepsis, s/p Klebsiella bacteremia   Completed  IV ABX, monitor voiding, PVRs.       # Urinary retention   Low cath-removed. TOV ongoing. Elevated PVRs overnight>700cc. SCV. Mobilize/bowel care.     Flomax to continue.     # LE DVT  AC/Coumadin with goal INR 2-3, therapeutic now. Monitor asymptomatic tachycardia, given history of PE. EKG with  sinus tachycardia        Precautions:              fall, seizure                                                                    Diet: dash    DVT Prophylaxis:         coumadin                                                                 Medical Prognosis: good    Prescreen Comparison: I have reviewed the prescreen information and I found no relevant changes between the preadmission  screening and my post admission evaluation.     Expected Therapy:   P.T.     1   hrs/day           O. T.  1    hrs/day           S.L.P.   1     hrs/day                    P&O     eval                                              Excpected Frequency: 5 days/7 day period    Rehab Potential:       good                                    Estimated Disposition:       home                   ELOS:       10       days

## 2022-12-04 NOTE — PROGRESS NOTE ADULT - ASSESSMENT
Patient found out on 11/24 she was pregnant. Started having bleeding on 11/30. Saw midwife on 12/1 to have bloodwork and screening. Was having right sided abdominal cramping for last three days. Went to  today for bloodwork and was sent here for ectopic evaluation.     Triage Assessment     Row Name 12/03/22 9655       Triage Assessment (Adult)    Airway WDL WDL       Respiratory WDL    Respiratory WDL WDL       Skin Circulation/Temperature WDL    Skin Circulation/Temperature WDL WDL       Cardiac WDL    Cardiac WDL WDL       Peripheral/Neurovascular WDL    Peripheral Neurovascular WDL WDL       Cognitive/Neuro/Behavioral WDL    Cognitive/Neuro/Behavioral WDL WDL               Retention / urosepsis. Better with abx. Failed void trials.    PLAN:    - continue abx as per cultures  - follow labs  - continue youssef  - no void trial in near future

## 2023-02-08 ENCOUNTER — OFFICE (OUTPATIENT)
Dept: URBAN - METROPOLITAN AREA CLINIC 90 | Facility: CLINIC | Age: 78
Setting detail: OPHTHALMOLOGY
End: 2023-02-08
Payer: MEDICARE

## 2023-02-08 DIAGNOSIS — E78.01: ICD-10-CM

## 2023-02-08 DIAGNOSIS — H25.13: ICD-10-CM

## 2023-02-08 DIAGNOSIS — D31.31: ICD-10-CM

## 2023-02-08 DIAGNOSIS — H52.7: ICD-10-CM

## 2023-02-08 DIAGNOSIS — H52.4: ICD-10-CM

## 2023-02-08 DIAGNOSIS — D31.32: ICD-10-CM

## 2023-02-08 DIAGNOSIS — I67.1: ICD-10-CM

## 2023-02-08 DIAGNOSIS — H40.033: ICD-10-CM

## 2023-02-08 PROCEDURE — 92133 CPTRZD OPH DX IMG PST SGM ON: CPT | Performed by: OPHTHALMOLOGY

## 2023-02-08 PROCEDURE — 92015 DETERMINE REFRACTIVE STATE: CPT | Performed by: OPHTHALMOLOGY

## 2023-02-08 PROCEDURE — 92014 COMPRE OPH EXAM EST PT 1/>: CPT | Performed by: OPHTHALMOLOGY

## 2023-02-08 PROCEDURE — 92020 GONIOSCOPY: CPT | Performed by: OPHTHALMOLOGY

## 2023-02-08 ASSESSMENT — KERATOMETRY
OS_K1POWER_DIOPTERS: 44.00
OD_K1POWER_DIOPTERS: 44.00
OD_K2POWER_DIOPTERS: 44.50
OD_AXISANGLE_DEGREES: 034
OS_K2POWER_DIOPTERS: 44.25
OS_AXISANGLE_DEGREES: 106
METHOD_AUTO_MANUAL: AUTO

## 2023-02-08 ASSESSMENT — REFRACTION_CURRENTRX
OS_OVR_VA: 20/
OD_SPHERE: +4.25
OD_OVR_VA: 20/
OS_OVR_VA: 20/
OS_SPHERE: +4.00
OD_VPRISM_DIRECTION: BF
OD_ADD: +3.25
OD_AXIS: 180
OS_AXIS: 180
OS_VPRISM_DIRECTION: BF
OD_OVR_VA: 20/
OS_ADD: +3.50
OS_CYLINDER: SPH
OD_VPRISM_DIRECTION: BF
OD_SPHERE: +4.25
OS_VPRISM_DIRECTION: BF
OS_SPHERE: +4.00
OD_CYLINDER: SPH
OS_ADD: +3.25
OD_CYLINDER: SPH
OS_CYLINDER: 0.00
OD_ADD: +3.50

## 2023-02-08 ASSESSMENT — VISUAL ACUITY
OS_BCVA: 20/40+2
OD_BCVA: 20/20

## 2023-02-08 ASSESSMENT — REFRACTION_MANIFEST
OD_VA1: 20/25-
OS_ADD: +2.50
OS_VA2: 20/25(J1)
OD_AXIS: 105
OD_VA2: 20/25(J1)
OD_CYLINDER: -1.25
OD_SPHERE: +5.25
OS_SPHERE: +4.50
OS_CYLINDER: -0.75
OS_VA1: 20/20
OD_ADD: +2.50
OS_AXIS: 105

## 2023-02-08 ASSESSMENT — REFRACTION_AUTOREFRACTION
OD_AXIS: 108
OD_SPHERE: +5.75
OS_CYLINDER: -1.00
OD_CYLINDER: -1.50
OS_AXIS: 099
OS_SPHERE: +5.25

## 2023-02-08 ASSESSMENT — AXIALLENGTH_DERIVED
OS_AL: 21.6803
OD_AL: 21.5599
OD_AL: 21.6826
OS_AL: 21.8879

## 2023-02-08 ASSESSMENT — CONFRONTATIONAL VISUAL FIELD TEST (CVF)
OD_FINDINGS: FULL
OS_FINDINGS: FULL

## 2023-02-08 ASSESSMENT — SPHEQUIV_DERIVED
OS_SPHEQUIV: 4.125
OD_SPHEQUIV: 5
OS_SPHEQUIV: 4.75
OD_SPHEQUIV: 4.625

## 2023-02-08 ASSESSMENT — TONOMETRY
OD_IOP_MMHG: 14
OS_IOP_MMHG: 14

## 2023-03-01 NOTE — PATIENT PROFILE ADULT - ABILITY TO HEAR (WITH HEARING AID OR HEARING APPLIANCE IF NORMALLY USED):
March 1, 2023      Nolberto Gregorio MD  [facility] [address]      Patient: Duy Tijerina   MR Number: [id number]   YOB: 1995   Date of Visit: 3/1/2023       Dear Nolberto Gregorio:    Duy Tijerina was seen today for Mirena IUD insertion  The patient also has a Nexplanon device however this is very deep in the soft tissue and we will arrange to have it removed with ultrasound guidance.     Sincerely,        Kendal Denise MD Adequate: hears normal conversation without difficulty

## 2023-03-02 RX ORDER — ALPRAZOLAM 0.25 MG/1
0.25 TABLET ORAL
Qty: 2 | Refills: 0 | Status: ACTIVE | COMMUNITY
Start: 2019-05-22 | End: 1900-01-01

## 2023-03-06 ENCOUNTER — APPOINTMENT (OUTPATIENT)
Dept: UROLOGY | Facility: CLINIC | Age: 78
End: 2023-03-06
Payer: MEDICARE

## 2023-03-06 ENCOUNTER — RESULT REVIEW (OUTPATIENT)
Age: 78
End: 2023-03-06

## 2023-03-06 ENCOUNTER — OUTPATIENT (OUTPATIENT)
Dept: OUTPATIENT SERVICES | Facility: HOSPITAL | Age: 78
LOS: 1 days | End: 2023-03-06
Payer: MEDICARE

## 2023-03-06 ENCOUNTER — LABORATORY RESULT (OUTPATIENT)
Age: 78
End: 2023-03-06

## 2023-03-06 ENCOUNTER — APPOINTMENT (OUTPATIENT)
Dept: CT IMAGING | Facility: IMAGING CENTER | Age: 78
End: 2023-03-06
Payer: MEDICARE

## 2023-03-06 DIAGNOSIS — Z98.890 OTHER SPECIFIED POSTPROCEDURAL STATES: Chronic | ICD-10-CM

## 2023-03-06 DIAGNOSIS — R93.422 ABNORMAL RADIOLOGIC FINDINGS ON DIAGNOSTIC IMAGING OF LEFT KIDNEY: ICD-10-CM

## 2023-03-06 DIAGNOSIS — N20.0 CALCULUS OF KIDNEY: ICD-10-CM

## 2023-03-06 PROCEDURE — 74176 CT ABD & PELVIS W/O CONTRAST: CPT

## 2023-03-06 PROCEDURE — 99214 OFFICE O/P EST MOD 30 MIN: CPT

## 2023-03-06 PROCEDURE — 74176 CT ABD & PELVIS W/O CONTRAST: CPT | Mod: 26,MH

## 2023-03-06 NOTE — LETTER BODY
[FreeTextEntry1] : Aquiles Pavon MD\par [address]\[place] [address]\par [phone]\par \par Dear Dr. Pavon,\par \par Reason for visit: Bilateral renal calculi. Bacterial UTI. Right flank pain. \par \par This is a 78 year-old retired female with history of brain aneurysm, breast cancer and uric acid stones, presenting with bilateral renal calculi and recurrent UTI. She has a history of brain aneurysms and has previously undergone stent placement. Her previous stone analysis in 2017 demonstrated uric acid stones. The patient's last renal ultrasound in October 2021 demonstrated persistent bilateral renal stones. She returns today for follow-up. She reports experiencing right flank pain today and rates it 3 out of 10. She notes that it is manageable. The patient is entirely asymptomatic. The past medical history, family history and social history are unchanged. All other review of systems are negative. Patient denies any changes in medications. Medication list was reconciled. She is on Plavix and Aspirin.\par \par On examination, the patient is a older appearing woman in no acute distress. She is alert and oriented follows commands. She has normal mood and affect. She is normocephalic. Neck is supple. Respirations are unlabored. Abdomen is soft and nontender. Bladder is nonpalpable. No CVA tenderness. Neurologically she is grossly intact. No peripheral edema. Skin without gross abnormality.\par \par Assessment: Bilateral renal calculi, stable. Bacterial UTI. Right flank pain. \par \par I counseled the patient. Patient will obtain pre-op labs for possible ureteroscopy. I recommended that she obtain urine culture and BMP today as well. In terms of her flank pain, I encouraged her to obtain a CT scan. I counseled the patient regarding the procedure. The risks and benefits were discussed. Alternatives were given. I answered the patient questions. The patient will take the necessary preparations for the procedure. Patient understands that if she develops gross hematuria or any urinary discomfort, she will contact me for further evaluation. Risks and alternatives were discussed. I answered the patient questions. The patient will follow-up as directed and will contact me with any questions or concerns. Thank you for the opportunity to participate in the care of Ms. DUPREE. I will keep you updated on her progress. \par \par Plan: Pre-op labs. Urine culture. BMP. CT scan. Ureteroscopy. Follow up as directed.

## 2023-03-06 NOTE — ADDENDUM
[FreeTextEntry1] : Entered by Denise Hamilton, acting as scribe for Dr. Johnny Burrows.\par The documentation recorded by the scribe accurately reflects the service I personally performed and the decisions made by me.

## 2023-03-08 DIAGNOSIS — R31.0 GROSS HEMATURIA: ICD-10-CM

## 2023-03-08 DIAGNOSIS — N39.0 URINARY TRACT INFECTION, SITE NOT SPECIFIED: ICD-10-CM

## 2023-03-08 DIAGNOSIS — N20.1 CALCULUS OF URETER: ICD-10-CM

## 2023-03-08 DIAGNOSIS — N20.0 CALCULUS OF KIDNEY: ICD-10-CM

## 2023-03-08 LAB
ANION GAP SERPL CALC-SCNC: 14 MMOL/L
BACTERIA UR CULT: NORMAL
BASOPHILS # BLD AUTO: 0 K/UL
BASOPHILS NFR BLD AUTO: 0 %
BUN SERPL-MCNC: 38 MG/DL
CALCIUM SERPL-MCNC: 9.6 MG/DL
CHLORIDE SERPL-SCNC: 102 MMOL/L
CO2 SERPL-SCNC: 23 MMOL/L
CREAT SERPL-MCNC: 1.68 MG/DL
EGFR: 31 ML/MIN/1.73M2
EOSINOPHIL # BLD AUTO: 0 K/UL
EOSINOPHIL NFR BLD AUTO: 0 %
GLUCOSE SERPL-MCNC: 114 MG/DL
HCT VFR BLD CALC: 40.9 %
HGB BLD-MCNC: 12.9 G/DL
LYMPHOCYTES # BLD AUTO: 2.31 K/UL
LYMPHOCYTES NFR BLD AUTO: 14.8 %
MAN DIFF?: NORMAL
MCHC RBC-ENTMCNC: 29.7 PG
MCHC RBC-ENTMCNC: 31.5 GM/DL
MCV RBC AUTO: 94 FL
MONOCYTES # BLD AUTO: 0.67 K/UL
MONOCYTES NFR BLD AUTO: 4.3 %
NEUTROPHILS # BLD AUTO: 12.65 K/UL
NEUTROPHILS NFR BLD AUTO: 80.9 %
PLATELET # BLD AUTO: 271 K/UL
POTASSIUM SERPL-SCNC: 4.1 MMOL/L
RBC # BLD: 4.35 M/UL
RBC # FLD: 14.5 %
SODIUM SERPL-SCNC: 140 MMOL/L
WBC # FLD AUTO: 15.64 K/UL

## 2023-03-10 LAB
ANION GAP SERPL CALC-SCNC: 15 MMOL/L
BUN SERPL-MCNC: 31 MG/DL
CALCIUM SERPL-MCNC: 9.7 MG/DL
CHLORIDE SERPL-SCNC: 105 MMOL/L
CO2 SERPL-SCNC: 22 MMOL/L
CREAT SERPL-MCNC: 1.12 MG/DL
EGFR: 51 ML/MIN/1.73M2
GLUCOSE SERPL-MCNC: 101 MG/DL
POTASSIUM SERPL-SCNC: 5 MMOL/L
SODIUM SERPL-SCNC: 143 MMOL/L

## 2023-03-30 ENCOUNTER — APPOINTMENT (OUTPATIENT)
Dept: UROLOGY | Facility: CLINIC | Age: 78
End: 2023-03-30
Payer: MEDICARE

## 2023-03-30 DIAGNOSIS — N20.0 CALCULUS OF KIDNEY: ICD-10-CM

## 2023-03-30 DIAGNOSIS — R93.422 ABNORMAL RADIOLOGIC FINDINGS ON DIAGNOSITIC IMAGING OF LEFT KIDNEY: ICD-10-CM

## 2023-03-30 DIAGNOSIS — Z00.00 ENCOUNTER FOR GENERAL ADULT MEDICAL EXAMINATION W/OUT ABNORMAL FINDINGS: ICD-10-CM

## 2023-03-30 PROCEDURE — 99214 OFFICE O/P EST MOD 30 MIN: CPT

## 2023-03-30 PROCEDURE — 88112 CYTOPATH CELL ENHANCE TECH: CPT | Mod: 26

## 2023-03-30 RX ORDER — POTASSIUM CITRATE 15 MEQ/1
15 MEQ TABLET, EXTENDED RELEASE ORAL
Qty: 180 | Refills: 1 | Status: ACTIVE | COMMUNITY
Start: 2023-03-30 | End: 1900-01-01

## 2023-03-30 NOTE — LETTER BODY
[FreeTextEntry1] : Aquiles Pavon MD\par [address]\[place] [address]\par [phone]\par \par Dear Dr. Pavon,\par \par Reason for visit: Bilateral renal calculi. Bacterial UTI. Right flank pain. \par \par This is a 78 year-old retired female with history of brain aneurysm, breast cancer and uric acid stones, presenting with bilateral renal calculi and recurrent UTI. She has a history of brain aneurysms and has previously undergone stent placement. Her previous stone analysis in 2017 demonstrated uric acid stones. The patient's last renal ultrasound in October 2021 demonstrated persistent bilateral renal stones. She previously experienced right flank pain rated 3/10. She returns today for follow-up. The past medical history, family history and social history are unchanged. All other review of systems are negative. Patient denies any changes in medications. Medication list was reconciled. She is on Plavix and Aspirin.\par \par On examination, the patient is an older appearing woman in no acute distress. She is alert and oriented follows commands. She has normal mood and affect. She is normocephalic. Neck is supple. Respirations are unlabored. Abdomen is soft and nontender. Bladder is nonpalpable. No CVA tenderness. Neurologically she is grossly intact. No peripheral edema. Skin without gross abnormality.\par \par Her BMP from March 9, 2023 demonstrated normal kidney function, creatinine 1.12. \par \par Her urine culture was negative.\par \par I personally reviewed CT images with the patient today and images demonstrated mild left sided hydronephrosis, and bilateral kidney stones up to 8 mm. \par \par Assessment: Bilateral renal calculi, stable. Bacterial UTI. Right flank pain. Mild left sided hydronephrosis. Bilateral kidney stones. \par \par I counseled the patient. In terms of her mild left sided hydronephrosis, she likely passed the stone on the left side.  Patient declined surgery currently as she is undergoing her brain aneurysm evaluation next month.  In terms of her history of uric acid stones, I recommended that she resume potassium citrate. I discussed the potential side effects of the medication. I counseled the patient on its use and side effects. If the patient develops any side effects, the patient will discontinue the medication and contact me. She will obtain BMP, urinalysis, urine culture, and urine cytology. I encouraged the patient to follow up in 6 months for ultrasound. Patient understands that if she develops gross hematuria or any urinary discomfort, she will contact me for further evaluation. Risks and alternatives were discussed. I answered the patient questions. The patient will follow-up as directed and will contact me with any questions or concerns. Thank you for the opportunity to participate in the care of Ms. DUPREE. I will keep you updated on her progress. \par \par Plan: resume Potassium citrate. Ultrasound. Urine culture. Urinalysis. Urine cytology. BMP. Follow up in 6 months for ultrasound.

## 2023-04-01 LAB
ANION GAP SERPL CALC-SCNC: 13 MMOL/L
APPEARANCE: CLEAR
BACTERIA UR CULT: NORMAL
BACTERIA: NEGATIVE
BILIRUBIN URINE: NEGATIVE
BLOOD URINE: NEGATIVE
BUN SERPL-MCNC: 28 MG/DL
CALCIUM SERPL-MCNC: 9.9 MG/DL
CHLORIDE SERPL-SCNC: 106 MMOL/L
CO2 SERPL-SCNC: 23 MMOL/L
COLOR: NORMAL
CREAT SERPL-MCNC: 1.05 MG/DL
EGFR: 55 ML/MIN/1.73M2
GLUCOSE QUALITATIVE U: NEGATIVE
GLUCOSE SERPL-MCNC: 89 MG/DL
HYALINE CASTS: 0 /LPF
KETONES URINE: NEGATIVE
LEUKOCYTE ESTERASE URINE: ABNORMAL
MICROSCOPIC-UA: NORMAL
NITRITE URINE: NEGATIVE
PH URINE: 5.5
POTASSIUM SERPL-SCNC: 4.7 MMOL/L
PROTEIN URINE: NEGATIVE
RED BLOOD CELLS URINE: 0 /HPF
SODIUM SERPL-SCNC: 142 MMOL/L
SPECIFIC GRAVITY URINE: 1.02
SQUAMOUS EPITHELIAL CELLS: 2 /HPF
URINE CYTOLOGY: NORMAL
UROBILINOGEN URINE: NORMAL
WHITE BLOOD CELLS URINE: 5 /HPF

## 2023-04-14 ENCOUNTER — OFFICE (OUTPATIENT)
Dept: URBAN - METROPOLITAN AREA CLINIC 90 | Facility: CLINIC | Age: 78
Setting detail: OPHTHALMOLOGY
End: 2023-04-14
Payer: MEDICARE

## 2023-04-14 DIAGNOSIS — H53.2: ICD-10-CM

## 2023-04-14 DIAGNOSIS — H50.311: ICD-10-CM

## 2023-04-14 PROCEDURE — 92012 INTRM OPH EXAM EST PATIENT: CPT | Performed by: OPHTHALMOLOGY

## 2023-04-14 PROCEDURE — 92060 SENSORIMOTOR EXAMINATION: CPT | Performed by: OPHTHALMOLOGY

## 2023-04-14 ASSESSMENT — REFRACTION_CURRENTRX
OD_VPRISM_DIRECTION: PROGS
OS_ADD: +3.50
OS_VPRISM_DIRECTION: BF
OS_SPHERE: +4.50
OD_AXIS: 105
OD_VPRISM_DIRECTION: BF
OS_OVR_VA: 20/
OD_ADD: +2.75
OS_CYLINDER: SPH
OD_CYLINDER: SPH
OS_CYLINDER: -0.75
OS_AXIS: 112
OS_VPRISM_DIRECTION: BF
OS_VPRISM_DIRECTION: PROGS
OD_ADD: +3.25
OS_AXIS: 180
OS_SPHERE: +4.00
OD_OVR_VA: 20/
OS_SPHERE: +4.00
OD_OVR_VA: 20/
OD_OVR_VA: 20/
OS_OVR_VA: 20/
OD_ADD: +3.50
OD_CYLINDER: -1.25
OS_CYLINDER: 0.00
OD_VPRISM_DIRECTION: BF
OD_SPHERE: +4.25
OS_OVR_VA: 20/
OD_AXIS: 180
OD_CYLINDER: SPH
OS_ADD: +2.75
OD_SPHERE: +4.25
OS_ADD: +3.25
OD_SPHERE: +5.25

## 2023-04-14 ASSESSMENT — AXIALLENGTH_DERIVED
OD_AL: 21.8783
OS_AL: 22.0022
OS_AL: 22.0873
OD_AL: 21.8783
OD_AL: 21.7534
OS_AL: 21.8759

## 2023-04-14 ASSESSMENT — REFRACTION_MANIFEST
OS_ADD: +2.50
OD_ADD: +2.50
OS_CYLINDER: -0.75
OD_CYLINDER: -1.25
OS_ADD: +2.50
OS_CYLINDER: -0.75
OS_VA1: 20/20
OD_CYLINDER: -1.25
OD_AXIS: 105
OS_SPHERE: +4.75
OD_SPHERE: +5.25
OS_SPHERE: +4.50
OD_VA1: 20/25-
OS_VA2: 20/25(J1)
OD_VA2: 20/25(J1)
OD_ADD: +2.50
OD_VA2: 20/25(J1)
OS_VA1: 20/20-2
OD_SPHERE: +5.25
OD_AXIS: 105
OD_VA1: 20/25-2
OS_AXIS: 105
OS_AXIS: 105
OS_VA2: 20/25(J1)

## 2023-04-14 ASSESSMENT — KERATOMETRY
OS_K1POWER_DIOPTERS: 43.50
OD_K2POWER_DIOPTERS: 44.00
METHOD_AUTO_MANUAL: AUTO
OD_AXISANGLE_DEGREES: 020
OS_AXISANGLE_DEGREES: 090
OD_K1POWER_DIOPTERS: 43.25
OS_K2POWER_DIOPTERS: 43.50

## 2023-04-14 ASSESSMENT — VISUAL ACUITY
OS_BCVA: 20/30-2
OD_BCVA: 20/30-1

## 2023-04-14 ASSESSMENT — REFRACTION_AUTOREFRACTION
OS_AXIS: 092
OD_CYLINDER: -1.50
OS_CYLINDER: -1.00
OS_SPHERE: +5.25
OD_AXIS: 107
OD_SPHERE: +5.75

## 2023-04-14 ASSESSMENT — CONFRONTATIONAL VISUAL FIELD TEST (CVF)
OD_FINDINGS: FULL
OS_FINDINGS: FULL

## 2023-04-14 ASSESSMENT — SPHEQUIV_DERIVED
OS_SPHEQUIV: 4.375
OS_SPHEQUIV: 4.125
OS_SPHEQUIV: 4.75
OD_SPHEQUIV: 4.625
OD_SPHEQUIV: 5
OD_SPHEQUIV: 4.625

## 2023-04-17 ENCOUNTER — APPOINTMENT (OUTPATIENT)
Dept: MRI IMAGING | Facility: HOSPITAL | Age: 78
End: 2023-04-17

## 2023-04-17 ENCOUNTER — OUTPATIENT (OUTPATIENT)
Dept: OUTPATIENT SERVICES | Facility: HOSPITAL | Age: 78
LOS: 1 days | End: 2023-04-17
Payer: MEDICARE

## 2023-04-17 DIAGNOSIS — Z98.890 OTHER SPECIFIED POSTPROCEDURAL STATES: Chronic | ICD-10-CM

## 2023-04-17 DIAGNOSIS — I72.9 ANEURYSM OF UNSPECIFIED SITE: ICD-10-CM

## 2023-04-17 PROCEDURE — G1004: CPT

## 2023-04-17 PROCEDURE — 70544 MR ANGIOGRAPHY HEAD W/O DYE: CPT | Mod: 26,ME

## 2023-04-17 PROCEDURE — 70544 MR ANGIOGRAPHY HEAD W/O DYE: CPT | Mod: ME

## 2023-04-18 ENCOUNTER — APPOINTMENT (OUTPATIENT)
Dept: NEUROLOGY | Facility: CLINIC | Age: 78
End: 2023-04-18
Payer: MEDICARE

## 2023-04-18 VITALS
SYSTOLIC BLOOD PRESSURE: 169 MMHG | BODY MASS INDEX: 29.82 KG/M2 | WEIGHT: 179 LBS | HEART RATE: 86 BPM | DIASTOLIC BLOOD PRESSURE: 93 MMHG | HEIGHT: 65 IN

## 2023-04-18 DIAGNOSIS — I72.9 ANEURYSM OF UNSPECIFIED SITE: ICD-10-CM

## 2023-04-18 DIAGNOSIS — I60.8 OTHER NONTRAUMATIC SUBARACHNOID HEMORRHAGE: ICD-10-CM

## 2023-04-18 PROCEDURE — 99215 OFFICE O/P EST HI 40 MIN: CPT

## 2023-04-18 RX ORDER — KRILL/OM-3/DHA/EPA/PHOSPHO/AST 1000-230MG
81 CAPSULE ORAL
Refills: 0 | Status: ACTIVE | COMMUNITY

## 2023-04-18 RX ORDER — ASPIRIN 325 MG/1
325 TABLET, FILM COATED ORAL DAILY
Qty: 90 | Refills: 2 | Status: DISCONTINUED | COMMUNITY
Start: 2020-09-15 | End: 2023-04-18

## 2023-04-18 NOTE — PHYSICAL EXAM
[General Appearance - Alert] : alert [General Appearance - Well Nourished] : well nourished [General Appearance - Well Developed] : well developed [Oriented To Time, Place, And Person] : oriented to person, place, and time [Affect] : the affect was normal [Mood] : the mood was normal [Person] : oriented to person [Place] : oriented to place [Time] : oriented to time [Concentration Intact] : normal concentrating ability [Visual Intact] : visual attention was ~T not ~L decreased [Naming Objects] : no difficulty naming common objects [Repeating Phrases] : no difficulty repeating a phrase [Writing A Sentence] : no difficulty writing a sentence [Fluency] : fluency intact [Comprehension] : comprehension intact [Reading] : reading intact [Past History] : adequate knowledge of personal past history [Cranial Nerves Oculomotor (III)] : extraocular motion intact [Cranial Nerves Optic (II)] : visual acuity intact bilaterally,  visual fields full to confrontation, pupils equal round and reactive to light [Cranial Nerves Trigeminal (V)] : facial sensation intact symmetrically [Cranial Nerves Facial (VII)] : face symmetrical [Cranial Nerves Vestibulocochlear (VIII)] : hearing was intact bilaterally [Cranial Nerves Glossopharyngeal (IX)] : tongue and palate midline [Cranial Nerves Accessory (XI - Cranial And Spinal)] : head turning and shoulder shrug symmetric [Motor Tone] : muscle tone was normal in all four extremities [Cranial Nerves Hypoglossal (XII)] : there was no tongue deviation with protrusion [Motor Strength] : muscle strength was normal in all four extremities [No Muscle Atrophy] : normal bulk in all four extremities [Sensation Tactile Decrease] : light touch was intact [Balance] : balance was intact [Full Visual Field] : full visual field [Hearing Threshold Finger Rub Not [place]] : hearing was normal [Neck Appearance] : the appearance of the neck was normal [] : no respiratory distress [Heart Rate And Rhythm] : heart rate was normal and rhythm regular [Edema] : there was no peripheral edema [Abdomen Soft] : soft [Abnormal Walk] : normal gait

## 2023-04-18 NOTE — REVIEW OF SYSTEMS
[Fever] : no fever [Chills] : no chills [Feeling Poorly] : not feeling poorly [Feeling Tired] : not feeling tired [Confused or Disoriented] : no confusion [Memory Lapses or Loss] : no memory loss [Decr. Concentrating Ability] : no decrease in concentrating ability [Difficulty with Language] : no ~M difficulty with language [Changed Thought Patterns] : no change in thought patterns [Repeating Questions] : no repeated questioning about recent events [Facial Weakness] : no facial weakness [Arm Weakness] : no arm weakness [Hand Weakness] : no hand weakness [Leg Weakness] : no leg weakness [Poor Coordination] : good coordination [Difficulty Writing] : no difficulty writing [Difficulties in Speech] : no speech difficulties [Numbness] : no numbness [Tingling] : no tingling [Seizures] : no convulsions [Dizziness] : no dizziness [Fainting] : no fainting [Lightheadedness] : no lightheadedness [Vertigo] : no vertigo [Tension Headache] : no tension-type headache [Difficulty Walking] : no difficulty walking [Inability to Walk] : able to walk [Anxiety] : no anxiety [Depression] : no depression [Eyesight Problems] : no eyesight problems [Loss Of Hearing] : no hearing loss [Chest Pain] : no chest pain [Palpitations] : no palpitations [Wheezing] : no wheezing [Shortness Of Breath] : no shortness of breath [Vomiting] : no vomiting [Incontinence] : no incontinence [Joint Pain] : no joint pain [Easy Bleeding] : no tendency for easy bleeding [Easy Bruising] : no tendency for easy bruising

## 2023-04-18 NOTE — HISTORY OF PRESENT ILLNESS
[FreeTextEntry1] : Mrs. Tran is a 78 year old woman who presented to [facility] ED with syncope and 3 day history of neck pain on 3/4/2019. Patient was transferred to [facility] for further work up. CT showed a large SAH and CTA revealed an A-comm aneurysm. She then underwent a successful coiling of the A-comm aneurysm. Her hospital course was notable for C. diff diarrhea and treated with course of Flagyl and Vancomycin. On 3/18/2019 patient developed dyspnea. CTA chest revealed RUL PE. She was started on heparin gtt 3/18/19 and bridged to Coumadin. Her follow up angiogram in September 2019 showed persistent complete aneurysm occlusion with stable small neck remnant. However, significant coil mass configuration change with coil migration likely into thrombus was noted. 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 She underwent selective cerebral angiography and aneurysm retreatment embolization (ABEBA 21 stent) on 10/08/20. MRI/MRA post procedure: stent coiled left anterior communicating artery aneurysm with stent in the left A2 segment. Good intracranial flow using noninvasive flow MR angiography without significant change since 10/2/2020. No acute infarcts. MRA HEAD with NOVA reviewed today 02/23/21 by me: stable and no visible aneurysm remnant. She had a repeat MRA HEAD with NOVA 06/09/21: Coiled anterior communicating artery aneurysm with left A2 segment without significant change in intracranial flow compared with 2/23/2021. No acute infarcts. Follow up angiogram on 07/21: showed Interval complete occlusion of the previously embolized ruptured A-comm aneurysm. She comes in today for a follow up visit, reports doing well. She had a repeat MRA Head NOVA 04/17/23: Coiled left anterior communicating artery aneurysm with expected signal dropout embolic material without change since 6/9/2021 and good intracranial flow. She denies any interval TIA/Stroke symptoms.

## 2023-04-18 NOTE — DISCUSSION/SUMMARY
[FreeTextEntry1] : Mrs. Tran s a 78 year old woman now 4 years s/p SAH secondary to a ruptured ACOM aneurysm status post coil embolization with subsequent coil compaction/migration into thrombus with neck recanalization, now 2.5 years s/p successful retreatment embolization with a flow diverting stent from the left callosomarginal trunk into the left A1 segment. Previous follow-up angiogram and now MRA head show complete aneurysm occlusion.  We discussed that her aneurysm is cured and there is no need for any further follow-up imaging. She will continue  ASA 81 mg daily. I will see her again in 1 year. All of their questions and concerns were addressed.

## 2023-05-03 ENCOUNTER — OFFICE (OUTPATIENT)
Dept: URBAN - METROPOLITAN AREA CLINIC 90 | Facility: CLINIC | Age: 78
Setting detail: OPHTHALMOLOGY
End: 2023-05-03
Payer: MEDICARE

## 2023-05-03 DIAGNOSIS — H53.2: ICD-10-CM

## 2023-05-03 DIAGNOSIS — H50.311: ICD-10-CM

## 2023-05-03 PROCEDURE — 92060 SENSORIMOTOR EXAMINATION: CPT | Performed by: OPHTHALMOLOGY

## 2023-05-03 ASSESSMENT — AXIALLENGTH_DERIVED
OD_AL: 21.7996
OD_AL: 21.6756
OS_AL: 22.0471
OS_AL: 22.0471
OS_AL: 21.8783
OD_AL: 21.7996
OS_AL: 21.9624
OD_AL: 21.7996

## 2023-05-03 ASSESSMENT — REFRACTION_CURRENTRX
OD_VPRISM_DIRECTION: BF
OD_CYLINDER: SPH
OD_CYLINDER: SPH
OS_OVR_VA: 20/
OS_VPRISM_DIRECTION: BF
OD_OVR_VA: 20/
OS_ADD: +3.50
OD_ADD: +3.50
OS_CYLINDER: -0.75
OS_AXIS: 111
OD_OVR_VA: 20/
OD_ADD: +2.75
OS_CYLINDER: SPH
OS_AXIS: 180
OD_OVR_VA: 20/
OS_OVR_VA: 20/
OD_SPHERE: +4.25
OS_CYLINDER: 0.00
OS_VPRISM_DIRECTION: BF
OS_VPRISM_DIRECTION: BF
OD_SPHERE: +5.25
OS_ADD: +3.25
OD_CYLINDER: -1.25
OD_SPHERE: +4.25
OD_VPRISM_DIRECTION: BF
OD_AXIS: 109
OS_SPHERE: +4.50
OD_VPRISM_DIRECTION: BF
OS_SPHERE: +4.00
OS_OVR_VA: 20/
OD_ADD: +3.25
OS_SPHERE: +4.00
OS_ADD: +2.75
OD_AXIS: 180

## 2023-05-03 ASSESSMENT — SPHEQUIV_DERIVED
OS_SPHEQUIV: 4.625
OD_SPHEQUIV: 4.625
OS_SPHEQUIV: 4.375
OD_SPHEQUIV: 5
OD_SPHEQUIV: 4.625
OS_SPHEQUIV: 4.125
OD_SPHEQUIV: 4.625
OS_SPHEQUIV: 4.125

## 2023-05-03 ASSESSMENT — REFRACTION_MANIFEST
OD_SPHERE: +5.25
OD_ADD: +2.50
OD_AXIS: 105
OD_AXIS: 105
OS_HPRISM: 2
OD_CYLINDER: -1.25
OS_ADD: +2.50
OD_SPHERE: +5.25
OS_CYLINDER: -0.75
OS_ADD: +2.50
OS_VA2: 20/25(J1)
OS_SPHERE: +4.75
OS_VA2: 20/25(J1)
OD_AXIS: 105
OS_ADD: +2.50
OD_ADD: +2.50
OS_VA1: 20/20
OD_HPRISM: 2
OS_AXIS: 105
OD_VPRISM_DIRECTION: BO
OS_AXIS: 105
OS_VA1: 20/20-2
OD_VA1: 20/25-2
OD_VA1: 20/25-
OD_VA1: 20/25-
OS_SPHERE: +4.50
OS_VA2: 20/25(J1)
OD_SPHERE: +5.25
OD_CYLINDER: -1.25
OD_CYLINDER: -1.25
OD_VA2: 20/25(J1)
OS_CYLINDER: -0.75
OD_VA2: 20/25(J1)
OD_VA2: 20/25(J1)
OS_VA1: 20/20
OD_ADD: +2.50
OS_VPRISM_DIRECTION: BO
OS_CYLINDER: -0.75
OS_AXIS: 105
OS_SPHERE: +4.50

## 2023-05-03 ASSESSMENT — REFRACTION_AUTOREFRACTION
OD_SPHERE: +5.75
OD_AXIS: 108
OS_CYLINDER: -0.75
OD_CYLINDER: -1.50
OS_AXIS: 096
OS_SPHERE: +5.00

## 2023-05-03 ASSESSMENT — VISUAL ACUITY
OD_BCVA: 20/20-1
OS_BCVA: 20/30-1

## 2023-05-03 ASSESSMENT — KERATOMETRY
OS_AXISANGLE_DEGREES: 106
OD_AXISANGLE_DEGREES: 027
OD_K2POWER_DIOPTERS: 44.25
OS_K1POWER_DIOPTERS: 43.50
METHOD_AUTO_MANUAL: AUTO
OD_K1POWER_DIOPTERS: 43.50
OS_K2POWER_DIOPTERS: 43.75

## 2023-05-03 ASSESSMENT — CONFRONTATIONAL VISUAL FIELD TEST (CVF)
OD_FINDINGS: FULL
OS_FINDINGS: FULL

## 2023-06-21 NOTE — DIETITIAN INITIAL EVALUATION ADULT. - NS FNS WEIGHT CHANGE REASON
Patient identifiers verified and correct for Ms Taylor  C/C:  Decr sensarion to both lips SEE NN  APPEARANCE: awake and alert in NAD. PAIN  0/10  SKIN: warm, dry and intact. No breakdown or bruising.  MUSCULOSKELETAL: Patient moving all extremities spontaneously, no obvious swelling or deformities noted. Ambulates independently.  RESPIRATORY: Denies shortness of breath.Respirations unlabored.   CARDIAC: Denies CP, 2+ distal pulses; no peripheral edema  ABDOMEN: S/ND/NT, Denies nausea  : voids spontaneously, denies difficulty  Neurologic: AAO x 4; follows commands equal strength in all extremities; denies numbness/tingling. Denies dizziness  Denies new weaknes, denies headache, denies BLURRED VISION    
Patient states decr sensation to both lips and right foot great toe onset Monday,  
unintentional

## 2023-11-14 NOTE — ED ADULT NURSE NOTE - NS_NURSE_RECEIVING_PHYS_ED_ALL_ED_FT
GASTROENTEROLOGY PRE-PROCEDURE H&P NOTE  Patient Name: Edwin Burt  Patient MRN: [id number]  Patient : 1956    Service date: 2023    PCP: Obed Gil MD    No chief complaint on file.      HPI: Patient is a 67 y.o. 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     Past Medical History:  Past Medical History:   Diagnosis Date    Diabetes mellitus     Unspecified viral hepatitis C without hepatic coma         Past Surgical History:  Past Surgical History:   Procedure Laterality Date    COLONOSCOPY N/A 2023    Procedure: COLONOSCOPY;  Surgeon: Tex Woods MD;  Location: [facility];  Service: Endoscopy;  Laterality: N/A;    ESOPHAGOGASTRODUODENOSCOPY N/A 2023    Procedure: EGD (ESOPHAGOGASTRODUODENOSCOPY);  Surgeon: Tex Woods MD;  Location: [facility];  Service: Endoscopy;  Laterality: N/A;    INCISION AND DRAINAGE, TENDON SHEATH, HAND Right 10/9/2023    Procedure: INCISION AND DRAINAGE, TENDON SHEATH, HAND;  Surgeon: West Browning MD;  Location: [facility] OR [address] [place] [address];  Service: Orthopedics;  Laterality: Right;    TENOSYNOVECTOMY Right 10/9/2023    Procedure: TENOSYNOVECTOMY;  Surgeon: West Browning MD;  Location: [facility] OR [address] [place] [address];  Service: Orthopedics;  Laterality: Right;    WISDOM TOOTH EXTRACTION          Home Medications:  Medications Prior to Admission   Medication Sig Dispense Refill Last Dose    cyanocobalamin 500 MCG tablet Take 500 mcg by mouth once daily.       insulin aspart protamine-insulin aspart (NOVOLOG 70/30) 100 unit/mL (70-30) InPn pen Inject 20 Units into the skin 2 (two) times daily before meals.       pantoprazole (PROTONIX) 40 MG tablet Take 40 mg by mouth once daily.       vitamin D (VITAMIN D3) 1000 units Tab Take 1,000 Units by mouth once daily.                  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 No pertinent family history.    Review of Systems:  A 10 point review of systems was performed and was normal, except as mentioned in the HPI, including constitutional, HEENT, heme, lymph, cardiovascular, respiratory, gastrointestinal, genitourinary, neurologic, endocrine, psychiatric and musculoskeletal.      OBJECTIVE:    Physical Exam:  24 Hour Vital Sign Ranges: Temp:  [97.7 °F (36.5 °C)] 97.7 °F (36.5 °C)  Pulse:  [81] 81  Resp:  [17] 17  SpO2:  [99 %] 99 %  BP: (161)/(78) 161/78  Most recent vitals: BP (!) 161/78   Pulse 81   Temp 97.7 °F (36.5 °C)   Resp 17   Ht 5' 10" (1.778 m)   Wt 59 kg (130 lb)   SpO2 99%   BMI 18.65 kg/m²    GEN: well-developed, well-nourished, awake and alert, non-toxic appearing adult  HEENT: PERRL, sclera anicteric, oral mucosa pink and moist without lesion  NECK: trachea midline; Good ROM  CV: regular rate and rhythm, no murmurs or gallops  RESP: clear to auscultation bilaterally, no wheezes, rhonci or rales  ABD: soft, non-tender, non-distended, normal bowel sounds  EXT: no swelling or edema, 2+ pulses distally  SKIN: no rashes or jaundice  PSYCH: normal affect    Labs:   No results for input(s): "WBC", "MCV", "PLT" in the last 72 hours.    Invalid input(s): "HGBAU"  No results for input(s): "NA", "K", "CL", "CO2", "BUN", "GLU" in the last 72 hours.    Invalid input(s): "CREA"  No results for input(s): "ALB" in the last 72 hours.    Invalid input(s): "ALKP", "SGOT", "SGPT", "TBIL", "DBIL", "TPRO"  No results for input(s): "PT", "INR", "PTT" in the last 72 hours.      IMPRESSION / RECOMMENDATIONS:  EGD  with interventions as warranted.   RIsks, benefits, alternatives discussed in detail regarding " upcoming procedures and sedation. Some of the more common endoscopic complications include but not limited to immediate or delayed perforation, bleeding, infections, pain, inadvertent injury to surrounding tissue / organs and possible need for surgical evaluation. Patient expressed understanding, all questions answered and will proceed with procedure as planned.     Errol Hollins III  11/14/2023  8:02 AM       javi

## 2024-01-01 NOTE — H&P ADULT - NSICDXPASTMEDICALHX_GEN_ALL_CORE_FT
(2) more than 100 beats/min
PAST MEDICAL HISTORY:  Clostridium difficile diarrhea     Dyslipidemia     H/O spont subarachnoid intracranial hemorrhage d/t cerebral aneurysm     HTN (Hypertension)     Urinary retention     Urinary tract infection

## 2024-01-30 NOTE — PHYSICAL EXAM
[General Appearance - Alert] : alert [General Appearance - Well Nourished] : well nourished [General Appearance - Well Developed] : well developed [Oriented To Time, Place, And Person] : oriented to person, place, and time [Affect] : the affect was normal [Mood] : the mood was normal [Person] : oriented to person [Place] : oriented to place [Time] : oriented to time [Concentration Intact] : normal concentrating ability [Visual Intact] : visual attention was ~T not ~L decreased [Naming Objects] : no difficulty naming common objects [Writing A Sentence] : no difficulty writing a sentence [Repeating Phrases] : no difficulty repeating a phrase [Fluency] : fluency intact [Comprehension] : comprehension intact [Reading] : reading intact [Past History] : adequate knowledge of personal past history [Cranial Nerves Optic (II)] : visual acuity intact bilaterally,  visual fields full to confrontation, pupils equal round and reactive to light [Cranial Nerves Oculomotor (III)] : extraocular motion intact [Cranial Nerves Facial (VII)] : face symmetrical [Cranial Nerves Trigeminal (V)] : facial sensation intact symmetrically [Cranial Nerves Glossopharyngeal (IX)] : tongue and palate midline [Cranial Nerves Accessory (XI - Cranial And Spinal)] : head turning and shoulder shrug symmetric [Cranial Nerves Vestibulocochlear (VIII)] : hearing was intact bilaterally [Motor Tone] : muscle tone was normal in all four extremities [Cranial Nerves Hypoglossal (XII)] : there was no tongue deviation with protrusion [Motor Strength] : muscle strength was normal in all four extremities [Motor Handedness Right-Handed] : the patient is right hand dominant [No Muscle Atrophy] : normal bulk in all four extremities [Sensation Tactile Decrease] : light touch was intact [Balance] : balance was intact [Extraocular Movements] : extraocular movements were intact [Full Visual Field] : full visual field [Neck Appearance] : the appearance of the neck was normal [Hearing Threshold Finger Rub Not [place]] : hearing was normal [Edema] : there was no peripheral edema [Heart Rate And Rhythm] : heart rate was normal and rhythm regular Yes [] : no respiratory distress [Abnormal Walk] : normal gait [Abdomen Soft] : soft [Involuntary Movements] : no involuntary movements were seen [Skin Color & Pigmentation] : normal skin color and pigmentation [Skin Turgor] : normal skin turgor [Paresis Pronator Drift Right-Sided] : no pronator drift on the right [Paresis Pronator Drift Left-Sided] : no pronator drift on the left [Motor Strength Upper Extremities Bilaterally] : strength was normal in both upper extremities [Motor Strength Lower Extremities Bilaterally] : strength was normal in both lower extremities [Dysdiadochokinesia Bilaterally] : not present [Coordination - Dysmetria Impaired Finger-to-Nose Bilateral] : not present

## 2024-02-13 NOTE — ED ADULT NURSE REASSESSMENT NOTE - NS ED NURSE REASSESS COMMENT FT1
Pt remains a&0x4, VS as noted, being prepared for transfer to [place].
VS as noted. as per Dr. Mercado and BP parameters, the nicardipine infusion is being stopped at this time
received pt from VEENA Haji. pt a&0x4, ambulating. Pt currently on nicardipine drip with VS as noted. pt reports a "slight headache" with pain 3/10 but otherwise comfortable.  Pt denies any chest pain, SOB, dizziness, weakness at this time. No bruising on pt. Pt lung sounds clear in all fields, abdomen non tender with BS present. Radial pulses equal with equal  strength. 20g IV in right AC and left hand.
stated

## 2024-03-18 ENCOUNTER — APPOINTMENT (OUTPATIENT)
Dept: UROLOGY | Facility: CLINIC | Age: 79
End: 2024-03-18
Payer: MEDICARE

## 2024-03-18 ENCOUNTER — OUTPATIENT (OUTPATIENT)
Dept: OUTPATIENT SERVICES | Facility: HOSPITAL | Age: 79
LOS: 1 days | End: 2024-03-18
Payer: MEDICARE

## 2024-03-18 DIAGNOSIS — R35.0 FREQUENCY OF MICTURITION: ICD-10-CM

## 2024-03-18 DIAGNOSIS — Z98.890 OTHER SPECIFIED POSTPROCEDURAL STATES: Chronic | ICD-10-CM

## 2024-03-18 PROCEDURE — 99213 OFFICE O/P EST LOW 20 MIN: CPT

## 2024-03-18 PROCEDURE — G2211 COMPLEX E/M VISIT ADD ON: CPT

## 2024-03-18 PROCEDURE — 76775 US EXAM ABDO BACK WALL LIM: CPT | Mod: 26

## 2024-03-18 PROCEDURE — 76775 US EXAM ABDO BACK WALL LIM: CPT

## 2024-03-19 DIAGNOSIS — N20.0 CALCULUS OF KIDNEY: ICD-10-CM

## 2024-03-19 DIAGNOSIS — R31.0 GROSS HEMATURIA: ICD-10-CM

## 2024-03-19 NOTE — HISTORY OF PRESENT ILLNESS
[FreeTextEntry1] : Follow-up kidney stone.  Patient history of uric acid stone.  Patient did not take potassium citrate because of concerns that she has a brain aneurysm.  I personally reviewed the ultrasound scan with patient today. Ultrasound demonstrated bilateral nonobstructing kidney stones.  Reassured patient.  Stone diet.  Encourage patient to continue resume potassium citrate.  Follow-up 1 year for ultrasound kidney.  Please refer to URO Consult note

## 2024-03-19 NOTE — LETTER BODY
[FreeTextEntry1] : Aquiles Pavon MD [address] [place], [place] [id number] [phone]  Dear Dr. Pavon,  Reason for visit: Bilateral renal calculi. Bacterial UTI. Right flank pain.    This is a 79 year-old retired female with history of brain aneurysm, breast cancer and uric acid stones, presenting with bilateral renal calculi and recurrent UTI. She has a history of brain aneurysms and has previously undergone stent placement. Her previous stone analysis in 2017 demonstrated uric acid stones. The patient's last renal ultrasound in October 2021 demonstrated persistent bilateral renal stones. She previously experienced right flank pain rated 3/10. She returns today for follow-up. Patient did not take potassium citrate because of concerns that she has a brain aneurysm. Ultrasound demonstrated bilateral nonobstructing kidney stones. The past medical history, family history and social history are unchanged. All other review of systems are negative. Patient denies any changes in medications. Medication list was reconciled. She is on Plavix and Aspirin.    On examination, the patient is an older appearing woman in no acute distress. She is alert and oriented follows commands. She has normal mood and affect. She is normocephalic. Neck is supple. Respirations are unlabored. Abdomen is soft and nontender. Bladder is nonpalpable. No CVA tenderness. Neurologically she is grossly intact. No peripheral edema. Skin without gross abnormality.    Her BMP from March 9, 2023 demonstrated normal kidney function, creatinine 1.05.  I personally reviewed the ultrasound scan with patient today. Ultrasound demonstrated moderate bilateral nonobstructing kidney stones.  There is no evidence hydronephrosis.   Assessment: Bilateral renal calculi. Bacterial UTI.    I counseled the patient. In terms of her history of uric acid stones, I recommended that she resume potassium citrate. Ultrasound demonstrated bilateral nonobstructing kidney stones. I reassured patient. I encouraged patient to maintain a low-protein low-sodium diet. I also encouraged hydration to prevent stone formation. I encouraged the patient to follow up in 1 year for renal ultrasound. Patient understands that if she develops gross hematuria or any urinary discomfort, she will contact me for further evaluation. Risks and alternatives were discussed. I answered the patient questions. The patient will follow-up as directed and will contact me with any questions or concerns. Thank you for the opportunity to participate in the care of Ms. DUPREE. I will keep you updated on her progress.  Plan: Resume potassium citrate. Stone diet. Hydration. Follow up in 1 year for renal ultrasound.

## 2024-03-19 NOTE — ADDENDUM
[FreeTextEntry1] : Entered by Adela Gomez, acting as scribe for Dr. Johnny Burrows. The documentation recorded by the scribe accurately reflects the service I personally performed and the decisions made by me.

## 2024-05-17 NOTE — H&P ADULT - [facility]
Diagnosis:   - Primary osteoarthritis of left hip (M16.12)  - Left hip pain (M25.552)        Referring Provider: Maria L Erickson  Date of Evaluation:    5/7/2024    Precautions:    just had cataracts surgery 5/1 - unable to lie prone, lift heavy or valsalva's maneuver  Next MD visit:   none scheduled  Date of Surgery:   n/a   Insurance Primary/Secondary: [employer] [place] / N/A     # Auth Visits: no auth            Subjective: pt arrives to PT today and states her hip is feeling a little better but she is not sure why.  She states her quad feels less tight.      Pain:   2/10      Objective:   L peroneals TTP      Initial eval objectives:  Observation: reduced LLE WB in standing  Palpation: generalized TTP through adductors, quad, TLF, glutes   Sensation: -    PROM: (* denotes performed with pain)  Hip   Flexion: 100*  Extension: L NT   Abduction: L 40*  ER: L 60 deg*  IR: L 25 deg*     Accessory motion: +relief with hip distraction    Flexibility:  Hip Flexor: L limited  Piriformis: L limited  Quads: L limited    Strength/MMT: (* denotes performed with pain) held due to contraindication to exert force after cataract surgery 6 days ago; will assess at a later date    Special tests: none    Gait: pt ambulates on level ground with SP cane, leg kept in OPP by weight shift through hips/trunk   Balance: SLS: R NT sec, L NT sec    Assessment:   Pt tolerated PT tx well today.  Tenderness in peroneals likely d/t gait compensations.  Progressed and added strengthening exercises as tolerated.         Goals:   (to be met in 10 visits)  Pt will have improved hip PROM Flex to 110 deg and ABD to 50 deg to be able to don/doff shoes and perform car transfers without difficulty   Pt will improve hip ABD and ER strength to at least 4/5 to increase ease with standing and walking   Pt will be able to squat to  light objects around the house with <3/10 hip pain   Pt will improve functional hip strength to report ability to ascend/descend 1  flight of stairs reciprocally without use of handrail   Pt will demonstrate improved SLS to >10 seconds MARTIN to promote safety and decrease risk of falls on uneven surfaces such as grass and gravel   Pt will be independent and compliant with comprehensive HEP to maintain progress achieved in PT     Plan: Next visit consider - dry needling  Date: 5/17/2024  TX#: 2/10 (poc) Date:                 TX#: 3/10 (poc) Date:                 TX#: 4/10 (poc) Date:                 TX#: 5/10 (poc) Date:   Tx#: 6/10 (poc)   Nustep - S10 - L1 - 5'  Bridges w/ BTB x20  BTB supine clams x20  Light pilates ring adduction in hooklying x20  L standing march x20 @ bar  L standing hip abd @ bar 3x10  @ bar partial squats x10                     Manual: 15'  - Lateral hip distraction w/ mulligan belt   - Long axis traction       HEP:   Access Code: [email or web address]  URL: [email or web address]  Date: 05/07/2024  Prepared by: Brigitte Mena    Exercises  - Hooklying Isometric Hip Abduction with Belt  - 2 x daily - 7 x weekly - 20 reps - 5\" hold  - Supine Hip Adduction Isometric with Ball  - 2 x daily - 7 x weekly - 20 reps - 5\" hold  - Supine Lower Trunk Rotation  - 2 x daily - 7 x weekly - 20 reps  - Hooklying Gluteal Sets  - 2 x daily - 7 x weekly - 20 reps - 5\" hold    Charges: 1 manual, 2 therex       Total Timed Treatment: 40 min  Total Treatment Time: 40 min   regular rate and rhythm/no rub

## 2024-06-22 ENCOUNTER — NON-APPOINTMENT (OUTPATIENT)
Age: 79
End: 2024-06-22

## 2024-06-24 ENCOUNTER — OFFICE (OUTPATIENT)
Dept: URBAN - METROPOLITAN AREA CLINIC 90 | Facility: CLINIC | Age: 79
Setting detail: OPHTHALMOLOGY
End: 2024-06-24
Payer: MEDICARE

## 2024-06-24 DIAGNOSIS — E78.01: ICD-10-CM

## 2024-06-24 DIAGNOSIS — I67.1: ICD-10-CM

## 2024-06-24 DIAGNOSIS — H25.13: ICD-10-CM

## 2024-06-24 DIAGNOSIS — D31.32: ICD-10-CM

## 2024-06-24 DIAGNOSIS — H53.2: ICD-10-CM

## 2024-06-24 DIAGNOSIS — D31.31: ICD-10-CM

## 2024-06-24 PROCEDURE — 92014 COMPRE OPH EXAM EST PT 1/>: CPT | Performed by: OPHTHALMOLOGY

## 2024-06-24 ASSESSMENT — CONFRONTATIONAL VISUAL FIELD TEST (CVF)
OD_FINDINGS: FULL
OS_FINDINGS: FULL

## 2024-09-05 NOTE — PROGRESS NOTE ADULT - SUBJECTIVE AND OBJECTIVE BOX
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 The patient awoke yesterday (3/3) evening with a "sharp, stabbing" headache. Upon standing up, she lost consciousness for about 2 minutes, as per , after which she regained consciousness and returned to baseline. The patient was transferred to ED by EMS, found to have a SAH, and was transferred to [facility] for further management. CTA demonstrated 2.4x4.8x3.3mm AComm aneurysm. Patient was brought to IR for balloon-assisted coiling with two coils. Patient tolerated the procedure well, with no change in neurologic status from baseline, remaining neuro-intact.     ADMISSION SCORES:   GCS: 15 NIHSS: 0   HH 1 mF 3    HOSPITAL COURSE:  3/4: s/p coiling of ACOMM aneurysm    OVERNIGHT EVENTS: none.    VITALS/DATA/ORDERS [x] Reviewed    ALLERGIES: Allergies  No Known Allergies    EXAMINATION:  General: No acute distress  HEENT: Anicteric sclerae  Cardiac: [id number]  Lungs: Clear  Abdomen: Soft, non-tender, +BS  Extremities: No c/c/e.   Skin/Incision Site: Clean, dry and intact  Neurologic: Awake, alert, fully oriented, follows commands, PERRL, EOMI, face symmetric, tongue midline, no drift, full strength SUMMARY: Patient is a 74 year old right handed woman with a PMH of HTN controlled on two medications and HLD who presented with syncope, found to have 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 The patient awoke yesterday (3/3) evening with a "sharp, stabbing" headache. Upon standing up, she lost consciousness for about 2 minutes, as per , after which she regained consciousness and returned to baseline. The patient was transferred to ED by EMS, found to have a SAH, and was transferred to [facility] for further management. CTA demonstrated 2.4x4.8x3.3mm AComm aneurysm. Patient was brought to IR for balloon-assisted coiling with two coils. Patient tolerated the procedure well, with no change in neurologic status from baseline, remaining neuro-intact.     ADMISSION SCORES:   GCS: 15 NIHSS: 0   HH 1 mF 3    HOSPITAL COURSE:  3/4: s/p coiling of ACOMM aneurysm    OVERNIGHT EVENTS: on 2%    VITALS/DATA/ORDERS [x] Reviewed    ALLERGIES: Allergies  No Known Allergies    EXAMINATION:  General: No acute distress  HEENT: Anicteric sclerae  Cardiac: [id number]  Lungs: Clear  Abdomen: Soft, non-tender, +BS  Extremities: No c/c/e.   Skin/Incision Site: Clean, dry and intact  Neurologic: Awake, alert, fully oriented, follows commands, PERRL, EOMI, face symmetric, tongue midline, no drift, full strength 97

## 2024-09-10 NOTE — ASU PATIENT PROFILE, ADULT - NS PRO TALK SOMEONE YN
Bedside report received from off going RN/tech: More, assumed care of patient.   Overnight Events: No overnight events per night RN. Discussed with pt and family at bedside about discharge today. Both are hesitant, stating they do not feel ready. Daughter at bedside states her mother has been complaining of dysuria all morning.  Both would like to speak with the MD prior to DC   A&O x 4  Oxygen: Room Air 2 L at night per report  SBP Ranged: 1 teen's-150's  Patient on cardiac monitor: Yes AFIB 40's -50's  IVF/IV medications: Not Applicable   Fall Risk Score: MODERATE FALL RISK  Fall risk interventions in place: Place yellow fall risk ID band on patient, Provide patient/family education based on risk assessment, Educate patient/family to call staff for assistance when getting out of bed, Place fall precaution signage outside patient door, Place patient in room close to nursing station, Utilize bed/chair fall alarm, Notify charge of high risk for huddle, and Bed alarm connected correctly  Bed type: Regular (Abiel Score less than 17 interventions in place)  Bedside sitter: Not Applicable   Isolation: Not applicable    no

## 2024-10-31 ENCOUNTER — OUTPATIENT (OUTPATIENT)
Dept: OUTPATIENT SERVICES | Facility: HOSPITAL | Age: 79
LOS: 1 days | End: 2024-10-31
Payer: MEDICARE

## 2024-10-31 ENCOUNTER — APPOINTMENT (OUTPATIENT)
Dept: UROLOGY | Facility: CLINIC | Age: 79
End: 2024-10-31
Payer: MEDICARE

## 2024-10-31 DIAGNOSIS — Z98.890 OTHER SPECIFIED POSTPROCEDURAL STATES: Chronic | ICD-10-CM

## 2024-10-31 DIAGNOSIS — N20.0 CALCULUS OF KIDNEY: ICD-10-CM

## 2024-10-31 DIAGNOSIS — R35.0 FREQUENCY OF MICTURITION: ICD-10-CM

## 2024-10-31 PROCEDURE — G2211 COMPLEX E/M VISIT ADD ON: CPT

## 2024-10-31 PROCEDURE — 76775 US EXAM ABDO BACK WALL LIM: CPT | Mod: 26

## 2024-10-31 PROCEDURE — 99214 OFFICE O/P EST MOD 30 MIN: CPT

## 2024-10-31 RX ORDER — POTASSIUM CITRATE 15 MEQ/1
15 MEQ TABLET, EXTENDED RELEASE ORAL
Qty: 180 | Refills: 3 | Status: ACTIVE | COMMUNITY
Start: 2024-10-31 | End: 1900-01-01

## 2024-11-01 DIAGNOSIS — N20.0 CALCULUS OF KIDNEY: ICD-10-CM

## 2024-11-20 PROCEDURE — 76775 US EXAM ABDO BACK WALL LIM: CPT

## 2024-12-31 NOTE — DISCHARGE NOTE PROVIDER - HOSPITAL COURSE
Addendum  created 12/31/24 0954 by PRAKASH Valdes-CRNA    Device data filed, Intraprocedure Devices edited       74 yr old Female c PMHx of HTN on ASA (? indication) presents to ED s/p syncope and 3 day hx of neck pain on 3/4/19. Admitted to [facility] where admission workup revealed large SAH on CT brain.  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 Hospital course notable for cdiff diarrhea evaluated by ID and treated with course of Flagyl and Vancomycin; also urinary retention (youssef was reinserted on 3/26/19), failed multiple TOVs, concurrent E coli UTI was discovered on 3/27/19 and treated with fosfomycin. On 3/18/19 patient developed dyspnea. CTA chest revealed RUL PE. Started on heparin gtt 3/18/19 and bridged to coumadin. Pt was deemed stable for acute rehab on 3/29.    On 4/7, RN notified resident on call to assess patient for elevated axillary temperature of ~102. Pt's vitals were:     T(C): 39.5 (04-07-19 @ 12:30)    T(F): 103.1 (04-07-19 @ 12:30), Max: 103.1 (04-07-19 @ 12:30)  -rectally    HR: 117 (04-07-19 @ 12:30) (82 - 117)    BP: 139/89 (04-07-19 @ 12:30) (105/68 - 145/89)    RR:  (16 - 18)    SpO2:  (90% - 97%)    Wt(kg): --    Resident on call notified hospitalist on call, Dr. G Reyes and sepsis workup was ordered (CBC, BMP, CXR, UA, UCx, Procalcitonin, Lactate, BCx x2).    Pt started to become hypoxic and RRT was called at 1307.    Pt was started on IVF, ABx, and Youssef was d/c'ed.    ID c/s placed and spoke with Dr. Irvin (ID on call). Recommended 1 dose of IV vanco 1500mg, and PO vanco 250mg q6 hours. Will see patient in the AM     At ~ 1950: Notified Hospitalist on-call, Dr. Tran, that patient's current VS: 102.7 F, , /63. Dr. Tran recommended to continue maintenance IVF and ABx for now, Motrin 600mg x1 dose for fever, and will assess patient.        Although temp stabilized after Motrin 600mg x1, pt remained hypotensive SBP ~80 despite IVF and Dr. Tran recommend patient to be xfer to ICU.    Dr. Byrd notified about acute transfer and pt's  present at bedside agrees with the plan.

## 2025-03-13 ENCOUNTER — APPOINTMENT (OUTPATIENT)
Dept: UROLOGY | Facility: CLINIC | Age: 80
End: 2025-03-13

## 2025-04-03 ENCOUNTER — OFFICE (OUTPATIENT)
Facility: LOCATION | Age: 80
Setting detail: OPHTHALMOLOGY
End: 2025-04-03
Payer: MEDICARE

## 2025-04-03 DIAGNOSIS — H40.033: ICD-10-CM

## 2025-04-03 DIAGNOSIS — H25.13: ICD-10-CM

## 2025-04-03 DIAGNOSIS — H53.2: ICD-10-CM

## 2025-04-03 DIAGNOSIS — D31.32: ICD-10-CM

## 2025-04-03 DIAGNOSIS — D31.31: ICD-10-CM

## 2025-04-03 DIAGNOSIS — E78.01: ICD-10-CM

## 2025-04-03 DIAGNOSIS — I67.1: ICD-10-CM

## 2025-04-03 PROCEDURE — 92020 GONIOSCOPY: CPT | Performed by: OPHTHALMOLOGY

## 2025-04-03 PROCEDURE — 92014 COMPRE OPH EXAM EST PT 1/>: CPT | Performed by: OPHTHALMOLOGY

## 2025-04-03 ASSESSMENT — REFRACTION_MANIFEST
OD_VA1: 20/25-
OS_VA2: 20/25(J1)
OD_CYLINDER: -1.25
OS_VA2: 20/25(J1)
OD_SPHERE: +5.25
OD_ADD: +2.50
OD_ADD: +2.50
OD_SPHERE: +5.25
OD_VA2: 20/25(J1)
OD_VA1: 20/25-
OS_CYLINDER: -0.75
OS_SPHERE: +4.75
OS_CYLINDER: -0.75
OS_ADD: +2.50
OD_CYLINDER: -1.25
OD_VA2: 20/25(J1)
OS_AXIS: 105
OD_AXIS: 105
OS_CYLINDER: -0.75
OD_VPRISM_DIRECTION: BO
OS_VA2: 20/25(J1)
OS_SPHERE: +4.50
OD_AXIS: 105
OS_VA1: 20/20
OD_HPRISM: 2
OS_SPHERE: +4.50
OS_HPRISM: 2
OS_VA1: 20/20-2
OS_VA1: 20/20
OS_AXIS: 105
OD_VA1: 20/25-2
OS_ADD: +2.50
OD_VA2: 20/25(J1)
OS_AXIS: 105
OD_SPHERE: +5.25
OD_CYLINDER: -1.25
OD_AXIS: 105
OS_ADD: +2.50
OS_VPRISM_DIRECTION: BO
OD_ADD: +2.50

## 2025-04-03 ASSESSMENT — REFRACTION_CURRENTRX
OD_AXIS: 109
OD_SPHERE: +4.25
OS_ADD: +3.25
OS_OVR_VA: 20/
OS_ADD: +2.75
OD_ADD: +2.75
OD_SPHERE: +5.25
OD_CYLINDER: SPH
OD_CYLINDER: -1.25
OS_ADD: +2.75
OS_ADD: +3.50
OS_OVR_VA: 20/
OS_AXIS: 111
OD_VPRISM_DIRECTION: BF
OD_OVR_VA: 20/
OD_AXIS: 106
OS_VPRISM_DIRECTION: BF
OS_AXIS: 108
OD_ADD: +3.50
OD_VPRISM_DIRECTION: BF
OS_CYLINDER: -0.75
OS_OVR_VA: 20/
OD_SPHERE: +4.25
OD_ADD: +3.25
OS_CYLINDER: SPH
OS_VPRISM_DIRECTION: BF
OS_SPHERE: +4.00
OS_CYLINDER: 0.00
OD_OVR_VA: 20/
OD_OVR_VA: 20/
OS_AXIS: 180
OD_VPRISM_DIRECTION: BF
OD_SPHERE: +5.25
OS_VPRISM_DIRECTION: BF
OS_SPHERE: +4.00
OD_ADD: +2.75
OD_CYLINDER: -1.25
OD_CYLINDER: SPH
OD_AXIS: 180
OS_SPHERE: +4.50
OS_CYLINDER: -0.75
OS_SPHERE: +4.50

## 2025-04-03 ASSESSMENT — CONFRONTATIONAL VISUAL FIELD TEST (CVF)
OS_FINDINGS: FULL
OD_FINDINGS: FULL

## 2025-04-03 ASSESSMENT — KERATOMETRY
OD_K2POWER_DIOPTERS: 44.25
OS_K1POWER_DIOPTERS: 43.50
OS_K2POWER_DIOPTERS: 44.00
OD_K1POWER_DIOPTERS: 43.50
OS_AXISANGLE_DEGREES: 135
METHOD_AUTO_MANUAL: AUTO
OD_AXISANGLE_DEGREES: 027

## 2025-04-03 ASSESSMENT — VISUAL ACUITY
OS_BCVA: 20/25-2
OD_BCVA: 20/25+2

## 2025-04-03 ASSESSMENT — REFRACTION_AUTOREFRACTION
OD_AXIS: 103
OS_CYLINDER: -1.00
OS_AXIS: 075
OS_SPHERE: +5.00
OD_SPHERE: +5.50
OD_CYLINDER: -1.50

## 2025-04-03 ASSESSMENT — TONOMETRY
OD_IOP_MMHG: 17
OS_IOP_MMHG: 16

## 2025-07-15 NOTE — PHYSICAL THERAPY INITIAL EVALUATION ADULT - ADDITIONAL COMMENTS
pt states she lives in [facility] with spouse with a flight of steps to enter (+handrail). Pt states prior to admission being independent with all functional mobility and ADLs. pt states she owns a RW, straight cane, and commode from previous hospitalization.
Family

## 2025-08-28 ENCOUNTER — RESULT REVIEW (OUTPATIENT)
Age: 80
End: 2025-08-28

## 2025-08-28 ENCOUNTER — OUTPATIENT (OUTPATIENT)
Dept: OUTPATIENT SERVICES | Facility: HOSPITAL | Age: 80
LOS: 1 days | End: 2025-08-28
Payer: MEDICARE

## 2025-08-28 ENCOUNTER — APPOINTMENT (OUTPATIENT)
Dept: UROLOGY | Facility: CLINIC | Age: 80
End: 2025-08-28
Payer: MEDICARE

## 2025-08-28 DIAGNOSIS — R35.0 FREQUENCY OF MICTURITION: ICD-10-CM

## 2025-08-28 DIAGNOSIS — N39.0 URINARY TRACT INFECTION, SITE NOT SPECIFIED: ICD-10-CM

## 2025-08-28 DIAGNOSIS — Z98.890 OTHER SPECIFIED POSTPROCEDURAL STATES: Chronic | ICD-10-CM

## 2025-08-28 DIAGNOSIS — I72.9 ANEURYSM OF UNSPECIFIED SITE: ICD-10-CM

## 2025-08-28 DIAGNOSIS — R31.0 GROSS HEMATURIA: ICD-10-CM

## 2025-08-28 DIAGNOSIS — N20.0 CALCULUS OF KIDNEY: ICD-10-CM

## 2025-08-28 DIAGNOSIS — R93.422 ABNORMAL RADIOLOGIC FINDINGS ON DIAGNOSITIC IMAGING OF LEFT KIDNEY: ICD-10-CM

## 2025-08-28 PROCEDURE — 99214 OFFICE O/P EST MOD 30 MIN: CPT

## 2025-08-28 PROCEDURE — 76775 US EXAM ABDO BACK WALL LIM: CPT | Mod: 26

## 2025-08-28 PROCEDURE — G2211 COMPLEX E/M VISIT ADD ON: CPT

## 2025-08-28 PROCEDURE — 76775 US EXAM ABDO BACK WALL LIM: CPT

## 2025-08-28 RX ORDER — POTASSIUM CITRATE AND CITRIC ACID 334; 1100 MG/5ML; MG/5ML
1100-334 SOLUTION ORAL
Qty: 1 | Refills: 3 | Status: ACTIVE | COMMUNITY
Start: 2025-08-28 | End: 1900-01-01

## 2025-08-29 DIAGNOSIS — N20.0 CALCULUS OF KIDNEY: ICD-10-CM

## 2025-08-29 DIAGNOSIS — N39.0 URINARY TRACT INFECTION, SITE NOT SPECIFIED: ICD-10-CM

## 2025-08-29 DIAGNOSIS — R93.422 ABNORMAL RADIOLOGIC FINDINGS ON DIAGNOSTIC IMAGING OF LEFT KIDNEY: ICD-10-CM

## 2025-08-29 DIAGNOSIS — I72.9 ANEURYSM OF UNSPECIFIED SITE: ICD-10-CM

## 2025-08-29 DIAGNOSIS — R31.0 GROSS HEMATURIA: ICD-10-CM

## 2025-08-29 LAB
APPEARANCE: CLEAR
BACTERIA: ABNORMAL /HPF
BILIRUBIN URINE: NEGATIVE
BLOOD URINE: NEGATIVE
CAST: 0 /LPF
COLOR: YELLOW
EPITHELIAL CELLS: 9 /HPF
GLUCOSE QUALITATIVE U: NEGATIVE MG/DL
KETONES URINE: NEGATIVE MG/DL
LEUKOCYTE ESTERASE URINE: ABNORMAL
MICROSCOPIC-UA: NORMAL
NITRITE URINE: NEGATIVE
PH URINE: 5.5
PROTEIN URINE: NEGATIVE MG/DL
RED BLOOD CELLS URINE: 1 /HPF
SPECIFIC GRAVITY URINE: 1.02
UROBILINOGEN URINE: 0.2 MG/DL
WHITE BLOOD CELLS URINE: 27 /HPF

## 2025-08-30 LAB
BACTERIA UR CULT: NORMAL
URINE CYTOLOGY: NORMAL

## 2025-09-03 ENCOUNTER — OUTPATIENT (OUTPATIENT)
Dept: OUTPATIENT SERVICES | Facility: HOSPITAL | Age: 80
LOS: 1 days | End: 2025-09-03
Payer: MEDICARE

## 2025-09-03 ENCOUNTER — APPOINTMENT (OUTPATIENT)
Dept: CT IMAGING | Facility: IMAGING CENTER | Age: 80
End: 2025-09-03
Payer: MEDICARE

## 2025-09-03 DIAGNOSIS — I72.9 ANEURYSM OF UNSPECIFIED SITE: ICD-10-CM

## 2025-09-03 DIAGNOSIS — Z98.890 OTHER SPECIFIED POSTPROCEDURAL STATES: Chronic | ICD-10-CM

## 2025-09-03 DIAGNOSIS — R93.422 ABNORMAL RADIOLOGIC FINDINGS ON DIAGNOSTIC IMAGING OF LEFT KIDNEY: ICD-10-CM

## 2025-09-03 DIAGNOSIS — N20.0 CALCULUS OF KIDNEY: ICD-10-CM

## 2025-09-03 PROCEDURE — 74176 CT ABD & PELVIS W/O CONTRAST: CPT | Mod: 26

## 2025-09-03 PROCEDURE — 74176 CT ABD & PELVIS W/O CONTRAST: CPT
